# Patient Record
Sex: MALE | Race: BLACK OR AFRICAN AMERICAN | NOT HISPANIC OR LATINO
[De-identification: names, ages, dates, MRNs, and addresses within clinical notes are randomized per-mention and may not be internally consistent; named-entity substitution may affect disease eponyms.]

---

## 2017-04-12 ENCOUNTER — FORM ENCOUNTER (OUTPATIENT)
Age: 77
End: 2017-04-12

## 2017-04-13 ENCOUNTER — APPOINTMENT (OUTPATIENT)
Dept: CT IMAGING | Facility: CLINIC | Age: 77
End: 2017-04-13

## 2017-04-13 ENCOUNTER — OUTPATIENT (OUTPATIENT)
Dept: OUTPATIENT SERVICES | Facility: HOSPITAL | Age: 77
LOS: 1 days | End: 2017-04-13
Payer: MEDICARE

## 2017-04-13 DIAGNOSIS — Z00.8 ENCOUNTER FOR OTHER GENERAL EXAMINATION: ICD-10-CM

## 2017-04-13 PROCEDURE — 70490 CT SOFT TISSUE NECK W/O DYE: CPT

## 2017-04-27 ENCOUNTER — APPOINTMENT (OUTPATIENT)
Dept: SURGERY | Facility: CLINIC | Age: 77
End: 2017-04-27

## 2017-10-31 ENCOUNTER — APPOINTMENT (OUTPATIENT)
Dept: SURGERY | Facility: CLINIC | Age: 77
End: 2017-10-31
Payer: MEDICARE

## 2017-10-31 PROCEDURE — 99213 OFFICE O/P EST LOW 20 MIN: CPT

## 2017-10-31 RX ORDER — TIZANIDINE 4 MG/1
4 TABLET ORAL
Qty: 60 | Refills: 0 | Status: ACTIVE | COMMUNITY
Start: 2017-07-29

## 2017-10-31 RX ORDER — CYCLOBENZAPRINE HYDROCHLORIDE 5 MG/1
5 TABLET, FILM COATED ORAL
Qty: 30 | Refills: 0 | Status: ACTIVE | COMMUNITY
Start: 2017-07-05

## 2018-03-15 ENCOUNTER — APPOINTMENT (OUTPATIENT)
Dept: GASTROENTEROLOGY | Facility: CLINIC | Age: 78
End: 2018-03-15
Payer: MEDICARE

## 2018-03-15 VITALS
DIASTOLIC BLOOD PRESSURE: 88 MMHG | HEART RATE: 102 BPM | WEIGHT: 224 LBS | HEIGHT: 74 IN | BODY MASS INDEX: 28.75 KG/M2 | SYSTOLIC BLOOD PRESSURE: 146 MMHG

## 2018-03-15 DIAGNOSIS — R19.5 OTHER FECAL ABNORMALITIES: ICD-10-CM

## 2018-03-15 DIAGNOSIS — C67.9 MALIGNANT NEOPLASM OF BLADDER, UNSPECIFIED: ICD-10-CM

## 2018-03-15 DIAGNOSIS — R60.0 LOCALIZED EDEMA: ICD-10-CM

## 2018-03-15 DIAGNOSIS — F41.9 ANXIETY DISORDER, UNSPECIFIED: ICD-10-CM

## 2018-03-15 DIAGNOSIS — K22.70 BARRETT'S ESOPHAGUS W/OUT DYSPLASIA: ICD-10-CM

## 2018-03-15 DIAGNOSIS — Z86.010 PERSONAL HISTORY OF COLONIC POLYPS: ICD-10-CM

## 2018-03-15 DIAGNOSIS — D50.9 IRON DEFICIENCY ANEMIA, UNSPECIFIED: ICD-10-CM

## 2018-03-15 PROCEDURE — 82274 ASSAY TEST FOR BLOOD FECAL: CPT | Mod: QW

## 2018-03-15 PROCEDURE — 99214 OFFICE O/P EST MOD 30 MIN: CPT

## 2018-04-02 ENCOUNTER — FORM ENCOUNTER (OUTPATIENT)
Age: 78
End: 2018-04-02

## 2018-04-03 ENCOUNTER — OUTPATIENT (OUTPATIENT)
Dept: OUTPATIENT SERVICES | Facility: HOSPITAL | Age: 78
LOS: 1 days | End: 2018-04-03
Payer: MEDICARE

## 2018-04-03 ENCOUNTER — APPOINTMENT (OUTPATIENT)
Dept: MRI IMAGING | Facility: CLINIC | Age: 78
End: 2018-04-03
Payer: MEDICARE

## 2018-04-03 ENCOUNTER — APPOINTMENT (OUTPATIENT)
Dept: CT IMAGING | Facility: CLINIC | Age: 78
End: 2018-04-03
Payer: MEDICARE

## 2018-04-03 DIAGNOSIS — Z00.8 ENCOUNTER FOR OTHER GENERAL EXAMINATION: ICD-10-CM

## 2018-04-03 PROCEDURE — 70490 CT SOFT TISSUE NECK W/O DYE: CPT

## 2018-04-03 PROCEDURE — 70490 CT SOFT TISSUE NECK W/O DYE: CPT | Mod: 26

## 2018-04-19 ENCOUNTER — APPOINTMENT (OUTPATIENT)
Dept: SURGERY | Facility: CLINIC | Age: 78
End: 2018-04-19
Payer: MEDICARE

## 2018-04-19 PROCEDURE — 99213 OFFICE O/P EST LOW 20 MIN: CPT | Mod: 25

## 2018-04-19 PROCEDURE — 36415 COLL VENOUS BLD VENIPUNCTURE: CPT

## 2018-04-19 PROCEDURE — 31575 DIAGNOSTIC LARYNGOSCOPY: CPT

## 2018-04-19 RX ORDER — OFLOXACIN 3 MG/ML
0.3 SOLUTION/ DROPS OPHTHALMIC
Qty: 5 | Refills: 0 | Status: ACTIVE | COMMUNITY
Start: 2017-11-28

## 2018-04-19 RX ORDER — PREDNISOLONE ACETATE 10 MG/ML
1 SUSPENSION/ DROPS OPHTHALMIC
Qty: 15 | Refills: 0 | Status: ACTIVE | COMMUNITY
Start: 2017-11-28

## 2018-04-19 RX ORDER — BROMFENAC SODIUM 0.7 MG/ML
0.07 SOLUTION/ DROPS OPHTHALMIC
Qty: 3 | Refills: 0 | Status: ACTIVE | COMMUNITY
Start: 2017-11-28

## 2018-04-19 RX ORDER — TAMSULOSIN HYDROCHLORIDE 0.4 MG/1
0.4 CAPSULE ORAL
Qty: 90 | Refills: 0 | Status: ACTIVE | COMMUNITY
Start: 2018-01-11

## 2018-04-21 LAB
T3 SERPL-MCNC: 106 NG/DL
T4 FREE SERPL-MCNC: 1.1 NG/DL
THYROGLOB AB SERPL-ACNC: <20 IU/ML
THYROPEROXIDASE AB SERPL IA-ACNC: <10 IU/ML
TSH SERPL-ACNC: 0.24 UIU/ML

## 2018-04-26 ENCOUNTER — OTHER (OUTPATIENT)
Age: 78
End: 2018-04-26

## 2018-12-06 ENCOUNTER — APPOINTMENT (OUTPATIENT)
Dept: SURGERY | Facility: CLINIC | Age: 78
End: 2018-12-06
Payer: MEDICARE

## 2018-12-06 PROCEDURE — 99213 OFFICE O/P EST LOW 20 MIN: CPT | Mod: 25

## 2018-12-06 PROCEDURE — 31575 DIAGNOSTIC LARYNGOSCOPY: CPT

## 2019-06-10 ENCOUNTER — OTHER (OUTPATIENT)
Age: 79
End: 2019-06-10

## 2019-06-13 ENCOUNTER — OUTPATIENT (OUTPATIENT)
Dept: OUTPATIENT SERVICES | Facility: HOSPITAL | Age: 79
LOS: 1 days | End: 2019-06-13
Payer: MEDICARE

## 2019-06-13 ENCOUNTER — APPOINTMENT (OUTPATIENT)
Dept: CT IMAGING | Facility: CLINIC | Age: 79
End: 2019-06-13
Payer: MEDICARE

## 2019-06-13 DIAGNOSIS — E04.1 NONTOXIC SINGLE THYROID NODULE: ICD-10-CM

## 2019-06-13 PROCEDURE — 70490 CT SOFT TISSUE NECK W/O DYE: CPT

## 2019-06-13 PROCEDURE — 70490 CT SOFT TISSUE NECK W/O DYE: CPT | Mod: 26

## 2019-06-20 ENCOUNTER — APPOINTMENT (OUTPATIENT)
Dept: SURGERY | Facility: CLINIC | Age: 79
End: 2019-06-20
Payer: MEDICARE

## 2019-06-20 PROCEDURE — 99213 OFFICE O/P EST LOW 20 MIN: CPT | Mod: 25

## 2019-06-20 PROCEDURE — 31575 DIAGNOSTIC LARYNGOSCOPY: CPT

## 2019-06-20 NOTE — PHYSICAL EXAM
[de-identified] : left thyroid nodule extending substernally [Laryngoscopy Performed] : laryngoscopy was performed, see procedure section for findings [R] : deviated to the right [Normal] : orientation to person, place, and time: normal

## 2019-06-20 NOTE — ASSESSMENT
[FreeTextEntry1] : will observe  because of lack of symptoms and comorbidities and need for sternotomy to perform thyroidectomy.  . wishes to defer.  neck CT to next year. to return earlier if any change.

## 2019-06-20 NOTE — HISTORY OF PRESENT ILLNESS
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  denies dysphagia, hoarseness. no change in breathing. no changes medically since last visit. recently treated for bladder cancer.  recent CT shows no change in  thyroid.

## 2019-12-10 ENCOUNTER — RESULT REVIEW (OUTPATIENT)
Age: 79
End: 2019-12-10

## 2019-12-10 ENCOUNTER — OTHER (OUTPATIENT)
Age: 79
End: 2019-12-10

## 2019-12-16 ENCOUNTER — APPOINTMENT (OUTPATIENT)
Dept: CT IMAGING | Facility: CLINIC | Age: 79
End: 2019-12-16
Payer: MEDICARE

## 2019-12-16 ENCOUNTER — OUTPATIENT (OUTPATIENT)
Dept: OUTPATIENT SERVICES | Facility: HOSPITAL | Age: 79
LOS: 1 days | End: 2019-12-16
Payer: MEDICARE

## 2019-12-16 DIAGNOSIS — E04.1 NONTOXIC SINGLE THYROID NODULE: ICD-10-CM

## 2019-12-16 PROCEDURE — 70490 CT SOFT TISSUE NECK W/O DYE: CPT

## 2019-12-16 PROCEDURE — 70490 CT SOFT TISSUE NECK W/O DYE: CPT | Mod: 26

## 2019-12-19 ENCOUNTER — OTHER (OUTPATIENT)
Age: 79
End: 2019-12-19

## 2019-12-19 ENCOUNTER — APPOINTMENT (OUTPATIENT)
Dept: SURGERY | Facility: CLINIC | Age: 79
End: 2019-12-19
Payer: MEDICARE

## 2020-03-17 ENCOUNTER — APPOINTMENT (OUTPATIENT)
Dept: SURGERY | Facility: CLINIC | Age: 80
End: 2020-03-17
Payer: MEDICARE

## 2020-03-17 PROCEDURE — 99213 OFFICE O/P EST LOW 20 MIN: CPT

## 2020-03-17 NOTE — PHYSICAL EXAM
[de-identified] : left thyroid nodule extending substernally [Laryngoscopy Performed] : laryngoscopy was performed, see procedure section for findings [R] : deviated to the right [Normal] : orientation to person, place, and time: normal

## 2020-03-17 NOTE — ASSESSMENT
[FreeTextEntry1] : will observe  because of lack of symptoms and comorbidities and need for sternotomy to perform thyroidectomy.  . wishes to defer.  neck CT  prior to next visit. to return earlier if any change.

## 2020-03-17 NOTE — HISTORY OF PRESENT ILLNESS
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  denies dysphagia, hoarseness. no change in breathing. no changes medically since last visit.   recent CT shows no change in  thyroid.  recent TFT's reportedly  normal

## 2021-04-05 ENCOUNTER — APPOINTMENT (OUTPATIENT)
Dept: CT IMAGING | Facility: CLINIC | Age: 81
End: 2021-04-05

## 2021-04-15 ENCOUNTER — OUTPATIENT (OUTPATIENT)
Dept: OUTPATIENT SERVICES | Facility: HOSPITAL | Age: 81
LOS: 1 days | End: 2021-04-15
Payer: COMMERCIAL

## 2021-04-15 ENCOUNTER — APPOINTMENT (OUTPATIENT)
Dept: CT IMAGING | Facility: CLINIC | Age: 81
End: 2021-04-15
Payer: MEDICARE

## 2021-04-15 DIAGNOSIS — E04.1 NONTOXIC SINGLE THYROID NODULE: ICD-10-CM

## 2021-04-15 PROCEDURE — 70491 CT SOFT TISSUE NECK W/DYE: CPT

## 2021-04-15 PROCEDURE — 70491 CT SOFT TISSUE NECK W/DYE: CPT | Mod: 26

## 2021-04-15 PROCEDURE — 82565 ASSAY OF CREATININE: CPT

## 2021-05-06 ENCOUNTER — APPOINTMENT (OUTPATIENT)
Dept: SURGERY | Facility: CLINIC | Age: 81
End: 2021-05-06
Payer: MEDICARE

## 2021-05-06 PROCEDURE — 99214 OFFICE O/P EST MOD 30 MIN: CPT | Mod: 25

## 2021-05-06 PROCEDURE — 36415 COLL VENOUS BLD VENIPUNCTURE: CPT

## 2021-05-06 PROCEDURE — 99072 ADDL SUPL MATRL&STAF TM PHE: CPT

## 2021-05-06 NOTE — HISTORY OF PRESENT ILLNESS
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  denies dysphagia, hoarseness. no change in breathing. had TURBT since last visit.   recent CT shows no change in  thyroid. I have reviewed all old and new data and available images.\par

## 2021-05-06 NOTE — PHYSICAL EXAM
[de-identified] : left thyroid nodule extending substernally [Laryngoscopy Performed] : laryngoscopy was performed, see procedure section for findings [R] : deviated to the right [Normal] : orientation to person, place, and time: normal

## 2021-05-06 NOTE — ASSESSMENT
[FreeTextEntry1] : will observe  because of lack of symptoms and comorbidities and need for sternotomy to perform thyroidectomy.  . wishes to defer.  neck CT  prior to next visit. to return earlier if any change.  bloods drawn. to call next week for results. patient has been given the opportunity to ask questions, and all of the patient's questions have been answered to their satisfaction\par

## 2021-05-07 LAB
T3 SERPL-MCNC: 101 NG/DL
T4 FREE SERPL-MCNC: 1 NG/DL
TSH SERPL-ACNC: 0.28 UIU/ML

## 2021-05-10 LAB
THYROGLOB AB SERPL-ACNC: <20 IU/ML
THYROPEROXIDASE AB SERPL IA-ACNC: 18.5 IU/ML

## 2021-05-11 ENCOUNTER — NON-APPOINTMENT (OUTPATIENT)
Age: 81
End: 2021-05-11

## 2022-04-27 ENCOUNTER — APPOINTMENT (OUTPATIENT)
Dept: CT IMAGING | Facility: CLINIC | Age: 82
End: 2022-04-27
Payer: MEDICARE

## 2022-04-27 ENCOUNTER — OUTPATIENT (OUTPATIENT)
Dept: OUTPATIENT SERVICES | Facility: HOSPITAL | Age: 82
LOS: 1 days | End: 2022-04-27
Payer: COMMERCIAL

## 2022-04-27 DIAGNOSIS — E04.1 NONTOXIC SINGLE THYROID NODULE: ICD-10-CM

## 2022-04-27 PROCEDURE — 70490 CT SOFT TISSUE NECK W/O DYE: CPT

## 2022-04-27 PROCEDURE — 70490 CT SOFT TISSUE NECK W/O DYE: CPT | Mod: 26

## 2022-05-05 ENCOUNTER — APPOINTMENT (OUTPATIENT)
Dept: SURGERY | Facility: CLINIC | Age: 82
End: 2022-05-05
Payer: SELF-PAY

## 2022-05-05 DIAGNOSIS — Z00.00 ENCOUNTER FOR GENERAL ADULT MEDICAL EXAMINATION W/OUT ABNORMAL FINDINGS: ICD-10-CM

## 2022-05-05 PROCEDURE — 99214 OFFICE O/P EST MOD 30 MIN: CPT | Mod: 25

## 2022-05-05 NOTE — PHYSICAL EXAM
[de-identified] : left thyroid nodule extending substernally [Laryngoscopy Performed] : laryngoscopy was performed, see procedure section for findings [R] : deviated to the right [Normal] : orientation to person, place, and time: normal

## 2022-05-05 NOTE — HISTORY OF PRESENT ILLNESS
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  denies dysphagia, hoarseness. no change in breathing.  now being treated for bladder cancer. .   recent CT shows no change in  thyroid. I have reviewed all old and new data and available images.\par

## 2022-05-06 LAB
T3 SERPL-MCNC: 109 NG/DL
T4 FREE SERPL-MCNC: 1.2 NG/DL
THYROGLOB AB SERPL-ACNC: <20 IU/ML
THYROPEROXIDASE AB SERPL IA-ACNC: <10 IU/ML
TSH SERPL-ACNC: 0.42 UIU/ML

## 2022-05-09 ENCOUNTER — NON-APPOINTMENT (OUTPATIENT)
Age: 82
End: 2022-05-09

## 2023-01-01 ENCOUNTER — NON-APPOINTMENT (OUTPATIENT)
Age: 83
End: 2023-01-01

## 2023-01-01 ENCOUNTER — APPOINTMENT (OUTPATIENT)
Dept: SURGERY | Facility: CLINIC | Age: 83
End: 2023-01-01
Payer: MEDICARE

## 2023-01-01 ENCOUNTER — APPOINTMENT (OUTPATIENT)
Dept: CT IMAGING | Facility: CLINIC | Age: 83
End: 2023-01-01

## 2023-01-01 ENCOUNTER — OUTPATIENT (OUTPATIENT)
Dept: OUTPATIENT SERVICES | Facility: HOSPITAL | Age: 83
LOS: 1 days | End: 2023-01-01
Payer: COMMERCIAL

## 2023-01-01 DIAGNOSIS — E04.1 NONTOXIC SINGLE THYROID NODULE: ICD-10-CM

## 2023-01-01 DIAGNOSIS — Z00.00 ENCOUNTER FOR GENERAL ADULT MEDICAL EXAMINATION WITHOUT ABNORMAL FINDINGS: ICD-10-CM

## 2023-01-01 LAB
T3 SERPL-MCNC: 91 NG/DL
T4 FREE SERPL-MCNC: 1.1 NG/DL
THYROGLOB AB SERPL-ACNC: <20 IU/ML
THYROPEROXIDASE AB SERPL IA-ACNC: 15.5 IU/ML
TSH SERPL-ACNC: 0.24 UIU/ML

## 2023-01-01 PROCEDURE — 70490 CT SOFT TISSUE NECK W/O DYE: CPT | Mod: 26

## 2023-01-01 PROCEDURE — 70490 CT SOFT TISSUE NECK W/O DYE: CPT

## 2023-01-01 PROCEDURE — 99214 OFFICE O/P EST MOD 30 MIN: CPT | Mod: 25

## 2023-08-22 NOTE — HISTORY OF PRESENT ILLNESS
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  denies dysphagia, hoarseness. no change in breathing.  now being treated for bladder cancer and cervical spine disease.   recent CT shows no change in  thyroid. I have reviewed all old and new data and available images.

## 2023-08-22 NOTE — PHYSICAL EXAM
[de-identified] : left thyroid nodule extending substernally [Laryngoscopy Performed] : laryngoscopy was performed, see procedure section for findings [R] : deviated to the right [Normal] : orientation to person, place, and time: normal

## 2024-01-01 ENCOUNTER — TRANSCRIPTION ENCOUNTER (OUTPATIENT)
Age: 84
End: 2024-01-01

## 2024-01-01 ENCOUNTER — INPATIENT (INPATIENT)
Facility: HOSPITAL | Age: 84
LOS: 8 days | End: 2024-04-07
Attending: INTERNAL MEDICINE | Admitting: INTERNAL MEDICINE
Payer: MEDICARE

## 2024-01-01 ENCOUNTER — NON-APPOINTMENT (OUTPATIENT)
Age: 84
End: 2024-01-01

## 2024-01-01 ENCOUNTER — RESULT REVIEW (OUTPATIENT)
Age: 84
End: 2024-01-01

## 2024-01-01 ENCOUNTER — INPATIENT (INPATIENT)
Facility: HOSPITAL | Age: 84
LOS: 7 days | Discharge: NURSING, REHAB OR LONG-TERM CARE | End: 2024-03-08
Attending: HOSPITALIST | Admitting: HOSPITALIST
Payer: MEDICARE

## 2024-01-01 VITALS
HEART RATE: 101 BPM | SYSTOLIC BLOOD PRESSURE: 108 MMHG | OXYGEN SATURATION: 100 % | DIASTOLIC BLOOD PRESSURE: 60 MMHG | RESPIRATION RATE: 18 BRPM | TEMPERATURE: 99 F

## 2024-01-01 VITALS
HEART RATE: 92 BPM | SYSTOLIC BLOOD PRESSURE: 89 MMHG | DIASTOLIC BLOOD PRESSURE: 69 MMHG | OXYGEN SATURATION: 96 % | RESPIRATION RATE: 22 BRPM | TEMPERATURE: 98 F

## 2024-01-01 VITALS
DIASTOLIC BLOOD PRESSURE: 61 MMHG | OXYGEN SATURATION: 86 % | HEART RATE: 172 BPM | RESPIRATION RATE: 20 BRPM | SYSTOLIC BLOOD PRESSURE: 98 MMHG | TEMPERATURE: 97 F

## 2024-01-01 VITALS — WEIGHT: 131.4 LBS | HEIGHT: 74 IN

## 2024-01-01 DIAGNOSIS — N17.9 ACUTE KIDNEY FAILURE, UNSPECIFIED: ICD-10-CM

## 2024-01-01 DIAGNOSIS — Z98.890 OTHER SPECIFIED POSTPROCEDURAL STATES: Chronic | ICD-10-CM

## 2024-01-01 DIAGNOSIS — I26.99 OTHER PULMONARY EMBOLISM WITHOUT ACUTE COR PULMONALE: ICD-10-CM

## 2024-01-01 DIAGNOSIS — E04.9 NONTOXIC GOITER, UNSPECIFIED: ICD-10-CM

## 2024-01-01 DIAGNOSIS — S31.000A UNSPECIFIED OPEN WOUND OF LOWER BACK AND PELVIS WITHOUT PENETRATION INTO RETROPERITONEUM, INITIAL ENCOUNTER: ICD-10-CM

## 2024-01-01 DIAGNOSIS — I80.10 PHLEBITIS AND THROMBOPHLEBITIS OF UNSPECIFIED FEMORAL VEIN: ICD-10-CM

## 2024-01-01 DIAGNOSIS — A41.9 SEPSIS, UNSPECIFIED ORGANISM: ICD-10-CM

## 2024-01-01 DIAGNOSIS — R65.10 SYSTEMIC INFLAMMATORY RESPONSE SYNDROME (SIRS) OF NON-INFECTIOUS ORIGIN WITHOUT ACUTE ORGAN DYSFUNCTION: ICD-10-CM

## 2024-01-01 DIAGNOSIS — C67.9 MALIGNANT NEOPLASM OF BLADDER, UNSPECIFIED: ICD-10-CM

## 2024-01-01 DIAGNOSIS — L89.154 PRESSURE ULCER OF SACRAL REGION, STAGE 4: ICD-10-CM

## 2024-01-01 DIAGNOSIS — D62 ACUTE POSTHEMORRHAGIC ANEMIA: ICD-10-CM

## 2024-01-01 DIAGNOSIS — R57.1 HYPOVOLEMIC SHOCK: ICD-10-CM

## 2024-01-01 DIAGNOSIS — Z29.9 ENCOUNTER FOR PROPHYLACTIC MEASURES, UNSPECIFIED: ICD-10-CM

## 2024-01-01 DIAGNOSIS — F41.9 ANXIETY DISORDER, UNSPECIFIED: ICD-10-CM

## 2024-01-01 DIAGNOSIS — Z91.89 OTHER SPECIFIED PERSONAL RISK FACTORS, NOT ELSEWHERE CLASSIFIED: ICD-10-CM

## 2024-01-01 DIAGNOSIS — I82.409 ACUTE EMBOLISM AND THROMBOSIS OF UNSPECIFIED DEEP VEINS OF UNSPECIFIED LOWER EXTREMITY: ICD-10-CM

## 2024-01-01 DIAGNOSIS — D64.9 ANEMIA, UNSPECIFIED: ICD-10-CM

## 2024-01-01 DIAGNOSIS — Z71.89 OTHER SPECIFIED COUNSELING: ICD-10-CM

## 2024-01-01 DIAGNOSIS — E11.9 TYPE 2 DIABETES MELLITUS WITHOUT COMPLICATIONS: ICD-10-CM

## 2024-01-01 DIAGNOSIS — Z96.643 PRESENCE OF ARTIFICIAL HIP JOINT, BILATERAL: Chronic | ICD-10-CM

## 2024-01-01 DIAGNOSIS — Z98.61 CORONARY ANGIOPLASTY STATUS: Chronic | ICD-10-CM

## 2024-01-01 DIAGNOSIS — I25.10 ATHEROSCLEROTIC HEART DISEASE OF NATIVE CORONARY ARTERY WITHOUT ANGINA PECTORIS: ICD-10-CM

## 2024-01-01 DIAGNOSIS — R06.00 DYSPNEA, UNSPECIFIED: ICD-10-CM

## 2024-01-01 DIAGNOSIS — I10 ESSENTIAL (PRIMARY) HYPERTENSION: ICD-10-CM

## 2024-01-01 DIAGNOSIS — N40.0 BENIGN PROSTATIC HYPERPLASIA WITHOUT LOWER URINARY TRACT SYMPTOMS: ICD-10-CM

## 2024-01-01 DIAGNOSIS — Z51.5 ENCOUNTER FOR PALLIATIVE CARE: ICD-10-CM

## 2024-01-01 LAB
-  AMPICILLIN/SULBACTAM: SIGNIFICANT CHANGE UP
-  AMPICILLIN: SIGNIFICANT CHANGE UP
-  AZTREONAM: SIGNIFICANT CHANGE UP
-  BACTEROIDES FRAGILIS: SIGNIFICANT CHANGE UP
-  CEFAZOLIN: SIGNIFICANT CHANGE UP
-  CEFEPIME: SIGNIFICANT CHANGE UP
-  CEFOXITIN: SIGNIFICANT CHANGE UP
-  CEFOXITIN: SIGNIFICANT CHANGE UP
-  CEFTRIAXONE: SIGNIFICANT CHANGE UP
-  CIPROFLOXACIN: SIGNIFICANT CHANGE UP
-  CTX-M RESISTANCE MARKER: SIGNIFICANT CHANGE UP
-  DAPTOMYCIN: SIGNIFICANT CHANGE UP
-  DAPTOMYCIN: SIGNIFICANT CHANGE UP
-  ERTAPENEM: SIGNIFICANT CHANGE UP
-  ESBL: SIGNIFICANT CHANGE UP
-  GENTAMICIN SYNERGY: SIGNIFICANT CHANGE UP
-  GENTAMICIN: SIGNIFICANT CHANGE UP
-  IMIPENEM: SIGNIFICANT CHANGE UP
-  IMIPENEM: SIGNIFICANT CHANGE UP
-  K. PNEUMONIAE GROUP: SIGNIFICANT CHANGE UP
-  LEVOFLOXACIN: SIGNIFICANT CHANGE UP
-  LINEZOLID: SIGNIFICANT CHANGE UP
-  LINEZOLID: SIGNIFICANT CHANGE UP
-  MEROPENEM: SIGNIFICANT CHANGE UP
-  PIPERACILLIN/TAZOBACTAM: SIGNIFICANT CHANGE UP
-  STREPTOMYCIN SYNERGY: SIGNIFICANT CHANGE UP
-  TOBRAMYCIN: SIGNIFICANT CHANGE UP
-  TRIMETHOPRIM/SULFAMETHOXAZOLE: SIGNIFICANT CHANGE UP
-  VANCOMYCIN: SIGNIFICANT CHANGE UP
A1C WITH ESTIMATED AVERAGE GLUCOSE RESULT: 5.9 % — HIGH (ref 4–5.6)
ADD ON TEST-SPECIMEN IN LAB: SIGNIFICANT CHANGE UP
ALBUMIN SERPL ELPH-MCNC: 1.2 G/DL — LOW (ref 3.3–5)
ALBUMIN SERPL ELPH-MCNC: 1.3 G/DL — LOW (ref 3.3–5)
ALBUMIN SERPL ELPH-MCNC: 1.3 G/DL — LOW (ref 3.3–5)
ALBUMIN SERPL ELPH-MCNC: 1.6 G/DL — LOW (ref 3.3–5)
ALBUMIN SERPL ELPH-MCNC: 1.9 G/DL — LOW (ref 3.3–5)
ALBUMIN SERPL ELPH-MCNC: 1.9 G/DL — LOW (ref 3.3–5)
ALBUMIN SERPL ELPH-MCNC: 2 G/DL — LOW (ref 3.3–5)
ALBUMIN SERPL ELPH-MCNC: 2.1 G/DL — LOW (ref 3.3–5)
ALBUMIN SERPL ELPH-MCNC: 2.1 G/DL — LOW (ref 3.3–5)
ALP SERPL-CCNC: 120 U/L — SIGNIFICANT CHANGE UP (ref 40–120)
ALP SERPL-CCNC: 126 U/L — HIGH (ref 40–120)
ALP SERPL-CCNC: 144 U/L — HIGH (ref 40–120)
ALP SERPL-CCNC: 168 U/L — HIGH (ref 40–120)
ALP SERPL-CCNC: 187 U/L — HIGH (ref 40–120)
ALP SERPL-CCNC: 372 U/L — HIGH (ref 40–120)
ALP SERPL-CCNC: 400 U/L — HIGH (ref 40–120)
ALP SERPL-CCNC: 481 U/L — HIGH (ref 40–120)
ALP SERPL-CCNC: 495 U/L — HIGH (ref 40–120)
ALP SERPL-CCNC: 540 U/L — HIGH (ref 40–120)
ALP SERPL-CCNC: 573 U/L — HIGH (ref 40–120)
ALT FLD-CCNC: 11 U/L — SIGNIFICANT CHANGE UP (ref 4–41)
ALT FLD-CCNC: 12 U/L — SIGNIFICANT CHANGE UP (ref 4–41)
ALT FLD-CCNC: 14 U/L — SIGNIFICANT CHANGE UP (ref 4–41)
ALT FLD-CCNC: 15 U/L — SIGNIFICANT CHANGE UP (ref 4–41)
ALT FLD-CCNC: 15 U/L — SIGNIFICANT CHANGE UP (ref 4–41)
ALT FLD-CCNC: 16 U/L — SIGNIFICANT CHANGE UP (ref 4–41)
ALT FLD-CCNC: 18 U/L — SIGNIFICANT CHANGE UP (ref 4–41)
ALT FLD-CCNC: 18 U/L — SIGNIFICANT CHANGE UP (ref 4–41)
ALT FLD-CCNC: 19 U/L — SIGNIFICANT CHANGE UP (ref 4–41)
ANION GAP SERPL CALC-SCNC: 10 MMOL/L — SIGNIFICANT CHANGE UP (ref 7–14)
ANION GAP SERPL CALC-SCNC: 10 MMOL/L — SIGNIFICANT CHANGE UP (ref 7–14)
ANION GAP SERPL CALC-SCNC: 11 MMOL/L — SIGNIFICANT CHANGE UP (ref 7–14)
ANION GAP SERPL CALC-SCNC: 12 MMOL/L — SIGNIFICANT CHANGE UP (ref 7–14)
ANION GAP SERPL CALC-SCNC: 13 MMOL/L — SIGNIFICANT CHANGE UP (ref 7–14)
ANION GAP SERPL CALC-SCNC: 14 MMOL/L — SIGNIFICANT CHANGE UP (ref 7–14)
ANION GAP SERPL CALC-SCNC: 15 MMOL/L — HIGH (ref 7–14)
ANION GAP SERPL CALC-SCNC: 16 MMOL/L — HIGH (ref 7–14)
ANION GAP SERPL CALC-SCNC: 9 MMOL/L — SIGNIFICANT CHANGE UP (ref 7–14)
ANISOCYTOSIS BLD QL: SIGNIFICANT CHANGE UP
APPEARANCE UR: ABNORMAL
APTT BLD: 104.3 SEC — HIGH (ref 24.5–35.6)
APTT BLD: 104.9 SEC — HIGH (ref 24.5–35.6)
APTT BLD: 114.7 SEC — HIGH (ref 24.5–35.6)
APTT BLD: 26.3 SEC — SIGNIFICANT CHANGE UP (ref 24.5–35.6)
APTT BLD: 27 SEC — SIGNIFICANT CHANGE UP (ref 24.5–35.6)
APTT BLD: 29.3 SEC — SIGNIFICANT CHANGE UP (ref 24.5–35.6)
APTT BLD: 64.5 SEC — HIGH (ref 24.5–35.6)
APTT BLD: 95.4 SEC — HIGH (ref 24.5–35.6)
APTT BLD: 96.8 SEC — HIGH (ref 24.5–35.6)
AST SERPL-CCNC: 11 U/L — SIGNIFICANT CHANGE UP (ref 4–40)
AST SERPL-CCNC: 13 U/L — SIGNIFICANT CHANGE UP (ref 4–40)
AST SERPL-CCNC: 13 U/L — SIGNIFICANT CHANGE UP (ref 4–40)
AST SERPL-CCNC: 14 U/L — SIGNIFICANT CHANGE UP (ref 4–40)
AST SERPL-CCNC: 16 U/L — SIGNIFICANT CHANGE UP (ref 4–40)
AST SERPL-CCNC: 17 U/L — SIGNIFICANT CHANGE UP (ref 4–40)
AST SERPL-CCNC: 18 U/L — SIGNIFICANT CHANGE UP (ref 4–40)
AST SERPL-CCNC: 20 U/L — SIGNIFICANT CHANGE UP (ref 4–40)
AST SERPL-CCNC: 24 U/L — SIGNIFICANT CHANGE UP (ref 4–40)
AST SERPL-CCNC: 26 U/L — SIGNIFICANT CHANGE UP (ref 4–40)
AST SERPL-CCNC: 31 U/L — SIGNIFICANT CHANGE UP (ref 4–40)
BASE EXCESS BLDA CALC-SCNC: -12.3 MMOL/L — LOW (ref -2–3)
BASE EXCESS BLDV CALC-SCNC: -10.8 MMOL/L — LOW (ref -2–3)
BASE EXCESS BLDV CALC-SCNC: -11.6 MMOL/L — LOW (ref -2–3)
BASE EXCESS BLDV CALC-SCNC: -13.3 MMOL/L — LOW (ref -2–3)
BASE EXCESS BLDV CALC-SCNC: -14.3 MMOL/L — LOW (ref -2–3)
BASE EXCESS BLDV CALC-SCNC: -8.4 MMOL/L — LOW (ref -2–3)
BASOPHILS # BLD AUTO: 0 K/UL — SIGNIFICANT CHANGE UP (ref 0–0.2)
BASOPHILS # BLD AUTO: 0 K/UL — SIGNIFICANT CHANGE UP (ref 0–0.2)
BASOPHILS # BLD AUTO: 0.01 K/UL — SIGNIFICANT CHANGE UP (ref 0–0.2)
BASOPHILS # BLD AUTO: 0.02 K/UL — SIGNIFICANT CHANGE UP (ref 0–0.2)
BASOPHILS # BLD AUTO: 0.05 K/UL — SIGNIFICANT CHANGE UP (ref 0–0.2)
BASOPHILS NFR BLD AUTO: 0 % — SIGNIFICANT CHANGE UP (ref 0–2)
BASOPHILS NFR BLD AUTO: 0 % — SIGNIFICANT CHANGE UP (ref 0–2)
BASOPHILS NFR BLD AUTO: 0.1 % — SIGNIFICANT CHANGE UP (ref 0–2)
BASOPHILS NFR BLD AUTO: 0.1 % — SIGNIFICANT CHANGE UP (ref 0–2)
BASOPHILS NFR BLD AUTO: 0.2 % — SIGNIFICANT CHANGE UP (ref 0–2)
BILIRUB DIRECT SERPL-MCNC: 0.2 MG/DL — SIGNIFICANT CHANGE UP (ref 0–0.3)
BILIRUB SERPL-MCNC: 0.2 MG/DL — SIGNIFICANT CHANGE UP (ref 0.2–1.2)
BILIRUB SERPL-MCNC: 0.4 MG/DL — SIGNIFICANT CHANGE UP (ref 0.2–1.2)
BILIRUB SERPL-MCNC: 0.5 MG/DL — SIGNIFICANT CHANGE UP (ref 0.2–1.2)
BILIRUB SERPL-MCNC: <0.2 MG/DL — SIGNIFICANT CHANGE UP (ref 0.2–1.2)
BILIRUB SERPL-MCNC: <0.2 MG/DL — SIGNIFICANT CHANGE UP (ref 0.2–1.2)
BILIRUB UR-MCNC: NEGATIVE — SIGNIFICANT CHANGE UP
BLD GP AB SCN SERPL QL: POSITIVE — SIGNIFICANT CHANGE UP
BLOOD GAS ARTERIAL - LYTES,HGB,ICA,LACT RESULT: SIGNIFICANT CHANGE UP
BLOOD GAS ARTERIAL COMPREHENSIVE RESULT: SIGNIFICANT CHANGE UP
BLOOD GAS ARTERIAL COMPREHENSIVE RESULT: SIGNIFICANT CHANGE UP
BLOOD GAS VENOUS COMPREHENSIVE RESULT: SIGNIFICANT CHANGE UP
BUN SERPL-MCNC: 24 MG/DL — HIGH (ref 7–23)
BUN SERPL-MCNC: 26 MG/DL — HIGH (ref 7–23)
BUN SERPL-MCNC: 30 MG/DL — HIGH (ref 7–23)
BUN SERPL-MCNC: 34 MG/DL — HIGH (ref 7–23)
BUN SERPL-MCNC: 44 MG/DL — HIGH (ref 7–23)
BUN SERPL-MCNC: 53 MG/DL — HIGH (ref 7–23)
BUN SERPL-MCNC: 55 MG/DL — HIGH (ref 7–23)
BUN SERPL-MCNC: 55 MG/DL — HIGH (ref 7–23)
BUN SERPL-MCNC: 56 MG/DL — HIGH (ref 7–23)
BUN SERPL-MCNC: 56 MG/DL — HIGH (ref 7–23)
BUN SERPL-MCNC: 57 MG/DL — HIGH (ref 7–23)
BUN SERPL-MCNC: 58 MG/DL — HIGH (ref 7–23)
BUN SERPL-MCNC: 62 MG/DL — HIGH (ref 7–23)
BUN SERPL-MCNC: 74 MG/DL — HIGH (ref 7–23)
BURR CELLS BLD QL SMEAR: PRESENT — SIGNIFICANT CHANGE UP
BURR CELLS BLD QL SMEAR: SLIGHT — SIGNIFICANT CHANGE UP
CALCIUM SERPL-MCNC: 6.4 MG/DL — CRITICAL LOW (ref 8.4–10.5)
CALCIUM SERPL-MCNC: 6.5 MG/DL — CRITICAL LOW (ref 8.4–10.5)
CALCIUM SERPL-MCNC: 6.6 MG/DL — LOW (ref 8.4–10.5)
CALCIUM SERPL-MCNC: 6.7 MG/DL — LOW (ref 8.4–10.5)
CALCIUM SERPL-MCNC: 7.2 MG/DL — LOW (ref 8.4–10.5)
CALCIUM SERPL-MCNC: 7.7 MG/DL — LOW (ref 8.4–10.5)
CALCIUM SERPL-MCNC: 7.8 MG/DL — LOW (ref 8.4–10.5)
CALCIUM SERPL-MCNC: 7.9 MG/DL — LOW (ref 8.4–10.5)
CALCIUM SERPL-MCNC: 7.9 MG/DL — LOW (ref 8.4–10.5)
CALCIUM SERPL-MCNC: 8 MG/DL — LOW (ref 8.4–10.5)
CALCIUM SERPL-MCNC: 8.2 MG/DL — LOW (ref 8.4–10.5)
CHLORIDE BLDV-SCNC: 111 MMOL/L — HIGH (ref 96–108)
CHLORIDE BLDV-SCNC: 115 MMOL/L — HIGH (ref 96–108)
CHLORIDE BLDV-SCNC: 115 MMOL/L — HIGH (ref 96–108)
CHLORIDE BLDV-SCNC: 116 MMOL/L — HIGH (ref 96–108)
CHLORIDE BLDV-SCNC: 117 MMOL/L — HIGH (ref 96–108)
CHLORIDE SERPL-SCNC: 111 MMOL/L — HIGH (ref 98–107)
CHLORIDE SERPL-SCNC: 112 MMOL/L — HIGH (ref 98–107)
CHLORIDE SERPL-SCNC: 112 MMOL/L — HIGH (ref 98–107)
CHLORIDE SERPL-SCNC: 113 MMOL/L — HIGH (ref 98–107)
CHLORIDE SERPL-SCNC: 114 MMOL/L — HIGH (ref 98–107)
CHLORIDE SERPL-SCNC: 114 MMOL/L — HIGH (ref 98–107)
CHLORIDE SERPL-SCNC: 115 MMOL/L — HIGH (ref 98–107)
CHLORIDE SERPL-SCNC: 116 MMOL/L — HIGH (ref 98–107)
CHLORIDE SERPL-SCNC: 117 MMOL/L — HIGH (ref 98–107)
CHLORIDE SERPL-SCNC: 120 MMOL/L — HIGH (ref 98–107)
CO2 BLDA-SCNC: 11 MMOL/L — LOW (ref 19–24)
CO2 BLDV-SCNC: 11.5 MMOL/L — LOW (ref 22–26)
CO2 BLDV-SCNC: 13 MMOL/L — LOW (ref 22–26)
CO2 BLDV-SCNC: 15 MMOL/L — LOW (ref 22–26)
CO2 BLDV-SCNC: 16 MMOL/L — LOW (ref 22–26)
CO2 BLDV-SCNC: 18.1 MMOL/L — LOW (ref 22–26)
CO2 SERPL-SCNC: 10 MMOL/L — CRITICAL LOW (ref 22–31)
CO2 SERPL-SCNC: 10 MMOL/L — CRITICAL LOW (ref 22–31)
CO2 SERPL-SCNC: 11 MMOL/L — LOW (ref 22–31)
CO2 SERPL-SCNC: 12 MMOL/L — LOW (ref 22–31)
CO2 SERPL-SCNC: 13 MMOL/L — LOW (ref 22–31)
CO2 SERPL-SCNC: 14 MMOL/L — LOW (ref 22–31)
CO2 SERPL-SCNC: 15 MMOL/L — LOW (ref 22–31)
CO2 SERPL-SCNC: 16 MMOL/L — LOW (ref 22–31)
CO2 SERPL-SCNC: 17 MMOL/L — LOW (ref 22–31)
COLOR SPEC: YELLOW — SIGNIFICANT CHANGE UP
CREAT SERPL-MCNC: 0.8 MG/DL — SIGNIFICANT CHANGE UP (ref 0.5–1.3)
CREAT SERPL-MCNC: 0.99 MG/DL — SIGNIFICANT CHANGE UP (ref 0.5–1.3)
CREAT SERPL-MCNC: 1.02 MG/DL — SIGNIFICANT CHANGE UP (ref 0.5–1.3)
CREAT SERPL-MCNC: 1.02 MG/DL — SIGNIFICANT CHANGE UP (ref 0.5–1.3)
CREAT SERPL-MCNC: 1.04 MG/DL — SIGNIFICANT CHANGE UP (ref 0.5–1.3)
CREAT SERPL-MCNC: 1.09 MG/DL — SIGNIFICANT CHANGE UP (ref 0.5–1.3)
CREAT SERPL-MCNC: 1.11 MG/DL — SIGNIFICANT CHANGE UP (ref 0.5–1.3)
CREAT SERPL-MCNC: 1.12 MG/DL — SIGNIFICANT CHANGE UP (ref 0.5–1.3)
CREAT SERPL-MCNC: 1.21 MG/DL — SIGNIFICANT CHANGE UP (ref 0.5–1.3)
CREAT SERPL-MCNC: 1.27 MG/DL — SIGNIFICANT CHANGE UP (ref 0.5–1.3)
CREAT SERPL-MCNC: 1.29 MG/DL — SIGNIFICANT CHANGE UP (ref 0.5–1.3)
CREAT SERPL-MCNC: 1.32 MG/DL — HIGH (ref 0.5–1.3)
CREAT SERPL-MCNC: 1.43 MG/DL — HIGH (ref 0.5–1.3)
CREAT SERPL-MCNC: 1.67 MG/DL — HIGH (ref 0.5–1.3)
CRP SERPL-MCNC: 72.8 MG/L — HIGH
CULTURE RESULTS: ABNORMAL
CULTURE RESULTS: SIGNIFICANT CHANGE UP
DIFF PNL FLD: ABNORMAL
E COLI DNA BLD POS QL NAA+NON-PROBE: SIGNIFICANT CHANGE UP
E FAECALIS DNA BLD POS QL NAA+NON-PROBE: SIGNIFICANT CHANGE UP
E FAECIUM DNA BLD POS QL NAA+NON-PROBE: SIGNIFICANT CHANGE UP
EGFR: 40 ML/MIN/1.73M2 — LOW
EGFR: 48 ML/MIN/1.73M2 — LOW
EGFR: 53 ML/MIN/1.73M2 — LOW
EGFR: 55 ML/MIN/1.73M2 — LOW
EGFR: 56 ML/MIN/1.73M2 — LOW
EGFR: 59 ML/MIN/1.73M2 — LOW
EGFR: 65 ML/MIN/1.73M2 — SIGNIFICANT CHANGE UP
EGFR: 65 ML/MIN/1.73M2 — SIGNIFICANT CHANGE UP
EGFR: 67 ML/MIN/1.73M2 — SIGNIFICANT CHANGE UP
EGFR: 71 ML/MIN/1.73M2 — SIGNIFICANT CHANGE UP
EGFR: 72 ML/MIN/1.73M2 — SIGNIFICANT CHANGE UP
EGFR: 72 ML/MIN/1.73M2 — SIGNIFICANT CHANGE UP
EGFR: 75 ML/MIN/1.73M2 — SIGNIFICANT CHANGE UP
EGFR: 87 ML/MIN/1.73M2 — SIGNIFICANT CHANGE UP
EOSINOPHIL # BLD AUTO: 0 K/UL — SIGNIFICANT CHANGE UP (ref 0–0.5)
EOSINOPHIL # BLD AUTO: 0.02 K/UL — SIGNIFICANT CHANGE UP (ref 0–0.5)
EOSINOPHIL # BLD AUTO: 0.17 K/UL — SIGNIFICANT CHANGE UP (ref 0–0.5)
EOSINOPHIL NFR BLD AUTO: 0 % — SIGNIFICANT CHANGE UP (ref 0–6)
EOSINOPHIL NFR BLD AUTO: 0.1 % — SIGNIFICANT CHANGE UP (ref 0–6)
EOSINOPHIL NFR BLD AUTO: 1.1 % — SIGNIFICANT CHANGE UP (ref 0–6)
ESTIMATED AVERAGE GLUCOSE: 123 — SIGNIFICANT CHANGE UP
FOLATE SERPL-MCNC: >20 NG/ML — HIGH (ref 3.1–17.5)
GAS PNL BLDV: 138 MMOL/L — SIGNIFICANT CHANGE UP (ref 136–145)
GAS PNL BLDV: 138 MMOL/L — SIGNIFICANT CHANGE UP (ref 136–145)
GAS PNL BLDV: 139 MMOL/L — SIGNIFICANT CHANGE UP (ref 136–145)
GAS PNL BLDV: SIGNIFICANT CHANGE UP
GAS PNL BLDV: SIGNIFICANT CHANGE UP
GIANT PLATELETS BLD QL SMEAR: PRESENT — SIGNIFICANT CHANGE UP
GLUCOSE BLDC GLUCOMTR-MCNC: 100 MG/DL — HIGH (ref 70–99)
GLUCOSE BLDC GLUCOMTR-MCNC: 101 MG/DL — HIGH (ref 70–99)
GLUCOSE BLDC GLUCOMTR-MCNC: 105 MG/DL — HIGH (ref 70–99)
GLUCOSE BLDC GLUCOMTR-MCNC: 122 MG/DL — HIGH (ref 70–99)
GLUCOSE BLDC GLUCOMTR-MCNC: 125 MG/DL — HIGH (ref 70–99)
GLUCOSE BLDC GLUCOMTR-MCNC: 132 MG/DL — HIGH (ref 70–99)
GLUCOSE BLDC GLUCOMTR-MCNC: 134 MG/DL — HIGH (ref 70–99)
GLUCOSE BLDC GLUCOMTR-MCNC: 134 MG/DL — HIGH (ref 70–99)
GLUCOSE BLDC GLUCOMTR-MCNC: 137 MG/DL — HIGH (ref 70–99)
GLUCOSE BLDC GLUCOMTR-MCNC: 139 MG/DL — HIGH (ref 70–99)
GLUCOSE BLDC GLUCOMTR-MCNC: 148 MG/DL — HIGH (ref 70–99)
GLUCOSE BLDC GLUCOMTR-MCNC: 152 MG/DL — HIGH (ref 70–99)
GLUCOSE BLDC GLUCOMTR-MCNC: 153 MG/DL — HIGH (ref 70–99)
GLUCOSE BLDC GLUCOMTR-MCNC: 157 MG/DL — HIGH (ref 70–99)
GLUCOSE BLDC GLUCOMTR-MCNC: 159 MG/DL — HIGH (ref 70–99)
GLUCOSE BLDC GLUCOMTR-MCNC: 164 MG/DL — HIGH (ref 70–99)
GLUCOSE BLDC GLUCOMTR-MCNC: 175 MG/DL — HIGH (ref 70–99)
GLUCOSE BLDC GLUCOMTR-MCNC: 178 MG/DL — HIGH (ref 70–99)
GLUCOSE BLDC GLUCOMTR-MCNC: 180 MG/DL — HIGH (ref 70–99)
GLUCOSE BLDC GLUCOMTR-MCNC: 187 MG/DL — HIGH (ref 70–99)
GLUCOSE BLDC GLUCOMTR-MCNC: 192 MG/DL — HIGH (ref 70–99)
GLUCOSE BLDC GLUCOMTR-MCNC: 199 MG/DL — HIGH (ref 70–99)
GLUCOSE BLDC GLUCOMTR-MCNC: 202 MG/DL — HIGH (ref 70–99)
GLUCOSE BLDC GLUCOMTR-MCNC: 212 MG/DL — HIGH (ref 70–99)
GLUCOSE BLDC GLUCOMTR-MCNC: 88 MG/DL — SIGNIFICANT CHANGE UP (ref 70–99)
GLUCOSE BLDV-MCNC: 111 MG/DL — HIGH (ref 70–99)
GLUCOSE BLDV-MCNC: 138 MG/DL — HIGH (ref 70–99)
GLUCOSE BLDV-MCNC: 142 MG/DL — HIGH (ref 70–99)
GLUCOSE BLDV-MCNC: 148 MG/DL — HIGH (ref 70–99)
GLUCOSE BLDV-MCNC: 179 MG/DL — HIGH (ref 70–99)
GLUCOSE SERPL-MCNC: 120 MG/DL — HIGH (ref 70–99)
GLUCOSE SERPL-MCNC: 150 MG/DL — HIGH (ref 70–99)
GLUCOSE SERPL-MCNC: 156 MG/DL — HIGH (ref 70–99)
GLUCOSE SERPL-MCNC: 157 MG/DL — HIGH (ref 70–99)
GLUCOSE SERPL-MCNC: 158 MG/DL — HIGH (ref 70–99)
GLUCOSE SERPL-MCNC: 161 MG/DL — HIGH (ref 70–99)
GLUCOSE SERPL-MCNC: 185 MG/DL — HIGH (ref 70–99)
GLUCOSE SERPL-MCNC: 191 MG/DL — HIGH (ref 70–99)
GLUCOSE SERPL-MCNC: 214 MG/DL — HIGH (ref 70–99)
GLUCOSE SERPL-MCNC: 57 MG/DL — LOW (ref 70–99)
GLUCOSE SERPL-MCNC: 73 MG/DL — SIGNIFICANT CHANGE UP (ref 70–99)
GLUCOSE SERPL-MCNC: 80 MG/DL — SIGNIFICANT CHANGE UP (ref 70–99)
GLUCOSE SERPL-MCNC: 80 MG/DL — SIGNIFICANT CHANGE UP (ref 70–99)
GLUCOSE SERPL-MCNC: 92 MG/DL — SIGNIFICANT CHANGE UP (ref 70–99)
GLUCOSE UR QL: NEGATIVE MG/DL — SIGNIFICANT CHANGE UP
GRAM STN FLD: ABNORMAL
HAPTOGLOB SERPL-MCNC: 326 MG/DL — HIGH (ref 34–200)
HCO3 BLDA-SCNC: 10 MMOL/L — CRITICAL LOW (ref 21–28)
HCO3 BLDV-SCNC: 11 MMOL/L — LOW (ref 22–29)
HCO3 BLDV-SCNC: 12 MMOL/L — LOW (ref 22–29)
HCO3 BLDV-SCNC: 14 MMOL/L — LOW (ref 22–29)
HCO3 BLDV-SCNC: 15 MMOL/L — LOW (ref 22–29)
HCO3 BLDV-SCNC: 17 MMOL/L — LOW (ref 22–29)
HCT VFR BLD CALC: 15 % — CRITICAL LOW (ref 39–50)
HCT VFR BLD CALC: 20.5 % — CRITICAL LOW (ref 39–50)
HCT VFR BLD CALC: 21.1 % — LOW (ref 39–50)
HCT VFR BLD CALC: 21.8 % — LOW (ref 39–50)
HCT VFR BLD CALC: 21.9 % — LOW (ref 39–50)
HCT VFR BLD CALC: 22.9 % — LOW (ref 39–50)
HCT VFR BLD CALC: 23.8 % — LOW (ref 39–50)
HCT VFR BLD CALC: 23.8 % — LOW (ref 39–50)
HCT VFR BLD CALC: 24.1 % — LOW (ref 39–50)
HCT VFR BLD CALC: 24.5 % — LOW (ref 39–50)
HCT VFR BLD CALC: 25.6 % — LOW (ref 39–50)
HCT VFR BLD CALC: 26 % — LOW (ref 39–50)
HCT VFR BLD CALC: 26.9 % — LOW (ref 39–50)
HCT VFR BLDA CALC: 20 % — CRITICAL LOW (ref 39–51)
HCT VFR BLDA CALC: 20 % — CRITICAL LOW (ref 39–51)
HCT VFR BLDA CALC: 21 % — CRITICAL LOW (ref 39–51)
HCT VFR BLDA CALC: 23 % — LOW (ref 39–51)
HCT VFR BLDA CALC: 23 % — LOW (ref 39–51)
HGB BLD CALC-MCNC: 6.5 G/DL — CRITICAL LOW (ref 12.6–17.4)
HGB BLD CALC-MCNC: 6.5 G/DL — CRITICAL LOW (ref 12.6–17.4)
HGB BLD CALC-MCNC: 7.1 G/DL — LOW (ref 12.6–17.4)
HGB BLD CALC-MCNC: 7.5 G/DL — LOW (ref 12.6–17.4)
HGB BLD CALC-MCNC: 7.8 G/DL — LOW (ref 12.6–17.4)
HGB BLD-MCNC: 4.5 G/DL — CRITICAL LOW (ref 13–17)
HGB BLD-MCNC: 6.5 G/DL — CRITICAL LOW (ref 13–17)
HGB BLD-MCNC: 6.7 G/DL — CRITICAL LOW (ref 13–17)
HGB BLD-MCNC: 7.4 G/DL — LOW (ref 13–17)
HGB BLD-MCNC: 7.5 G/DL — LOW (ref 13–17)
HGB BLD-MCNC: 7.6 G/DL — LOW (ref 13–17)
HGB BLD-MCNC: 7.7 G/DL — LOW (ref 13–17)
HGB BLD-MCNC: 7.8 G/DL — LOW (ref 13–17)
HGB BLD-MCNC: 8.2 G/DL — LOW (ref 13–17)
HGB BLD-MCNC: 8.2 G/DL — LOW (ref 13–17)
HGB BLD-MCNC: 8.8 G/DL — LOW (ref 13–17)
HOROWITZ INDEX BLDA+IHG-RTO: 35 — SIGNIFICANT CHANGE UP
IANC: 12.11 K/UL — HIGH (ref 1.8–7.4)
IANC: 13.87 K/UL — HIGH (ref 1.8–7.4)
IANC: 19.52 K/UL — HIGH (ref 1.8–7.4)
IANC: 22.9 K/UL — HIGH (ref 1.8–7.4)
IANC: 9.74 K/UL — HIGH (ref 1.8–7.4)
IMM GRANULOCYTES NFR BLD AUTO: 1.2 % — HIGH (ref 0–0.9)
IMM GRANULOCYTES NFR BLD AUTO: 1.9 % — HIGH (ref 0–0.9)
IMM GRANULOCYTES NFR BLD AUTO: 2.9 % — HIGH (ref 0–0.9)
INR BLD: 1.39 RATIO — HIGH (ref 0.85–1.18)
INR BLD: 1.42 RATIO — HIGH (ref 0.85–1.18)
INR BLD: 1.57 RATIO — HIGH (ref 0.85–1.18)
INR BLD: 1.63 RATIO — HIGH (ref 0.85–1.18)
INR BLD: 2.47 RATIO — HIGH (ref 0.85–1.18)
KETONES UR-MCNC: NEGATIVE MG/DL — SIGNIFICANT CHANGE UP
LACTATE BLDV-MCNC: 1 MMOL/L — SIGNIFICANT CHANGE UP (ref 0.5–2)
LACTATE BLDV-MCNC: 1.3 MMOL/L — SIGNIFICANT CHANGE UP (ref 0.5–2)
LACTATE BLDV-MCNC: 2.4 MMOL/L — HIGH (ref 0.5–2)
LACTATE BLDV-MCNC: 2.6 MMOL/L — HIGH (ref 0.5–2)
LACTATE BLDV-MCNC: 4.5 MMOL/L — CRITICAL HIGH (ref 0.5–2)
LDH SERPL L TO P-CCNC: 226 U/L — HIGH (ref 135–225)
LEUKOCYTE ESTERASE UR-ACNC: ABNORMAL
LYMPHOCYTES # BLD AUTO: 0.64 K/UL — LOW (ref 1–3.3)
LYMPHOCYTES # BLD AUTO: 0.66 K/UL — LOW (ref 1–3.3)
LYMPHOCYTES # BLD AUTO: 0.66 K/UL — LOW (ref 1–3.3)
LYMPHOCYTES # BLD AUTO: 12.3 % — LOW (ref 13–44)
LYMPHOCYTES # BLD AUTO: 19.9 % — SIGNIFICANT CHANGE UP (ref 13–44)
LYMPHOCYTES # BLD AUTO: 2.21 K/UL — SIGNIFICANT CHANGE UP (ref 1–3.3)
LYMPHOCYTES # BLD AUTO: 2.6 % — LOW (ref 13–44)
LYMPHOCYTES # BLD AUTO: 2.9 % — LOW (ref 13–44)
LYMPHOCYTES # BLD AUTO: 2.96 K/UL — SIGNIFICANT CHANGE UP (ref 1–3.3)
LYMPHOCYTES # BLD AUTO: 4.3 % — LOW (ref 13–44)
MACROCYTES BLD QL: SIGNIFICANT CHANGE UP
MAGNESIUM SERPL-MCNC: 1.2 MG/DL — LOW (ref 1.6–2.6)
MAGNESIUM SERPL-MCNC: 1.7 MG/DL — SIGNIFICANT CHANGE UP (ref 1.6–2.6)
MAGNESIUM SERPL-MCNC: 1.8 MG/DL — SIGNIFICANT CHANGE UP (ref 1.6–2.6)
MAGNESIUM SERPL-MCNC: 1.8 MG/DL — SIGNIFICANT CHANGE UP (ref 1.6–2.6)
MAGNESIUM SERPL-MCNC: 2 MG/DL — SIGNIFICANT CHANGE UP (ref 1.6–2.6)
MAGNESIUM SERPL-MCNC: 2 MG/DL — SIGNIFICANT CHANGE UP (ref 1.6–2.6)
MAGNESIUM SERPL-MCNC: 2.2 MG/DL — SIGNIFICANT CHANGE UP (ref 1.6–2.6)
MAGNESIUM SERPL-MCNC: 2.3 MG/DL — SIGNIFICANT CHANGE UP (ref 1.6–2.6)
MAGNESIUM SERPL-MCNC: 2.3 MG/DL — SIGNIFICANT CHANGE UP (ref 1.6–2.6)
MAGNESIUM SERPL-MCNC: 2.4 MG/DL — SIGNIFICANT CHANGE UP (ref 1.6–2.6)
MAGNESIUM SERPL-MCNC: 2.5 MG/DL — SIGNIFICANT CHANGE UP (ref 1.6–2.6)
MCHC RBC-ENTMCNC: 28.6 PG — SIGNIFICANT CHANGE UP (ref 27–34)
MCHC RBC-ENTMCNC: 29 PG — SIGNIFICANT CHANGE UP (ref 27–34)
MCHC RBC-ENTMCNC: 29 PG — SIGNIFICANT CHANGE UP (ref 27–34)
MCHC RBC-ENTMCNC: 29.5 PG — SIGNIFICANT CHANGE UP (ref 27–34)
MCHC RBC-ENTMCNC: 29.9 PG — SIGNIFICANT CHANGE UP (ref 27–34)
MCHC RBC-ENTMCNC: 30 GM/DL — LOW (ref 32–36)
MCHC RBC-ENTMCNC: 30.8 GM/DL — LOW (ref 32–36)
MCHC RBC-ENTMCNC: 30.8 PG — SIGNIFICANT CHANGE UP (ref 27–34)
MCHC RBC-ENTMCNC: 30.8 PG — SIGNIFICANT CHANGE UP (ref 27–34)
MCHC RBC-ENTMCNC: 31 PG — SIGNIFICANT CHANGE UP (ref 27–34)
MCHC RBC-ENTMCNC: 31.1 GM/DL — LOW (ref 32–36)
MCHC RBC-ENTMCNC: 31.1 PG — SIGNIFICANT CHANGE UP (ref 27–34)
MCHC RBC-ENTMCNC: 31.2 PG — SIGNIFICANT CHANGE UP (ref 27–34)
MCHC RBC-ENTMCNC: 31.2 PG — SIGNIFICANT CHANGE UP (ref 27–34)
MCHC RBC-ENTMCNC: 31.3 PG — SIGNIFICANT CHANGE UP (ref 27–34)
MCHC RBC-ENTMCNC: 31.5 GM/DL — LOW (ref 32–36)
MCHC RBC-ENTMCNC: 31.7 PG — SIGNIFICANT CHANGE UP (ref 27–34)
MCHC RBC-ENTMCNC: 31.8 GM/DL — LOW (ref 32–36)
MCHC RBC-ENTMCNC: 31.9 GM/DL — LOW (ref 32–36)
MCHC RBC-ENTMCNC: 32 GM/DL — SIGNIFICANT CHANGE UP (ref 32–36)
MCHC RBC-ENTMCNC: 32 GM/DL — SIGNIFICANT CHANGE UP (ref 32–36)
MCHC RBC-ENTMCNC: 32.3 GM/DL — SIGNIFICANT CHANGE UP (ref 32–36)
MCHC RBC-ENTMCNC: 32.7 GM/DL — SIGNIFICANT CHANGE UP (ref 32–36)
MCHC RBC-ENTMCNC: 32.7 GM/DL — SIGNIFICANT CHANGE UP (ref 32–36)
MCHC RBC-ENTMCNC: 33.8 GM/DL — SIGNIFICANT CHANGE UP (ref 32–36)
MCHC RBC-ENTMCNC: 34.4 GM/DL — SIGNIFICANT CHANGE UP (ref 32–36)
MCV RBC AUTO: 102.7 FL — HIGH (ref 80–100)
MCV RBC AUTO: 102.9 FL — HIGH (ref 80–100)
MCV RBC AUTO: 85.9 FL — SIGNIFICANT CHANGE UP (ref 80–100)
MCV RBC AUTO: 86.9 FL — SIGNIFICANT CHANGE UP (ref 80–100)
MCV RBC AUTO: 88.7 FL — SIGNIFICANT CHANGE UP (ref 80–100)
MCV RBC AUTO: 91.2 FL — SIGNIFICANT CHANGE UP (ref 80–100)
MCV RBC AUTO: 91.9 FL — SIGNIFICANT CHANGE UP (ref 80–100)
MCV RBC AUTO: 95.4 FL — SIGNIFICANT CHANGE UP (ref 80–100)
MCV RBC AUTO: 97.2 FL — SIGNIFICANT CHANGE UP (ref 80–100)
MCV RBC AUTO: 97.5 FL — SIGNIFICANT CHANGE UP (ref 80–100)
MCV RBC AUTO: 97.7 FL — SIGNIFICANT CHANGE UP (ref 80–100)
MCV RBC AUTO: 97.7 FL — SIGNIFICANT CHANGE UP (ref 80–100)
MCV RBC AUTO: 98 FL — SIGNIFICANT CHANGE UP (ref 80–100)
METAMYELOCYTES # FLD: 0.9 % — SIGNIFICANT CHANGE UP (ref 0–1)
METHOD TYPE: SIGNIFICANT CHANGE UP
MONOCYTES # BLD AUTO: 0.43 K/UL — SIGNIFICANT CHANGE UP (ref 0–0.9)
MONOCYTES # BLD AUTO: 1.45 K/UL — HIGH (ref 0–0.9)
MONOCYTES # BLD AUTO: 1.69 K/UL — HIGH (ref 0–0.9)
MONOCYTES # BLD AUTO: 1.73 K/UL — HIGH (ref 0–0.9)
MONOCYTES # BLD AUTO: 1.73 K/UL — HIGH (ref 0–0.9)
MONOCYTES NFR BLD AUTO: 1.7 % — LOW (ref 2–14)
MONOCYTES NFR BLD AUTO: 11.3 % — SIGNIFICANT CHANGE UP (ref 2–14)
MONOCYTES NFR BLD AUTO: 11.6 % — SIGNIFICANT CHANGE UP (ref 2–14)
MONOCYTES NFR BLD AUTO: 6.5 % — SIGNIFICANT CHANGE UP (ref 2–14)
MONOCYTES NFR BLD AUTO: 9.4 % — SIGNIFICANT CHANGE UP (ref 2–14)
MRSA PCR RESULT.: SIGNIFICANT CHANGE UP
NEUTROPHILS # BLD AUTO: 12.95 K/UL — HIGH (ref 1.8–7.4)
NEUTROPHILS # BLD AUTO: 13.87 K/UL — HIGH (ref 1.8–7.4)
NEUTROPHILS # BLD AUTO: 19.52 K/UL — HIGH (ref 1.8–7.4)
NEUTROPHILS # BLD AUTO: 24.18 K/UL — HIGH (ref 1.8–7.4)
NEUTROPHILS # BLD AUTO: 9.74 K/UL — HIGH (ref 1.8–7.4)
NEUTROPHILS NFR BLD AUTO: 65.4 % — SIGNIFICANT CHANGE UP (ref 43–77)
NEUTROPHILS NFR BLD AUTO: 76.9 % — SIGNIFICANT CHANGE UP (ref 43–77)
NEUTROPHILS NFR BLD AUTO: 84.4 % — HIGH (ref 43–77)
NEUTROPHILS NFR BLD AUTO: 87.5 % — HIGH (ref 43–77)
NEUTROPHILS NFR BLD AUTO: 92.2 % — HIGH (ref 43–77)
NEUTS BAND # BLD: 6.1 % — HIGH (ref 0–6)
NITRITE UR-MCNC: NEGATIVE — SIGNIFICANT CHANGE UP
NRBC # BLD: 0 /100 WBCS — SIGNIFICANT CHANGE UP (ref 0–0)
NRBC # BLD: 1 /100 WBCS — HIGH (ref 0–0)
NRBC # BLD: 1 /100 WBCS — HIGH (ref 0–0)
NRBC # BLD: 2 /100 WBCS — HIGH (ref 0–0)
NRBC # FLD: 0 K/UL — SIGNIFICANT CHANGE UP (ref 0–0)
NRBC # FLD: 0 K/UL — SIGNIFICANT CHANGE UP (ref 0–0)
NRBC # FLD: 0.02 K/UL — HIGH (ref 0–0)
NRBC # FLD: 0.03 K/UL — HIGH (ref 0–0)
NRBC # FLD: 0.09 K/UL — HIGH (ref 0–0)
NRBC # FLD: 0.14 K/UL — HIGH (ref 0–0)
NRBC # FLD: 0.15 K/UL — HIGH (ref 0–0)
NRBC # FLD: 0.23 K/UL — HIGH (ref 0–0)
NRBC # FLD: 0.24 K/UL — HIGH (ref 0–0)
NRBC # FLD: 0.31 K/UL — HIGH (ref 0–0)
NRBC # FLD: 0.43 K/UL — HIGH (ref 0–0)
NT-PROBNP SERPL-SCNC: 1361 PG/ML — HIGH
OB PNL STL: NEGATIVE — SIGNIFICANT CHANGE UP
OB PNL STL: POSITIVE
ORGANISM # SPEC MICROSCOPIC CNT: ABNORMAL
PCO2 BLDA: 17 MMHG — LOW (ref 35–48)
PCO2 BLDV: 22 MMHG — LOW (ref 42–55)
PCO2 BLDV: 26 MMHG — LOW (ref 42–55)
PCO2 BLDV: 30 MMHG — LOW (ref 42–55)
PCO2 BLDV: 32 MMHG — LOW (ref 42–55)
PCO2 BLDV: 34 MMHG — LOW (ref 42–55)
PH BLDA: 7.38 — SIGNIFICANT CHANGE UP (ref 7.35–7.45)
PH BLDV: 7.28 — LOW (ref 7.32–7.43)
PH BLDV: 7.3 — LOW (ref 7.32–7.43)
PH BLDV: 7.31 — LOW (ref 7.32–7.43)
PH UR: 6 — SIGNIFICANT CHANGE UP (ref 5–8)
PHOSPHATE SERPL-MCNC: 2.2 MG/DL — LOW (ref 2.5–4.5)
PHOSPHATE SERPL-MCNC: 2.8 MG/DL — SIGNIFICANT CHANGE UP (ref 2.5–4.5)
PHOSPHATE SERPL-MCNC: 2.9 MG/DL — SIGNIFICANT CHANGE UP (ref 2.5–4.5)
PHOSPHATE SERPL-MCNC: 3 MG/DL — SIGNIFICANT CHANGE UP (ref 2.5–4.5)
PHOSPHATE SERPL-MCNC: 3.1 MG/DL — SIGNIFICANT CHANGE UP (ref 2.5–4.5)
PHOSPHATE SERPL-MCNC: 3.2 MG/DL — SIGNIFICANT CHANGE UP (ref 2.5–4.5)
PHOSPHATE SERPL-MCNC: 3.4 MG/DL — SIGNIFICANT CHANGE UP (ref 2.5–4.5)
PHOSPHATE SERPL-MCNC: 3.5 MG/DL — SIGNIFICANT CHANGE UP (ref 2.5–4.5)
PHOSPHATE SERPL-MCNC: 3.7 MG/DL — SIGNIFICANT CHANGE UP (ref 2.5–4.5)
PHOSPHATE SERPL-MCNC: 3.7 MG/DL — SIGNIFICANT CHANGE UP (ref 2.5–4.5)
PHOSPHATE SERPL-MCNC: 3.9 MG/DL — SIGNIFICANT CHANGE UP (ref 2.5–4.5)
PHOSPHATE SERPL-MCNC: 4 MG/DL — SIGNIFICANT CHANGE UP (ref 2.5–4.5)
PHOSPHATE SERPL-MCNC: 4.2 MG/DL — SIGNIFICANT CHANGE UP (ref 2.5–4.5)
PLAT MORPH BLD: NORMAL — SIGNIFICANT CHANGE UP
PLATELET # BLD AUTO: 112 K/UL — LOW (ref 150–400)
PLATELET # BLD AUTO: 120 K/UL — LOW (ref 150–400)
PLATELET # BLD AUTO: 120 K/UL — LOW (ref 150–400)
PLATELET # BLD AUTO: 125 K/UL — LOW (ref 150–400)
PLATELET # BLD AUTO: 126 K/UL — LOW (ref 150–400)
PLATELET # BLD AUTO: 143 K/UL — LOW (ref 150–400)
PLATELET # BLD AUTO: 148 K/UL — LOW (ref 150–400)
PLATELET # BLD AUTO: 202 K/UL — SIGNIFICANT CHANGE UP (ref 150–400)
PLATELET # BLD AUTO: 263 K/UL — SIGNIFICANT CHANGE UP (ref 150–400)
PLATELET # BLD AUTO: 69 K/UL — LOW (ref 150–400)
PLATELET # BLD AUTO: 87 K/UL — LOW (ref 150–400)
PLATELET COUNT - ESTIMATE: ABNORMAL
PO2 BLDA: 150 MMHG — HIGH (ref 83–108)
PO2 BLDV: 23 MMHG — LOW (ref 25–45)
PO2 BLDV: 41 MMHG — SIGNIFICANT CHANGE UP (ref 25–45)
PO2 BLDV: 42 MMHG — SIGNIFICANT CHANGE UP (ref 25–45)
PO2 BLDV: 46 MMHG — HIGH (ref 25–45)
PO2 BLDV: 47 MMHG — HIGH (ref 25–45)
POIKILOCYTOSIS BLD QL AUTO: SIGNIFICANT CHANGE UP
POLYCHROMASIA BLD QL SMEAR: SLIGHT — SIGNIFICANT CHANGE UP
POTASSIUM BLDV-SCNC: 4 MMOL/L — SIGNIFICANT CHANGE UP (ref 3.5–5.1)
POTASSIUM BLDV-SCNC: 4.2 MMOL/L — SIGNIFICANT CHANGE UP (ref 3.5–5.1)
POTASSIUM BLDV-SCNC: 4.2 MMOL/L — SIGNIFICANT CHANGE UP (ref 3.5–5.1)
POTASSIUM BLDV-SCNC: 4.4 MMOL/L — SIGNIFICANT CHANGE UP (ref 3.5–5.1)
POTASSIUM BLDV-SCNC: 4.5 MMOL/L — SIGNIFICANT CHANGE UP (ref 3.5–5.1)
POTASSIUM SERPL-MCNC: 3.4 MMOL/L — LOW (ref 3.5–5.3)
POTASSIUM SERPL-MCNC: 3.5 MMOL/L — SIGNIFICANT CHANGE UP (ref 3.5–5.3)
POTASSIUM SERPL-MCNC: 3.8 MMOL/L — SIGNIFICANT CHANGE UP (ref 3.5–5.3)
POTASSIUM SERPL-MCNC: 3.8 MMOL/L — SIGNIFICANT CHANGE UP (ref 3.5–5.3)
POTASSIUM SERPL-MCNC: 3.9 MMOL/L — SIGNIFICANT CHANGE UP (ref 3.5–5.3)
POTASSIUM SERPL-MCNC: 3.9 MMOL/L — SIGNIFICANT CHANGE UP (ref 3.5–5.3)
POTASSIUM SERPL-MCNC: 4 MMOL/L — SIGNIFICANT CHANGE UP (ref 3.5–5.3)
POTASSIUM SERPL-MCNC: 4 MMOL/L — SIGNIFICANT CHANGE UP (ref 3.5–5.3)
POTASSIUM SERPL-MCNC: 4.2 MMOL/L — SIGNIFICANT CHANGE UP (ref 3.5–5.3)
POTASSIUM SERPL-MCNC: 4.3 MMOL/L — SIGNIFICANT CHANGE UP (ref 3.5–5.3)
POTASSIUM SERPL-MCNC: 4.4 MMOL/L — SIGNIFICANT CHANGE UP (ref 3.5–5.3)
POTASSIUM SERPL-MCNC: 4.4 MMOL/L — SIGNIFICANT CHANGE UP (ref 3.5–5.3)
POTASSIUM SERPL-MCNC: 4.7 MMOL/L — SIGNIFICANT CHANGE UP (ref 3.5–5.3)
POTASSIUM SERPL-MCNC: 5.1 MMOL/L — SIGNIFICANT CHANGE UP (ref 3.5–5.3)
POTASSIUM SERPL-SCNC: 3.4 MMOL/L — LOW (ref 3.5–5.3)
POTASSIUM SERPL-SCNC: 3.5 MMOL/L — SIGNIFICANT CHANGE UP (ref 3.5–5.3)
POTASSIUM SERPL-SCNC: 3.8 MMOL/L — SIGNIFICANT CHANGE UP (ref 3.5–5.3)
POTASSIUM SERPL-SCNC: 3.8 MMOL/L — SIGNIFICANT CHANGE UP (ref 3.5–5.3)
POTASSIUM SERPL-SCNC: 3.9 MMOL/L — SIGNIFICANT CHANGE UP (ref 3.5–5.3)
POTASSIUM SERPL-SCNC: 3.9 MMOL/L — SIGNIFICANT CHANGE UP (ref 3.5–5.3)
POTASSIUM SERPL-SCNC: 4 MMOL/L — SIGNIFICANT CHANGE UP (ref 3.5–5.3)
POTASSIUM SERPL-SCNC: 4 MMOL/L — SIGNIFICANT CHANGE UP (ref 3.5–5.3)
POTASSIUM SERPL-SCNC: 4.2 MMOL/L — SIGNIFICANT CHANGE UP (ref 3.5–5.3)
POTASSIUM SERPL-SCNC: 4.3 MMOL/L — SIGNIFICANT CHANGE UP (ref 3.5–5.3)
POTASSIUM SERPL-SCNC: 4.4 MMOL/L — SIGNIFICANT CHANGE UP (ref 3.5–5.3)
POTASSIUM SERPL-SCNC: 4.4 MMOL/L — SIGNIFICANT CHANGE UP (ref 3.5–5.3)
POTASSIUM SERPL-SCNC: 4.7 MMOL/L — SIGNIFICANT CHANGE UP (ref 3.5–5.3)
POTASSIUM SERPL-SCNC: 5.1 MMOL/L — SIGNIFICANT CHANGE UP (ref 3.5–5.3)
PROT SERPL-MCNC: 4.2 G/DL — LOW (ref 6–8.3)
PROT SERPL-MCNC: 4.3 G/DL — LOW (ref 6–8.3)
PROT SERPL-MCNC: 4.4 G/DL — LOW (ref 6–8.3)
PROT SERPL-MCNC: 4.4 G/DL — LOW (ref 6–8.3)
PROT SERPL-MCNC: 4.9 G/DL — LOW (ref 6–8.3)
PROT SERPL-MCNC: 5.1 G/DL — LOW (ref 6–8.3)
PROT SERPL-MCNC: 5.2 G/DL — LOW (ref 6–8.3)
PROT SERPL-MCNC: 5.3 G/DL — LOW (ref 6–8.3)
PROT SERPL-MCNC: 5.3 G/DL — LOW (ref 6–8.3)
PROT SERPL-MCNC: 5.6 G/DL — LOW (ref 6–8.3)
PROT SERPL-MCNC: 5.6 G/DL — LOW (ref 6–8.3)
PROT UR-MCNC: 30 MG/DL
PROTEUS SP DNA BLD POS QL NAA+NON-PROBE: SIGNIFICANT CHANGE UP
PROTHROM AB SERPL-ACNC: 15.6 SEC — HIGH (ref 9.5–13)
PROTHROM AB SERPL-ACNC: 15.7 SEC — HIGH (ref 9.5–13)
PROTHROM AB SERPL-ACNC: 17.5 SEC — HIGH (ref 9.5–13)
PROTHROM AB SERPL-ACNC: 18.2 SEC — HIGH (ref 9.5–13)
PROTHROM AB SERPL-ACNC: 27.2 SEC — HIGH (ref 9.5–13)
RBC # BLD: 1.46 M/UL — LOW (ref 4.2–5.8)
RBC # BLD: 2.05 M/UL — LOW (ref 4.2–5.8)
RBC # BLD: 2.31 M/UL — LOW (ref 4.2–5.8)
RBC # BLD: 2.44 M/UL — LOW (ref 4.2–5.8)
RBC # BLD: 2.48 M/UL — LOW (ref 4.2–5.8)
RBC # BLD: 2.5 M/UL — LOW (ref 4.2–5.8)
RBC # BLD: 2.51 M/UL — LOW (ref 4.2–5.8)
RBC # BLD: 2.51 M/UL — LOW (ref 4.2–5.8)
RBC # BLD: 2.55 M/UL — LOW (ref 4.2–5.8)
RBC # BLD: 2.59 M/UL — LOW (ref 4.2–5.8)
RBC # BLD: 2.62 M/UL — LOW (ref 4.2–5.8)
RBC # BLD: 2.66 M/UL — LOW (ref 4.2–5.8)
RBC # BLD: 2.82 M/UL — LOW (ref 4.2–5.8)
RBC # BLD: 4.72 M/UL — SIGNIFICANT CHANGE UP (ref 4.2–5.8)
RBC # FLD: 17.9 % — HIGH (ref 10.3–14.5)
RBC # FLD: 18 % — HIGH (ref 10.3–14.5)
RBC # FLD: 18.5 % — HIGH (ref 10.3–14.5)
RBC # FLD: 18.6 % — HIGH (ref 10.3–14.5)
RBC # FLD: 18.9 % — HIGH (ref 10.3–14.5)
RBC # FLD: 21.2 % — HIGH (ref 10.3–14.5)
RBC # FLD: 22 % — HIGH (ref 10.3–14.5)
RBC # FLD: 22 % — HIGH (ref 10.3–14.5)
RBC # FLD: 22.3 % — HIGH (ref 10.3–14.5)
RBC # FLD: 22.9 % — HIGH (ref 10.3–14.5)
RBC # FLD: 23 % — HIGH (ref 10.3–14.5)
RBC # FLD: 23.4 % — HIGH (ref 10.3–14.5)
RBC # FLD: 23.9 % — HIGH (ref 10.3–14.5)
RBC BLD AUTO: NORMAL — SIGNIFICANT CHANGE UP
RETICS #: 275.6 K/UL — HIGH (ref 25–125)
RETICS/RBC NFR: 5.8 % — HIGH (ref 0.5–2.5)
RH IG SCN BLD-IMP: POSITIVE — SIGNIFICANT CHANGE UP
S AUREUS DNA NOSE QL NAA+PROBE: SIGNIFICANT CHANGE UP
SAO2 % BLDA: 97.8 % — SIGNIFICANT CHANGE UP (ref 94–98)
SAO2 % BLDV: 22 % — LOW (ref 67–88)
SAO2 % BLDV: 63.5 % — LOW (ref 67–88)
SAO2 % BLDV: 70.2 % — SIGNIFICANT CHANGE UP (ref 67–88)
SAO2 % BLDV: 71.2 % — SIGNIFICANT CHANGE UP (ref 67–88)
SAO2 % BLDV: 74.3 % — SIGNIFICANT CHANGE UP (ref 67–88)
SCHISTOCYTES BLD QL AUTO: SLIGHT — SIGNIFICANT CHANGE UP
SODIUM SERPL-SCNC: 138 MMOL/L — SIGNIFICANT CHANGE UP (ref 135–145)
SODIUM SERPL-SCNC: 139 MMOL/L — SIGNIFICANT CHANGE UP (ref 135–145)
SODIUM SERPL-SCNC: 140 MMOL/L — SIGNIFICANT CHANGE UP (ref 135–145)
SODIUM SERPL-SCNC: 141 MMOL/L — SIGNIFICANT CHANGE UP (ref 135–145)
SODIUM SERPL-SCNC: 142 MMOL/L — SIGNIFICANT CHANGE UP (ref 135–145)
SODIUM SERPL-SCNC: 145 MMOL/L — SIGNIFICANT CHANGE UP (ref 135–145)
SP GR SPEC: 1.06 — HIGH (ref 1–1.03)
SPECIMEN SOURCE: SIGNIFICANT CHANGE UP
SURGICAL PATHOLOGY STUDY: SIGNIFICANT CHANGE UP
T4 FREE SERPL-MCNC: 1.4 NG/DL — SIGNIFICANT CHANGE UP (ref 0.9–1.8)
TROPONIN T, HIGH SENSITIVITY RESULT: 120 NG/L — CRITICAL HIGH
TROPONIN T, HIGH SENSITIVITY RESULT: 92 NG/L — CRITICAL HIGH
TSH SERPL-MCNC: 0.64 UIU/ML — SIGNIFICANT CHANGE UP (ref 0.27–4.2)
UROBILINOGEN FLD QL: 0.2 MG/DL — SIGNIFICANT CHANGE UP (ref 0.2–1)
VARIANT LYMPHS # BLD: 1.7 % — SIGNIFICANT CHANGE UP (ref 0–6)
VIT B12 SERPL-MCNC: 1502 PG/ML — HIGH (ref 200–900)
WBC # BLD: 13.91 K/UL — HIGH (ref 3.8–10.5)
WBC # BLD: 14.29 K/UL — HIGH (ref 3.8–10.5)
WBC # BLD: 14.73 K/UL — HIGH (ref 3.8–10.5)
WBC # BLD: 14.9 K/UL — HIGH (ref 3.8–10.5)
WBC # BLD: 15.34 K/UL — HIGH (ref 3.8–10.5)
WBC # BLD: 16.7 K/UL — HIGH (ref 3.8–10.5)
WBC # BLD: 18.01 K/UL — HIGH (ref 3.8–10.5)
WBC # BLD: 21.05 K/UL — HIGH (ref 3.8–10.5)
WBC # BLD: 21.1 K/UL — HIGH (ref 3.8–10.5)
WBC # BLD: 21.55 K/UL — HIGH (ref 3.8–10.5)
WBC # BLD: 22.05 K/UL — HIGH (ref 3.8–10.5)
WBC # BLD: 22.3 K/UL — HIGH (ref 3.8–10.5)
WBC # BLD: 25.27 K/UL — HIGH (ref 3.8–10.5)
WBC # FLD AUTO: 13.91 K/UL — HIGH (ref 3.8–10.5)
WBC # FLD AUTO: 14.29 K/UL — HIGH (ref 3.8–10.5)
WBC # FLD AUTO: 14.73 K/UL — HIGH (ref 3.8–10.5)
WBC # FLD AUTO: 14.9 K/UL — HIGH (ref 3.8–10.5)
WBC # FLD AUTO: 15.34 K/UL — HIGH (ref 3.8–10.5)
WBC # FLD AUTO: 16.7 K/UL — HIGH (ref 3.8–10.5)
WBC # FLD AUTO: 18.01 K/UL — HIGH (ref 3.8–10.5)
WBC # FLD AUTO: 21.05 K/UL — HIGH (ref 3.8–10.5)
WBC # FLD AUTO: 21.1 K/UL — HIGH (ref 3.8–10.5)
WBC # FLD AUTO: 21.55 K/UL — HIGH (ref 3.8–10.5)
WBC # FLD AUTO: 22.05 K/UL — HIGH (ref 3.8–10.5)
WBC # FLD AUTO: 22.3 K/UL — HIGH (ref 3.8–10.5)
WBC # FLD AUTO: 25.27 K/UL — HIGH (ref 3.8–10.5)

## 2024-01-01 PROCEDURE — 88305 TISSUE EXAM BY PATHOLOGIST: CPT | Mod: 26

## 2024-01-01 PROCEDURE — 99223 1ST HOSP IP/OBS HIGH 75: CPT

## 2024-01-01 PROCEDURE — 99285 EMERGENCY DEPT VISIT HI MDM: CPT

## 2024-01-01 PROCEDURE — 99291 CRITICAL CARE FIRST HOUR: CPT | Mod: GC

## 2024-01-01 PROCEDURE — 99232 SBSQ HOSP IP/OBS MODERATE 35: CPT | Mod: GC

## 2024-01-01 PROCEDURE — 74178 CT ABD&PLV WO CNTR FLWD CNTR: CPT | Mod: 26,MC

## 2024-01-01 PROCEDURE — 93971 EXTREMITY STUDY: CPT | Mod: 26,LT

## 2024-01-01 PROCEDURE — 99232 SBSQ HOSP IP/OBS MODERATE 35: CPT

## 2024-01-01 PROCEDURE — 36620 INSERTION CATHETER ARTERY: CPT

## 2024-01-01 PROCEDURE — 99291 CRITICAL CARE FIRST HOUR: CPT

## 2024-01-01 PROCEDURE — 71045 X-RAY EXAM CHEST 1 VIEW: CPT | Mod: 26

## 2024-01-01 PROCEDURE — 71275 CT ANGIOGRAPHY CHEST: CPT | Mod: 26,MC

## 2024-01-01 PROCEDURE — 99497 ADVNCD CARE PLAN 30 MIN: CPT | Mod: 25

## 2024-01-01 PROCEDURE — 70450 CT HEAD/BRAIN W/O DYE: CPT | Mod: 26,MC,XU

## 2024-01-01 PROCEDURE — 86077 PHYS BLOOD BANK SERV XMATCH: CPT

## 2024-01-01 PROCEDURE — 99223 1ST HOSP IP/OBS HIGH 75: CPT | Mod: GC

## 2024-01-01 PROCEDURE — 70498 CT ANGIOGRAPHY NECK: CPT | Mod: 26,MC

## 2024-01-01 PROCEDURE — 99497 ADVNCD CARE PLAN 30 MIN: CPT | Mod: GC,25

## 2024-01-01 PROCEDURE — 99233 SBSQ HOSP IP/OBS HIGH 50: CPT | Mod: GC

## 2024-01-01 PROCEDURE — 36600 WITHDRAWAL OF ARTERIAL BLOOD: CPT

## 2024-01-01 PROCEDURE — 72127 CT NECK SPINE W/O & W/DYE: CPT | Mod: 26

## 2024-01-01 PROCEDURE — 0042T: CPT | Mod: MC

## 2024-01-01 PROCEDURE — 43239 EGD BIOPSY SINGLE/MULTIPLE: CPT | Mod: GC

## 2024-01-01 PROCEDURE — 99233 SBSQ HOSP IP/OBS HIGH 50: CPT

## 2024-01-01 PROCEDURE — 71045 X-RAY EXAM CHEST 1 VIEW: CPT | Mod: 26,77

## 2024-01-01 PROCEDURE — 99239 HOSP IP/OBS DSCHRG MGMT >30: CPT | Mod: GC

## 2024-01-01 PROCEDURE — 70496 CT ANGIOGRAPHY HEAD: CPT | Mod: 26,MC

## 2024-01-01 PROCEDURE — 74177 CT ABD & PELVIS W/CONTRAST: CPT | Mod: 26,MC

## 2024-01-01 PROCEDURE — 99221 1ST HOSP IP/OBS SF/LOW 40: CPT | Mod: GC

## 2024-01-01 RX ORDER — DEXTROSE 50 % IN WATER 50 %
15 SYRINGE (ML) INTRAVENOUS ONCE
Refills: 0 | Status: DISCONTINUED | OUTPATIENT
Start: 2024-01-01 | End: 2024-01-01

## 2024-01-01 RX ORDER — HYDROMORPHONE HYDROCHLORIDE 2 MG/ML
0.5 INJECTION INTRAMUSCULAR; INTRAVENOUS; SUBCUTANEOUS ONCE
Refills: 0 | Status: DISCONTINUED | OUTPATIENT
Start: 2024-01-01 | End: 2024-01-01

## 2024-01-01 RX ORDER — GLUCAGON INJECTION, SOLUTION 0.5 MG/.1ML
1 INJECTION, SOLUTION SUBCUTANEOUS ONCE
Refills: 0 | Status: DISCONTINUED | OUTPATIENT
Start: 2024-01-01 | End: 2024-01-01

## 2024-01-01 RX ORDER — DEXTROSE 50 % IN WATER 50 %
12.5 SYRINGE (ML) INTRAVENOUS ONCE
Refills: 0 | Status: DISCONTINUED | OUTPATIENT
Start: 2024-01-01 | End: 2024-01-01

## 2024-01-01 RX ORDER — LANOLIN ALCOHOL/MO/W.PET/CERES
3 CREAM (GRAM) TOPICAL AT BEDTIME
Refills: 0 | Status: DISCONTINUED | OUTPATIENT
Start: 2024-01-01 | End: 2024-01-01

## 2024-01-01 RX ORDER — SENNA PLUS 8.6 MG/1
2 TABLET ORAL AT BEDTIME
Refills: 0 | Status: DISCONTINUED | OUTPATIENT
Start: 2024-01-01 | End: 2024-01-01

## 2024-01-01 RX ORDER — SODIUM CHLORIDE 9 MG/ML
1000 INJECTION, SOLUTION INTRAVENOUS
Refills: 0 | Status: DISCONTINUED | OUTPATIENT
Start: 2024-01-01 | End: 2024-01-01

## 2024-01-01 RX ORDER — IMIPENEM AND CILASTATIN 250; 250 MG/100ML; MG/100ML
500 INJECTION, POWDER, FOR SOLUTION INTRAVENOUS EVERY 6 HOURS
Refills: 0 | Status: DISCONTINUED | OUTPATIENT
Start: 2024-01-01 | End: 2024-01-01

## 2024-01-01 RX ORDER — METOPROLOL TARTRATE 50 MG
1 TABLET ORAL
Refills: 0 | DISCHARGE

## 2024-01-01 RX ORDER — VASOPRESSIN 20 [USP'U]/ML
0.02 INJECTION INTRAVENOUS
Qty: 40 | Refills: 0 | Status: DISCONTINUED | OUTPATIENT
Start: 2024-01-01 | End: 2024-01-01

## 2024-01-01 RX ORDER — TAMSULOSIN HYDROCHLORIDE 0.4 MG/1
0.4 CAPSULE ORAL AT BEDTIME
Refills: 0 | Status: DISCONTINUED | OUTPATIENT
Start: 2024-01-01 | End: 2024-01-01

## 2024-01-01 RX ORDER — SODIUM CHLORIDE 9 MG/ML
1000 INJECTION INTRAMUSCULAR; INTRAVENOUS; SUBCUTANEOUS ONCE
Refills: 0 | Status: COMPLETED | OUTPATIENT
Start: 2024-01-01 | End: 2024-01-01

## 2024-01-01 RX ORDER — HYDROMORPHONE HYDROCHLORIDE 2 MG/ML
0.5 INJECTION INTRAMUSCULAR; INTRAVENOUS; SUBCUTANEOUS
Refills: 0 | Status: DISCONTINUED | OUTPATIENT
Start: 2024-01-01 | End: 2024-01-01

## 2024-01-01 RX ORDER — PANTOPRAZOLE SODIUM 20 MG/1
40 TABLET, DELAYED RELEASE ORAL
Refills: 0 | Status: DISCONTINUED | OUTPATIENT
Start: 2024-01-01 | End: 2024-01-01

## 2024-01-01 RX ORDER — FAMOTIDINE 10 MG/ML
1 INJECTION INTRAVENOUS
Refills: 0 | DISCHARGE

## 2024-01-01 RX ORDER — HYDROMORPHONE HYDROCHLORIDE 2 MG/ML
0.2 INJECTION INTRAMUSCULAR; INTRAVENOUS; SUBCUTANEOUS
Refills: 0 | Status: DISCONTINUED | OUTPATIENT
Start: 2024-01-01 | End: 2024-01-01

## 2024-01-01 RX ORDER — HYDROMORPHONE HYDROCHLORIDE 2 MG/ML
0.5 INJECTION INTRAMUSCULAR; INTRAVENOUS; SUBCUTANEOUS EVERY 6 HOURS
Refills: 0 | Status: DISCONTINUED | OUTPATIENT
Start: 2024-01-01 | End: 2024-01-01

## 2024-01-01 RX ORDER — PANTOPRAZOLE SODIUM 20 MG/1
40 TABLET, DELAYED RELEASE ORAL
Refills: 0 | Status: COMPLETED | OUTPATIENT
Start: 2024-01-01 | End: 2024-01-01

## 2024-01-01 RX ORDER — ACETAMINOPHEN 500 MG
650 TABLET ORAL EVERY 6 HOURS
Refills: 0 | Status: DISCONTINUED | OUTPATIENT
Start: 2024-01-01 | End: 2024-01-01

## 2024-01-01 RX ORDER — FENTANYL CITRATE 50 UG/ML
25 INJECTION INTRAVENOUS ONCE
Refills: 0 | Status: DISCONTINUED | OUTPATIENT
Start: 2024-01-01 | End: 2024-01-01

## 2024-01-01 RX ORDER — ONDANSETRON 8 MG/1
4 TABLET, FILM COATED ORAL EVERY 8 HOURS
Refills: 0 | Status: DISCONTINUED | OUTPATIENT
Start: 2024-01-01 | End: 2024-01-01

## 2024-01-01 RX ORDER — VANCOMYCIN HCL 1 G
1000 VIAL (EA) INTRAVENOUS ONCE
Refills: 0 | Status: COMPLETED | OUTPATIENT
Start: 2024-01-01 | End: 2024-01-01

## 2024-01-01 RX ORDER — INSULIN LISPRO 100/ML
VIAL (ML) SUBCUTANEOUS AT BEDTIME
Refills: 0 | Status: DISCONTINUED | OUTPATIENT
Start: 2024-01-01 | End: 2024-01-01

## 2024-01-01 RX ORDER — SODIUM HYPOCHLORITE 0.125 %
1 SOLUTION, NON-ORAL MISCELLANEOUS
Refills: 0 | Status: DISCONTINUED | OUTPATIENT
Start: 2024-01-01 | End: 2024-01-01

## 2024-01-01 RX ORDER — FINASTERIDE 5 MG/1
5 TABLET, FILM COATED ORAL DAILY
Refills: 0 | Status: DISCONTINUED | OUTPATIENT
Start: 2024-01-01 | End: 2024-01-01

## 2024-01-01 RX ORDER — MIDAZOLAM HYDROCHLORIDE 1 MG/ML
1 INJECTION, SOLUTION INTRAMUSCULAR; INTRAVENOUS ONCE
Refills: 0 | Status: DISCONTINUED | OUTPATIENT
Start: 2024-01-01 | End: 2024-01-01

## 2024-01-01 RX ORDER — HYDROMORPHONE HYDROCHLORIDE 2 MG/ML
0.5 INJECTION INTRAMUSCULAR; INTRAVENOUS; SUBCUTANEOUS
Qty: 0 | Refills: 0 | DISCHARGE
Start: 2024-01-01

## 2024-01-01 RX ORDER — MEROPENEM 1 G/30ML
1000 INJECTION INTRAVENOUS EVERY 8 HOURS
Refills: 0 | Status: DISCONTINUED | OUTPATIENT
Start: 2024-01-01 | End: 2024-01-01

## 2024-01-01 RX ORDER — NOREPINEPHRINE BITARTRATE/D5W 8 MG/250ML
0.05 PLASTIC BAG, INJECTION (ML) INTRAVENOUS
Qty: 8 | Refills: 0 | Status: DISCONTINUED | OUTPATIENT
Start: 2024-01-01 | End: 2024-01-01

## 2024-01-01 RX ORDER — ACETAMINOPHEN 500 MG
2 TABLET ORAL
Refills: 0 | DISCHARGE

## 2024-01-01 RX ORDER — POTASSIUM CHLORIDE 20 MEQ
40 PACKET (EA) ORAL EVERY 4 HOURS
Refills: 0 | Status: COMPLETED | OUTPATIENT
Start: 2024-01-01 | End: 2024-01-01

## 2024-01-01 RX ORDER — CALCIUM GLUCONATE 100 MG/ML
2 VIAL (ML) INTRAVENOUS ONCE
Refills: 0 | Status: COMPLETED | OUTPATIENT
Start: 2024-01-01 | End: 2024-01-01

## 2024-01-01 RX ORDER — APIXABAN 2.5 MG/1
2 TABLET, FILM COATED ORAL
Qty: 0 | Refills: 0 | DISCHARGE
Start: 2024-01-01

## 2024-01-01 RX ORDER — POTASSIUM PHOSPHATE, MONOBASIC POTASSIUM PHOSPHATE, DIBASIC 236; 224 MG/ML; MG/ML
15 INJECTION, SOLUTION INTRAVENOUS ONCE
Refills: 0 | Status: COMPLETED | OUTPATIENT
Start: 2024-01-01 | End: 2024-01-01

## 2024-01-01 RX ORDER — SODIUM CHLORIDE 9 MG/ML
500 INJECTION INTRAMUSCULAR; INTRAVENOUS; SUBCUTANEOUS
Refills: 0 | Status: DISCONTINUED | OUTPATIENT
Start: 2024-01-01 | End: 2024-01-01

## 2024-01-01 RX ORDER — SODIUM CHLORIDE 9 MG/ML
500 INJECTION INTRAMUSCULAR; INTRAVENOUS; SUBCUTANEOUS ONCE
Refills: 0 | Status: COMPLETED | OUTPATIENT
Start: 2024-01-01 | End: 2024-01-01

## 2024-01-01 RX ORDER — PIPERACILLIN AND TAZOBACTAM 4; .5 G/20ML; G/20ML
3.38 INJECTION, POWDER, LYOPHILIZED, FOR SOLUTION INTRAVENOUS ONCE
Refills: 0 | Status: COMPLETED | OUTPATIENT
Start: 2024-01-01 | End: 2024-01-01

## 2024-01-01 RX ORDER — PIPERACILLIN AND TAZOBACTAM 4; .5 G/20ML; G/20ML
3.38 INJECTION, POWDER, LYOPHILIZED, FOR SOLUTION INTRAVENOUS EVERY 8 HOURS
Refills: 0 | Status: DISCONTINUED | OUTPATIENT
Start: 2024-01-01 | End: 2024-01-01

## 2024-01-01 RX ORDER — FERROUS SULFATE 325(65) MG
5 TABLET ORAL
Refills: 0 | DISCHARGE

## 2024-01-01 RX ORDER — METRONIDAZOLE 500 MG
500 TABLET ORAL THREE TIMES A DAY
Refills: 0 | Status: COMPLETED | OUTPATIENT
Start: 2024-01-01 | End: 2024-01-01

## 2024-01-01 RX ORDER — HEPARIN SODIUM 5000 [USP'U]/ML
4500 INJECTION INTRAVENOUS; SUBCUTANEOUS EVERY 6 HOURS
Refills: 0 | Status: DISCONTINUED | OUTPATIENT
Start: 2024-01-01 | End: 2024-01-01

## 2024-01-01 RX ORDER — LINAGLIPTIN 5 MG/1
1 TABLET, FILM COATED ORAL
Refills: 0 | DISCHARGE

## 2024-01-01 RX ORDER — ACETAMINOPHEN 500 MG
1000 TABLET ORAL ONCE
Refills: 0 | Status: COMPLETED | OUTPATIENT
Start: 2024-01-01 | End: 2024-01-01

## 2024-01-01 RX ORDER — HYDROMORPHONE HYDROCHLORIDE 2 MG/ML
1 INJECTION INTRAMUSCULAR; INTRAVENOUS; SUBCUTANEOUS
Refills: 0 | Status: DISCONTINUED | OUTPATIENT
Start: 2024-01-01 | End: 2024-01-01

## 2024-01-01 RX ORDER — LOSARTAN POTASSIUM 100 MG/1
1 TABLET, FILM COATED ORAL
Refills: 0 | DISCHARGE

## 2024-01-01 RX ORDER — FOLIC ACID 0.8 MG
1 TABLET ORAL
Refills: 0 | DISCHARGE

## 2024-01-01 RX ORDER — DEXTROSE 50 % IN WATER 50 %
25 SYRINGE (ML) INTRAVENOUS ONCE
Refills: 0 | Status: DISCONTINUED | OUTPATIENT
Start: 2024-01-01 | End: 2024-01-01

## 2024-01-01 RX ORDER — OXYCODONE AND ACETAMINOPHEN 5; 325 MG/1; MG/1
1 TABLET ORAL EVERY 6 HOURS
Refills: 0 | Status: DISCONTINUED | OUTPATIENT
Start: 2024-01-01 | End: 2024-01-01

## 2024-01-01 RX ORDER — INSULIN LISPRO 100/ML
VIAL (ML) SUBCUTANEOUS
Refills: 0 | Status: DISCONTINUED | OUTPATIENT
Start: 2024-01-01 | End: 2024-01-01

## 2024-01-01 RX ORDER — SODIUM CHLORIDE 9 MG/ML
1000 INJECTION INTRAMUSCULAR; INTRAVENOUS; SUBCUTANEOUS
Refills: 0 | Status: DISCONTINUED | OUTPATIENT
Start: 2024-01-01 | End: 2024-01-01

## 2024-01-01 RX ORDER — MIRTAZAPINE 45 MG/1
1 TABLET, ORALLY DISINTEGRATING ORAL
Refills: 0 | DISCHARGE

## 2024-01-01 RX ORDER — HYDROMORPHONE HYDROCHLORIDE 2 MG/ML
0.2 INJECTION INTRAMUSCULAR; INTRAVENOUS; SUBCUTANEOUS ONCE
Refills: 0 | Status: DISCONTINUED | OUTPATIENT
Start: 2024-01-01 | End: 2024-01-01

## 2024-01-01 RX ORDER — SENNA PLUS 8.6 MG/1
2 TABLET ORAL
Refills: 0 | DISCHARGE

## 2024-01-01 RX ORDER — VANCOMYCIN HCL 1 G
1000 VIAL (EA) INTRAVENOUS EVERY 12 HOURS
Refills: 0 | Status: DISCONTINUED | OUTPATIENT
Start: 2024-01-01 | End: 2024-01-01

## 2024-01-01 RX ORDER — SIMVASTATIN 20 MG/1
1 TABLET, FILM COATED ORAL
Refills: 0 | DISCHARGE

## 2024-01-01 RX ORDER — INSULIN LISPRO 100/ML
VIAL (ML) SUBCUTANEOUS EVERY 6 HOURS
Refills: 0 | Status: DISCONTINUED | OUTPATIENT
Start: 2024-01-01 | End: 2024-01-01

## 2024-01-01 RX ORDER — VASOPRESSIN 20 [USP'U]/ML
0.04 INJECTION INTRAVENOUS
Qty: 40 | Refills: 0 | Status: DISCONTINUED | OUTPATIENT
Start: 2024-01-01 | End: 2024-01-01

## 2024-01-01 RX ORDER — MAGNESIUM SULFATE 500 MG/ML
1 VIAL (ML) INJECTION ONCE
Refills: 0 | Status: COMPLETED | OUTPATIENT
Start: 2024-01-01 | End: 2024-01-01

## 2024-01-01 RX ORDER — HYDROMORPHONE HYDROCHLORIDE 2 MG/ML
0.5 INJECTION INTRAMUSCULAR; INTRAVENOUS; SUBCUTANEOUS EVERY 4 HOURS
Refills: 0 | Status: DISCONTINUED | OUTPATIENT
Start: 2024-01-01 | End: 2024-01-01

## 2024-01-01 RX ORDER — PANTOPRAZOLE SODIUM 20 MG/1
1 TABLET, DELAYED RELEASE ORAL
Qty: 0 | Refills: 0 | DISCHARGE
Start: 2024-01-01

## 2024-01-01 RX ORDER — SODIUM BICARBONATE 1 MEQ/ML
100 SYRINGE (ML) INTRAVENOUS ONCE
Refills: 0 | Status: COMPLETED | OUTPATIENT
Start: 2024-01-01 | End: 2024-01-01

## 2024-01-01 RX ORDER — FLUTICASONE PROPIONATE 50 MCG
1 SPRAY, SUSPENSION NASAL
Refills: 0 | DISCHARGE

## 2024-01-01 RX ORDER — DEXTROSE 50 % IN WATER 50 %
50 SYRINGE (ML) INTRAVENOUS ONCE
Refills: 0 | Status: DISCONTINUED | OUTPATIENT
Start: 2024-01-01 | End: 2024-01-01

## 2024-01-01 RX ORDER — NYSTATIN CREAM 100000 [USP'U]/G
1 CREAM TOPICAL
Refills: 0 | DISCHARGE

## 2024-01-01 RX ORDER — ROBINUL 0.2 MG/ML
0.4 INJECTION INTRAMUSCULAR; INTRAVENOUS EVERY 6 HOURS
Refills: 0 | Status: DISCONTINUED | OUTPATIENT
Start: 2024-01-01 | End: 2024-01-01

## 2024-01-01 RX ORDER — NYSTATIN CREAM 100000 [USP'U]/G
1 CREAM TOPICAL
Refills: 0 | Status: DISCONTINUED | OUTPATIENT
Start: 2024-01-01 | End: 2024-01-01

## 2024-01-01 RX ORDER — OXYCODONE AND ACETAMINOPHEN 5; 325 MG/1; MG/1
1 TABLET ORAL
Qty: 0 | Refills: 0 | DISCHARGE
Start: 2024-01-01

## 2024-01-01 RX ORDER — MIRTAZAPINE 45 MG/1
7.5 TABLET, ORALLY DISINTEGRATING ORAL AT BEDTIME
Refills: 0 | Status: DISCONTINUED | OUTPATIENT
Start: 2024-01-01 | End: 2024-01-01

## 2024-01-01 RX ORDER — ATORVASTATIN CALCIUM 80 MG/1
40 TABLET, FILM COATED ORAL AT BEDTIME
Refills: 0 | Status: DISCONTINUED | OUTPATIENT
Start: 2024-01-01 | End: 2024-01-01

## 2024-01-01 RX ORDER — SODIUM CHLORIDE 9 MG/ML
10 INJECTION INTRAMUSCULAR; INTRAVENOUS; SUBCUTANEOUS
Refills: 0 | Status: DISCONTINUED | OUTPATIENT
Start: 2024-01-01 | End: 2024-01-01

## 2024-01-01 RX ORDER — MAGNESIUM SULFATE 500 MG/ML
2 VIAL (ML) INJECTION
Refills: 0 | Status: COMPLETED | OUTPATIENT
Start: 2024-01-01 | End: 2024-01-01

## 2024-01-01 RX ORDER — METFORMIN HYDROCHLORIDE 850 MG/1
1 TABLET ORAL
Refills: 0 | DISCHARGE

## 2024-01-01 RX ORDER — HEPARIN SODIUM 5000 [USP'U]/ML
1000 INJECTION INTRAVENOUS; SUBCUTANEOUS
Qty: 25000 | Refills: 0 | Status: DISCONTINUED | OUTPATIENT
Start: 2024-01-01 | End: 2024-01-01

## 2024-01-01 RX ORDER — MAGNESIUM SULFATE 500 MG/ML
2 VIAL (ML) INJECTION ONCE
Refills: 0 | Status: COMPLETED | OUTPATIENT
Start: 2024-01-01 | End: 2024-01-01

## 2024-01-01 RX ORDER — SODIUM HYPOCHLORITE 0.125 %
1 SOLUTION, NON-ORAL MISCELLANEOUS DAILY
Refills: 0 | Status: DISCONTINUED | OUTPATIENT
Start: 2024-01-01 | End: 2024-01-01

## 2024-01-01 RX ORDER — CHLORHEXIDINE GLUCONATE 213 G/1000ML
1 SOLUTION TOPICAL
Refills: 0 | Status: DISCONTINUED | OUTPATIENT
Start: 2024-01-01 | End: 2024-01-01

## 2024-01-01 RX ORDER — OXYCODONE HYDROCHLORIDE 5 MG/1
0.5 TABLET ORAL
Refills: 0 | DISCHARGE

## 2024-01-01 RX ORDER — APIXABAN 2.5 MG/1
10 TABLET, FILM COATED ORAL EVERY 12 HOURS
Refills: 0 | Status: DISCONTINUED | OUTPATIENT
Start: 2024-01-01 | End: 2024-01-01

## 2024-01-01 RX ORDER — HEPARIN SODIUM 5000 [USP'U]/ML
4500 INJECTION INTRAVENOUS; SUBCUTANEOUS ONCE
Refills: 0 | Status: COMPLETED | OUTPATIENT
Start: 2024-01-01 | End: 2024-01-01

## 2024-01-01 RX ORDER — MEROPENEM 1 G/30ML
1000 INJECTION INTRAVENOUS EVERY 12 HOURS
Refills: 0 | Status: DISCONTINUED | OUTPATIENT
Start: 2024-01-01 | End: 2024-01-01

## 2024-01-01 RX ORDER — ACETAMINOPHEN 500 MG
1000 TABLET ORAL ONCE
Refills: 0 | Status: DISCONTINUED | OUTPATIENT
Start: 2024-01-01 | End: 2024-01-01

## 2024-01-01 RX ORDER — TAMSULOSIN HYDROCHLORIDE 0.4 MG/1
1 CAPSULE ORAL
Refills: 0 | DISCHARGE

## 2024-01-01 RX ORDER — FINASTERIDE 5 MG/1
1 TABLET, FILM COATED ORAL
Refills: 0 | DISCHARGE

## 2024-01-01 RX ORDER — ACETAMINOPHEN 500 MG
975 TABLET ORAL ONCE
Refills: 0 | Status: COMPLETED | OUTPATIENT
Start: 2024-01-01 | End: 2024-01-01

## 2024-01-01 RX ORDER — FUROSEMIDE 40 MG
20 TABLET ORAL ONCE
Refills: 0 | Status: COMPLETED | OUTPATIENT
Start: 2024-01-01 | End: 2024-01-01

## 2024-01-01 RX ORDER — FENTANYL CITRATE 50 UG/ML
50 INJECTION INTRAVENOUS ONCE
Refills: 0 | Status: DISCONTINUED | OUTPATIENT
Start: 2024-01-01 | End: 2024-01-01

## 2024-01-01 RX ORDER — FOLIC ACID 0.8 MG
1 TABLET ORAL DAILY
Refills: 0 | Status: DISCONTINUED | OUTPATIENT
Start: 2024-01-01 | End: 2024-01-01

## 2024-01-01 RX ORDER — VANCOMYCIN HCL 1 G
VIAL (EA) INTRAVENOUS
Refills: 0 | Status: DISCONTINUED | OUTPATIENT
Start: 2024-01-01 | End: 2024-01-01

## 2024-01-01 RX ORDER — HEPARIN SODIUM 5000 [USP'U]/ML
2000 INJECTION INTRAVENOUS; SUBCUTANEOUS EVERY 6 HOURS
Refills: 0 | Status: DISCONTINUED | OUTPATIENT
Start: 2024-01-01 | End: 2024-01-01

## 2024-01-01 RX ORDER — METRONIDAZOLE 500 MG
1 TABLET ORAL
Refills: 0 | DISCHARGE

## 2024-01-01 RX ORDER — HEPARIN SODIUM 5000 [USP'U]/ML
INJECTION INTRAVENOUS; SUBCUTANEOUS
Qty: 25000 | Refills: 0 | Status: DISCONTINUED | OUTPATIENT
Start: 2024-01-01 | End: 2024-01-01

## 2024-01-01 RX ORDER — CHLORHEXIDINE GLUCONATE 213 G/1000ML
1 SOLUTION TOPICAL DAILY
Refills: 0 | Status: DISCONTINUED | OUTPATIENT
Start: 2024-01-01 | End: 2024-01-01

## 2024-01-01 RX ORDER — PANTOPRAZOLE SODIUM 20 MG/1
40 TABLET, DELAYED RELEASE ORAL ONCE
Refills: 0 | Status: COMPLETED | OUTPATIENT
Start: 2024-01-01 | End: 2024-01-01

## 2024-01-01 RX ORDER — HEPARIN SODIUM 5000 [USP'U]/ML
800 INJECTION INTRAVENOUS; SUBCUTANEOUS
Qty: 25000 | Refills: 0 | Status: DISCONTINUED | OUTPATIENT
Start: 2024-01-01 | End: 2024-01-01

## 2024-01-01 RX ADMIN — Medication 1 TABLET(S): at 17:11

## 2024-01-01 RX ADMIN — HYDROMORPHONE HYDROCHLORIDE 0.5 MILLIGRAM(S): 2 INJECTION INTRAMUSCULAR; INTRAVENOUS; SUBCUTANEOUS at 11:59

## 2024-01-01 RX ADMIN — ATORVASTATIN CALCIUM 40 MILLIGRAM(S): 80 TABLET, FILM COATED ORAL at 22:36

## 2024-01-01 RX ADMIN — HYDROMORPHONE HYDROCHLORIDE 0.5 MILLIGRAM(S): 2 INJECTION INTRAMUSCULAR; INTRAVENOUS; SUBCUTANEOUS at 09:41

## 2024-01-01 RX ADMIN — PIPERACILLIN AND TAZOBACTAM 200 GRAM(S): 4; .5 INJECTION, POWDER, LYOPHILIZED, FOR SOLUTION INTRAVENOUS at 22:51

## 2024-01-01 RX ADMIN — Medication 1 APPLICATION(S): at 18:47

## 2024-01-01 RX ADMIN — HYDROMORPHONE HYDROCHLORIDE 0.5 MILLIGRAM(S): 2 INJECTION INTRAMUSCULAR; INTRAVENOUS; SUBCUTANEOUS at 05:29

## 2024-01-01 RX ADMIN — MEROPENEM 100 MILLIGRAM(S): 1 INJECTION INTRAVENOUS at 21:37

## 2024-01-01 RX ADMIN — FINASTERIDE 5 MILLIGRAM(S): 5 TABLET, FILM COATED ORAL at 13:45

## 2024-01-01 RX ADMIN — PANTOPRAZOLE SODIUM 40 MILLIGRAM(S): 20 TABLET, DELAYED RELEASE ORAL at 17:21

## 2024-01-01 RX ADMIN — Medication 100 MILLIGRAM(S): at 10:27

## 2024-01-01 RX ADMIN — Medication 1 TABLET(S): at 05:44

## 2024-01-01 RX ADMIN — HYDROMORPHONE HYDROCHLORIDE 0.5 MILLIGRAM(S): 2 INJECTION INTRAMUSCULAR; INTRAVENOUS; SUBCUTANEOUS at 15:43

## 2024-01-01 RX ADMIN — FENTANYL CITRATE 50 MICROGRAM(S): 50 INJECTION INTRAVENOUS at 10:15

## 2024-01-01 RX ADMIN — SENNA PLUS 2 TABLET(S): 8.6 TABLET ORAL at 21:29

## 2024-01-01 RX ADMIN — CHLORHEXIDINE GLUCONATE 1 APPLICATION(S): 213 SOLUTION TOPICAL at 05:49

## 2024-01-01 RX ADMIN — Medication 25 GRAM(S): at 17:22

## 2024-01-01 RX ADMIN — HYDROMORPHONE HYDROCHLORIDE 0.2 MILLIGRAM(S): 2 INJECTION INTRAMUSCULAR; INTRAVENOUS; SUBCUTANEOUS at 15:00

## 2024-01-01 RX ADMIN — Medication 1 TABLET(S): at 11:35

## 2024-01-01 RX ADMIN — Medication 1: at 09:14

## 2024-01-01 RX ADMIN — Medication 1 MILLIGRAM(S): at 12:00

## 2024-01-01 RX ADMIN — APIXABAN 10 MILLIGRAM(S): 2.5 TABLET, FILM COATED ORAL at 18:43

## 2024-01-01 RX ADMIN — Medication 10 MILLIGRAM(S): at 15:41

## 2024-01-01 RX ADMIN — HYDROMORPHONE HYDROCHLORIDE 0.5 MILLIGRAM(S): 2 INJECTION INTRAMUSCULAR; INTRAVENOUS; SUBCUTANEOUS at 22:18

## 2024-01-01 RX ADMIN — HYDROMORPHONE HYDROCHLORIDE 0.5 MILLIGRAM(S): 2 INJECTION INTRAMUSCULAR; INTRAVENOUS; SUBCUTANEOUS at 00:00

## 2024-01-01 RX ADMIN — FINASTERIDE 5 MILLIGRAM(S): 5 TABLET, FILM COATED ORAL at 11:07

## 2024-01-01 RX ADMIN — PANTOPRAZOLE SODIUM 40 MILLIGRAM(S): 20 TABLET, DELAYED RELEASE ORAL at 01:23

## 2024-01-01 RX ADMIN — HYDROMORPHONE HYDROCHLORIDE 0.5 MILLIGRAM(S): 2 INJECTION INTRAMUSCULAR; INTRAVENOUS; SUBCUTANEOUS at 05:01

## 2024-01-01 RX ADMIN — PANTOPRAZOLE SODIUM 40 MILLIGRAM(S): 20 TABLET, DELAYED RELEASE ORAL at 05:02

## 2024-01-01 RX ADMIN — Medication 400 MILLIGRAM(S): at 06:00

## 2024-01-01 RX ADMIN — Medication 1 MILLIGRAM(S): at 11:36

## 2024-01-01 RX ADMIN — MIRTAZAPINE 7.5 MILLIGRAM(S): 45 TABLET, ORALLY DISINTEGRATING ORAL at 23:37

## 2024-01-01 RX ADMIN — APIXABAN 10 MILLIGRAM(S): 2.5 TABLET, FILM COATED ORAL at 06:56

## 2024-01-01 RX ADMIN — PANTOPRAZOLE SODIUM 40 MILLIGRAM(S): 20 TABLET, DELAYED RELEASE ORAL at 17:11

## 2024-01-01 RX ADMIN — Medication 1: at 18:36

## 2024-01-01 RX ADMIN — Medication 20 MILLIGRAM(S): at 12:10

## 2024-01-01 RX ADMIN — ROBINUL 0.4 MILLIGRAM(S): 0.2 INJECTION INTRAMUSCULAR; INTRAVENOUS at 11:59

## 2024-01-01 RX ADMIN — MIRTAZAPINE 7.5 MILLIGRAM(S): 45 TABLET, ORALLY DISINTEGRATING ORAL at 21:28

## 2024-01-01 RX ADMIN — HYDROMORPHONE HYDROCHLORIDE 0.5 MILLIGRAM(S): 2 INJECTION INTRAMUSCULAR; INTRAVENOUS; SUBCUTANEOUS at 00:51

## 2024-01-01 RX ADMIN — HYDROMORPHONE HYDROCHLORIDE 0.5 MILLIGRAM(S): 2 INJECTION INTRAMUSCULAR; INTRAVENOUS; SUBCUTANEOUS at 00:49

## 2024-01-01 RX ADMIN — Medication 400 MILLIGRAM(S): at 00:33

## 2024-01-01 RX ADMIN — HYDROMORPHONE HYDROCHLORIDE 0.5 MILLIGRAM(S): 2 INJECTION INTRAMUSCULAR; INTRAVENOUS; SUBCUTANEOUS at 18:53

## 2024-01-01 RX ADMIN — HYDROMORPHONE HYDROCHLORIDE 0.5 MILLIGRAM(S): 2 INJECTION INTRAMUSCULAR; INTRAVENOUS; SUBCUTANEOUS at 12:59

## 2024-01-01 RX ADMIN — PANTOPRAZOLE SODIUM 40 MILLIGRAM(S): 20 TABLET, DELAYED RELEASE ORAL at 06:19

## 2024-01-01 RX ADMIN — HEPARIN SODIUM 8 UNIT(S)/HR: 5000 INJECTION INTRAVENOUS; SUBCUTANEOUS at 00:00

## 2024-01-01 RX ADMIN — NYSTATIN CREAM 1 APPLICATION(S): 100000 CREAM TOPICAL at 18:52

## 2024-01-01 RX ADMIN — HYDROMORPHONE HYDROCHLORIDE 1 MILLIGRAM(S): 2 INJECTION INTRAMUSCULAR; INTRAVENOUS; SUBCUTANEOUS at 14:38

## 2024-01-01 RX ADMIN — APIXABAN 10 MILLIGRAM(S): 2.5 TABLET, FILM COATED ORAL at 06:18

## 2024-01-01 RX ADMIN — SODIUM CHLORIDE 100 MILLILITER(S): 9 INJECTION INTRAMUSCULAR; INTRAVENOUS; SUBCUTANEOUS at 21:38

## 2024-01-01 RX ADMIN — Medication 1: at 12:49

## 2024-01-01 RX ADMIN — Medication 400 MILLIGRAM(S): at 01:01

## 2024-01-01 RX ADMIN — MIRTAZAPINE 7.5 MILLIGRAM(S): 45 TABLET, ORALLY DISINTEGRATING ORAL at 22:03

## 2024-01-01 RX ADMIN — HYDROMORPHONE HYDROCHLORIDE 0.5 MILLIGRAM(S): 2 INJECTION INTRAMUSCULAR; INTRAVENOUS; SUBCUTANEOUS at 17:56

## 2024-01-01 RX ADMIN — ATORVASTATIN CALCIUM 40 MILLIGRAM(S): 80 TABLET, FILM COATED ORAL at 23:39

## 2024-01-01 RX ADMIN — APIXABAN 10 MILLIGRAM(S): 2.5 TABLET, FILM COATED ORAL at 18:53

## 2024-01-01 RX ADMIN — PANTOPRAZOLE SODIUM 40 MILLIGRAM(S): 20 TABLET, DELAYED RELEASE ORAL at 06:03

## 2024-01-01 RX ADMIN — HYDROMORPHONE HYDROCHLORIDE 0.2 MILLIGRAM(S): 2 INJECTION INTRAMUSCULAR; INTRAVENOUS; SUBCUTANEOUS at 14:00

## 2024-01-01 RX ADMIN — MIRTAZAPINE 7.5 MILLIGRAM(S): 45 TABLET, ORALLY DISINTEGRATING ORAL at 21:24

## 2024-01-01 RX ADMIN — SENNA PLUS 2 TABLET(S): 8.6 TABLET ORAL at 22:50

## 2024-01-01 RX ADMIN — SODIUM CHLORIDE 151.52 MILLILITER(S): 9 INJECTION INTRAMUSCULAR; INTRAVENOUS; SUBCUTANEOUS at 12:56

## 2024-01-01 RX ADMIN — Medication 500 MILLIGRAM(S): at 14:37

## 2024-01-01 RX ADMIN — CHLORHEXIDINE GLUCONATE 1 APPLICATION(S): 213 SOLUTION TOPICAL at 12:01

## 2024-01-01 RX ADMIN — HYDROMORPHONE HYDROCHLORIDE 0.5 MILLIGRAM(S): 2 INJECTION INTRAMUSCULAR; INTRAVENOUS; SUBCUTANEOUS at 11:41

## 2024-01-01 RX ADMIN — HYDROMORPHONE HYDROCHLORIDE 0.5 MILLIGRAM(S): 2 INJECTION INTRAMUSCULAR; INTRAVENOUS; SUBCUTANEOUS at 13:23

## 2024-01-01 RX ADMIN — TAMSULOSIN HYDROCHLORIDE 0.4 MILLIGRAM(S): 0.4 CAPSULE ORAL at 22:37

## 2024-01-01 RX ADMIN — Medication 100 MILLIGRAM(S): at 12:31

## 2024-01-01 RX ADMIN — HYDROMORPHONE HYDROCHLORIDE 0.5 MILLIGRAM(S): 2 INJECTION INTRAMUSCULAR; INTRAVENOUS; SUBCUTANEOUS at 04:30

## 2024-01-01 RX ADMIN — PANTOPRAZOLE SODIUM 40 MILLIGRAM(S): 20 TABLET, DELAYED RELEASE ORAL at 07:19

## 2024-01-01 RX ADMIN — HYDROMORPHONE HYDROCHLORIDE 0.5 MILLIGRAM(S): 2 INJECTION INTRAMUSCULAR; INTRAVENOUS; SUBCUTANEOUS at 11:07

## 2024-01-01 RX ADMIN — Medication 1 MILLIGRAM(S): at 11:07

## 2024-01-01 RX ADMIN — Medication 1 MILLIGRAM(S): at 13:45

## 2024-01-01 RX ADMIN — HYDROMORPHONE HYDROCHLORIDE 0.5 MILLIGRAM(S): 2 INJECTION INTRAMUSCULAR; INTRAVENOUS; SUBCUTANEOUS at 15:52

## 2024-01-01 RX ADMIN — FENTANYL CITRATE 25 MICROGRAM(S): 50 INJECTION INTRAVENOUS at 11:56

## 2024-01-01 RX ADMIN — SODIUM CHLORIDE 1000 MILLILITER(S): 9 INJECTION INTRAMUSCULAR; INTRAVENOUS; SUBCUTANEOUS at 22:47

## 2024-01-01 RX ADMIN — HYDROMORPHONE HYDROCHLORIDE 0.5 MILLIGRAM(S): 2 INJECTION INTRAMUSCULAR; INTRAVENOUS; SUBCUTANEOUS at 15:46

## 2024-01-01 RX ADMIN — Medication 5.59 MICROGRAM(S)/KG/MIN: at 21:37

## 2024-01-01 RX ADMIN — IMIPENEM AND CILASTATIN 100 MILLIGRAM(S): 250; 250 INJECTION, POWDER, FOR SOLUTION INTRAVENOUS at 17:26

## 2024-01-01 RX ADMIN — Medication 5.59 MICROGRAM(S)/KG/MIN: at 04:08

## 2024-01-01 RX ADMIN — MIRTAZAPINE 7.5 MILLIGRAM(S): 45 TABLET, ORALLY DISINTEGRATING ORAL at 22:37

## 2024-01-01 RX ADMIN — SODIUM CHLORIDE 500 MILLILITER(S): 9 INJECTION INTRAMUSCULAR; INTRAVENOUS; SUBCUTANEOUS at 01:23

## 2024-01-01 RX ADMIN — HYDROMORPHONE HYDROCHLORIDE 0.5 MILLIGRAM(S): 2 INJECTION INTRAMUSCULAR; INTRAVENOUS; SUBCUTANEOUS at 05:30

## 2024-01-01 RX ADMIN — Medication 1 TABLET(S): at 11:28

## 2024-01-01 RX ADMIN — Medication 10 MILLIGRAM(S): at 13:25

## 2024-01-01 RX ADMIN — Medication 100 MILLIGRAM(S): at 01:14

## 2024-01-01 RX ADMIN — Medication 25 GRAM(S): at 11:41

## 2024-01-01 RX ADMIN — Medication 1 APPLICATION(S): at 13:48

## 2024-01-01 RX ADMIN — PANTOPRAZOLE SODIUM 40 MILLIGRAM(S): 20 TABLET, DELAYED RELEASE ORAL at 05:28

## 2024-01-01 RX ADMIN — HYDROMORPHONE HYDROCHLORIDE 0.5 MILLIGRAM(S): 2 INJECTION INTRAMUSCULAR; INTRAVENOUS; SUBCUTANEOUS at 11:29

## 2024-01-01 RX ADMIN — POTASSIUM PHOSPHATE, MONOBASIC POTASSIUM PHOSPHATE, DIBASIC 62.5 MILLIMOLE(S): 236; 224 INJECTION, SOLUTION INTRAVENOUS at 10:46

## 2024-01-01 RX ADMIN — HYDROMORPHONE HYDROCHLORIDE 0.5 MILLIGRAM(S): 2 INJECTION INTRAMUSCULAR; INTRAVENOUS; SUBCUTANEOUS at 12:29

## 2024-01-01 RX ADMIN — PANTOPRAZOLE SODIUM 40 MILLIGRAM(S): 20 TABLET, DELAYED RELEASE ORAL at 06:58

## 2024-01-01 RX ADMIN — HYDROMORPHONE HYDROCHLORIDE 0.5 MILLIGRAM(S): 2 INJECTION INTRAMUSCULAR; INTRAVENOUS; SUBCUTANEOUS at 23:31

## 2024-01-01 RX ADMIN — PANTOPRAZOLE SODIUM 40 MILLIGRAM(S): 20 TABLET, DELAYED RELEASE ORAL at 06:56

## 2024-01-01 RX ADMIN — HYDROMORPHONE HYDROCHLORIDE 0.5 MILLIGRAM(S): 2 INJECTION INTRAMUSCULAR; INTRAVENOUS; SUBCUTANEOUS at 18:07

## 2024-01-01 RX ADMIN — HYDROMORPHONE HYDROCHLORIDE 0.5 MILLIGRAM(S): 2 INJECTION INTRAMUSCULAR; INTRAVENOUS; SUBCUTANEOUS at 06:18

## 2024-01-01 RX ADMIN — HYDROMORPHONE HYDROCHLORIDE 1 MILLIGRAM(S): 2 INJECTION INTRAMUSCULAR; INTRAVENOUS; SUBCUTANEOUS at 03:30

## 2024-01-01 RX ADMIN — HYDROMORPHONE HYDROCHLORIDE 0.5 MILLIGRAM(S): 2 INJECTION INTRAMUSCULAR; INTRAVENOUS; SUBCUTANEOUS at 10:16

## 2024-01-01 RX ADMIN — CHLORHEXIDINE GLUCONATE 1 APPLICATION(S): 213 SOLUTION TOPICAL at 13:45

## 2024-01-01 RX ADMIN — Medication 1 APPLICATION(S): at 17:18

## 2024-01-01 RX ADMIN — HEPARIN SODIUM 4500 UNIT(S): 5000 INJECTION INTRAVENOUS; SUBCUTANEOUS at 22:50

## 2024-01-01 RX ADMIN — TAMSULOSIN HYDROCHLORIDE 0.4 MILLIGRAM(S): 0.4 CAPSULE ORAL at 21:28

## 2024-01-01 RX ADMIN — HYDROMORPHONE HYDROCHLORIDE 0.5 MILLIGRAM(S): 2 INJECTION INTRAMUSCULAR; INTRAVENOUS; SUBCUTANEOUS at 06:03

## 2024-01-01 RX ADMIN — Medication 0.5 MILLIGRAM(S): at 03:10

## 2024-01-01 RX ADMIN — NYSTATIN CREAM 1 APPLICATION(S): 100000 CREAM TOPICAL at 18:05

## 2024-01-01 RX ADMIN — NYSTATIN CREAM 1 APPLICATION(S): 100000 CREAM TOPICAL at 17:58

## 2024-01-01 RX ADMIN — Medication 40 MILLIEQUIVALENT(S): at 10:52

## 2024-01-01 RX ADMIN — FENTANYL CITRATE 50 MICROGRAM(S): 50 INJECTION INTRAVENOUS at 10:04

## 2024-01-01 RX ADMIN — HYDROMORPHONE HYDROCHLORIDE 0.5 MILLIGRAM(S): 2 INJECTION INTRAMUSCULAR; INTRAVENOUS; SUBCUTANEOUS at 17:12

## 2024-01-01 RX ADMIN — HYDROMORPHONE HYDROCHLORIDE 0.5 MILLIGRAM(S): 2 INJECTION INTRAMUSCULAR; INTRAVENOUS; SUBCUTANEOUS at 08:41

## 2024-01-01 RX ADMIN — Medication 1 TABLET(S): at 18:21

## 2024-01-01 RX ADMIN — Medication 500 MILLIGRAM(S): at 13:14

## 2024-01-01 RX ADMIN — HYDROMORPHONE HYDROCHLORIDE 1 MILLIGRAM(S): 2 INJECTION INTRAMUSCULAR; INTRAVENOUS; SUBCUTANEOUS at 10:43

## 2024-01-01 RX ADMIN — ROBINUL 0.4 MILLIGRAM(S): 0.2 INJECTION INTRAMUSCULAR; INTRAVENOUS at 22:18

## 2024-01-01 RX ADMIN — Medication 1 MILLIGRAM(S): at 11:44

## 2024-01-01 RX ADMIN — Medication 1: at 22:37

## 2024-01-01 RX ADMIN — PANTOPRAZOLE SODIUM 40 MILLIGRAM(S): 20 TABLET, DELAYED RELEASE ORAL at 05:44

## 2024-01-01 RX ADMIN — HYDROMORPHONE HYDROCHLORIDE 1 MILLIGRAM(S): 2 INJECTION INTRAMUSCULAR; INTRAVENOUS; SUBCUTANEOUS at 03:01

## 2024-01-01 RX ADMIN — HYDROMORPHONE HYDROCHLORIDE 0.5 MILLIGRAM(S): 2 INJECTION INTRAMUSCULAR; INTRAVENOUS; SUBCUTANEOUS at 07:10

## 2024-01-01 RX ADMIN — PANTOPRAZOLE SODIUM 40 MILLIGRAM(S): 20 TABLET, DELAYED RELEASE ORAL at 17:24

## 2024-01-01 RX ADMIN — HYDROMORPHONE HYDROCHLORIDE 0.5 MILLIGRAM(S): 2 INJECTION INTRAMUSCULAR; INTRAVENOUS; SUBCUTANEOUS at 17:19

## 2024-01-01 RX ADMIN — HYDROMORPHONE HYDROCHLORIDE 0.5 MILLIGRAM(S): 2 INJECTION INTRAMUSCULAR; INTRAVENOUS; SUBCUTANEOUS at 12:34

## 2024-01-01 RX ADMIN — HYDROMORPHONE HYDROCHLORIDE 0.5 MILLIGRAM(S): 2 INJECTION INTRAMUSCULAR; INTRAVENOUS; SUBCUTANEOUS at 02:14

## 2024-01-01 RX ADMIN — Medication 1: at 06:01

## 2024-01-01 RX ADMIN — Medication 10 MILLIGRAM(S): at 06:19

## 2024-01-01 RX ADMIN — PANTOPRAZOLE SODIUM 40 MILLIGRAM(S): 20 TABLET, DELAYED RELEASE ORAL at 17:26

## 2024-01-01 RX ADMIN — MEROPENEM 100 MILLIGRAM(S): 1 INJECTION INTRAVENOUS at 05:48

## 2024-01-01 RX ADMIN — Medication 100 MILLIEQUIVALENT(S): at 05:58

## 2024-01-01 RX ADMIN — Medication 1 TABLET(S): at 12:00

## 2024-01-01 RX ADMIN — ATORVASTATIN CALCIUM 40 MILLIGRAM(S): 80 TABLET, FILM COATED ORAL at 22:02

## 2024-01-01 RX ADMIN — Medication 1000 MILLIGRAM(S): at 01:16

## 2024-01-01 RX ADMIN — PANTOPRAZOLE SODIUM 40 MILLIGRAM(S): 20 TABLET, DELAYED RELEASE ORAL at 05:25

## 2024-01-01 RX ADMIN — Medication 10 MILLIGRAM(S): at 13:44

## 2024-01-01 RX ADMIN — Medication 10 MILLIGRAM(S): at 23:37

## 2024-01-01 RX ADMIN — Medication 10 MILLIGRAM(S): at 05:37

## 2024-01-01 RX ADMIN — Medication 2: at 00:00

## 2024-01-01 RX ADMIN — Medication 1: at 18:20

## 2024-01-01 RX ADMIN — FINASTERIDE 5 MILLIGRAM(S): 5 TABLET, FILM COATED ORAL at 11:13

## 2024-01-01 RX ADMIN — Medication 500 MILLIGRAM(S): at 21:29

## 2024-01-01 RX ADMIN — ATORVASTATIN CALCIUM 40 MILLIGRAM(S): 80 TABLET, FILM COATED ORAL at 22:48

## 2024-01-01 RX ADMIN — CHLORHEXIDINE GLUCONATE 1 APPLICATION(S): 213 SOLUTION TOPICAL at 11:08

## 2024-01-01 RX ADMIN — Medication 100 GRAM(S): at 09:17

## 2024-01-01 RX ADMIN — ATORVASTATIN CALCIUM 40 MILLIGRAM(S): 80 TABLET, FILM COATED ORAL at 21:28

## 2024-01-01 RX ADMIN — Medication 1: at 18:27

## 2024-01-01 RX ADMIN — PANTOPRAZOLE SODIUM 40 MILLIGRAM(S): 20 TABLET, DELAYED RELEASE ORAL at 17:57

## 2024-01-01 RX ADMIN — SODIUM CHLORIDE 2000 MILLILITER(S): 9 INJECTION INTRAMUSCULAR; INTRAVENOUS; SUBCUTANEOUS at 12:00

## 2024-01-01 RX ADMIN — Medication 10 MILLIGRAM(S): at 22:03

## 2024-01-01 RX ADMIN — PANTOPRAZOLE SODIUM 40 MILLIGRAM(S): 20 TABLET, DELAYED RELEASE ORAL at 18:21

## 2024-01-01 RX ADMIN — HEPARIN SODIUM 1100 UNIT(S)/HR: 5000 INJECTION INTRAVENOUS; SUBCUTANEOUS at 22:49

## 2024-01-01 RX ADMIN — TAMSULOSIN HYDROCHLORIDE 0.4 MILLIGRAM(S): 0.4 CAPSULE ORAL at 21:05

## 2024-01-01 RX ADMIN — Medication 1 TABLET(S): at 12:09

## 2024-01-01 RX ADMIN — HYDROMORPHONE HYDROCHLORIDE 0.5 MILLIGRAM(S): 2 INJECTION INTRAMUSCULAR; INTRAVENOUS; SUBCUTANEOUS at 11:36

## 2024-01-01 RX ADMIN — PANTOPRAZOLE SODIUM 40 MILLIGRAM(S): 20 TABLET, DELAYED RELEASE ORAL at 18:05

## 2024-01-01 RX ADMIN — Medication 1 TABLET(S): at 11:08

## 2024-01-01 RX ADMIN — Medication 1: at 13:12

## 2024-01-01 RX ADMIN — Medication 10 MILLIGRAM(S): at 21:24

## 2024-01-01 RX ADMIN — HYDROMORPHONE HYDROCHLORIDE 0.5 MILLIGRAM(S): 2 INJECTION INTRAMUSCULAR; INTRAVENOUS; SUBCUTANEOUS at 16:22

## 2024-01-01 RX ADMIN — CHLORHEXIDINE GLUCONATE 1 APPLICATION(S): 213 SOLUTION TOPICAL at 05:28

## 2024-01-01 RX ADMIN — IMIPENEM AND CILASTATIN 100 MILLIGRAM(S): 250; 250 INJECTION, POWDER, FOR SOLUTION INTRAVENOUS at 23:52

## 2024-01-01 RX ADMIN — HYDROMORPHONE HYDROCHLORIDE 0.5 MILLIGRAM(S): 2 INJECTION INTRAMUSCULAR; INTRAVENOUS; SUBCUTANEOUS at 00:45

## 2024-01-01 RX ADMIN — HYDROMORPHONE HYDROCHLORIDE 0.5 MILLIGRAM(S): 2 INJECTION INTRAMUSCULAR; INTRAVENOUS; SUBCUTANEOUS at 00:30

## 2024-01-01 RX ADMIN — Medication 500 MILLIGRAM(S): at 22:03

## 2024-01-01 RX ADMIN — HYDROMORPHONE HYDROCHLORIDE 0.5 MILLIGRAM(S): 2 INJECTION INTRAMUSCULAR; INTRAVENOUS; SUBCUTANEOUS at 17:49

## 2024-01-01 RX ADMIN — Medication 1 APPLICATION(S): at 06:00

## 2024-01-01 RX ADMIN — Medication 2: at 13:13

## 2024-01-01 RX ADMIN — PANTOPRAZOLE SODIUM 40 MILLIGRAM(S): 20 TABLET, DELAYED RELEASE ORAL at 06:02

## 2024-01-01 RX ADMIN — HYDROMORPHONE HYDROCHLORIDE 0.5 MILLIGRAM(S): 2 INJECTION INTRAMUSCULAR; INTRAVENOUS; SUBCUTANEOUS at 19:49

## 2024-01-01 RX ADMIN — Medication 100 GRAM(S): at 11:56

## 2024-01-01 RX ADMIN — SENNA PLUS 2 TABLET(S): 8.6 TABLET ORAL at 21:24

## 2024-01-01 RX ADMIN — Medication 10 MILLIGRAM(S): at 06:56

## 2024-01-01 RX ADMIN — Medication 10 MILLIGRAM(S): at 21:30

## 2024-01-01 RX ADMIN — MIRTAZAPINE 7.5 MILLIGRAM(S): 45 TABLET, ORALLY DISINTEGRATING ORAL at 22:49

## 2024-01-01 RX ADMIN — Medication 1000 MILLIGRAM(S): at 06:15

## 2024-01-01 RX ADMIN — HYDROMORPHONE HYDROCHLORIDE 1 MILLIGRAM(S): 2 INJECTION INTRAMUSCULAR; INTRAVENOUS; SUBCUTANEOUS at 16:12

## 2024-01-01 RX ADMIN — NYSTATIN CREAM 1 APPLICATION(S): 100000 CREAM TOPICAL at 07:07

## 2024-01-01 RX ADMIN — HYDROMORPHONE HYDROCHLORIDE 0.5 MILLIGRAM(S): 2 INJECTION INTRAMUSCULAR; INTRAVENOUS; SUBCUTANEOUS at 17:45

## 2024-01-01 RX ADMIN — Medication 975 MILLIGRAM(S): at 11:42

## 2024-01-01 RX ADMIN — Medication 10 MILLIGRAM(S): at 22:56

## 2024-01-01 RX ADMIN — APIXABAN 10 MILLIGRAM(S): 2.5 TABLET, FILM COATED ORAL at 05:37

## 2024-01-01 RX ADMIN — SODIUM CHLORIDE 500 MILLILITER(S): 9 INJECTION INTRAMUSCULAR; INTRAVENOUS; SUBCUTANEOUS at 08:50

## 2024-01-01 RX ADMIN — HYDROMORPHONE HYDROCHLORIDE 0.5 MILLIGRAM(S): 2 INJECTION INTRAMUSCULAR; INTRAVENOUS; SUBCUTANEOUS at 06:00

## 2024-01-01 RX ADMIN — NYSTATIN CREAM 1 APPLICATION(S): 100000 CREAM TOPICAL at 12:13

## 2024-01-01 RX ADMIN — NYSTATIN CREAM 1 APPLICATION(S): 100000 CREAM TOPICAL at 06:58

## 2024-01-01 RX ADMIN — Medication 1 TABLET(S): at 17:24

## 2024-01-01 RX ADMIN — Medication 1 TABLET(S): at 13:45

## 2024-01-01 RX ADMIN — IMIPENEM AND CILASTATIN 100 MILLIGRAM(S): 250; 250 INJECTION, POWDER, FOR SOLUTION INTRAVENOUS at 12:34

## 2024-01-01 RX ADMIN — HYDROMORPHONE HYDROCHLORIDE 0.5 MILLIGRAM(S): 2 INJECTION INTRAMUSCULAR; INTRAVENOUS; SUBCUTANEOUS at 01:49

## 2024-01-01 RX ADMIN — HYDROMORPHONE HYDROCHLORIDE 0.5 MILLIGRAM(S): 2 INJECTION INTRAMUSCULAR; INTRAVENOUS; SUBCUTANEOUS at 23:35

## 2024-01-01 RX ADMIN — CHLORHEXIDINE GLUCONATE 1 APPLICATION(S): 213 SOLUTION TOPICAL at 11:36

## 2024-01-01 RX ADMIN — Medication 500 MILLIGRAM(S): at 13:03

## 2024-01-01 RX ADMIN — Medication 250 MILLIGRAM(S): at 17:22

## 2024-01-01 RX ADMIN — Medication 3: at 18:49

## 2024-01-01 RX ADMIN — HYDROMORPHONE HYDROCHLORIDE 0.5 MILLIGRAM(S): 2 INJECTION INTRAMUSCULAR; INTRAVENOUS; SUBCUTANEOUS at 09:16

## 2024-01-01 RX ADMIN — HYDROMORPHONE HYDROCHLORIDE 0.5 MILLIGRAM(S): 2 INJECTION INTRAMUSCULAR; INTRAVENOUS; SUBCUTANEOUS at 00:36

## 2024-01-01 RX ADMIN — Medication 10 MILLIGRAM(S): at 12:09

## 2024-01-01 RX ADMIN — Medication 25 GRAM(S): at 21:37

## 2024-01-01 RX ADMIN — SODIUM CHLORIDE 500 MILLILITER(S): 9 INJECTION INTRAMUSCULAR; INTRAVENOUS; SUBCUTANEOUS at 10:00

## 2024-01-01 RX ADMIN — HYDROMORPHONE HYDROCHLORIDE 0.5 MILLIGRAM(S): 2 INJECTION INTRAMUSCULAR; INTRAVENOUS; SUBCUTANEOUS at 00:59

## 2024-01-01 RX ADMIN — HYDROMORPHONE HYDROCHLORIDE 1 MILLIGRAM(S): 2 INJECTION INTRAMUSCULAR; INTRAVENOUS; SUBCUTANEOUS at 12:39

## 2024-01-01 RX ADMIN — Medication 500 MILLIGRAM(S): at 05:43

## 2024-01-01 RX ADMIN — Medication 1 APPLICATION(S): at 05:28

## 2024-01-01 RX ADMIN — Medication 975 MILLIGRAM(S): at 12:42

## 2024-01-01 RX ADMIN — Medication 10 MILLIGRAM(S): at 06:57

## 2024-01-01 RX ADMIN — Medication 1: at 05:08

## 2024-01-01 RX ADMIN — TAMSULOSIN HYDROCHLORIDE 0.4 MILLIGRAM(S): 0.4 CAPSULE ORAL at 23:38

## 2024-01-01 RX ADMIN — HYDROMORPHONE HYDROCHLORIDE 0.5 MILLIGRAM(S): 2 INJECTION INTRAMUSCULAR; INTRAVENOUS; SUBCUTANEOUS at 08:45

## 2024-01-01 RX ADMIN — ROBINUL 0.4 MILLIGRAM(S): 0.2 INJECTION INTRAMUSCULAR; INTRAVENOUS at 04:30

## 2024-01-01 RX ADMIN — HYDROMORPHONE HYDROCHLORIDE 0.5 MILLIGRAM(S): 2 INJECTION INTRAMUSCULAR; INTRAVENOUS; SUBCUTANEOUS at 02:30

## 2024-01-01 RX ADMIN — CHLORHEXIDINE GLUCONATE 1 APPLICATION(S): 213 SOLUTION TOPICAL at 06:00

## 2024-01-01 RX ADMIN — SODIUM CHLORIDE 500 MILLILITER(S): 9 INJECTION INTRAMUSCULAR; INTRAVENOUS; SUBCUTANEOUS at 14:00

## 2024-01-01 RX ADMIN — Medication 1: at 17:23

## 2024-01-01 RX ADMIN — Medication 5.59 MICROGRAM(S)/KG/MIN: at 07:00

## 2024-01-01 RX ADMIN — Medication 1: at 17:27

## 2024-01-01 RX ADMIN — PANTOPRAZOLE SODIUM 40 MILLIGRAM(S): 20 TABLET, DELAYED RELEASE ORAL at 17:12

## 2024-01-01 RX ADMIN — TAMSULOSIN HYDROCHLORIDE 0.4 MILLIGRAM(S): 0.4 CAPSULE ORAL at 22:56

## 2024-01-01 RX ADMIN — FENTANYL CITRATE 25 MICROGRAM(S): 50 INJECTION INTRAVENOUS at 13:52

## 2024-01-01 RX ADMIN — PANTOPRAZOLE SODIUM 40 MILLIGRAM(S): 20 TABLET, DELAYED RELEASE ORAL at 17:18

## 2024-01-01 RX ADMIN — VASOPRESSIN 6 UNIT(S)/MIN: 20 INJECTION INTRAVENOUS at 09:56

## 2024-01-01 RX ADMIN — HYDROMORPHONE HYDROCHLORIDE 0.5 MILLIGRAM(S): 2 INJECTION INTRAMUSCULAR; INTRAVENOUS; SUBCUTANEOUS at 05:00

## 2024-01-01 RX ADMIN — CHLORHEXIDINE GLUCONATE 1 APPLICATION(S): 213 SOLUTION TOPICAL at 18:00

## 2024-01-01 RX ADMIN — Medication 10 MILLIGRAM(S): at 22:55

## 2024-01-01 RX ADMIN — Medication 1 APPLICATION(S): at 10:45

## 2024-01-01 RX ADMIN — FINASTERIDE 5 MILLIGRAM(S): 5 TABLET, FILM COATED ORAL at 11:35

## 2024-01-01 RX ADMIN — CHLORHEXIDINE GLUCONATE 1 APPLICATION(S): 213 SOLUTION TOPICAL at 06:02

## 2024-01-01 RX ADMIN — HYDROMORPHONE HYDROCHLORIDE 1 MILLIGRAM(S): 2 INJECTION INTRAMUSCULAR; INTRAVENOUS; SUBCUTANEOUS at 18:03

## 2024-01-01 RX ADMIN — PANTOPRAZOLE SODIUM 40 MILLIGRAM(S): 20 TABLET, DELAYED RELEASE ORAL at 17:56

## 2024-01-01 RX ADMIN — IMIPENEM AND CILASTATIN 100 MILLIGRAM(S): 250; 250 INJECTION, POWDER, FOR SOLUTION INTRAVENOUS at 05:08

## 2024-01-01 RX ADMIN — FINASTERIDE 5 MILLIGRAM(S): 5 TABLET, FILM COATED ORAL at 11:44

## 2024-01-01 RX ADMIN — HYDROMORPHONE HYDROCHLORIDE 1 MILLIGRAM(S): 2 INJECTION INTRAMUSCULAR; INTRAVENOUS; SUBCUTANEOUS at 15:08

## 2024-01-01 RX ADMIN — POTASSIUM PHOSPHATE, MONOBASIC POTASSIUM PHOSPHATE, DIBASIC 62.5 MILLIMOLE(S): 236; 224 INJECTION, SOLUTION INTRAVENOUS at 11:40

## 2024-01-01 RX ADMIN — Medication 250 MILLIGRAM(S): at 10:52

## 2024-01-01 RX ADMIN — Medication 10 MILLIGRAM(S): at 22:36

## 2024-01-01 RX ADMIN — HYDROMORPHONE HYDROCHLORIDE 0.5 MILLIGRAM(S): 2 INJECTION INTRAMUSCULAR; INTRAVENOUS; SUBCUTANEOUS at 08:15

## 2024-01-01 RX ADMIN — NYSTATIN CREAM 1 APPLICATION(S): 100000 CREAM TOPICAL at 17:24

## 2024-01-01 RX ADMIN — Medication 1 TABLET(S): at 11:44

## 2024-01-01 RX ADMIN — PANTOPRAZOLE SODIUM 40 MILLIGRAM(S): 20 TABLET, DELAYED RELEASE ORAL at 19:00

## 2024-01-01 RX ADMIN — Medication 40 MILLIEQUIVALENT(S): at 05:22

## 2024-01-01 RX ADMIN — NYSTATIN CREAM 1 APPLICATION(S): 100000 CREAM TOPICAL at 05:38

## 2024-01-01 RX ADMIN — Medication 0.5 MILLIGRAM(S): at 06:51

## 2024-01-01 RX ADMIN — HYDROMORPHONE HYDROCHLORIDE 0.5 MILLIGRAM(S): 2 INJECTION INTRAMUSCULAR; INTRAVENOUS; SUBCUTANEOUS at 17:24

## 2024-01-01 RX ADMIN — HEPARIN SODIUM 9 UNIT(S)/HR: 5000 INJECTION INTRAVENOUS; SUBCUTANEOUS at 21:38

## 2024-01-01 RX ADMIN — SODIUM CHLORIDE 50 MILLILITER(S): 9 INJECTION INTRAMUSCULAR; INTRAVENOUS; SUBCUTANEOUS at 03:30

## 2024-01-01 RX ADMIN — NYSTATIN CREAM 1 APPLICATION(S): 100000 CREAM TOPICAL at 17:11

## 2024-01-01 RX ADMIN — CHLORHEXIDINE GLUCONATE 1 APPLICATION(S): 213 SOLUTION TOPICAL at 05:29

## 2024-01-01 RX ADMIN — PANTOPRAZOLE SODIUM 40 MILLIGRAM(S): 20 TABLET, DELAYED RELEASE ORAL at 05:29

## 2024-01-01 RX ADMIN — Medication 10 MILLIGRAM(S): at 05:25

## 2024-01-01 RX ADMIN — PANTOPRAZOLE SODIUM 40 MILLIGRAM(S): 20 TABLET, DELAYED RELEASE ORAL at 05:49

## 2024-01-01 RX ADMIN — HYDROMORPHONE HYDROCHLORIDE 0.5 MILLIGRAM(S): 2 INJECTION INTRAMUSCULAR; INTRAVENOUS; SUBCUTANEOUS at 18:47

## 2024-01-01 RX ADMIN — IMIPENEM AND CILASTATIN 100 MILLIGRAM(S): 250; 250 INJECTION, POWDER, FOR SOLUTION INTRAVENOUS at 11:56

## 2024-01-01 RX ADMIN — Medication 1 MILLIGRAM(S): at 11:13

## 2024-01-01 RX ADMIN — Medication 500 MILLIGRAM(S): at 21:24

## 2024-01-01 RX ADMIN — HYDROMORPHONE HYDROCHLORIDE 0.5 MILLIGRAM(S): 2 INJECTION INTRAMUSCULAR; INTRAVENOUS; SUBCUTANEOUS at 14:37

## 2024-01-01 RX ADMIN — NYSTATIN CREAM 1 APPLICATION(S): 100000 CREAM TOPICAL at 18:37

## 2024-01-01 RX ADMIN — Medication 500 MILLIGRAM(S): at 06:56

## 2024-01-01 RX ADMIN — Medication 1 APPLICATION(S): at 17:13

## 2024-01-01 RX ADMIN — Medication 250 MILLIGRAM(S): at 06:28

## 2024-01-01 RX ADMIN — FINASTERIDE 5 MILLIGRAM(S): 5 TABLET, FILM COATED ORAL at 11:28

## 2024-01-01 RX ADMIN — SODIUM CHLORIDE 100 MILLILITER(S): 9 INJECTION INTRAMUSCULAR; INTRAVENOUS; SUBCUTANEOUS at 17:00

## 2024-01-01 RX ADMIN — HYDROMORPHONE HYDROCHLORIDE 0.5 MILLIGRAM(S): 2 INJECTION INTRAMUSCULAR; INTRAVENOUS; SUBCUTANEOUS at 06:02

## 2024-01-01 RX ADMIN — HYDROMORPHONE HYDROCHLORIDE 0.5 MILLIGRAM(S): 2 INJECTION INTRAMUSCULAR; INTRAVENOUS; SUBCUTANEOUS at 05:49

## 2024-01-01 RX ADMIN — VASOPRESSIN 6 UNIT(S)/MIN: 20 INJECTION INTRAVENOUS at 21:37

## 2024-01-01 RX ADMIN — FINASTERIDE 5 MILLIGRAM(S): 5 TABLET, FILM COATED ORAL at 12:10

## 2024-01-01 RX ADMIN — PANTOPRAZOLE SODIUM 40 MILLIGRAM(S): 20 TABLET, DELAYED RELEASE ORAL at 18:43

## 2024-01-01 RX ADMIN — PIPERACILLIN AND TAZOBACTAM 200 GRAM(S): 4; .5 INJECTION, POWDER, LYOPHILIZED, FOR SOLUTION INTRAVENOUS at 04:30

## 2024-01-01 RX ADMIN — TAMSULOSIN HYDROCHLORIDE 0.4 MILLIGRAM(S): 0.4 CAPSULE ORAL at 22:02

## 2024-01-01 RX ADMIN — PANTOPRAZOLE SODIUM 40 MILLIGRAM(S): 20 TABLET, DELAYED RELEASE ORAL at 05:36

## 2024-01-01 RX ADMIN — Medication 1 TABLET(S): at 06:55

## 2024-01-01 RX ADMIN — Medication 1: at 13:16

## 2024-01-01 RX ADMIN — Medication 10 MILLIGRAM(S): at 18:29

## 2024-01-01 RX ADMIN — ATORVASTATIN CALCIUM 40 MILLIGRAM(S): 80 TABLET, FILM COATED ORAL at 22:56

## 2024-01-01 RX ADMIN — CHLORHEXIDINE GLUCONATE 1 APPLICATION(S): 213 SOLUTION TOPICAL at 05:14

## 2024-01-01 RX ADMIN — NYSTATIN CREAM 1 APPLICATION(S): 100000 CREAM TOPICAL at 22:03

## 2024-01-01 RX ADMIN — Medication 1 APPLICATION(S): at 05:49

## 2024-01-01 RX ADMIN — PANTOPRAZOLE SODIUM 40 MILLIGRAM(S): 20 TABLET, DELAYED RELEASE ORAL at 18:53

## 2024-01-01 RX ADMIN — Medication 250 MILLIGRAM(S): at 00:51

## 2024-01-01 RX ADMIN — HYDROMORPHONE HYDROCHLORIDE 0.5 MILLIGRAM(S): 2 INJECTION INTRAMUSCULAR; INTRAVENOUS; SUBCUTANEOUS at 18:24

## 2024-01-01 RX ADMIN — HYDROMORPHONE HYDROCHLORIDE 0.5 MILLIGRAM(S): 2 INJECTION INTRAMUSCULAR; INTRAVENOUS; SUBCUTANEOUS at 12:53

## 2024-01-01 RX ADMIN — PANTOPRAZOLE SODIUM 40 MILLIGRAM(S): 20 TABLET, DELAYED RELEASE ORAL at 18:07

## 2024-01-01 RX ADMIN — HYDROMORPHONE HYDROCHLORIDE 0.5 MILLIGRAM(S): 2 INJECTION INTRAMUSCULAR; INTRAVENOUS; SUBCUTANEOUS at 20:00

## 2024-01-01 RX ADMIN — SENNA PLUS 2 TABLET(S): 8.6 TABLET ORAL at 22:03

## 2024-01-01 RX ADMIN — PANTOPRAZOLE SODIUM 40 MILLIGRAM(S): 20 TABLET, DELAYED RELEASE ORAL at 05:08

## 2024-01-01 RX ADMIN — SODIUM CHLORIDE 500 MILLILITER(S): 9 INJECTION INTRAMUSCULAR; INTRAVENOUS; SUBCUTANEOUS at 16:00

## 2024-01-01 RX ADMIN — APIXABAN 10 MILLIGRAM(S): 2.5 TABLET, FILM COATED ORAL at 17:57

## 2024-01-01 RX ADMIN — CHLORHEXIDINE GLUCONATE 1 APPLICATION(S): 213 SOLUTION TOPICAL at 11:17

## 2024-01-01 RX ADMIN — Medication 1 APPLICATION(S): at 11:29

## 2024-01-01 RX ADMIN — CHLORHEXIDINE GLUCONATE 1 APPLICATION(S): 213 SOLUTION TOPICAL at 05:00

## 2024-01-01 RX ADMIN — HYDROMORPHONE HYDROCHLORIDE 0.5 MILLIGRAM(S): 2 INJECTION INTRAMUSCULAR; INTRAVENOUS; SUBCUTANEOUS at 07:40

## 2024-01-01 RX ADMIN — HYDROMORPHONE HYDROCHLORIDE 0.5 MILLIGRAM(S): 2 INJECTION INTRAMUSCULAR; INTRAVENOUS; SUBCUTANEOUS at 22:30

## 2024-01-01 RX ADMIN — Medication 1 TABLET(S): at 11:13

## 2024-01-01 RX ADMIN — VASOPRESSIN 6 UNIT(S)/MIN: 20 INJECTION INTRAVENOUS at 11:41

## 2024-01-01 RX ADMIN — HYDROMORPHONE HYDROCHLORIDE 1 MILLIGRAM(S): 2 INJECTION INTRAMUSCULAR; INTRAVENOUS; SUBCUTANEOUS at 18:33

## 2024-01-01 RX ADMIN — HYDROMORPHONE HYDROCHLORIDE 0.5 MILLIGRAM(S): 2 INJECTION INTRAMUSCULAR; INTRAVENOUS; SUBCUTANEOUS at 17:17

## 2024-01-01 RX ADMIN — Medication 100 GRAM(S): at 11:40

## 2024-01-01 RX ADMIN — MIDAZOLAM HYDROCHLORIDE 1 MILLIGRAM(S): 1 INJECTION, SOLUTION INTRAMUSCULAR; INTRAVENOUS at 10:07

## 2024-01-01 RX ADMIN — HYDROMORPHONE HYDROCHLORIDE 0.5 MILLIGRAM(S): 2 INJECTION INTRAMUSCULAR; INTRAVENOUS; SUBCUTANEOUS at 11:08

## 2024-01-01 RX ADMIN — CHLORHEXIDINE GLUCONATE 1 APPLICATION(S): 213 SOLUTION TOPICAL at 05:02

## 2024-01-01 RX ADMIN — MIRTAZAPINE 7.5 MILLIGRAM(S): 45 TABLET, ORALLY DISINTEGRATING ORAL at 22:55

## 2024-01-01 RX ADMIN — HYDROMORPHONE HYDROCHLORIDE 0.5 MILLIGRAM(S): 2 INJECTION INTRAMUSCULAR; INTRAVENOUS; SUBCUTANEOUS at 16:36

## 2024-01-01 RX ADMIN — PIPERACILLIN AND TAZOBACTAM 25 GRAM(S): 4; .5 INJECTION, POWDER, LYOPHILIZED, FOR SOLUTION INTRAVENOUS at 14:34

## 2024-01-01 RX ADMIN — PANTOPRAZOLE SODIUM 40 MILLIGRAM(S): 20 TABLET, DELAYED RELEASE ORAL at 05:48

## 2024-01-01 RX ADMIN — NYSTATIN CREAM 1 APPLICATION(S): 100000 CREAM TOPICAL at 05:43

## 2024-01-01 RX ADMIN — HYDROMORPHONE HYDROCHLORIDE 0.5 MILLIGRAM(S): 2 INJECTION INTRAMUSCULAR; INTRAVENOUS; SUBCUTANEOUS at 23:19

## 2024-01-01 RX ADMIN — Medication 10 MILLIGRAM(S): at 05:44

## 2024-01-01 RX ADMIN — NYSTATIN CREAM 1 APPLICATION(S): 100000 CREAM TOPICAL at 07:19

## 2024-01-01 RX ADMIN — FENTANYL CITRATE 25 MICROGRAM(S): 50 INJECTION INTRAVENOUS at 14:30

## 2024-01-01 RX ADMIN — HYDROMORPHONE HYDROCHLORIDE 0.5 MILLIGRAM(S): 2 INJECTION INTRAMUSCULAR; INTRAVENOUS; SUBCUTANEOUS at 20:17

## 2024-01-01 RX ADMIN — Medication 1 MILLIGRAM(S): at 12:10

## 2024-01-01 RX ADMIN — TAMSULOSIN HYDROCHLORIDE 0.4 MILLIGRAM(S): 0.4 CAPSULE ORAL at 21:38

## 2024-01-01 RX ADMIN — Medication 1 MILLIGRAM(S): at 11:28

## 2024-01-01 RX ADMIN — ATORVASTATIN CALCIUM 40 MILLIGRAM(S): 80 TABLET, FILM COATED ORAL at 21:24

## 2024-01-01 RX ADMIN — CHLORHEXIDINE GLUCONATE 1 APPLICATION(S): 213 SOLUTION TOPICAL at 06:21

## 2024-01-01 RX ADMIN — CHLORHEXIDINE GLUCONATE 1 APPLICATION(S): 213 SOLUTION TOPICAL at 11:44

## 2024-01-01 RX ADMIN — HYDROMORPHONE HYDROCHLORIDE 0.5 MILLIGRAM(S): 2 INJECTION INTRAMUSCULAR; INTRAVENOUS; SUBCUTANEOUS at 13:29

## 2024-01-01 RX ADMIN — PANTOPRAZOLE SODIUM 40 MILLIGRAM(S): 20 TABLET, DELAYED RELEASE ORAL at 19:49

## 2024-01-01 RX ADMIN — FINASTERIDE 5 MILLIGRAM(S): 5 TABLET, FILM COATED ORAL at 12:00

## 2024-01-01 RX ADMIN — Medication 10 MILLIGRAM(S): at 13:14

## 2024-01-01 RX ADMIN — HEPARIN SODIUM 1000 UNIT(S)/HR: 5000 INJECTION INTRAVENOUS; SUBCUTANEOUS at 05:42

## 2024-02-29 NOTE — ED ADULT NURSE NOTE - OBJECTIVE STATEMENT
Pt. presents to Trauma A BIBEMS from [facility] for low hemoglobin 4.9 w/ outpatient labs. Pt. denies any SOB CP lightheadedness/dizziness abd pain n/v/d fever/chills. Pt. states he feels fine Pt. noted w/ stage 4 pressure injury in his thoracic back and another one in his sacral area, covered w/ dressing. [id number] placed labs collected and sent. to be given blood

## 2024-02-29 NOTE — ED ADULT NURSE REASSESSMENT NOTE - NS ED NURSE REASSESS COMMENT FT1
Gricelda RN: Pt is A&Ox3, resting in stretcher with no complaints at this time. Respirations even and unlabored, chest rise equal b/l. Low sinus tachycardia noted on cardiac monitor. VS as noted in flow sheets. Pt denies chest pain, SOB, abdominal pain, N/V/D, h/a, dizziness, numbness/tingling or any urinary symptoms at this time. No acute distress noted. Pt to receive emergent blood transfusion as per Dr. Spangler and Dr. Velasquez for low hemoglobin. Consent form signed in chart. Blood verified at bedside with MADHAVI Reeves. Pt tolerating blood transfusion well at this time. Safety maintained throughout.

## 2024-02-29 NOTE — ED ADULT NURSE REASSESSMENT NOTE - NS ED NURSE REASSESS COMMENT FT1
report received from AM RN NOEL. pt remains at baseline mental status A&OX4, RR even unlabored completing full sentences. pt resting in stretcher comfortably at this time, no new complaints offered. pt VS per chart. MD Gray aware pt BP. pt denies any acute complaints. pt repositioned in stretcher for increased comfort. stretcher lowest position siderails up safety measures in place.  pt awaiting further results at this time.

## 2024-02-29 NOTE — ED ADULT NURSE NOTE - CHIEF COMPLAINT QUOTE
Pt reporting to the ED form dyana Murcia for low hbg 4.6. As per dyana, increased confusion X ~ 3 days. PMH HTN, DM, GERD. Pt tachycardic, hypoxic, placed on NC. Charge notified. placed on NRBM with minimal improvement.

## 2024-02-29 NOTE — ED PROVIDER NOTE - CLINICAL SUMMARY MEDICAL DECISION MAKING FREE TEXT BOX
A/P 83-year-old male with extensive medical history transferred from [facility] for low hemoglobin and mild confusion.  Exam noted for tachycardia, extensive stage IV sacral decub on the back and sacrum, light brown stool on rectal exam.  Plan: Screening EKG, emergency blood transfusion, labs,  guaiac, cardiac monitoring, admit

## 2024-02-29 NOTE — ED PROVIDER NOTE - PHYSICAL EXAMINATION
Attending/Aníbal: NAD; PERRL/EOMI, non-icterus, supple, no SEBLE, no JVD, RRR, CTAB; Abd-soft, NT/ND, no HSM; no LE edema, A&Ox2 (name/place), BUE 5-/5; BLE 2/5; Skin-+extsneive stage 4 back/sacral-no d/c

## 2024-02-29 NOTE — ED PROVIDER NOTE - OBJECTIVE STATEMENT
___ sent in from [facility] for low hemoglobin, BIBEMS, on arrival pt hypotensive, satting 98% on RA, mentating well, mildly confused. Limited Hx. ***incomplete note*** ___ sent in from [facility] for low hemoglobin, BIBEMS, on arrival pt hypotensive, satting 98% on RA, mentating well, mildly confused. Limited Hx. ***incomplete note***    Attending/Aníbal: 84 yo M [facility] HTN, DM2, bladder CA, CAD s/p cath/stents, Iron Def anemia, HTN, BPH, rhabdo, muscle weakness transferred for low hemoglobin, and mild confusion. Patient denies CP, SOB, palp, abd or change in bowel habits. Attending/Aníabl: 84 yo M [facility] HTN, DM2, bladder CA, CAD s/p cath/stents, Iron Def anemia, HTN, BPH, rhabdo, muscle weakness transferred for low hemoglobin, and mild confusion. Patient denies CP, SOB, palp, abd or change in bowel habits. Hx of upper GIB 2/2 steroid use in the past per documentation from [facility].

## 2024-02-29 NOTE — ED ADULT NURSE NOTE - NSFALLHARMRISKINTERV_ED_ALL_ED

## 2024-02-29 NOTE — ED ADULT NURSE REASSESSMENT NOTE - NS ED NURSE REASSESS COMMENT FT1
Pre-Transfusion vital signs as per flowsheet at this time Consent for transfusion in chart at this time. Pt educated on need for blood product at this time and verified understanding with teachback method. Pt educated on signs and symptoms related to transusion reaction. 2 RNs at bedside at this time for confirmation of blood. Pt offering no complaints and remains in no acute distress. blood initiated as ordered over 2hrs.  safety measures maintained throughout

## 2024-02-29 NOTE — ED PROVIDER NOTE - PROGRESS NOTE DETAILS
Aníbal: Emergency blood transfusion, signed consent in the chart. Patient persistently hypotensive after 1u pRBC, mapping 65-70s, mildly confused however AAOx3 on repeat assessment, will order CTA abd/pelvis to eval for GIB, will order additional unit of pRBC as repeat Hgb corrected to 6, elevated white count likely 2/2 hemoconcentration given pt afebrile w/o obvious signs of infection. - Martha Velasquez, PGY-3 pt sleeping, hemoglobin 6 and wbc 21 , antibiotics ordered. plan for ct angio pending. Called to expedite study. Consent signed, obtained phone consent from Sara, emergency contact, updated on the phone. States BP is low for him, will order pressor. Second unit of prbc.   pt full code. Received on sign out from Dr Spangler, completed prbc, BP in SBP 90's plan for ct angio    -Dr Alvarenga  . Patient persistently hypotensive after 1u pRBC, mapping 65-70s, mildly confused however AAOx3 on repeat assessment, will order CTA abd/pelvis to eval for GIB, will order additional unit of pRBC as repeat Hgb corrected to 6, elevated white count likely 2/2 hemoconcentration given pt afebrile w/o obvious signs of infection. - Martha Velasquez, PGY-3 MICU consulted for persistent hypotension, blood cultures ordered. - Martha Velasquez, PGY-3 Received on sign out from Dr Spangler, completed prbc, BP in SBP 90's plan for ct angio    -Dr Alvarenga  . Patient persistently hypotensive after 1u pRBC, mapping 65-70s, mildly confused however AAOx3 on repeat assessment, will order CTA abd/pelvis to eval for GIB, will order additional unit of pRBC as repeat Hgb corrected to 6. - Martha Velasquez, PGY-3 returned from Ct, tolerated well. Called blood bank for second prbc. pending ct read, MICU consulted. MICU consulted for persistent hypotension, blood cultures ordered. HCP Sara updated regarding pt course. - Martha Velasquez, PGY-3 Aníbal: Emergency blood transfusion, signed consent in the chart.    Ron, PGY-3: Spoke w/pt's cousin Sara named as familial care provider for pt, Sara currently out of state, agreeable to blood transfusion at this time, per Sara Murcia told her pt has Hx of GIB, she was unaware. No Hx of blood transfusions or transfusion reactions that she knows of. Emergent blood release consent form signed and placed in chart. Pt full code at this time. /96 HR 90 prbc started. will sign out to night team at end of shift Duarte, PGY-3, EM: received sign out on this patient, rectal exam performed by prior ED team w/o blood, stool brown. admitted to medicine for sepsis. BP improved w/ 2u PRBC. Inpatient team recommending fluid, 500 cc bolus ordered to be given after blood.

## 2024-02-29 NOTE — CONSULT NOTE ADULT - ASSESSMENT
83M with HTN, DM2, bladder CA, CAD s/p stents, Iron Def anemia, BPH who was referred to the ED for low hemoglobin. MICU was consulted for hypotension.     #Hypotension  #Anemia  Initially BP was 90/60, likely secondary to blood loss anemia, however on my assessment the patient's blood pressure was 110/60 after receiving 1U of pRBCs  Suspect anemia is multifactorial (secondary to iron deficiency, anemia of chronic disease, ?upper GI bleed given BUN elevation and reporting black stool)  - work up of anemia per primary team/ED  - transfuse second unit of pRBCs  - serial CBCs  - blood pressure check at least q4h    The patient is currently not a MICU candidate, please re-consult the MICU as needed.    Note is incomplete until signed by attending.

## 2024-02-29 NOTE — ED ADULT TRIAGE NOTE - CHIEF COMPLAINT QUOTE
Pt reporting to the ED form dyana Murcia for low hbg 4.6. As per dyana, increased confusion X ~ 3 days. PMH HTN, DM, GERD. Pt tahcycaridc, hypoxic, placed on NC. Charge notified. Pt reporting to the ED form dyana Murcia for low hbg 4.6. As per dyana, increased confusion X ~ 3 days. PMH HTN, DM, GERD. Pt tachycardic, hypoxic, placed on NC. Charge notified. placed on NRBM with minimal improvement.

## 2024-02-29 NOTE — CONSULT NOTE ADULT - ATTENDING COMMENTS
83M with HTN, DM2, bladder CA, CAD s/p stents, Iron Def anemia, BPH who was referred to the ED for low hemoglobin. MICU was consulted for hypotension. and concern for hemorrhagic shock.  pt with h/o Fe def anemia now with acute on chronic anemia, ?GIB  s/p 1 unit of PRBCs with improvement of SBP (hb 4.5 to 6.7), 2nd unit ordered   SBP in 110s prior to 2nd unit of prbc  NPO, protonix BID, GI eval  serial CBC   montior for signs of bleeding  CTA done in ED, results pending   no need for vasopressors, reconsult as needed

## 2024-02-29 NOTE — ED PROVIDER NOTE - CARE PLAN
1 Principal Discharge DX:	Low hemoglobin   Principal Discharge DX:	Low hemoglobin  Secondary Diagnosis:	Sepsis  Secondary Diagnosis:	DVT, lower extremity

## 2024-03-01 NOTE — H&P ADULT - CONVERSATION DETAILS
Discussed MOLST DNR DNI Code status. Discussed pt at high risk of decompensating again and given overall poor functional status and multiple comorbidities. Pt understands and request to remain full code at this time. Pt determined to have capacity at this time to make medical decisions. Discussed pt at high risk of decompensating again and given overall poor functional status and multiple comorbidities. Pt understands and request to remain full code at this time. Pt determined to have capacity at this time to make medical decisions.      18 minutes spent

## 2024-03-01 NOTE — PROGRESS NOTE ADULT - PROBLEM SELECTOR PLAN 2
Hgb 4.5 on admission appropriately responding to 6.5 after 2u pRBC. c/b positive antibody screen. 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 Given documented history of recent UGIB, mostly likely source PUD from prior corticosteroid use. Pt unsure if he got endoscopy at [facility].     - trend CBC, maintain active T&S, transfuse to goal Hgb > 8 (CAD), Plt > 10, >15 if febrile, >50 if active bleed or procedure   - unable to obtain anemia w/u prior to transfusion  - consent in chart  - monitor stool output for melena -- pt was previously on PO iron, now being held  - GI consult in AM; will likely need cardiac clearance if plan for scope -- pt open to EGD if offered Hgb 4.5 on admission appropriately responding to 6.5 after 2u pRBC. c/b positive antibody screen. 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 Given documented history of recent UGIB, mostly likely source PUD from prior corticosteroid use. Pt unsure if he got endoscopy at [facility].     -gi consult  -am cbc  -ct a+P showing peptic ulcer disease and duodenitis

## 2024-03-01 NOTE — ED ADULT NURSE REASSESSMENT NOTE - NS ED NURSE REASSESS COMMENT FT1
PT is resting in stretcher, easily arousable to verbal stimuli. no apparent distress noted at this time. pt blood running per MD order.  hand off will be given to primary nurse when return to area.

## 2024-03-01 NOTE — H&P ADULT - NSHPSOCIALHISTORY_GEN_ALL_CORE
From [facility], previously from home living alone w/o services. bedbound, full dependant for most ADLs, all IADLs. Unclear dispo plan from [facility] Denied tobacco, EtOH, recreational drug use.

## 2024-03-01 NOTE — H&P ADULT - NSICDXPASTSURGICALHX_GEN_ALL_CORE_FT
PAST SURGICAL HISTORY:  History of percutaneous coronary intervention     S/P aortic aneurysm repair     S/P bilateral hip replacements

## 2024-03-01 NOTE — PROGRESS NOTE ADULT - PROBLEM SELECTOR PLAN 1
Pt presenting hypotensive tachycardic Hgb 4 iso presumed UGIB, stabilized after 2u pRBC, 500cc NS bolus. /60s now; unknown baseline.    - transfuse to goals below  - ctm BP q4h  - likely history of HFpEF per LAURITA documentation noting [facility] TTE w LVEF 43%  - f/u TTE    # Sepsis  Pt technically meets SIRS criteria with tachycardia, tachypnea, leukocytosis, pCO2 32; likely source would be sacral decub vs aspiration iso UGIB. Favor leukocytosis, tachycardia, hypoxia from acute anemia and would expect improvement after adequate resuscitation. Was given zosyn x1 in ED.    - will monitor off abx for now  - f/u routine rvp mrsa cxr  - f/u BCx Pt presenting hypotensive tachycardic Hgb 4 iso presumed UGIB, stabilized after 2u pRBC, 500cc NS bolus. /60s now; unknown baseline. initially mapping 70s in the ED.   - likely history of HFpEF per LAURITA documentation noting [facility] TTE w LVEF 43%  - f/u TTE    # Sepsis  Pt technically meets SIRS criteria with tachycardia, tachypnea, leukocytosis, pCO2 32; likely source would be sacral decub vs aspiration iso UGIB. Favor leukocytosis, tachycardia, hypoxia from acute anemia and would expect improvement after adequate resuscitation. Was given zosyn x1 in ED.    - will monitor off abx for now  - f/u routine rvp mrsa cxr  - f/u BCx

## 2024-03-01 NOTE — CONSULT NOTE ADULT - ATTENDING COMMENTS
Pt seen.  Assessment and plan reviewed and discussed with fellow. Agree with above.    Impression/Recommendations:  Lumbar spine stage 4 pressure injury--  aquacel/gauze/ABD/tegaderm  Sacrum Unsatgeable-- honey/foam Pt seen.  Assessment and plan reviewed and discussed with fellow. Agree with above.    Impression/Recommendations:  Lumbar spine stage 4 pressure injury--  aquacel/gauze/ABD/tegaderm  Sacrum Unstageable-- honey/foam Assessment and plan reviewed and discussed with fellow. Agree with above.    Impression/Recommendations:  Lumbar spine stage 4 pressure injury--  aquacel/gauze/ABD/tegaderm  Sacrum Unstageable-- honey/foam

## 2024-03-01 NOTE — PROGRESS NOTE ADULT - PROBLEM SELECTOR PLAN 4
Acute L prox great saphenous vein extending into common femoral DVT. Nonocculsive on DVT study. Unable to start AC iso acute bleed.    - monitor off AC  - unlikely candidate but can consider IVC filter

## 2024-03-01 NOTE — H&P ADULT - NSHPADDITIONALINFOADULT_GEN_ALL_CORE
# HFmrEF?   Prior TTE at [facility] apparently w LVH and EF 43%?  - f/u repeat TTE, proBNP    # AF?  - repeat ECG  - would not be a candidate for AC anyways    # Substernal Goiter  Substernal goiter cold. Previously seen by o/p surg, not candidate for resection due to need for sternotomy to resect.  - f/u TSH # HFmrEF  Prior TTE at [facility] w LVH and EF 43%?  - f/u repeat TTE, proBNP  -lasix post blood products      # Substernal Goiter  Substernal goiter cold. Previously seen by o/p surg, not candidate for resection due to need for sternotomy to resect.  - f/u TSH

## 2024-03-01 NOTE — H&P ADULT - NSHPREVIEWOFSYSTEMS_GEN_ALL_CORE
REVIEW OF SYSTEMS:    CONSTITUTIONAL: +weakness. No fevers or chills  EYES/ENT: No visual changes;  No vertigo or throat pain   NECK: No pain or stiffness  RESPIRATORY: No cough, wheezing, hemoptysis; No shortness of breath  CARDIOVASCULAR: No chest pain or palpitations  GASTROINTESTINAL: No abdominal or epigastric pain. No nausea, vomiting, or hematemesis; No diarrhea or constipation. No melena or hematochezia.  GENITOURINARY: No dysuria, frequency or hematuria  NEUROLOGICAL: No numbness  SKIN: No itching, rashes  MUSCULOSKELETAL: No joint pain, muscle pain.

## 2024-03-01 NOTE — H&P ADULT - PROBLEM SELECTOR PLAN 9
Home regimen metformin 1g qd, tradjenta 5mg qd    - f/u A1c  - c/w INOCENTE, FSG ACHS or q6h if NPO; goal glucose  - c/w home finasteride, tamsulosin  - monitor urine output; consider bladder scan

## 2024-03-01 NOTE — PROGRESS NOTE ADULT - ATTENDING COMMENTS
83M from [facility] bladder ca, CAD s/p stents, HTN, T2DM, LYUBOV, GERD, BPH, stage 4 decubiti c/b osteomyelitis on Flagyl/Augmentin until 3/4/24, AAA s/p graft/stent, recent UGIB (1/2024) iso corticosteroids, prior reports of EV, Fermin's esophagitis, substernal goiter, s/p b/l hip replacements p/w hypotension iso anemia Hgb 4s and LLE DVT    overnight no events. BP much improved. mentating well. denies any CP, SOB. states he has had intermittent melena but not in the past month to his knowledge. has seen both GI and heme before for his anemia    on exam  mentating well, answering questions  nad  abd: +BS soft NT/ND  ext: no edema b/l 83M from [facility] bladder ca, CAD s/p stents, HTN, T2DM, LYUBOV, GERD, BPH, stage 4 decubiti c/b osteomyelitis on Flagyl/Augmentin until 3/4/24, AAA s/p graft/stent, recent UGIB (1/2024) iso corticosteroids, prior reports of EV, Fermin's esophagitis, substernal goiter, s/p b/l hip replacements p/w hypotension iso anemia Hgb 4s and LLE DVT    overnight no events. BP much improved. mentating well. denies any CP, SOB. states he has had intermittent melena but not in the past month to his knowledge. has seen both GI and heme before for his anemia    on exam  mentating well, answering questions  nad  abd: +BS soft NT/ND  ext: no edema b/l    Plan  #Possible UGI bleed  -ok to advance diet  -PPI BID  -monitor H&H closely  -appreciate GI input, plan for scope on monday    #Sepsis criteria  -elevated WBC count but afebrile  -in spite of sirs criteria don't feel like he is in sepsis  -f/u blood cultures for now    #sacral decubitus ulcer stage iv  -c/w augmentin and flagyl (unclear why flagyl but Pt had assessment at OSH)  -end date 3/4  -check CRP    #DVT  -hold off on AC given bleed  -scds    otherwise as noted above    attempted to call HCP darrel [phone] but no answer

## 2024-03-01 NOTE — PROGRESS NOTE ADULT - PROBLEM SELECTOR PLAN 5
Likely prerenal iso hypovolemia    - trend BUN/SCr, avoid nephrotoxic, renally dose all meds  - strict I/Os   - urine studies if not improving Likely prerenal iso hypovolemia, creatinine 1.67

## 2024-03-01 NOTE — PROGRESS NOTE ADULT - ASSESSMENT
83M from [facility] bladder ca, CAD s/p stents, HTN, T2DM, LYUBOV, GERD, BPH, depression/anxiety, stage 4 decubiti c/b osteomyelitis on Flagyl/Augmentin until 3/4/24, AAA s/p graft/stent, recent UGIB (1/2024) iso corticosteroids, prior reports of EV, Fermin's esophagitis, substernal goiter, s/p b/l hip replacements p/w hypotension iso anemia Hgb 4s, LLE DVT. 83M from [facility] bladder ca, CAD s/p stents, HTN, T2DM, LYUBOV, GERD, BPH, depression/anxiety, stage 4 decubiti c/b osteomyelitis on Flagyl/Augmentin until 3/4/24, AAA s/p graft/stent, recent UGIB (1/2024) iso corticosteroids, prior reports of EV, Fermin's esophagitis, substernal goiter, s/p b/l hip replacements p/w hypotension iso anemia Hgb 4s. course complicated by LLE DVT.

## 2024-03-01 NOTE — H&P ADULT - NSHPLABSRESULTS_GEN_ALL_CORE
LABS:                       6.5    21.10 )-----------( 125      ( 29 Feb 2024 19:39 )             21.1     02-29    138  |  112<H>  |  74<H>  ----------------------------<  157<H>  4.4   |  17<L>  |  1.67<H>    Ca    7.8<L>      29 Feb 2024 15:10  Phos  4.2     02-29  Mg     2.40     02-29    TPro  5.6<L>  /  Alb  2.1<L>  /  TBili  <0.2  /  DBili  x   /  AST  18  /  ALT  12  /  AlkPhos  144<H>  02-29    PT/INR - ( 29 Feb 2024 15:10 )   PT: 15.6 sec;   INR: 1.39 ratio    PTT - ( 29 Feb 2024 15:10 )  PTT:26.3 sec    Urinalysis Basic - ( 29 Feb 2024 15:10 )  Color: x / Appearance: x / SG: x / pH: x  Gluc: 157 mg/dL / Ketone: x  / Bili: x / Urobili: x   Blood: x / Protein: x / Nitrite: x   Leuk Esterase: x / RBC: x / WBC x   Sq Epi: x / Non Sq Epi: x / Bacteria: x    CAPILLARY BLOOD GLUCOSE  POCT Blood Glucose.: 179 mg/dL (02-29 @ 19:44)    RADIOLOGY, EKG & ADDITIONAL TESTS: Reviewed.   ECG: tachycardic, unclear if sinus given grossly noisy leads; electronic read reports AFib but with very regular QRS. Difficult to interpret.    < from: CT Abdomen and Pelvis w/wo IV Cont (02.29.24 @ 23:29) >  IMPRESSION:  No active GI bleeding is evident.    Wall thickening of the duodenal bulband descending duodenum with mucosal   irregularity and outpouchings. Mild edema in the adjacent fat. Most   likely peptic ulcer disease with acute duodenitis. No abscess or   extraluminal air. Inflamed duodenal diverticulum less likely.    Suspect left common femoral vein DVT. Recommend left lower extremity   venous ultrasound.    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 Reconstitution within the common femoral artery.    Large decubitus ulcer superficial to the lumbar spine.    Several other chronic findings as above.  < end of copied text >    < from: US Duplex Venous Lower Ext Ltd, Left (03.01.24 @ 03:13) >  IMPRESSION:  Left-sided acute occlusive thrombus in the proximal great saphenous vein,   extending into the common femoral vein where it is nonocclusive.  < end of copied text >    < from: CT Neck Soft Tissue No Cont (08.02.23 @ 16:31) >  IMPRESSION:  1.  Goiterous enlargement of the left thyroid lobe with substernal   extension, rightward tracheal and esophageal displacement and   compression. This is largely visualized with the inferior most aspect not   well demonstrated in this examination. Findings relatively unchanged when   compared to prior imaging.  2.  Chronic appearing fracture to the anterior posterior arches of C1   which is new when compared to prior imaging. This finding results in   anterior displacement of the C2 vertebra and probable narrowing of the   cervical spinal canal. Recommend neurosurgical consultation and MRI of   the cervical spine curve assessment of the cervical spinal cord. Clinical   correlation is recommended.  < end of copied text >

## 2024-03-01 NOTE — H&P ADULT - PROBLEM SELECTOR PLAN 10
- c/w home finasteride, tamsulosin  - monitor urine output; consider bladder scan - c/w home mirtazapine, buspirone

## 2024-03-01 NOTE — H&P ADULT - PROBLEM SELECTOR PLAN 5
Likely prerenal iso hypovolemia    - trend BUN/SCr, avoid nephrotoxic, renally dose all meds  - strict I/Os   - urine studies if not improving

## 2024-03-01 NOTE — ED ADULT NURSE REASSESSMENT NOTE - NS ED NURSE REASSESS COMMENT FT1
received pt back from US. pt remains at baseline mental status A&OX3, RR even unlabored completing full sentences. O2 saturation 100% RA. pt resting in stretcher comfortably at this time, no new complaints offered. vbg drawn and sent as ordered. stretcher lowest position siderails up safety measures in place. pt admitted awaiting bed placement at this time

## 2024-03-01 NOTE — H&P ADULT - PROBLEM SELECTOR PLAN 6
Unclear staging, diagnosis, treatment plan, outpatient provider.     - try to obtain outpt records  - f/u UA to r/o hematuria s/p PCI, stents. ECG w/o c/f ischemia  - c/w statin  -hold aspirin for now

## 2024-03-01 NOTE — PROGRESS NOTE ADULT - PROBLEM SELECTOR PLAN 9
Home regimen metformin 1g qd, tradjenta 5mg qd    - f/u A1c  - c/w INOCENTE, FSG ACHS or q6h if NPO; goal glucose

## 2024-03-01 NOTE — H&P ADULT - NSHPPHYSICALEXAM_GEN_ALL_CORE
T(C): 36.7 (03-01-24 @ 02:00), Max: 37.1 (02-29-24 @ 15:35)  T(F): 98 (03-01-24 @ 02:00), Max: 98.7 (02-29-24 @ 15:35)  HR: 98 (03-01-24 @ 03:07) (92 - 172)  BP: 109/67 (03-01-24 @ 03:07) (89/66 - 111/68)  BP(mean): 75 (02-29-24 @ 21:45) (71 - 75)  RR: 19 (03-01-24 @ 03:07) (17 - 22)  SpO2: 100% (03-01-24 @ 03:07) (86% - 100%) on 4L NC    PHYSICAL EXAM:  GENERAL: NAD, lying in bed  HEAD:  Atraumatic, Normocephalic  EYES: EOMI, PERRLA, conjunctiva and sclera clear  ENMT: No tonsillar erythema, exudates, or enlargement; Moist mucous membranes  NECK: Supple, No JVD, Normal thyroid  HEART: Regular rate and rhythm; No murmurs, rubs, or gallops  RESPIRATORY: CTA B/L, No W/R/R  ABDOMEN: Soft, Nontender, Nondistended; Bowel sounds present  NEUROLOGY: A&Ox3, nonfocal, moving all extremities  EXTREMITIES:  2+ Peripheral Pulses, No clubbing, cyanosis, or edema  SKIN: warm, dry, normal color, no rash or abnormal lesions  BACK: covered in gauze/diaper. Exam limited by pt positioning. Lumbar/thoracic decubiti clean appearing. Sacral ulcer foul smelling but appeared grossly clean with granulation tissue.  RECTAL: per ED note, brown stool. Did not repeat.

## 2024-03-01 NOTE — CONSULT NOTE ADULT - ASSESSMENT
83M from [facility] with Fermin's esophagus, bladder ca, CAD s/p stents, HTN, T2DM, LYUBOV (on oral iron supplementation), GERD, BPH, depression/anxiety, stage 4 decubiti c/b osteomyelitis on Flagyl/Augmentin until 3/4/24, AAA s/p graft/stent, rectal polyps, substernal goiter, s/p b/l hip replacements, recent admission (1/2023) at [facility] for hypernatremia, rhabdo iso prolonged fall, COVID treated with remdesivir/decadron c/b UGIB with ?endoscopy who is admitted with acute blood loss anemia and leukocytosis.     #Acute blood loss anemia  #Black stools  #H/o iron deficiency anemia, on oral iron supplements  #Fermin's esophagus  #Leukocytosis  #Sacral decubitus ulceration, osteomyelitis   #Acute DVT, not on A/C  Patient with anemia to 4.5 g/dL from baseline ~9 in January with reports of intermittent black stools, initially hypotensive however responded appropriately to IVF resuscitation. He was transfused 2 units pRBC with adequate improvement in hgb to 6.5 g/dL.  BUN/Cr 74/1.67. Iron studies in January suggestive of anemia of chronic inflammation with ferritin > 500 however i/s/o acute infection and possible iron supplementation. CT A/P demonstrates findings suggestive of duodenal ulceration with active duodenitis. Anemia is likely multifactorial with a potential component of upper GI bleeding due to peptic ulcer disease, esophagitis. Less likely colonic bleeding in the absence of hematochezia and negative prior colonoscopies.  Patient on H2RA only prior to admission. At present he remains clinically stable without indication for urgent endoscopy. Given leukocytosis and possibility of infection, recommend complete infectious work-up prior to endoscopic evaluation.     Endoscopic reports found in outpatient record:  EGD (2016): Colten's esophagus, questionable EV, questionable gastric polyp  Colonoscopy (2016): internal hemorrhoids, four rectal polyps  EGD (2010): Fermin's, gastritis, hiatal hernia  Colonoscopy (2010): internal hemorrhoids  Capsule endoscopy (2010): non-bleeding jejunal ulceration   p/w hypotension iso anemia Hgb 4s. Per [facility] documentation, pt was recently admitted (1/2023) at [facility] for COVID, hypernatremia, rhabdo iso prolonged fall, treated with remdesivir/decadron c/b UGIB; unclear if endoscopy was performed. TTE performed there showed mild LVH with EF 43%. At [facility], pt was found confused, hypoxic, tachycardic, with recent labs showing Hgb 4.6; no active bleeding, melena, hematochezia was documented.     Recommendations:  - IV PPI BID  - Closely monitor vital signs and for clinical signs of bleeding  - Track all stool output and color  - Maintain two large bore peripheral IVs  - Trend CBC, maintain active T&S and transfuse to goal hgb > 8 g/dL   - Clear liquid diet   - Medical work-up of anemia (pre-transfusional iron studies, folic acid, B12, LDH, haptoglobin)  - Complete infectious work-up including cultures and assessment of decubitus ulcer  - Tentative plan for EGD Monday if medically optimized, or sooner if clinically indicated  - Please contact GI team if patient develops overt bleeding and/or hemodynamic instability     Singh Rizo MD  Gastroenterology/Hepatology Fellow  Available via [employer] Teams  Pager: [phone]    NON-URGENT CONSULTS:  Please email [email or web address]  AT NIGHT AND ON WEEKENDS:  Contact on-call GI fellow via answering service ([phone]) from 5pm-8am and on weekends/holidays  MONDAY-FRIDAY 8AM-5PM:  Pager# [phone] (GEOVANNY) or [phone] ([facility]) 83M from [facility] with Fermin's esophagus, bladder ca, CAD s/p stents, HTN, T2DM, LYUBOV (on oral iron supplementation), GERD, BPH, depression/anxiety, stage 4 decubiti c/b osteomyelitis on Flagyl/Augmentin until 3/4/24, AAA s/p graft/stent, rectal polyps, substernal goiter, s/p b/l hip replacements, recent admission (1/2023) at [facility] for hypernatremia, rhabdo iso prolonged fall, COVID treated with remdesivir/decadron c/b UGIB with ?endoscopy who is admitted with acute blood loss anemia and leukocytosis.     #Acute blood loss anemia  #Black stools  #H/o iron deficiency anemia, on oral iron supplements  #Fermin's esophagus  #Leukocytosis  #Sacral decubitus ulceration, osteomyelitis   #Acute DVT, not on A/C  Patient with anemia to 4.5 g/dL from baseline ~9 in January with reports of intermittent black stools, initially hypotensive however responded appropriately to IVF resuscitation. He was transfused 2 units pRBC with adequate improvement in hgb to 6.5 g/dL.  BUN/Cr 74/1.67. Iron studies in January suggestive of anemia of chronic inflammation with ferritin > 500 however i/s/o acute infection and possible iron supplementation. CT A/P demonstrates findings suggestive of duodenal ulceration with active duodenitis. Anemia is likely multifactorial with a potential component of upper GI bleeding due to peptic ulcer disease, esophagitis. Less likely colonic bleeding in the absence of hematochezia and negative prior colonoscopies.  Patient on H2RA only prior to admission. At present he remains clinically stable without indication for urgent endoscopy. Given leukocytosis and possibility of infection, recommend complete infectious work-up prior to endoscopic evaluation.     Endoscopic reports found in outpatient record:  EGD (2016): Colten's esophagus, questionable EV, questionable gastric polyp  Colonoscopy (2016): internal hemorrhoids, four rectal polyps  EGD (2010): Fermin's, gastritis, hiatal hernia  Colonoscopy (2010): internal hemorrhoids  Capsule endoscopy (2010): non-bleeding jejunal ulceration   p/w hypotension iso anemia Hgb 4s. Per [facility] documentation, pt was recently admitted (1/2023) at [facility] for COVID, hypernatremia, rhabdo iso prolonged fall, treated with remdesivir/decadron c/b UGIB; unclear if endoscopy was performed. TTE performed there showed mild LVH with EF 43%. At [facility], pt was found confused, hypoxic, tachycardic, with recent labs showing Hgb 4.6; no active bleeding, melena, hematochezia was documented.     Recommendations:  - IV PPI BID  - Closely monitor vital signs and for clinical signs of bleeding  - Track all stool output and color  - Maintain two large bore peripheral IVs  - Trend CBC, maintain active T&S and transfuse to goal hgb > 8 g/dL   - OK to advance diet  - Medical work-up of anemia (pre-transfusional iron studies, folic acid, B12, LDH, haptoglobin)  - Complete infectious work-up including cultures and assessment of decubitus ulcer  - Tentative plan for EGD Monday if medically optimized, or sooner if clinically indicated  - Please contact GI team if patient develops overt bleeding and/or hemodynamic instability     Singh Rizo MD  Gastroenterology/Hepatology Fellow  Available via [employer] Teams  Pager: [phone]    NON-URGENT CONSULTS:  Please email [email or web address]  AT NIGHT AND ON WEEKENDS:  Contact on-call GI fellow via answering service ([phone]) from 5pm-8am and on weekends/holidays  MONDAY-FRIDAY 8AM-5PM:  Pager# [phone] (FLAQUITA) or [phone] ([facility])

## 2024-03-01 NOTE — CONSULT NOTE ADULT - SUBJECTIVE AND OBJECTIVE BOX
HPI:    83M with HTN, DM2, bladder CA, CAD s/p stents, Iron Def anemia, BPH who was referred to the ED for low hemoglobin. Per ED documentation, has a history of GI bleeding from steroid use. On interview, the patient is A&Ox3, though appears mildly confused. States he came to the ED because his doctor told him, though he doesn't know why. Denies gross bleeding, though does report dark stools in recent days.      Allergies    No Known Allergies    Intolerances      REVIEW OF SYSTEMS:    CONSTITUTIONAL: No weakness, fevers or chills  EYES: No visual changes; no sclera icterics, no pain or drainage  ENT:  No vertigo or throat pain   NECK: No pain or stiffness  RESPIRATORY: No cough, wheezing, hemoptysis; No shortness of breath  CARDIOVASCULAR: No chest pain or palpitations  GASTROINTESTINAL: No abdominal or epigastric pain. No nausea, vomiting, or hematemesis; No diarrhea or constipation. No melena or hematochezia.  GENITOURINARY: No dysuria, frequency or hematuria  NEUROLOGICAL: No numbness or weakness  SKIN: No itching, rashes  Psych: No anxiety or depression        OBJECTIVE:  ICU Vital Signs Last 24 Hrs  T(C): 36.8 (29 Feb 2024 23:44), Max: 37.1 (29 Feb 2024 15:35)  T(F): 98.3 (29 Feb 2024 23:44), Max: 98.7 (29 Feb 2024 15:35)  HR: 102 (29 Feb 2024 23:44) (92 - 172)  BP: 111/68 (29 Feb 2024 23:44) (89/66 - 111/68)  BP(mean): 75 (29 Feb 2024 21:45) (71 - 75)  RR: 21 (29 Feb 2024 23:44) (18 - 22)  SpO2: 100% (29 Feb 2024 23:44) (86% - 100%)    O2 Parameters below as of 29 Feb 2024 23:44  Patient On (Oxygen Delivery Method): room air              CAPILLARY BLOOD GLUCOSE      POCT Blood Glucose.: 179 mg/dL (29 Feb 2024 19:44)      PHYSICAL EXAM:  General: pale, chronically ill appearing  HEENT: conjunctival pallor  Neck: supple  Respiratory: CTABL, comfortable on room air  Cardiovascular: tachycardic, normal S1 S1, no m/r/g  Abdomen: soft, mildly distended  Extremities: cool and dry  Skin: dry, no peripheral edema  Neurological: +b/l lower extremity paralysis  Psychiatry: normal mood and affect    HOSPITAL MEDICATIONS:  MEDICATIONS  (STANDING):  piperacillin/tazobactam IVPB... 3.375 Gram(s) IV Intermittent once    MEDICATIONS  (PRN):      LABS:                        6.5    21.10 )-----------( 125      ( 29 Feb 2024 19:39 )             21.1     02-29    138  |  112<H>  |  74<H>  ----------------------------<  157<H>  4.4   |  17<L>  |  1.67<H>    Ca    7.8<L>      29 Feb 2024 15:10  Phos  4.2     02-29  Mg     2.40     02-29    TPro  5.6<L>  /  Alb  2.1<L>  /  TBili  <0.2  /  DBili  x   /  AST  18  /  ALT  12  /  AlkPhos  144<H>  02-29    PT/INR - ( 29 Feb 2024 15:10 )   PT: 15.6 sec;   INR: 1.39 ratio         PTT - ( 29 Feb 2024 15:10 )  PTT:26.3 sec  Urinalysis Basic - ( 29 Feb 2024 15:10 )    Color: x / Appearance: x / SG: x / pH: x  Gluc: 157 mg/dL / Ketone: x  / Bili: x / Urobili: x   Blood: x / Protein: x / Nitrite: x   Leuk Esterase: x / RBC: x / WBC x   Sq Epi: x / Non Sq Epi: x / Bacteria: x
WOUND SURGERY CONSULT NOTE    HPI:  83M from [facility] w bladder ca, CAD s/p stents, HTN, T2DM, LYUBOV, GERD, BPH, depression/anxiety, stage 4 decubiti c/b osteomyelitis on Flagyl/Augmentin until 3/4/24, AAA s/p graft/stent, recent UGIB (1/2024) iso corticosteroids, prior reports of EV, Fermin's esophagitis, substernal goiter, s/p b/l hip replacements p/w hypotension iso anemia Hgb 4s. Per [facility] documentation, pt was recently admitted (1/2023) at [facility] for COVID, hypernatremia, rhabdo iso prolonged fall, treated with remdesivir/decadron c/b UGIB; unclear if endoscopy performed. TTE performed there showed mild LVH with EF 43%. At [facility], pt was found confused, hypoxic, tachycardic, with recent labs showing Hgb 4.6; no active bleeding, melena, hematochezia was documented; patient unsure of bowel movement quality given largely bedbound status. At time of interview, pt A&Ox3 stating he feels well but is overall poor historian with poor insight, repeatedly requesting for a diet vanilla cherry Pepsi.    In ED, pt found afebrile, hypotensive 98/61, tachycardic 172BPM, SpO2 86% on 4LNC. ED performed rectal exam showing light brown stool. Initial labs notable for leukocytosis WBC 18.01, Hgb 4.5, .7, BUN/SCr 74/1.67, mildly elevated , positive Ab screen, mild metabolic acidosis with negative lactate. Pt transfused 2u pRBC, protonix 40mg IVP, zosyn x1, and NS 500cc bolus. Pt BP responded now 110s/70s, admitted to medicine for further care. (01 Mar 2024 03:51)    Wound care consulted for evaluation of chronic back wound and sacral wound. Patient sustained a pressure injury after a fall at home where he was found after 2 days, which evolved into a large open wound. He complains of pain in his back with movement and dressing changes. He has been receiving wound care at his rehab facility. He says he has a decreased appetite and poor nutrition.     Current Diet: Diet, NPO:   Except Medications  With Ice Chips/Sips of Water (03-01-24 @ 03:11)      PAST MEDICAL & SURGICAL HISTORY:  Sickle Cell Anemia  Bladder cancer  CAD (coronary artery disease)  HTN (hypertension)  T2DM (type 2 diabetes mellitus)  LYUBOV (iron deficiency anemia)  GERD (gastroesophageal reflux disease)  BPH (benign prostatic hyperplasia)  Anxiety and depression  Sacral decubitus ulcer  Osteomyelitis  Abdominal aortic aneurysm  Upper GI bleed  Peptic ulcer disease  Goiter  Esophageal varix  Fermin esophagus  S/P hip replacement, bilateral  S/P repair of abdominal aortic aneurysm using straight graft  History of percutaneous coronary intervention  S/P bilateral hip replacements  S/P aortic aneurysm repair      MEDICATIONS  (STANDING):  amoxicillin  875 milliGRAM(s)/clavulanate 1 Tablet(s) Oral two times a day  atorvastatin 40 milliGRAM(s) Oral at bedtime  busPIRone 10 milliGRAM(s) Oral three times a day  dextrose 5%. 1000 milliLiter(s) (50 mL/Hr) IV Continuous <Continuous>  dextrose 5%. 1000 milliLiter(s) (100 mL/Hr) IV Continuous <Continuous>  dextrose 50% Injectable 25 Gram(s) IV Push once  dextrose 50% Injectable 12.5 Gram(s) IV Push once  dextrose 50% Injectable 25 Gram(s) IV Push once  finasteride 5 milliGRAM(s) Oral daily  folic acid 1 milliGRAM(s) Oral daily  furosemide   Injectable 20 milliGRAM(s) IV Push once  glucagon  Injectable 1 milliGRAM(s) IntraMuscular once  insulin lispro (ADMELOG) corrective regimen sliding scale   SubCutaneous three times a day before meals  insulin lispro (ADMELOG) corrective regimen sliding scale   SubCutaneous at bedtime  metroNIDAZOLE    Tablet 500 milliGRAM(s) Oral three times a day  mirtazapine 7.5 milliGRAM(s) Oral at bedtime  multivitamin 1 Tablet(s) Oral daily  nystatin Cream 1 Application(s) Topical two times a day  pantoprazole  Injectable 40 milliGRAM(s) IV Push two times a day  senna 2 Tablet(s) Oral at bedtime  tamsulosin 0.4 milliGRAM(s) Oral at bedtime  vitamin B complex with vitamin C 1 Tablet(s) Oral daily    MEDICATIONS  (PRN):  acetaminophen     Tablet .. 650 milliGRAM(s) Oral every 6 hours PRN Temp greater or equal to 38C (100.4F), Mild Pain (1 - 3)  aluminum hydroxide/magnesium hydroxide/simethicone Suspension 30 milliLiter(s) Oral every 4 hours PRN Dyspepsia  dextrose Oral Gel 15 Gram(s) Oral once PRN Blood Glucose LESS THAN 70 milliGRAM(s)/deciliter  melatonin 3 milliGRAM(s) Oral at bedtime PRN Insomnia  ondansetron Injectable 4 milliGRAM(s) IV Push every 8 hours PRN Nausea and/or Vomiting      Allergies  No Known Allergies      SOCIAL HISTORY:  Previously lived alone, now at [facility]. Son who lives [facility]    FAMILY HISTORY:  Family history of colon cancer in mother (Mother)      REVIEW OF SYSTEMS:   General/ Skin/Vasc/ Neuro/ MSK: see HPI  All other systems negative    PHYSICAL EXAM:  Vital Signs Last 24 Hrs  T(C): 36.8 (01 Mar 2024 05:30), Max: 37.1 (29 Feb 2024 15:35)  T(F): 98.2 (01 Mar 2024 05:30), Max: 98.7 (29 Feb 2024 15:35)  HR: 90 (01 Mar 2024 05:30) (90 - 172)  BP: 106/70 (01 Mar 2024 05:30) (89/66 - 111/68)  BP(mean): 75 (29 Feb 2024 21:45) (71 - 75)  RR: 18 (01 Mar 2024 05:30) (17 - 22)  SpO2: 100% (01 Mar 2024 05:30) (86% - 100%)    Parameters below as of 01 Mar 2024 05:30  Patient On (Oxygen Delivery Method): room air    General: Elderly man supine in bed, NAD  HEENT:  NC/AT, EOMI  Respiratory: nonlabored w/ equal chest rise  Gastrointestinal: soft NT/ND  Neurology:  A&Ox3, no gross deficits  Psych: calm, appropriate  Musculoskeletal: leg length discrepancy right leg shorter than left  Vascular: BLE equally warm, bilateral DP pulses palpable  Skin:  BLE xerosis.  Lumbar spine/back wound 21.5x9x5.5cm, undermining from 1 to 7oclock max 6.7cm at 5oclock. Small amount of eschar at 11oclock position. Wound bed pink granulation tissue with areas of exposed bone and muscle. Tender to palpation. No purulence, no erythema, no odor.  Sacral wound with tan eschar 5x9x0.2cm. Wound does not connect to lumbar spine wound, though there is minimal tissue  the undermining of the lumbar wound from the sacral wound.    LABS/ CULTURES/ RADIOLOGY:                        6.5    21.10 )-----------( 125      ( 29 Feb 2024 19:39 )             21.1       138  |  112  |  74  ----------------------------<  157      [02-29-24 @ 15:10]  4.4   |  17  |  1.67        Ca     7.8     [02-29-24 @ 15:10]      Mg     2.40     [02-29-24 @ 15:10]      Phos  4.2     [02-29-24 @ 15:10]    TPro  5.6  /  Alb  2.1  /  TBili  <0.2  /  DBili  x   /  AST  18  /  ALT  12  /  AlkPhos  144  [02-29-24 @ 15:10]    PT/INR: PT 15.6 , INR 1.39       [02-29-24 @ 15:10]  PTT: 26.3       [02-29-24 @ 15:10]      CT ABDOMEN AND PELVIS  PROCEDURE DATE:  02/29/2024      FINDINGS:  LOWER CHEST: Nothing acute. Mild bronchiectasis and dependent   atelectasis/scarring lung bases.    LIVER: Within normal limits.  BILE DUCTS: Normal caliber.  GALLBLADDER: Within normal limits.  SPLEEN: Within normal limits.  PANCREAS: Within normal limits.  ADRENALS: Within normal limits.  KIDNEYS/URETERS: Within normal limits.    BLADDER: Mostly obscured by streak artifact from hip arthroplasties, no   acute findings.  REPRODUCTIVE ORGANS: Unremarkable as visualized. Prostate obscured by   streak artifact.    BOWEL: No bowel obstruction. Appendix is normal. No active GI bleeding is   evident. Wall thickening of the duodenal bulb and descending duodenum   with mucosal irregularity and outpouchings. Mild edema in the adjacent   fat.  PERITONEUM: No ascites.  VESSELS: Expansion low-density within the left common femoral vein,   partially obscured by streak artifact, suspicious for DVT. Status post   endograft repair of abdominal aortic aneurysm. Aneurysm sac 7.6 cm   diameter. Prominent mural thrombus within the stent which is patent to   its bifurcation. The right iliac component of the stent is patent. The   left does not opacify and is probably chronically occluded. Contrast is   visible in the more distal left superficial and profunda femoral   arteries. The external iliac arteries and common femorals are obscured by   streak artifact. A vascular occlusion device and coils areseen in the   region of the origin of the left internal iliac. 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  RETROPERITONEUM/LYMPH NODES: No lymphadenopathy.  ABDOMINAL WALL: Large decubitus ulcer superficial to the L2-5 spinous   processes.  BONES: No fracture or aggressive osseous lesions. Prominent degenerative   changes lumbar spine. Bilateral total hip arthroplasties.    IMPRESSION:  No active GI bleeding is evident.    Wall thickening of the duodenal bulband descending duodenum with mucosal   irregularity and outpouchings. Mild edema in the adjacent fat. Most   likely peptic ulcer disease with acute duodenitis. No abscess or   extraluminal air. Inflamed duodenal diverticulum less likely.    Suspect left common femoral vein DVT. Recommend left lower extremity   venous ultrasound.    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 Reconstitution within the common femoral artery.    Large decubitus ulcer superficial to the lumbar spine.    Several other chronic findings as above.    A/P:  83M w hx of sickle cell anemia, bladder ca, CAD s/p stents, HTN, T2DM, LYUBOV, GERD, BPH, depression/anxiety, stage 4 decubiti c/b osteomyelitis on Flagyl/Augmentin until 3/4/24, AAA s/p graft/stent, recent UGIB (1/2024) while on corticosteroids, Fermin's esophagitis, substernal goiter, b/l hip replacements admitted with hypotension likely secondary to anemia w Hgb 4.5 on arrival. Also found to have LLE DVT.    Wound Consult requested to assist w/ management of lumbar wound, sacral wound.    Lumbar spine stage 4 pressure injury  - Cleanse with NS, pat dry. Apply barrier film to intact skin surrounding wound. Apply Aqucel to wound base, dry gauze to fill in overlying dead space, do not overpack. Cover with ABD, secure with Tegaderm. Change daily and PRN if soiled    Sacral wound - unstagable  - Cleanse with soap and water, pat dry. Apply barrier film to intact periwound skin. Apply medihoney to wound eschar, cover with adhesive foam. Change daily and PRN if soiled.    Additional recommendations:  Nutrition Consult for optimization in pt w/ decreased appetite        Inadequate PO intake, & Increased nutritional needs            encourage high quality protein, grupo/ prosource, MVI & Vit C to promote wound healing  Moisturize intact skin w/ SWEEN cream BID including lower legs and feet  Continue turning and positioning w/ offloading assistive devices as per protocol  Waffle Cushion to chair when oob to chair  Continue w/ low air loss pressure redistribution bed surface   Pt will need Group 2 mattress on hospital bed and ROHO cushion for wheel chair upon discharge home  Continue w/ attends under pads and Pericare care as per protocol  Consider condom cath as patient states he cannot use urinal    Care as per medicine, will follow along periodically throughout hospitalization    Upon discharge f/u as outpatient at [facility] [address] [place] [address] [phone]    Thank you for this consult,  Renee Bolden MD; Wound Care Fellow (available on Teams)    If after 4PM or before 7:30AM on Mon-Friday or weekend/holiday please contact general surgery for urgent matters.   - Team A: [id number]   - Team B: [id number]  
Chief Complaint:  anemia    HPI:    83M from [facility] with Fermin's esophagus, bladder ca, CAD s/p stents, HTN, T2DM, LYUBOV (on oral iron supplementation), GERD, BPH, depression/anxiety, stage 4 decubiti c/b osteomyelitis on Flagyl/Augmentin until 3/4/24, AAA s/p graft/stent, rectal polyps, substernal goiter, s/p b/l hip replacements p/w hypotension iso anemia Hgb 4s. Per [facility] documentation, pt was recently admitted (1/2023) at [facility] for COVID, hypernatremia, rhabdo iso prolonged fall, treated with remdesivir/decadron c/b UGIB; unclear if endoscopy was performed. TTE performed there showed mild LVH with EF 43%. At [facility], pt was found confused, hypoxic, tachycardic, with recent labs showing Hgb 4.6; no active bleeding, melena, hematochezia was documented.     Patient reports intermittent black and brown stools for years, averaging 3-4 black semi-formed stools weekly, on iron supplements for an unclear duration of time. He reports prior EGD/Colonoscopy 5 years ago and 2 years ago with "normal" findings. He reports recent lightheadedness, fatigue and diaphoresis without abdominal pain, nausea, vomiting, heartburn, dysphagia or hematochezia.     In ED, pt found afebrile, hypotensive 98/61, tachycardic 172BPM, SpO2 86% on 4LNC. ED performed rectal exam showing light brown stool. Initial labs notable for leukocytosis WBC 18.01, Hgb 4.5, .7, BUN/SCr 74/1.67, mildly elevated , mild metabolic acidosis with negative lactate. Pt transfused 2u pRBC, protonix 40mg IVP, zosyn x1, and NS 500cc bolus. Pt BP responded now 110s/70s, admitted to medicine for further care.    Allergies:  No Known Allergies      Hospital Medications:  acetaminophen     Tablet .. 650 milliGRAM(s) Oral every 6 hours PRN  aluminum hydroxide/magnesium hydroxide/simethicone Suspension 30 milliLiter(s) Oral every 4 hours PRN  amoxicillin  875 milliGRAM(s)/clavulanate 1 Tablet(s) Oral two times a day  atorvastatin 40 milliGRAM(s) Oral at bedtime  busPIRone 10 milliGRAM(s) Oral three times a day  dextrose 5%. 1000 milliLiter(s) IV Continuous <Continuous>  dextrose 5%. 1000 milliLiter(s) IV Continuous <Continuous>  dextrose 50% Injectable 25 Gram(s) IV Push once  dextrose 50% Injectable 12.5 Gram(s) IV Push once  dextrose 50% Injectable 25 Gram(s) IV Push once  dextrose Oral Gel 15 Gram(s) Oral once PRN  finasteride 5 milliGRAM(s) Oral daily  folic acid 1 milliGRAM(s) Oral daily  glucagon  Injectable 1 milliGRAM(s) IntraMuscular once  insulin lispro (ADMELOG) corrective regimen sliding scale   SubCutaneous three times a day before meals  insulin lispro (ADMELOG) corrective regimen sliding scale   SubCutaneous at bedtime  melatonin 3 milliGRAM(s) Oral at bedtime PRN  metroNIDAZOLE    Tablet 500 milliGRAM(s) Oral three times a day  mirtazapine 7.5 milliGRAM(s) Oral at bedtime  multivitamin 1 Tablet(s) Oral daily  nystatin Cream 1 Application(s) Topical two times a day  ondansetron Injectable 4 milliGRAM(s) IV Push every 8 hours PRN  pantoprazole  Injectable 40 milliGRAM(s) IV Push two times a day  senna 2 Tablet(s) Oral at bedtime  tamsulosin 0.4 milliGRAM(s) Oral at bedtime  vitamin B complex with vitamin C 1 Tablet(s) Oral daily      PMHX/PSHX:  Bladder cancer    CAD (coronary artery disease)    HTN (hypertension)    T2DM (type 2 diabetes mellitus)    LYUBOV (iron deficiency anemia)    GERD (gastroesophageal reflux disease)    BPH (benign prostatic hyperplasia)    Anxiety and depression    Sacral decubitus ulcer    Osteomyelitis    Abdominal aortic aneurysm    Upper GI bleed    Peptic ulcer disease    Goiter    Esophageal varix    Fermin esophagus    S/P hip replacement, bilateral    S/P repair of abdominal aortic aneurysm using straight graft    History of percutaneous coronary intervention    S/P bilateral hip replacements    S/P aortic aneurysm repair    Family history:  Family history of colon cancer in mother (Mother)    Social History: no smoking    ROS:   See HPI    PHYSICAL EXAM:   GENERAL:  NAD, resting comfortably in bed  HEENT:  Sclera anicteric  CHEST:  Normal effort  HEART:  HDS  ABDOMEN:  Soft, non-tender, non-distended  EXTREMITIES:  No edema  SKIN:  Warm & Dry.   NEURO:  Alert, conversant, no focal deficit    Vital Signs:  Vital Signs Last 24 Hrs  T(C): 36.8 (01 Mar 2024 05:30), Max: 37.1 (29 Feb 2024 15:35)  T(F): 98.2 (01 Mar 2024 05:30), Max: 98.7 (29 Feb 2024 15:35)  HR: 90 (01 Mar 2024 05:30) (90 - 172)  BP: 106/70 (01 Mar 2024 05:30) (89/66 - 111/68)  BP(mean): 75 (29 Feb 2024 21:45) (71 - 75)  RR: 18 (01 Mar 2024 05:30) (17 - 22)  SpO2: 100% (01 Mar 2024 05:30) (86% - 100%)    Parameters below as of 01 Mar 2024 05:30  Patient On (Oxygen Delivery Method): room air      Daily Height in cm: 187.96 (01 Mar 2024 05:30)    Daily     LABS:                        6.5    21.10 )-----------( 125      ( 29 Feb 2024 19:39 )             21.1     Mean Cell Volume: 102.9 fL (02-29-24 @ 19:39)    02-29    138  |  112<H>  |  74<H>  ----------------------------<  157<H>  4.4   |  17<L>  |  1.67<H>    Ca    7.8<L>      29 Feb 2024 15:10  Phos  4.2     02-29  Mg     2.40     02-29    TPro  5.6<L>  /  Alb  2.1<L>  /  TBili  <0.2  /  DBili  x   /  AST  18  /  ALT  12  /  AlkPhos  144<H>  02-29    LIVER FUNCTIONS - ( 29 Feb 2024 15:10 )  Alb: 2.1 g/dL / Pro: 5.6 g/dL / ALK PHOS: 144 U/L / ALT: 12 U/L / AST: 18 U/L / GGT: x           PT/INR - ( 29 Feb 2024 15:10 )   PT: 15.6 sec;   INR: 1.39 ratio         PTT - ( 29 Feb 2024 15:10 )  PTT:26.3 sec  Urinalysis Basic - ( 29 Feb 2024 15:10 )    Color: x / Appearance: x / SG: x / pH: x  Gluc: 157 mg/dL / Ketone: x  / Bili: x / Urobili: x   Blood: x / Protein: x / Nitrite: x   Leuk Esterase: x / RBC: x / WBC x   Sq Epi: x / Non Sq Epi: x / Bacteria: x                              6.5    21.10 )-----------( 125      ( 29 Feb 2024 19:39 )             21.1                         4.5    18.01 )-----------( 126      ( 29 Feb 2024 15:10 )             15.0     Imaging:    < from: CT Abdomen and Pelvis w/wo IV Cont (02.29.24 @ 23:29) >  FINDINGS:  LOWER CHEST: Nothing acute. Mild bronchiectasis and dependent   atelectasis/scarring lung bases.    LIVER: Within normal limits.  BILE DUCTS: Normal caliber.  GALLBLADDER: Within normal limits.  SPLEEN: Within normal limits.  PANCREAS: Within normal limits.  ADRENALS: Within normal limits.  KIDNEYS/URETERS: Within normal limits.    BLADDER: Mostly obscured by streak artifact from hip arthroplasties, no   acute findings.  REPRODUCTIVE ORGANS: Unremarkable as visualized. Prostate obscured by   streak artifact.    BOWEL: No bowel obstruction. Appendix is normal. No active GI bleeding is   evident. Wall thickening of the duodenal bulb and descending duodenum   with mucosal irregularity and outpouchings. Mild edema in the adjacent   fat.  PERITONEUM: No ascites.  VESSELS: Expansion low-density within the left common femoral vein,   partially obscured by streak artifact, suspicious for DVT. Status post   endograft repair of abdominal aortic aneurysm. Aneurysm sac 7.6 cm   diameter. Prominent mural thrombus within the stent which is patent to   its bifurcation. The right iliac component of the stent is patent. The   left does not opacify and is probably chronically occluded. Contrast is   visible in the more distal left superficial and profunda femoral   arteries. The external iliac arteries and common femorals are obscured by   streak artifact. A vascular occlusion device and coils areseen in the   region of the origin of the left internal iliac. 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  RETROPERITONEUM/LYMPH NODES: No lymphadenopathy.  ABDOMINAL WALL: Large decubitus ulcer superficial to the L2-5 spinous   processes.  BONES: No fracture or aggressive osseous lesions. Prominent degenerative   changes lumbar spine. Bilateral total hip arthroplasties.    IMPRESSION:  No active GI bleeding is evident.    Wall thickening of the duodenal bulband descending duodenum with mucosal   irregularity and outpouchings. Mild edema in the adjacent fat. Most   likely peptic ulcer disease with acute duodenitis. No abscess or   extraluminal air. Inflamed duodenal diverticulum less likely.    Suspect left common femoral vein DVT. Recommend left lower extremity   venous ultrasound.    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 Reconstitution within the common femoral artery.    Large decubitus ulcer superficial to the lumbar spine.    < end of copied text >

## 2024-03-01 NOTE — H&P ADULT - PROBLEM SELECTOR PLAN 2
Hgb 4.5 on admission appropriately responding to 6.5 after 2u pRBC. c/b positive antibody screen. 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 Given documented history of recent UGIB, mostly likely source PUD from prior corticosteroid use. Pt unsure if he got endoscopy at [facility].     - trend CBC, maintain active T&S, transfuse to goal Hgb > 8 (CAD), Plt > 10, >15 if febrile, >50 if active bleed or procedure   - unable to obtain anemia w/u prior to transfusion  - consent in chart  - monitor stool output for melena -- pt was previously on PO iron, now being held  - GI consult in AM; will likely need cardiac clearance if plan for scope -- pt open to EGD if offered -likely reactive in setting of anemia  -wound care consult for sacral ulcer, though afebrile and no neutrophil shift can hold off on further antibiotics for now

## 2024-03-01 NOTE — H&P ADULT - ASSESSMENT
83M from [facility] bladder ca, CAD s/p stents, HTN, T2DM, LYUBOV, GERD, BPH, depression/anxiety, stage 4 decubiti c/b osteomyelitis on Flagyl/Augmentin until 3/4/24, AAA s/p graft/stent, recent UGIB (1/2024) iso corticosteroids, prior reports of EV, Fermin's esophagitis, substernal goiter, s/p b/l hip replacements p/w hypotension iso anemia Hgb 4s. 83M from [facility] bladder ca, CAD s/p stents, HTN, T2DM, LYUBOV, GERD, BPH, depression/anxiety, stage 4 decubiti c/b osteomyelitis on Flagyl/Augmentin until 3/4/24, AAA s/p graft/stent, recent UGIB (1/2024) iso corticosteroids, prior reports of EV, Fermin's esophagitis, substernal goiter, s/p b/l hip replacements p/w hypotension iso anemia Hgb 4s, LLE DVT.

## 2024-03-01 NOTE — H&P ADULT - HISTORY OF PRESENT ILLNESS
83M from [facility] w bladder ca, CAD s/p stents, HTN, T2DM, LYUBOV, GERD, BPH, depression/anxiety, stage 4 decubiti c/b osteomyelitis on Flagyl/Augmentin until 3/4/24, AAA s/p graft/stent, recent UGIB (1/2024) iso corticosteroids, prior reports of EV, Fermin's esophagitis, substernal goiter, s/p b/l hip replacements p/w hypotension iso anemia Hgb 4s. Per [facility] documentation, pt was recently admitted (1/2023) at [facility] for COVID, hypernatremia, rhabdo iso prolonged fall, treated with remdesivir/decadron c/b UGIB; unclear if endoscopy performed. TTE performed there showed mild LVH with EF 43%. At [facility], pt was found confused, hypoxic, tachycardic, with recent labs showing Hgb 4.6; no active bleeding, melena, hematochezia was documented; patient unsure of bowel movement quality given largely bedbound status. At time of interview, pt A&Ox3 stating he feels well but is overall poor historian with poor insight, repeatedly requesting for a diet vanilla cherry Pepsi.    In ED, pt found afebrile, hypotensive 98/61, tachycardic 172BPM, SpO2 86% on 4LNC. ED performed rectal exam showing light brown stool. Initial labs notable for leukocytosis WBC 18.01, Hgb 4.5, .7, BUN/SCr 74/1.67, mildly elevated , positive Ab screen, mild metabolic acidosis with negative lactate. Pt transfused 2u pRBC, protonix 40mg IVP, zosyn x1, and NS 500cc bolus. Pt BP responded now 110s/70s, admitted to medicine for further care.

## 2024-03-01 NOTE — CONSULT NOTE ADULT - ATTENDING COMMENTS
# Worsening anemia, history of LYUBOV on iron supplementation: Reported history of dark stools as well as UGIB during recent hospitalization at OSH, but currently without overt bleeding. Hgb 4.5 on admission. Difficult to interpret BUN/Cr in setting of renal dysfunction. Prior endoscopic evaluation listed above, including Fermin's esophagus as well as ?esophageal varix (not noted on prior imaging, no known underlying chronic liver disease though mild thrombocytopenia and elevated INR on admission). Here imaging notable for "wall thickening of the duodenal bulb and descending duodenum with mucosal irregularity and outpouchings" concerning for possible PUD.   # CAD s/p PCI ?not currently on anti-thrombotic agents  # Fermin's esophagus  # Stage 4 decubiti c/b osteomyelitis on Flagyl/Augmentin until 3/4/24  # AAA s/p graft/stent, rectal polyps  # Recent OSH with reported UGIB, but unclear if endoscopic evaluation was performed  # Acute DVT, not on A/C    --High dose PPI  --Diet per primary team  --Tentatively plan for EGD/push enteroscopy on Monday 3/4 for further evaluation of anemia, dark stools and abnormal CTAP findings c/f PUD    Additional recommendations as above. # Worsening anemia, history of LYUBOV on iron supplementation: Reported history of dark stools as well as UGIB during recent hospitalization at OSH, but currently without overt bleeding. Hgb 4.5 on admission. Difficult to interpret BUN/Cr in setting of renal dysfunction. Prior endoscopic evaluation listed above, including Fermin's esophagus as well as ?esophageal varix (not noted on prior imaging, no known underlying chronic liver disease though mild thrombocytopenia and elevated INR on admission). Here imaging notable for "wall thickening of the duodenal bulb and descending duodenum with mucosal irregularity and outpouchings" concerning for possible PUD. Though anemia likely multifactorial, concern at this time for recent upper GI blood losses, possibly from underlying PUD.  # CAD s/p PCI ?not currently on anti-thrombotic agents  # Fermin's esophagus  # Stage 4 decubiti c/b osteomyelitis on Flagyl/Augmentin until 3/4/24  # AAA s/p graft/stent, rectal polyps  # Recent OSH with reported UGIB, but unclear if endoscopic evaluation was performed  # Acute DVT, not on A/C    --High dose PPI  --Diet per primary team  --Tentatively plan for EGD/push enteroscopy on Monday 3/4 for further evaluation of anemia, dark stools and abnormal CTAP findings c/f PUD    Additional recommendations as above.

## 2024-03-01 NOTE — PROGRESS NOTE ADULT - PROBLEM SELECTOR PLAN 6
Unclear staging, diagnosis, treatment plan, outpatient provider.     - try to obtain outpt records  - f/u UA to r/o hematuria

## 2024-03-01 NOTE — PATIENT PROFILE ADULT - FALL HARM RISK - HARM RISK INTERVENTIONS
Assistance with ambulation/Assistance OOB with selected safe patient handling equipment/Communicate Risk of Fall with Harm to all staff/Discuss with provider need for PT consult/Monitor gait and stability/Provide patient with walking aids - walker, cane, crutches/Reinforce activity limits and safety measures with patient and family/Reorient to person, place and time as needed/Review medications for side effects contributing to fall risk/Sit up slowly, dangle for a short time, stand at bedside before walking/Tailored Fall Risk Interventions/Toileting schedule using arm’s reach rule for commode and bathroom/Use of alarms - bed, chair and/or voice tab/Visual Cue: Yellow wristband and red socks/Bed in lowest position, wheels locked, appropriate side rails in place/Call bell, personal items and telephone in reach/Instruct patient to call for assistance before getting out of bed or chair/Non-slip footwear when patient is out of bed/Sims to call system/Physically safe environment - no spills, clutter or unnecessary equipment/Purposeful Proactive Rounding/Room/bathroom lighting operational, light cord in reach

## 2024-03-01 NOTE — PROGRESS NOTE ADULT - PROBLEM SELECTOR PLAN 3
Chronic sacral and back decubiti ulcer c/b osteomyelitis. Unclear if organism identified, but was discharged from [facility] with 6 weeks of Augmentin/Flagyl due to end 3/4/24.    - monitor for systemic infectious signs  - trend WBC, fever curve  - f/u BCx  - will hold off on IV antibiotics at this time; reevaluate need  - obtain records from [facility] regarding any bone cultures or workup for osteomyelitis  - wound care consult

## 2024-03-01 NOTE — H&P ADULT - PROBLEM SELECTOR PLAN 11
- c/w home mirtazapine, buspirone Hospital Bundle    Fluids: PO  Electrolytes: Replete K > 4, Mg > 2, Phos > 3  Nutrition: Diet NPO for possible GI procedure  PPX  ---VTE: hold iso acute bleed. can consider SCD but acute DVT in L leg   ---GI: IV PPI BID  ---Resp: n/a  Access: PIV  Code Status: FULL CODE; would engage in further GoC  Dispo: pending clinical improvement, likely return to [facility] if able

## 2024-03-01 NOTE — H&P ADULT - PROBLEM SELECTOR PLAN 12
Hospital Bundle    Fluids: PO  Electrolytes: Replete K > 4, Mg > 2, Phos > 3  Nutrition: Diet NPO for possible GI procedure  PPX  ---VTE: hold iso acute bleed. can consider SCD but acute DVT in L leg   ---GI: IV PPI BID  ---Resp: n/a  Access: PIV  Code Status: FULL CODE; would engage in further GoC  Dispo: pending clinical improvement, likely return to [facility] if able

## 2024-03-01 NOTE — PROGRESS NOTE ADULT - SUBJECTIVE AND OBJECTIVE BOX
***************************************************************  Justin Alicea) [facility] PGY2  Internal Medicine   ***************************************************************    MERLINE RODRIGUEZ  83y  MRN: [id number]    Patient is a 83y old  Male who presents with a chief complaint of Anemia, Hypotension (01 Mar 2024 03:51)      Subjective: no events ON. Denies fever, CP, SOB, abn pain, N/V, dysuria. Tolerating diet.      MEDICATIONS  (STANDING):  atorvastatin 40 milliGRAM(s) Oral at bedtime  busPIRone 10 milliGRAM(s) Oral three times a day  dextrose 5%. 1000 milliLiter(s) (50 mL/Hr) IV Continuous <Continuous>  dextrose 5%. 1000 milliLiter(s) (100 mL/Hr) IV Continuous <Continuous>  dextrose 50% Injectable 12.5 Gram(s) IV Push once  dextrose 50% Injectable 25 Gram(s) IV Push once  dextrose 50% Injectable 25 Gram(s) IV Push once  finasteride 5 milliGRAM(s) Oral daily  folic acid 1 milliGRAM(s) Oral daily  glucagon  Injectable 1 milliGRAM(s) IntraMuscular once  insulin lispro (ADMELOG) corrective regimen sliding scale   SubCutaneous three times a day before meals  insulin lispro (ADMELOG) corrective regimen sliding scale   SubCutaneous at bedtime  mirtazapine 7.5 milliGRAM(s) Oral at bedtime  multivitamin 1 Tablet(s) Oral daily  nystatin Cream 1 Application(s) Topical two times a day  pantoprazole  Injectable 40 milliGRAM(s) IV Push two times a day  senna 2 Tablet(s) Oral at bedtime  tamsulosin 0.4 milliGRAM(s) Oral at bedtime  vitamin B complex with vitamin C 1 Tablet(s) Oral daily    MEDICATIONS  (PRN):  acetaminophen     Tablet .. 650 milliGRAM(s) Oral every 6 hours PRN Temp greater or equal to 38C (100.4F), Mild Pain (1 - 3)  aluminum hydroxide/magnesium hydroxide/simethicone Suspension 30 milliLiter(s) Oral every 4 hours PRN Dyspepsia  dextrose Oral Gel 15 Gram(s) Oral once PRN Blood Glucose LESS THAN 70 milliGRAM(s)/deciliter  melatonin 3 milliGRAM(s) Oral at bedtime PRN Insomnia  ondansetron Injectable 4 milliGRAM(s) IV Push every 8 hours PRN Nausea and/or Vomiting      Objective:    Vitals: Vital Signs Last 24 Hrs  T(C): 36.8 (03-01-24 @ 05:30), Max: 37.1 (02-29-24 @ 15:35)  T(F): 98.2 (03-01-24 @ 05:30), Max: 98.7 (02-29-24 @ 15:35)  HR: 90 (03-01-24 @ 05:30) (90 - 172)  BP: 106/70 (03-01-24 @ 05:30) (89/66 - 111/68)  BP(mean): 75 (02-29-24 @ 21:45) (71 - 75)  RR: 18 (03-01-24 @ 05:30) (17 - 22)  SpO2: 100% (03-01-24 @ 05:30) (86% - 100%)            I&O's Summary      PHYSICAL EXAM:  GENERAL: NAD  HEAD:  Atraumatic, Normocephalic  EYES: EOMI, conjunctiva and sclera clear  CHEST/LUNG: Clear to auscultation bilaterally; No rales, rhonchi, wheezing, or rubs  HEART: Regular rate and rhythm; No murmurs, rubs, or gallops  ABDOMEN: Soft, Nontender, Nondistended;   SKIN: No rashes or lesions  NERVOUS SYSTEM:  Alert & Oriented X3, no focal deficits    LABS:  02-29    138  |  112<H>  |  74<H>  ----------------------------<  157<H>  4.4   |  17<L>  |  1.67<H>    Ca    7.8<L>      29 Feb 2024 15:10  Phos  4.2     02-29  Mg     2.40     02-29    TPro  5.6<L>  /  Alb  2.1<L>  /  TBili  <0.2  /  DBili  x   /  AST  18  /  ALT  12  /  AlkPhos  144<H>  02-29      PT/INR - ( 29 Feb 2024 15:10 )   PT: 15.6 sec;   INR: 1.39 ratio         PTT - ( 29 Feb 2024 15:10 )  PTT:26.3 sec              Urinalysis Basic - ( 29 Feb 2024 15:10 )    Color: x / Appearance: x / SG: x / pH: x  Gluc: 157 mg/dL / Ketone: x  / Bili: x / Urobili: x   Blood: x / Protein: x / Nitrite: x   Leuk Esterase: x / RBC: x / WBC x   Sq Epi: x / Non Sq Epi: x / Bacteria: x                              6.5    21.10 )-----------( 125      ( 29 Feb 2024 19:39 )             21.1                         4.5    18.01 )-----------( 126      ( 29 Feb 2024 15:10 )             15.0     CAPILLARY BLOOD GLUCOSE      POCT Blood Glucose.: 148 mg/dL (01 Mar 2024 04:34)  POCT Blood Glucose.: 179 mg/dL (29 Feb 2024 19:44)  POCT Blood Glucose.: 181 mg/dL (29 Feb 2024 14:47)      RADIOLOGY & ADDITIONAL TESTS:    Imaging Personally Reviewed:  [x ] YES  [ ] NO    Consultants involved in case:   Consultant(s) Notes Reviewed:  [ x] YES  [ ] NO:   Care Discussed with Consultants/Other Providers [x ] YES  [ ] NO           Internal Medicine   ***************************************************************    MERLINE RODRIGUEZ  83y  MRN: [id number]    Patient is a 83y old  Male who presents with a chief complaint of Anemia, Hypotension (01 Mar 2024 03:51)      Subjective: no events ON.     MEDICATIONS  (STANDING):  atorvastatin 40 milliGRAM(s) Oral at bedtime  busPIRone 10 milliGRAM(s) Oral three times a day  dextrose 5%. 1000 milliLiter(s) (50 mL/Hr) IV Continuous <Continuous>  dextrose 5%. 1000 milliLiter(s) (100 mL/Hr) IV Continuous <Continuous>  dextrose 50% Injectable 12.5 Gram(s) IV Push once  dextrose 50% Injectable 25 Gram(s) IV Push once  dextrose 50% Injectable 25 Gram(s) IV Push once  finasteride 5 milliGRAM(s) Oral daily  folic acid 1 milliGRAM(s) Oral daily  glucagon  Injectable 1 milliGRAM(s) IntraMuscular once  insulin lispro (ADMELOG) corrective regimen sliding scale   SubCutaneous three times a day before meals  insulin lispro (ADMELOG) corrective regimen sliding scale   SubCutaneous at bedtime  mirtazapine 7.5 milliGRAM(s) Oral at bedtime  multivitamin 1 Tablet(s) Oral daily  nystatin Cream 1 Application(s) Topical two times a day  pantoprazole  Injectable 40 milliGRAM(s) IV Push two times a day  senna 2 Tablet(s) Oral at bedtime  tamsulosin 0.4 milliGRAM(s) Oral at bedtime  vitamin B complex with vitamin C 1 Tablet(s) Oral daily    MEDICATIONS  (PRN):  acetaminophen     Tablet .. 650 milliGRAM(s) Oral every 6 hours PRN Temp greater or equal to 38C (100.4F), Mild Pain (1 - 3)  aluminum hydroxide/magnesium hydroxide/simethicone Suspension 30 milliLiter(s) Oral every 4 hours PRN Dyspepsia  dextrose Oral Gel 15 Gram(s) Oral once PRN Blood Glucose LESS THAN 70 milliGRAM(s)/deciliter  melatonin 3 milliGRAM(s) Oral at bedtime PRN Insomnia  ondansetron Injectable 4 milliGRAM(s) IV Push every 8 hours PRN Nausea and/or Vomiting      Objective:    Vitals: Vital Signs Last 24 Hrs  T(C): 36.8 (03-01-24 @ 05:30), Max: 37.1 (02-29-24 @ 15:35)  T(F): 98.2 (03-01-24 @ 05:30), Max: 98.7 (02-29-24 @ 15:35)  HR: 90 (03-01-24 @ 05:30) (90 - 172)  BP: 106/70 (03-01-24 @ 05:30) (89/66 - 111/68)  BP(mean): 75 (02-29-24 @ 21:45) (71 - 75)  RR: 18 (03-01-24 @ 05:30) (17 - 22)  SpO2: 100% (03-01-24 @ 05:30) (86% - 100%)            I&O's Summary      PHYSICAL EXAM:  GENERAL: NAD  HEAD:  Atraumatic, Normocephalic  EYES: EOMI, conjunctiva and sclera clear  CHEST/LUNG: Clear to auscultation bilaterally; No rales, rhonchi, wheezing, or rubs  HEART: Regular rate and rhythm; No murmurs, rubs, or gallops  ABDOMEN: Soft, Nontender, Nondistended;   SKIN: No rashes or lesions  NERVOUS SYSTEM:  Alert & Oriented X3, no focal deficits    LABS:  02-29    138  |  112<H>  |  74<H>  ----------------------------<  157<H>  4.4   |  17<L>  |  1.67<H>    Ca    7.8<L>      29 Feb 2024 15:10  Phos  4.2     02-29  Mg     2.40     02-29    TPro  5.6<L>  /  Alb  2.1<L>  /  TBili  <0.2  /  DBili  x   /  AST  18  /  ALT  12  /  AlkPhos  144<H>  02-29      PT/INR - ( 29 Feb 2024 15:10 )   PT: 15.6 sec;   INR: 1.39 ratio         PTT - ( 29 Feb 2024 15:10 )  PTT:26.3 sec              Urinalysis Basic - ( 29 Feb 2024 15:10 )    Color: x / Appearance: x / SG: x / pH: x  Gluc: 157 mg/dL / Ketone: x  / Bili: x / Urobili: x   Blood: x / Protein: x / Nitrite: x   Leuk Esterase: x / RBC: x / WBC x   Sq Epi: x / Non Sq Epi: x / Bacteria: x                              6.5    21.10 )-----------( 125      ( 29 Feb 2024 19:39 )             21.1                         4.5    18.01 )-----------( 126      ( 29 Feb 2024 15:10 )             15.0     CAPILLARY BLOOD GLUCOSE      POCT Blood Glucose.: 148 mg/dL (01 Mar 2024 04:34)  POCT Blood Glucose.: 179 mg/dL (29 Feb 2024 19:44)  POCT Blood Glucose.: 181 mg/dL (29 Feb 2024 14:47)      RADIOLOGY & ADDITIONAL TESTS:    Imaging Personally Reviewed:  [x ] YES  [ ] NO    Consultants involved in case:   Consultant(s) Notes Reviewed:  [ x] YES  [ ] NO:   Care Discussed with Consultants/Other Providers [x ] YES  [ ] NO

## 2024-03-01 NOTE — H&P ADULT - PROBLEM SELECTOR PLAN 1
Pt presenting hypotensive tachycardic Hgb 4 iso presumed UGIB, stabilized after 2u pRBC, 500cc NS bolus. /60s now; unknown baseline.    - transfuse to goals below  - ctm BP q4h    # Sepsis  Pt technically meets SIRS criteria with tachycardia, tachypnea, leukocytosis, pCO2 32; likely source would be sacral decub vs aspiration iso UGIB. Favor leukocytosis, tachycardia, hypoxia from acute anemia and would expect improvement after adequate resuscitation. Was given zosyn x1 in ED.    - will monitor off abx for now Pt presenting hypotensive tachycardic Hgb 4 iso presumed UGIB, stabilized after 2u pRBC, 500cc NS bolus. /60s now; unknown baseline.    - transfuse to goals below  - ctm BP q4h  - likely history of HFpEF per [facility] documentation noting [facility] TTE w LVEF 43%  - f/u TTE    # Sepsis  Pt technically meets SIRS criteria with tachycardia, tachypnea, leukocytosis, pCO2 32; likely source would be sacral decub vs aspiration iso UGIB. Favor leukocytosis, tachycardia, hypoxia from acute anemia and would expect improvement after adequate resuscitation. Was given zosyn x1 in ED.    - will monitor off abx for now  - f/u routine rvp mrsa cxr  - f/u BCx -anemia has hx prior UGIB w/ ulcers  -BUN/Cr ratio elevated  - transfuse to goals below  - ctm BP q4h  - likely history of HFpEF per [facility] documentation noting [facility] TTE w LVEF 43%, caution after blood can give lasix 20 mg iv times 1  -MCV elevated, is on oral B12, can obtain B12 /FA/hapt/LDH as well  -email sent to GI

## 2024-03-01 NOTE — H&P ADULT - NSICDXPASTMEDICALHX_GEN_ALL_CORE_FT
PAST MEDICAL HISTORY:  Abdominal aortic aneurysm     Anxiety and depression     Fermin esophagus     Bladder cancer     BPH (benign prostatic hyperplasia)     CAD (coronary artery disease)     Esophageal varix     GERD (gastroesophageal reflux disease)     Goiter     HTN (hypertension)     LYUBOV (iron deficiency anemia)     Osteomyelitis     Peptic ulcer disease     S/P hip replacement, bilateral     S/P repair of abdominal aortic aneurysm using straight graft     Sacral decubitus ulcer     T2DM (type 2 diabetes mellitus)     Upper GI bleed

## 2024-03-01 NOTE — H&P ADULT - PROBLEM SELECTOR PLAN 3
Chronic sacral and back decubiti ulcer c/b osteomyelitis. Unclear if organism identified, but was discharged from [facility] with 6 weeks of Augmentin/Flagyl due to end 3/4/24.    - monitor for systemic infectious signs  - trend WBC, fever curve  - f/u BCx  - will hold off on IV antibiotics at this time; reevaluate need  - obtain records from [facility] regarding any bone cultures or workup for osteomyelitis  - wound care consult

## 2024-03-01 NOTE — H&P ADULT - PROBLEM SELECTOR PLAN 4
Acute L prox great saphenous vein extending into common femoral DVT. Nonocculsive on DVT study. Unable to start AC iso acute bleed.    - monitor off AC  - unlikely candidate but can consider IVC filter Acute L prox great saphenous vein extending into common femoral DVT. Nonocculsive on DVT study. Unable to start AC iso acute bleed.    - monitor off AC, defer to GI when it is appropriate from GI standpoint for AC vs IVC filter

## 2024-03-02 NOTE — DIETITIAN NUTRITION RISK NOTIFICATION - ADDITIONAL COMMENTS/DIETITIAN RECOMMENDATIONS
Please see Dietitian Initial Assessment for complete recommendations.  Mandi Garcia, MIGUEL, CDN #[id number]  Also available on [employer] Teams

## 2024-03-02 NOTE — PROGRESS NOTE ADULT - PROBLEM SELECTOR PLAN 3
Chronic sacral and back decubiti ulcer c/b osteomyelitis. Unclear if organism identified, but was discharged from [facility] with 6 weeks of Augmentin/Flagyl due to end 3/4/24.    - monitor for systemic infectious signs  - trend WBC, fever curve  - f/u BCx  - will hold off on IV antibiotics at this time; reevaluate need  - obtain records from [facility] regarding any bone cultures or workup for osteomyelitis  - wound care consult

## 2024-03-02 NOTE — DIETITIAN INITIAL EVALUATION ADULT - PERTINENT LABORATORY DATA
(3/2) Na 142, BUN 53<H>, Cr 1.32<H>, BG 73, K+ 4.0, Phos 3.7, Mg 2.30, Alk Phos 126<H>, ALT/SGPT 18, AST/SGOT 26   POCT: (3/2) 101-202, (3/1) 100-148

## 2024-03-02 NOTE — DIETITIAN INITIAL EVALUATION ADULT - ADD RECOMMEND
1) Recommend consistent carbohydrate diet + Ensure Max 1 PO 2x daily (provides 150 kcal, 30 gm protein per 11oz serving).  2) Recommend continue micronutrient supplementation.

## 2024-03-02 NOTE — CHART NOTE - NSCHARTNOTEFT_GEN_A_CORE
Patient seen and examined at bedside. No acute events. Patient reports feeling generally well without reports of overt bleeding. Vitals and exam are stable. Hgb appropriately increased to 8.8 g/dL following transfusion.    Recommendations:  - IV PPI BID  - Closely monitor vital signs and for clinical signs of bleeding  - Track all stool output and color  - Maintain two large bore peripheral IVs  - Trend CBC, maintain active T&S and transfuse to goal hgb > 8 g/dL   - OK to advance diet  - Medical work-up of anemia (pre-transfusional iron studies, folic acid, B12, LDH, haptoglobin)  - Complete infectious work-up including cultures and assessment of decubitus ulcer  - Tentative plan for EGD Monday if medically optimized, or sooner if clinically indicated  - Please contact GI team if patient develops overt bleeding and/or hemodynamic instability     Singh Rizo MD  Gastroenterology/Hepatology Fellow

## 2024-03-02 NOTE — PHYSICAL THERAPY INITIAL EVALUATION ADULT - PERTINENT HX OF CURRENT PROBLEM, REHAB EVAL
83M from [facility] w bladder ca, CAD s/p stents, HTN, T2DM, LYUBOV, GERD, BPH, depression/anxiety, stage 4 decubiti c/b osteomyelitis on Flagyl/Augmentin until 3/4/24, AAA s/p graft/stent, recent UGIB (1/2024) iso corticosteroids, prior reports of EV, Fermin's esophagitis, substernal goiter, s/p b/l hip replacements p/w hypotension iso anemia Hgb 4s. Per [facility] documentation, pt was recently admitted (1/2023) at [facility] for COVID, hypernatremia, rhabdo iso prolonged fall, treated with remdesivir/decadron c/b UGIB; unclear if endoscopy performed. TTE performed there showed mild LVH with EF 43%. At [facility], pt was found confused, hypoxic, tachycardic, with recent labs showing Hgb 4.6; no active bleeding, melena, hematochezia was documented; patient unsure of bowel movement quality given largely bedbound status. At time of interview, pt A&Ox3 stating he feels well but is overall poor historian with poor insight, repeatedly requesting for a diet vanilla cherry Pepsi.

## 2024-03-02 NOTE — PROGRESS NOTE ADULT - PROBLEM SELECTOR PLAN 2
Hgb 4.5 on admission appropriately responding to 6.5 after 2u pRBC. c/b positive antibody screen. 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 Given documented history of recent UGIB, mostly likely source PUD from prior corticosteroid use. Pt unsure if he got endoscopy at [facility].     -gi consult  -am cbc  -ct a+P showing peptic ulcer disease and duodenitis

## 2024-03-02 NOTE — PROGRESS NOTE ADULT - PROBLEM SELECTOR PLAN 1
Pt presenting hypotensive tachycardic Hgb 4 iso presumed UGIB, stabilized after 2u pRBC, 500cc NS bolus. /60s now; unknown baseline. initially mapping 70s in the ED.   - likely history of HFpEF per [facility] documentation noting [facility] TTE w LVEF 43%  - f/u TTE    # Sepsis  Pt technically meets SIRS criteria with tachycardia, tachypnea, leukocytosis, pCO2 32; likely source would be sacral decub vs aspiration iso UGIB. Favor leukocytosis, tachycardia, hypoxia from acute anemia and would expect improvement after adequate resuscitation. Was given zosyn x1 in ED.    - will monitor off abx for now  - f/u routine rvp mrsa cxr  - f/u BCx

## 2024-03-02 NOTE — DIETITIAN INITIAL EVALUATION ADULT - OTHER CALCULATIONS
Dosing weight (3/1) 167.5 lbs / 76 kg.  Recent chart weight (11/9/23) 290 lbs; unclear accuracy.   Anthropometrics per [facility] transfer records: 167.1 lbs / 72 inches.   Ideal Body Weight: 190 lbs / 86.4 kg +/-10%

## 2024-03-02 NOTE — PROGRESS NOTE ADULT - ATTENDING COMMENTS
83M from [facility] bladder ca, CAD s/p stents, HTN, T2DM, LYUBOV, GERD, BPH, stage 4 decubiti c/b osteomyelitis on Flagyl/Augmentin until 3/4/24, AAA s/p graft/stent, recent UGIB (1/2024) iso corticosteroids, prior reports of EV, Fermin's esophagitis, substernal goiter, s/p b/l hip replacements p/w hypotension iso anemia Hgb 4s and LLE DVT    Plan  #Possible UGI bleed  -ok to advance diet  -PPI IV BID  -monitor H&H closely  -appreciate GI input, plan for scope 3/4    #Sepsis criteria  -elevated WBC count but afebrile  -in spite of sirs criteria don't feel like he is in sepsis  -f/u blood cultures for now    #sacral decubitus ulcer stage iv  -c/w augmentin and flagyl (unclear why flagyl but Pt had assessment at OSH)  -end date 3/4    #DVT  -hold off on AC given bleed  -scds    otherwise as noted above    attempted to call HCP darrel [phone] but no answer

## 2024-03-02 NOTE — DIETITIAN INITIAL EVALUATION ADULT - OTHER INFO
Per chart, pt is 83 year old male PMH bladder cancer, CAD s/p stents, HTN, type 2 DM, LYUBOV, GERD, BPH, depression/anxiety, stage 4 decubitus ulcer complicated by OM, AAA s/p graft/stent, recent UGIB (1/2024) in setting of corticosteroids, Fermin's esophagitis, substernal goiter, s/p bilateral hip replacements presenting from rehab with hypotension in setting of anemia with course complicated by LLE DVT. CT with PUD, duodenitis. GI was consulted, possible plan for EGD. Full code.    Pt confirms NKFA, denies difficulties chewing/swallowing. History of type 2 DM, no HbA1c available at this time. Diet PTA per [place] transfer records: regular solids, thin liquids, no added salt, no concentrated sweets, Boost Glucose Control TID, DPP 30 mL TID and LPS-SF 60 mL BID. Notable medications PTA per [place] transfer records: ferrous sulfate, folic acid, multivitamin with mineral, Metformin 1000 mg qD, Tradjenta 5 mg qD and mirtazapine. Pt reports poor appetite/PO intake x3 months PTA.    Pt reports continued poor PO intake in house, consumed ~50% of breakfast tray this morning. Pt amenable to provision of nutrition supplement however communicates hesitance at consuming as he does not want frequent BMs. Pt requires assistance with tray set up but is able to feed self independently. Last BM today per flowsheets. Ordered for senna 2 tablets qHS. Fingersticks elevated.

## 2024-03-02 NOTE — PROGRESS NOTE ADULT - ASSESSMENT
83M from [facility] bladder ca, CAD s/p stents, HTN, T2DM, LYUBOV, GERD, BPH, depression/anxiety, stage 4 decubiti c/b osteomyelitis on Flagyl/Augmentin until 3/4/24, AAA s/p graft/stent, recent UGIB (1/2024) iso corticosteroids, prior reports of EV, Fermin's esophagitis, substernal goiter, s/p b/l hip replacements p/w hypotension iso anemia Hgb 4s. course complicated by LLE DVT.

## 2024-03-02 NOTE — DIETITIAN INITIAL EVALUATION ADULT - PERTINENT MEDS FT
amoxicillin/clavulanate Tablet  atorvastatin   folic acid   ADMELOG corrective regimen sliding scale   metroNIDAZOLE Tablet   mirtazapine   multivitamin   pantoprazole IV  senna  vitamin B complex with vitamin C Tablet  aluminum hydroxide/magnesium hydroxide/simethicone Suspension PRN  ondansetron IV PRN

## 2024-03-03 NOTE — PROGRESS NOTE ADULT - ASSESSMENT
83M from [facility] with Fermin's esophagus, bladder ca, CAD s/p stents, HTN, T2DM, LYUBOV (on oral iron supplementation), GERD, BPH, depression/anxiety, stage 4 decubiti c/b osteomyelitis on Flagyl/Augmentin until 3/4/24, AAA s/p graft/stent, rectal polyps, substernal goiter, s/p b/l hip replacements, recent admission (1/2023) at [facility] for hypernatremia, rhabdo iso prolonged fall, COVID treated with remdesivir/decadron c/b UGIB with ?endoscopy who is admitted with acute blood loss anemia and leukocytosis.     #Acute blood loss anemia  #Black stools  #H/o iron deficiency anemia, on oral iron supplements  #Fermin's esophagus  #Leukocytosis  #Sacral decubitus ulceration, osteomyelitis   #Acute DVT, not on A/C  Patient with anemia to 4.5 g/dL from baseline ~9 in January with reports of intermittent black stools, initially hypotensive however responded appropriately to IVF resuscitation. He was transfused 2 units pRBC with adequate improvement in hgb to 6.5 g/dL and then >8.  BUN/Cr 74/1.67. Iron studies in January suggestive of anemia of chronic inflammation with ferritin > 500 however i/s/o acute infection and possible iron supplementation. CT A/P demonstrates findings suggestive of duodenal ulceration with active duodenitis. Anemia is likely multifactorial with a potential component of upper GI bleeding due to peptic ulcer disease, esophagitis. Less likely colonic bleeding in the absence of hematochezia and negative prior colonoscopies.  Patient on H2RA only prior to admission. At present he remains clinically stable without indication for urgent endoscopy. Given leukocytosis and possibility of infection, recommend complete infectious work-up prior to endoscopic evaluation. Hemolysis labs negative. B12/folate normal.    Endoscopic reports found in outpatient record:  EGD (2016): Colten's esophagus, questionable EV, questionable gastric polyp  Colonoscopy (2016): internal hemorrhoids, four rectal polyps  EGD (2010): Fermin's, gastritis, hiatal hernia  Colonoscopy (2010): internal hemorrhoids  Capsule endoscopy (2010): non-bleeding jejunal ulceration   p/w hypotension iso anemia Hgb 4s. Per [facility] documentation, pt was recently admitted (1/2023) at [facility] for COVID, hypernatremia, rhabdo iso prolonged fall, treated with remdesivir/decadron c/b UGIB; unclear if endoscopy was performed. TTE performed there showed mild LVH with EF 43%. At [facility], pt was found confused, hypoxic, tachycardic, with recent labs showing Hgb 4.6; no active bleeding, melena, hematochezia was documented.      Recommendations:  - IV PPI BID  - Closely monitor vital signs and for clinical signs of bleeding  - Track all stool output and color  - Trend CBC, maintain active T&S and transfuse to goal hgb > 8 g/dL   - Regular diet  - NPO after midnight for EGD Monday  - 4AM lab collection (CBC, BMP)    Singh Rizo MD  Gastroenterology/Hepatology Fellow  Available via [employer] Teams  Pager: [phone]    NON-URGENT CONSULTS:  Please email [email or web address]  AT NIGHT AND ON WEEKENDS:  Contact on-call GI fellow via answering service ([phone]) from 5pm-8am and on weekends/holidays  MONDAY-FRIDAY 8AM-5PM:  Pager# [phone] ([facility]) or [phone] ([facility])

## 2024-03-03 NOTE — PROGRESS NOTE ADULT - ATTENDING COMMENTS
83M from [facility] bladder ca, CAD s/p stents, HTN, T2DM, LYUBOV, GERD, BPH, stage 4 decubiti c/b osteomyelitis on Flagyl/Augmentin until 3/4/24, AAA s/p graft/stent, recent UGIB (1/2024) iso corticosteroids, prior reports of EV, Fermin's esophagitis, substernal goiter, s/p b/l hip replacements p/w hypotension iso anemia Hgb 4s and LLE DVT    Plan  #Possible UGI bleed  -CLD today and NPO mn  -PPI IV BID  -monitor H&H closely  -appreciate GI input, plan for scope 3/4    #Sepsis criteria  -elevated WBC count but afebrile  -in spite of sirs criteria don't feel like he is in sepsis  -f/u blood cultures for now    #sacral decubitus ulcer stage iv  -c/w augmentin and flagyl (unclear why flagyl but Pt had assessment at OSH)  -end date 3/4    #DVT  -hold off on AC given bleed  -scds    otherwise as noted above    attempted to call HCP darrel [phone] but no answer

## 2024-03-03 NOTE — PROGRESS NOTE ADULT - SUBJECTIVE AND OBJECTIVE BOX
MICHAELMERLINE  83y  Male      Patient is a 83y old  Male who presents with a chief complaint of Anemia     (02 Mar 2024 14:39)      INTERVAL HPI/OVERNIGHT EVENTS:naeo        FAMILY HISTORY:  Family history of colon cancer in mother (Mother)      T(C): 36.5 (03-03-24 @ 01:42), Max: 36.6 (03-02-24 @ 10:15)  HR: 98 (03-03-24 @ 01:42) (95 - 104)  BP: 92/64 (03-03-24 @ 01:42) (91/63 - 107/60)  RR: 18 (03-03-24 @ 01:42) (17 - 18)  SpO2: 100% (03-03-24 @ 01:42) (98% - 100%)  Wt(kg): --Vital Signs Last 24 Hrs  T(C): 36.5 (03 Mar 2024 01:42), Max: 36.6 (02 Mar 2024 10:15)  T(F): 97.7 (03 Mar 2024 01:42), Max: 97.9 (02 Mar 2024 14:52)  HR: 98 (03 Mar 2024 01:42) (95 - 104)  BP: 92/64 (03 Mar 2024 01:42) (91/63 - 107/60)  BP(mean): --  RR: 18 (03 Mar 2024 01:42) (17 - 18)  SpO2: 100% (03 Mar 2024 01:42) (98% - 100%)    Parameters below as of 03 Mar 2024 01:42  Patient On (Oxygen Delivery Method): room air      No Known Allergies      PHYSICAL EXAM:  GENERAL: NAD  HEAD:  Atraumatic, Normocephalic  EYES: EOMI, conjunctiva and sclera clear  CHEST/LUNG: Clear to auscultation bilaterally; No rales, rhonchi, wheezing, or rubs  HEART: Regular rate and rhythm; No murmurs, rubs, or gallops  ABDOMEN: Soft, Nontender, Nondistended;   SKIN: sacral ulcer  NERVOUS SYSTEM:  Alert & Oriented X3, no focal deficits    Consultant(s) Notes Reviewed:  [x ] YES  [ ] NO  Care Discussed with Consultants/Other Providers [ x] YES  [ ] NO    LABS:      RADIOLOGY & ADDITIONAL TESTS:    Imaging Personally Reviewed:  [ ] YES  [ ] NO  acetaminophen     Tablet .. 650 milliGRAM(s) Oral every 6 hours PRN  aluminum hydroxide/magnesium hydroxide/simethicone Suspension 30 milliLiter(s) Oral every 4 hours PRN  amoxicillin  875 milliGRAM(s)/clavulanate 1 Tablet(s) Oral two times a day  atorvastatin 40 milliGRAM(s) Oral at bedtime  busPIRone 10 milliGRAM(s) Oral three times a day  chlorhexidine 2% Cloths 1 Application(s) Topical daily  dextrose 5%. 1000 milliLiter(s) IV Continuous <Continuous>  dextrose 5%. 1000 milliLiter(s) IV Continuous <Continuous>  dextrose 50% Injectable 12.5 Gram(s) IV Push once  dextrose 50% Injectable 25 Gram(s) IV Push once  dextrose 50% Injectable 25 Gram(s) IV Push once  dextrose Oral Gel 15 Gram(s) Oral once PRN  finasteride 5 milliGRAM(s) Oral daily  folic acid 1 milliGRAM(s) Oral daily  glucagon  Injectable 1 milliGRAM(s) IntraMuscular once  insulin lispro (ADMELOG) corrective regimen sliding scale   SubCutaneous three times a day before meals  insulin lispro (ADMELOG) corrective regimen sliding scale   SubCutaneous at bedtime  melatonin 3 milliGRAM(s) Oral at bedtime PRN  metroNIDAZOLE    Tablet 500 milliGRAM(s) Oral three times a day  mirtazapine 7.5 milliGRAM(s) Oral at bedtime  multivitamin 1 Tablet(s) Oral daily  nystatin Cream 1 Application(s) Topical two times a day  ondansetron Injectable 4 milliGRAM(s) IV Push every 8 hours PRN  pantoprazole  Injectable 40 milliGRAM(s) IV Push two times a day  senna 2 Tablet(s) Oral at bedtime  tamsulosin 0.4 milliGRAM(s) Oral at bedtime  vitamin B complex with vitamin C 1 Tablet(s) Oral daily      HEALTH ISSUES - PROBLEM Dx:  Sacral decubitus ulcer, stage IV    CAD (coronary artery disease)    HTN (hypertension)    Type 2 diabetes mellitus    BPH (benign prostatic hyperplasia)    Anxiety and depression    Prophylactic measure    Thrombophlebitis of the femoral vein    DVT, lower extremity    ANTONI (acute kidney injury)    Systemic inflammatory response syndrome (SIRS)    Anemia

## 2024-03-03 NOTE — PROGRESS NOTE ADULT - SUBJECTIVE AND OBJECTIVE BOX
Interval Events:   No acute events  Patient feels well w/o complaint. Brown stool noted by pt and RN.  Vitals, exam and labs stable    Hospital Medications:  acetaminophen     Tablet .. 650 milliGRAM(s) Oral every 6 hours PRN  aluminum hydroxide/magnesium hydroxide/simethicone Suspension 30 milliLiter(s) Oral every 4 hours PRN  amoxicillin  875 milliGRAM(s)/clavulanate 1 Tablet(s) Oral two times a day  atorvastatin 40 milliGRAM(s) Oral at bedtime  busPIRone 10 milliGRAM(s) Oral three times a day  chlorhexidine 2% Cloths 1 Application(s) Topical daily  dextrose 5%. 1000 milliLiter(s) IV Continuous <Continuous>  dextrose 5%. 1000 milliLiter(s) IV Continuous <Continuous>  dextrose 50% Injectable 12.5 Gram(s) IV Push once  dextrose 50% Injectable 25 Gram(s) IV Push once  dextrose 50% Injectable 25 Gram(s) IV Push once  dextrose Oral Gel 15 Gram(s) Oral once PRN  finasteride 5 milliGRAM(s) Oral daily  folic acid 1 milliGRAM(s) Oral daily  glucagon  Injectable 1 milliGRAM(s) IntraMuscular once  insulin lispro (ADMELOG) corrective regimen sliding scale   SubCutaneous three times a day before meals  insulin lispro (ADMELOG) corrective regimen sliding scale   SubCutaneous at bedtime  melatonin 3 milliGRAM(s) Oral at bedtime PRN  metroNIDAZOLE    Tablet 500 milliGRAM(s) Oral three times a day  mirtazapine 7.5 milliGRAM(s) Oral at bedtime  multivitamin 1 Tablet(s) Oral daily  nystatin Cream 1 Application(s) Topical two times a day  ondansetron Injectable 4 milliGRAM(s) IV Push every 8 hours PRN  pantoprazole  Injectable 40 milliGRAM(s) IV Push two times a day  senna 2 Tablet(s) Oral at bedtime  tamsulosin 0.4 milliGRAM(s) Oral at bedtime  vitamin B complex with vitamin C 1 Tablet(s) Oral daily      ROS: See above.    PHYSICAL EXAM:   Vital Signs:  Vital Signs Last 24 Hrs  T(C): 36.7 (03 Mar 2024 05:42), Max: 36.7 (03 Mar 2024 05:42)  T(F): 98.1 (03 Mar 2024 05:42), Max: 98.1 (03 Mar 2024 05:42)  HR: 101 (03 Mar 2024 05:42) (95 - 104)  BP: 92/63 (03 Mar 2024 05:42) (91/63 - 102/63)  BP(mean): --  RR: 18 (03 Mar 2024 05:42) (17 - 18)  SpO2: 100% (03 Mar 2024 05:42) (98% - 100%)    Parameters below as of 03 Mar 2024 05:42  Patient On (Oxygen Delivery Method): room air      Daily     Daily     GENERAL:  NAD, resting comfortably in bed  HEENT:  sclera anicteric  CHEST:  Normal Effort  HEART:  HDS  ABDOMEN:  Soft, non-tender, non-distended  SKIN:  Warm & Dry. No jaundice  NEURO:  Alert, conversant, no focal deficit    LABS:                        8.2    14.90 )-----------( 120      ( 03 Mar 2024 05:55 )             26.0     Mean Cell Volume: 97.7 fL (03-03-24 @ 05:55)    03-03    140  |  111<H>  |  44<H>  ----------------------------<  80  4.4   |  17<L>  |  1.29    Ca    7.9<L>      03 Mar 2024 05:55  Phos  3.0     03-03  Mg     2.20     03-03    TPro  5.3<L>  /  Alb  2.0<L>  /  TBili  0.5  /  DBili  x   /  AST  31  /  ALT  18  /  AlkPhos  168<H>  03-03    LIVER FUNCTIONS - ( 03 Mar 2024 05:55 )  Alb: 2.0 g/dL / Pro: 5.3 g/dL / ALK PHOS: 168 U/L / ALT: 18 U/L / AST: 31 U/L / GGT: x             Urinalysis Basic - ( 03 Mar 2024 05:55 )    Color: x / Appearance: x / SG: x / pH: x  Gluc: 80 mg/dL / Ketone: x  / Bili: x / Urobili: x   Blood: x / Protein: x / Nitrite: x   Leuk Esterase: x / RBC: x / WBC x   Sq Epi: x / Non Sq Epi: x / Bacteria: x                              8.2    14.90 )-----------( 120      ( 03 Mar 2024 05:55 )             26.0                         8.8    15.34 )-----------( 125      ( 02 Mar 2024 05:06 )             26.9                         6.5    21.10 )-----------( 125      ( 29 Feb 2024 19:39 )             21.1                         4.5    18.01 )-----------( 126      ( 29 Feb 2024 15:10 )             15.0

## 2024-03-03 NOTE — DISCHARGE NOTE PROVIDER - HOSPITAL COURSE
83M from [facility] bladder ca, CAD s/p stents, HTN, T2DM, LYUBOV, GERD, BPH, depression/anxiety, stage 4 decubiti c/b osteomyelitis on Flagyl/Augmentin until 3/4/24, AAA s/p graft/stent, recent UGIB (1/2024) iso corticosteroids, prior reports of EV, Fermin's esophagitis, substernal goiter, s/p b/l hip replacements p/w hypotension iso anemia Hgb 4s. course complicated by LLE DVT. Patient was started on IV PPI BID with deferment of AC in setting of bleed. Patient required 3 units of prbcs with appropriate response. Patient underwent EGD/Colonoscopy on 3/4/23.    83M from [facility] bladder ca, CAD s/p stents, HTN, T2DM, LYUBOV, GERD, BPH, depression/anxiety, stage 4 decubitus sacral wound c/b osteomyelitis with ongoing abx treatment, AAA s/p graft/stent, recent UGIB (1/2024) iso corticosteroids, Fermin's esophagitis, s/p b/l hip replacements who presented with hypotension and was admitted for hypovolemic shock iso upper GIB. While admitted he was given 3u pRBC with appropriate response in Hgb and BP. He was also initiated on IV PPI BID given his bleeding. He underwent EGD/colonoscopy which found mild gastropathy, two non-bleeding duodenal ulcers, and non-bleeding duodenal angiodysplastic lesions. His antibiotic course for osteo was completed while inpt and a wound vacuum was applied to his sacral wound. His hospital course was complicated by LLE DVT. After confirmation that bleeding had resolved, he was initiated on AC therapy with Eliquis which he will continue once discharged. He should also continue taking oral PPI BID. He should follow-up with gastroenterology in 4-6 weeks to repeat endoscopy. He should also follow-up with wound care and his primary care doctor.            83M from [facility] bladder ca, CAD s/p stents, HTN, T2DM, LYUBOV, GERD, BPH, depression/anxiety, stage 4 decubitus sacral wound c/b osteomyelitis with ongoing abx treatment, AAA s/p graft/stent, recent UGIB (1/2024) iso corticosteroids, Fermin's esophagitis, s/p b/l hip replacements who presented with hypotension and was admitted for hypovolemic shock iso upper GIB. While admitted he was given 3u pRBC with appropriate response in Hgb and BP. He was also initiated on IV PPI BID given his bleeding. He underwent EGD/colonoscopy which found mild gastropathy, two non-bleeding duodenal ulcers, and non-bleeding duodenal angiodysplastic lesions. His antibiotic course for osteo was completed while inpt and a wound vacuum was applied to his sacral wound. His hospital course was complicated by LLE DVT. After confirmation that bleeding had resolved and discussion with patient on risk/benefits, he was initiated on AC therapy with Eliquis which he will continue once discharged. He should also continue taking oral PPI BID. He should follow-up with gastroenterology in 4-6 weeks to repeat endoscopy. He should also follow-up with wound care and his primary care doctor.

## 2024-03-03 NOTE — PROGRESS NOTE ADULT - TIME BILLING
review of laboratory data, radiology results, consultants' recommendations, documentation in [place], discussion with patient/ACP and interdisciplinary staff (such as , social workers, etc). Interventions were performed as documented above.
review of laboratory data, radiology results, consultants' recommendations, documentation in [place], discussion with patient/ACP and interdisciplinary staff (such as , social workers, etc). Interventions were performed as documented above.
I had a face to face encounter with this patient. I spent 35 total minutes on the bedside interview and examination, coordination of care, counseling, chart review, order placement and documentation for this patient.

## 2024-03-03 NOTE — PROGRESS NOTE ADULT - PROBLEM SELECTOR PLAN 1
Pt presenting hypotensive tachycardic Hgb 4 iso presumed UGIB, stabilized after 2u pRBC, 500cc NS bolus. /60s now; unknown baseline. initially mapping 70s in the ED.   - likely history of HFpEF per [facility] documentation noting [facility] TTE w LVEF 43%  - f/u TTE    # Sepsis  Pt technically meets SIRS criteria with tachycardia, tachypnea, leukocytosis, pCO2 32; likely source would be sacral decub vs aspiration iso UGIB. Favor leukocytosis, tachycardia, hypoxia from acute anemia and would expect improvement after adequate resuscitation. Was given zosyn x1 in ED.    - will monitor off abx for now  - f/u routine rvp mrsa cxr  - f/u BCx

## 2024-03-03 NOTE — PROGRESS NOTE ADULT - PROBLEM SELECTOR PLAN 3
Chronic sacral and back decubiti ulcer c/b osteomyelitis. Unclear if organism identified, but was discharged from [facility] with 6 weeks of Augmentin/Flagyl due to end 3/4/24.    - monitor for systemic infectious signs  - trend WBC, fever curve  - f/u BCx  - will hold off on IV antibiotics at this time; reevaluate need  - obtain records from [facility] regarding any bone cultures or workup for osteomyelitis  - wound care consult

## 2024-03-03 NOTE — DISCHARGE NOTE PROVIDER - NSDCFUADDAPPT_GEN_ALL_CORE_FT
APPTS ARE READY TO BE MADE: [ ] YES    Best Family or Patient Contact (if needed):    Additional Information about above appointments (if needed):    1: Gastroenterology  2: [facility] at [facility] [address] [facility] [phone]  3: PCP    Other comments or requests:

## 2024-03-03 NOTE — DISCHARGE NOTE PROVIDER - NSDCCPCAREPLAN_GEN_ALL_CORE_FT
PRINCIPAL DISCHARGE DIAGNOSIS  Diagnosis: Low hemoglobin  Assessment and Plan of Treatment: You had very low blood levels requiring transfer to [facility]. Here you received 3 units of blood with an appropriate response in your blood levels. We suspected a Gastrointestinal bleed and you underwent an EGD and colonoscopy which found ___      SECONDARY DISCHARGE DIAGNOSES  Diagnosis: DVT, lower extremity  Assessment and Plan of Treatment: You were noted to have a blood clot in your left leg. Initially we opted to not give you medication due to your bleeding. ___     PRINCIPAL DISCHARGE DIAGNOSIS  Diagnosis: Low hemoglobin  Assessment and Plan of Treatment: You had very low blood levels, also known as anemia, and required transfer to [facility]. While here you received 3 units of blood and your blood levels and blood pressure improved to safe levels. We suspected a Gastrointestinal bleed and you underwent an EGD and colonoscopy which found no active bleeding but did find ulcers and small vessels that likely had bled before. You were started on a medication which will hopefully reduce the risk for any future bleeding. Once discharged, you should followup with the gastroenterologist as you will require a repeat endoscopy in 4-6 weeks. You should also followup with your primary care doctor to discuss your hospitalization.      SECONDARY DISCHARGE DIAGNOSES  Diagnosis: DVT, lower extremity  Assessment and Plan of Treatment: You were noted to have a blood clot in your left leg. Initially we opted to not give you medication due to your bleeding. After your endoscopy was performed and no additional bleeding was seen we determined that it would be more beneficial to start taking medication to treat the clot. You will need to be on this medication for several months. While on the medication your risk for bleeding is increased so you should be especially careful to avoid having falls or other injuries.    Diagnosis: Sacral decubitus ulcer, stage IV  Assessment and Plan of Treatment: While in the hospital you finished the course of antibiotics that was started during your prior admission for wound infection and osteomyelitis. A wound vacuum was placed during this admission which you will continue to have on discharge to encourage healing of the wound. If you notice any new or worsening symptoms, such as fever, chills, worsening of the wound, or having drainage from the wound, you should call your primary doctor or return to the emergency department for further evaluation.     PRINCIPAL DISCHARGE DIAGNOSIS  Diagnosis: Low hemoglobin  Assessment and Plan of Treatment: You had very low blood levels, also known as anemia, and required transfer to [facility]. While here you received 3 units of blood and your blood levels and blood pressure improved to safe levels. We suspected a Gastrointestinal bleed and you underwent an EGD and colonoscopy which found no active bleeding but did find ulcers and small vessels that likely had bled before. You were started on a medication which will hopefully reduce the risk for any future bleeding. Once discharged, you should followup with the gastroenterologist as you will require a repeat endoscopy in 4-6 weeks. You should also followup with your primary care doctor to discuss your hospitalization.      SECONDARY DISCHARGE DIAGNOSES  Diagnosis: DVT, lower extremity  Assessment and Plan of Treatment: You were noted to have a blood clot in your left leg. Initially we opted to not give you medication due to your bleeding. After your endoscopy was performed and no additional bleeding was seen we determined that it would be more beneficial to start taking medication to treat the clot. Once discharged, take Eliquis 10mg every 12 hours until 3/11/24. Starting 3/12/24, take 5mg every 12 hours for 6 months. During this time, discuss with your PCP if you should continue to take the medication after 6 months.  While on the medication your risk for bleeding is increased so you should be especially careful to avoid having falls or other injuries.    Diagnosis: Sacral decubitus ulcer, stage IV  Assessment and Plan of Treatment: While in the hospital you finished the course of antibiotics that was started during your prior admission for wound infection and osteomyelitis. A wound vacuum was placed during this admission which you will continue to have on discharge to encourage healing of the wound. If you notice any new or worsening symptoms, such as fever, chills, worsening of the wound, or having drainage from the wound, you should call your primary doctor or return to the emergency department for further evaluation.

## 2024-03-03 NOTE — DISCHARGE NOTE PROVIDER - NSFOLLOWUPCLINICS_GEN_ALL_ED_FT
[facility]  Gastroenterology  [address] [place] [id number]  Phone: [phone]  Fax:   Follow Up Time: 1 month

## 2024-03-03 NOTE — DISCHARGE NOTE PROVIDER - NSDCMRMEDTOKEN_GEN_ALL_CORE_FT
Augmentin 875 mg-125 mg oral tablet: 1 tab(s) orally 2 times a day 6 weeks from 1/22/2024 - 3/4/2024  busPIRone 10 mg oral tablet: 1 tab(s) orally 3 times a day  ferrous sulfate 220 mg/5 mL (44 mg/5 mL elemental iron) oral elixir: 5 milliliter(s) orally 3 times a day  finasteride 5 mg oral tablet: 1 tab(s) orally once a day  Flagyl 500 mg oral tablet: 1 tab(s) orally 3 times a day 6 weeks from 1/22/2024 - 3/4/2024  fluticasone 50 mcg/inh nasal spray: 1 spray(s) in each nostril once a day  folic acid 1 mg oral tablet: 1 tab(s) orally once a day  losartan 50 mg oral tablet: 1 tab(s) orally once a day  metFORMIN 1000 mg oral tablet, extended release: 1 tab(s) orally once a day  metoprolol succinate 25 mg oral capsule, extended release: 1 cap(s) orally once a day  mirtazapine 7.5 mg oral tablet: 1 tab(s) orally once a day (at bedtime)  Multiple Vitamins oral liquid: 5 milliliter(s) orally once a day  nystatin 100,000 units/g topical cream: Apply topically to affected area 2 times a day Bilateral groin rash  oxyCODONE 5 mg oral tablet: 0.5 tab(s) orally 2 times a day as needed for before dressing change  Pepcid 20 mg oral tablet: 1 tab(s) orally once a day (at bedtime)  senna (sennosides) 8.6 mg oral tablet: 2 tab(s) orally once a day (at bedtime)  simvastatin 20 mg oral tablet: 1 tab(s) orally once a day  tamsulosin 0.4 mg oral capsule: 1 cap(s) orally once a day (at bedtime)  Tradjenta 5 mg oral tablet: 1 tab(s) orally once a day  Tylenol 325 mg oral capsule: 2 cap(s) orally 3 times a day as needed for before PT/OT   apixaban 5 mg oral tablet: 2 tab(s) orally every 12 hours Take 10mg every 12 hours until 3/11/24. Starting 3/12/24, take 5mg every 12 hours for 6 months. During this time, discuss with your primary doctor if you should continue the medication after 6 months.  Augmentin 875 mg-125 mg oral tablet: 1 tab(s) orally 2 times a day 6 weeks from 1/22/2024 - 3/4/2024  busPIRone 10 mg oral tablet: 1 tab(s) orally 3 times a day  ferrous sulfate 220 mg/5 mL (44 mg/5 mL elemental iron) oral elixir: 5 milliliter(s) orally 3 times a day  finasteride 5 mg oral tablet: 1 tab(s) orally once a day  fluticasone 50 mcg/inh nasal spray: 1 spray(s) in each nostril once a day  folic acid 1 mg oral tablet: 1 tab(s) orally once a day  losartan 50 mg oral tablet: 1 tab(s) orally once a day  metFORMIN 1000 mg oral tablet, extended release: 1 tab(s) orally once a day  metoprolol succinate 25 mg oral capsule, extended release: 1 cap(s) orally once a day  mirtazapine 7.5 mg oral tablet: 1 tab(s) orally once a day (at bedtime)  Multiple Vitamins oral liquid: 5 milliliter(s) orally once a day  nystatin 100,000 units/g topical cream: Apply topically to affected area 2 times a day Bilateral groin rash  oxyCODONE 5 mg oral tablet: 0.5 tab(s) orally 2 times a day as needed for before dressing change  Pepcid 20 mg oral tablet: 1 tab(s) orally once a day (at bedtime)  senna (sennosides) 8.6 mg oral tablet: 2 tab(s) orally once a day (at bedtime)  simvastatin 20 mg oral tablet: 1 tab(s) orally once a day  tamsulosin 0.4 mg oral capsule: 1 cap(s) orally once a day (at bedtime)  Tradjenta 5 mg oral tablet: 1 tab(s) orally once a day  Tylenol 325 mg oral capsule: 2 cap(s) orally 3 times a day as needed for before PT/OT   apixaban 5 mg oral tablet: 2 tab(s) orally every 12 hours Take 10mg every 12 hours until 3/11/24. Starting 3/12/24, take 5mg every 12 hours for 6 months. During this time, discuss with your primary doctor if you should continue the medication after 6 months.  busPIRone 10 mg oral tablet: 1 tab(s) orally 3 times a day  ferrous sulfate 220 mg/5 mL (44 mg/5 mL elemental iron) oral elixir: 5 milliliter(s) orally 3 times a day  finasteride 5 mg oral tablet: 1 tab(s) orally once a day  fluticasone 50 mcg/inh nasal spray: 1 spray(s) in each nostril once a day  folic acid 1 mg oral tablet: 1 tab(s) orally once a day  HYDROmorphone: 0.5 milligram(s) intravenous every 4 hours as needed for  severe pain  metFORMIN 1000 mg oral tablet, extended release: 1 tab(s) orally once a day  mirtazapine 7.5 mg oral tablet: 1 tab(s) orally once a day (at bedtime)  Multiple Vitamins oral liquid: 5 milliliter(s) orally once a day  nystatin 100,000 units/g topical cream: Apply topically to affected area 2 times a day Bilateral groin rash  oxycodone-acetaminophen 5 mg-325 mg oral tablet: 1 tab(s) orally every 6 hours As needed Moderate Pain (4 - 6)  pantoprazole 40 mg oral delayed release tablet: 1 tab(s) orally 2 times a day  Pepcid 20 mg oral tablet: 1 tab(s) orally once a day (at bedtime)  senna (sennosides) 8.6 mg oral tablet: 2 tab(s) orally once a day (at bedtime)  simvastatin 20 mg oral tablet: 1 tab(s) orally once a day  tamsulosin 0.4 mg oral capsule: 1 cap(s) orally once a day (at bedtime)  Tradjenta 5 mg oral tablet: 1 tab(s) orally once a day  Tylenol 325 mg oral capsule: 2 cap(s) orally 3 times a day as needed for before PT/OT

## 2024-03-03 NOTE — DISCHARGE NOTE PROVIDER - NSDCFUADDINST_GEN_ALL_CORE_FT
1. Please schedule an appointment with the gastroenterologist as you will require repeat endoscopy in 4-6 weeks to make sure there is no additional bleeding. 1. Please schedule an appointment with the gastroenterologist as you will require repeat endoscopy in 4-6 weeks to make sure there is no additional bleeding.  2. Once discharged, take Eliquis 10mg every 12 hours until 3/11/24. Starting 3/12/24, take 5mg every 12 hours for 6 months. During this time, discuss with your PCP if you should continue to take the medication after 6 months.

## 2024-03-03 NOTE — DISCHARGE NOTE PROVIDER - NSDCCPTREATMENT_GEN_ALL_CORE_FT
PRINCIPAL PROCEDURE  Procedure: EGD  Findings and Treatment:       SECONDARY PROCEDURE  Procedure: CT abdomen pelvis  Findings and Treatment:   < end of copied text >  IMPRESSION:  No active GI bleeding is evident.< from: CT Abdomen and Pelvis w/wo IV Cont (02.29.24 @ 23:29) >      Procedure: US venous duplex scan extremity lower left  Findings and Treatment:   < end of copied text >  IMPRESSION:  Left-sided acute occlusive thrombus in the proximal great saphenous vein,   extending into the common femoral vein where it is nonocclusive.< from: US Duplex Venous Lower Ext Ltd, Left (03.01.24 @ 03:13) >      Procedure: CT cervical spine  Findings and Treatment:   < end of copied text >  IMPRESSION:   No vertebral fracture is recognized. 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  Straightening of the normal   cervical lordosis on a degenerative basis and mild dextroscoliosis with   apex at C7-T1.  Severe degenerative discdisease and spondylosis at C3-4   through C6-7 with narrowing of the BILATERAL C3-4 through C6/7 neural   foramina due to uncovertebral spurring and facet osteophytic hypertrophy.   Posterior osteophytic ridge/disc complexes at C3-4 through C6-7 flatten   the ventral thecal sac and the ventral cord.< from: CT Cervical Spine w/wo IV Cont (03.01.24 @ 12:46) >       PRINCIPAL PROCEDURE  Procedure: EGD  Findings and Treatment: Findings:       A small hiatal hernia was present.       The exam of the esophagus was otherwise normal.       Patchy mild gastropathy characterized by erosions and erythema was found     in the stomach. Biopsies were taken with a cold forceps for histology.       Two non-bleeding cratered duodenal ulcers were found in the duodenal        bulb. The largest lesion was 20 mm in largest dimension with pigmented        material and extending into the duodenal sweep.       A few (at least 5) diminutive angiodysplastic lesions without bleeding        were found in the second portion of the duodenum. Given aforementioned        ulcers and non-bleeding nature, these lesions were not treated.       The exam of the duodenum was otherwise normal.                                                                                   Impression:          - Small hiatal hernia.                       - Gastropathy. Biopsied.    - Two non-bleeding duodenal ulcers with pigmented                        material. Likely etiology of recent blood loss and                        corresponding to recent CT findings.                       - A few non-bleeding angiodysplastic lesions in the                        duodenum. Potential contributing factor to anemia,                        though less likely etiology of overt bleeding.  Recommendation:      - Return patient to hospital [facility] for ongoing care.                       -Advance diet as tolerated.                       - PPI 40mg BID.                       - Suggest repeat EGD in 4-6 weeks to assess ulcer                        healing.                       - Iron supplementation as indicated.        SECONDARY PROCEDURE  Procedure: CT abdomen pelvis  Findings and Treatment:   < end of copied text >  IMPRESSION:  No active GI bleeding is evident.< from: CT Abdomen and Pelvis w/wo IV Cont (02.29.24 @ 23:29) >      Procedure: US venous duplex scan extremity lower left  Findings and Treatment:   < end of copied text >  IMPRESSION:  Left-sided acute occlusive thrombus in the proximal great saphenous vein,   extending into the common femoral vein where it is nonocclusive.< from: US Duplex Venous Lower Ext Ltd, Left (03.01.24 @ 03:13) >      Procedure: CT cervical spine  Findings and Treatment:   < end of copied text >  IMPRESSION:   No vertebral fracture is recognized. 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  Straightening of the normal   cervical lordosis on a degenerative basis and mild dextroscoliosis with   apex at C7-T1.  Severe degenerative discdisease and spondylosis at C3-4   through C6-7 with narrowing of the BILATERAL C3-4 through C6/7 neural   foramina due to uncovertebral spurring and facet osteophytic hypertrophy.   Posterior osteophytic ridge/disc complexes at C3-4 through C6-7 flatten   the ventral thecal sac and the ventral cord.< from: CT Cervical Spine w/wo IV Cont (03.01.24 @ 12:46) >

## 2024-03-03 NOTE — PROGRESS NOTE ADULT - PROBLEM SELECTOR PLAN 2
Hgb 4.5 on admission appropriately responding to 6.5 after 2u pRBC. c/b positive antibody screen. 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 Given documented history of recent UGIB, mostly likely source PUD from prior corticosteroid use. Pt unsure if he got endoscopy at [facility].     -gi consult  -am cbc  -ct a+P showing peptic ulcer disease and duodenitis

## 2024-03-03 NOTE — PROGRESS NOTE ADULT - ASSESSMENT
83M from [facility] bladder ca, CAD s/p stents, HTN, T2DM, LYUBOV, GERD, BPH, depression/anxiety, stage 4 decubiti c/b osteomyelitis on Flagyl/Augmentin until 3/4/24, AAA s/p graft/stent, recent UGIB (1/2024) iso corticosteroids, prior reports of EV, Fermin's esophagitis, substernal goiter, s/p b/l hip replacements p/w hypotension iso anemia Hgb 4s. course complicated by LLE DVT.

## 2024-03-03 NOTE — DISCHARGE NOTE PROVIDER - DISCHARGE DIET
Pre-Operative Instructions    Patient name: Charlie Bernabe         We have scheduled you today for a GI procedure that will be performed sometime within the next few months. Because we realize that there may be some changes in the maintenance of your health after today, but prior to your procedure, we ask that you note the followin. If you have any additional medications that are added to the current medications you are taking, please notify our office so that we can properly instruct you in how to take this around the time of your procedure. For example,  if you started on any type of blood thinner, any type of anti inflammatory medication, or any type of iron supplement or vitamin, these medications will need to be stopped, depending on what they are, several days BEFORE the procedure. If you are given pain medications or any new type of heart medication or blood pressure pills or are a newly diagnoses diabetic put on blood sugar medication, we may also need to make adjustments regarding these.  2. If you have any type of device implanted (pacemaker, defibrillator, implanted pain device or stimulator, or have any kind of joint replacement) please let us know, as special arrangements may need to be made in order for the procedure to be performed.  3. If you develop new allergies, such as Latex or Versed, special arrangements may need to be made.  4. We will also need to be made aware of any changes in your insurance as we want to be sure that you receive the full benefit of your plan.      We understand that situations may arise causing you to cancel and/or reschedule your procedure date. We would appreciate at least two weeks notice. Thank you for your cooperation.    The staff of the [facility] offers these suggestions if you are scheduled for surgery.    1. Plan for unforeseen changes in surgery time. Although we try to maintain a set surgery schedule, there are times when a  surgery case may take longer than expected. This may result in your being in the surgery center longer than originally planned.      2. Arrange for an adult to stay with you at the surgery center. It is very important that you have an adult stay with you at the surgery center during your procedure. The medications you receive can make you sleepy and forgetful. For this reason, the doctor may want to discuss your surgery and post-operative care with an adult friend or family member. Additionally, an adult will be needed to drive you home. An adult must stay with you for the first 24 hours after your surgery. IF YOU DO  NOT HAVE AN ADULT TO DRIVE YOU HOME, YOUR SURGERY WILL BE CANCELLED.     3. Remember to follow pre-operative dietary restrictions. An empty stomach is imperative to prevent nausea or vomiting during and after your procedure.      4. Things to bring with you:  · a list of your current medications (including \"over the counter\" and herbal medications)  · important legal documents such as Power of , Guardianship papers  · any assistive device you are currently using (crutches, walker, hearing aid, etc.)    5. Limit visitors while you are at the surgery center. The time spent at the surgery center is very brief and will be limited to preparing you for surgery and assisting you to recover afterwards. Surgery center rooms are very small, which requires us to limit the number of visitors allowed in at one time.      6. Avoid alcoholic beverages. Because you will be asked to \"fast\" before your procedure, your body will be in a naturally dehydrated state. Alcohol will add to this dehydration making you more prone to problems with blood pressure during and after your procedure You should avoid alcoholic beverages for at least 24 hours prior to your surgery.    7. Wear loose comfortable clothes. Clothes that are easy to put on and take off are best. Sweat pants, shirts with buttons, and slip-on shoes are  wise choices. Avoid tight-fitting clothing such as blue jeans and turtlenecks.    8. Patients on Anticoagulation Medications:   IF YOU HAVE NOT RECEIVED INSTRUCTIONS REGARDING YOUR BLOOD THINNING MEDICATIONS WITHIN 7 DAYS OF YOUR SURGERY, PLEASE CALL THE GI DOCTOR'S OFFICE. FAILURE TO DO SO MAY RESULT IN CANCELLATION OF YOUR SURGERY.     9. Ask questions and insist on answers. Surgery can be a stressful time for anyone. If you have any questions or are unsure about any information given to you, be sure to ask for clarification. A patient can never ask too many questions. If there is something on your mind, let us know. We always want you to feel safe and confident in the care you are receiving.     TO ALL [facility] AMBULATORY SURGERY PATIENTS    You can decide today about the care you will receive in the future. [facility] respects your rights and will support your decisions to the fullest extent permitted by law, including your right to refuse or accept medical or surgical treatment. Please be advised that the [facility] prohibits ambulatory surgery centers from upholding Advanced Directives during surgical procedures. For this reason, any Advanced Directive will be null and void during your stay in the [facility].     Although not honored in the [facility], you are still entitled to be informed about your rights surrounding Advanced Directives. Advanced Directives are legal documents that enable you to specify what forms of treatment you want performed or withheld should you become unable to make or communicate these decisions on your own.  Although this is not a common decision made by outpatient surgery patients, we would like to let you know that an information booklet, \"A Personal Decision\" is available upon your request.      How do you know if an Advanced Directive is right  for you?  It is important to realize your rights as an individual, what the nature of consent for treatment implies, what a durable power of  for health care is and what a living will is.      After reviewing the booklet, \"A Personal Decision,\" should you have any further questions, please call us at [phone].      Thank you.     PATIENT’S RIGHTS    I. To be treated with respect, consideration and dignity, free from all forms of abuse, harassment and discrimination.     II. To receive quality care and high professional standards in a safe environment.    II. To be provided with appropriate privacy.    III. To expect that all disclosures and records are treated confidentially and, except when required by law, to be given the opportunity to approve or refuse their release.    IV. To be provided, to the degree known, complete information concerning their diagnosis, treatment and prognosis. When appropriate, the information is provided to a person designated by the patient to be a legally authorized person.    V. To review the records pertaining to his/her medical care and to have the information explained or interpreted as necessary except when restricted law.    VI. To expect that we will communicate with you in a matter that you can understand.     VII. To be given the names of all practitioners and health care personnel participating in his/her care.    VIII. To make decisions about the plan of care prior to and during the course of treatment.  IX. To refuse a recommended treatment or plan of care to the extent permitted by law and to be informed of the medical consequences of this action.     X. To expect that your treatment preferences will be responded to as delineated in your Advanced Directive, within the limits of the ASC bylaws regarding resuscitation    XI. To be informed as to:  A. Rights and Responsibilities.  B. Services available in the organization.  C. Provisions for after-hours and  emergency care.  D.  The charges for services and available payment options.   E. The right to consent or decline participation in proposed research  studies.  F.  The available resources for resolving disputes, grievances, and conflicts.      XII. To expect emergency procedures to be implemented without unnecessary delay.    XIII. To expect a safe hospital transfer with appropriate medical records when necessary.     XIV. To know that all patients rights extend to the patient’s legal representatives.     XV. Complaints regarding Patients Rights or any issue surrounding care received during your stay can be made by contacting any of the following organizations:  i. Medicare patient: Visit the Office of Medicare Beneficiary Ombudsman at www.medicare.gov on the web.  Or call 1-800-Medicare (1-803.323.7631).     TTY users should call 1-383.708.1150.  ii. Non-Medicare patients can contact the [facility] at [phone]; fax [phone], TTY [phone].  iii. Any patient can also contact the [facility] at [phone] (toll free 8:30 am to 5:00 pm, central time, weekdays).        Patient Responsibilities    It is your responsibility as a [facility] patient:    · To provide all personal and family health information needed to provide you with appropriate care.    · To participate to the best of your ability in making decisions about your medical treatment, and to comply with the agreed upon plan of care.    · To ask questions of your physician or other care providers when you do not understand any information or instructions.    · To maintain appointments as scheduled, or to reschedule in a timely fashion.    · To inform your physician and other care providers if you anticipate problems in following prescribed treatment.    · To recognize the impact of your lifestyle on your personal health.    · To inform your physician or other care provider if you desire a  transfer of care to another physician.    · To be considerate of others receiving and providing care.  To observe relevant surgery center policies and procedures.    · To accept financial responsibility for health care services and to work cooperatively with [facility] to resolve financial obligations    Please contact [facility] regarding billing, to verify your coverage and any out-of-pocket responsibility at [phone]             Regular Diet - No restrictions

## 2024-03-03 NOTE — DISCHARGE NOTE PROVIDER - CARE PROVIDER_API CALL
SRIDEVI AL  [address] [place] [id number]  Phone: [phone]  Fax: [phone]  Established Patient  Follow Up Time:

## 2024-03-04 NOTE — PROGRESS NOTE ADULT - PROBLEM SELECTOR PLAN 4
Acute L prox great saphenous vein extending into common femoral DVT. Nonocculsive on DVT study. Unable to start AC iso acute bleed.    - monitor off AC  - unlikely candidate but can consider IVC filter Acute L prox great saphenous vein extending into common femoral DVT. Nonocculsive on DVT study.  - monitor off AC iso bleed  - unlikely candidate but can consider IVC filter

## 2024-03-04 NOTE — PROGRESS NOTE ADULT - PROBLEM SELECTOR PLAN 1
Pt presenting hypotensive tachycardic Hgb 4 iso presumed UGIB, stabilized after 2u pRBC, 500cc NS bolus. /60s now; unknown baseline. initially mapping 70s in the ED.   - likely history of HFpEF per [facility] documentation noting [facility] TTE w LVEF 43%  - f/u TTE    # Sepsis  Pt technically meets SIRS criteria with tachycardia, tachypnea, leukocytosis, pCO2 32; likely source would be sacral decub vs aspiration iso UGIB. Favor leukocytosis, tachycardia, hypoxia from acute anemia and would expect improvement after adequate resuscitation. Was given zosyn x1 in ED.    - will monitor off abx for now  - f/u routine rvp mrsa cxr  - f/u BCx Pt presented hypotensive, tachycardic, Hgb 4 iso presumed UGIB, stabilized after 2u pRBC. Met SIRS criteria in ED, possible source of osteomyelitis given hx however was treated at prior hospitalization  -trend cbc  -monitor vitals, fluids prn   -infectious workup including BCx, MRSA, CXR negative   -BCx negative Pt presented hypotensive, tachycardic, Hgb 4 iso presumed UGIB, stabilized after 2u pRBC. Met SIRS criteria in ED, possible source of osteomyelitis given hx however was treated at prior hospitalization  -trend cbc  -monitor vitals, fluids prn   -infectious workup including BCx, MRSA, CXR negative

## 2024-03-04 NOTE — PROGRESS NOTE ADULT - ATTENDING COMMENTS
83M from [facility] w bladder ca, CAD s/p stents, HTN, T2DM, LYUBOV, GERD, BPH, stage 4 decubiti c/b osteomyelitis on Flagyl/Augmentin until 3/4/24, AAA s/p graft/stent, recent UGIB (1/2024) iso corticosteroids, prior reports of EV, Fermin's esophagitis, substernal goiter, s/p b/l hip replacements a/w hypotension iso severe anemia and also found to have LLE DVT. S/p 2U PRBCs last on 3/1. Pt also met SIRS criteria on admission w/ leukocytosis and tachycardia however w/o any evidence of new localized infection.     -monitor H/H closely   -c/w PPI IV BID  -GI following. Plan is for scope today 3/4  -Holding off on a/c for LE DVT for now given anemia. Next steps to be determined pending endoscopy evaluation. Will need to challenge on a/c vs revisit possible IVC filter based on overall picture and discussion w/ family.   -C/w augmentin and flagyl (unclear why flagyl but Pt had assessment at OSH). To be completed today 3/4. 2/29 blood cx NTD. Monitor WBC especially once abx completed. C/w wound care.       Dispo- pending medical optimization. PT rec [facility]

## 2024-03-04 NOTE — PROGRESS NOTE ADULT - PROBLEM SELECTOR PLAN 9
Home regimen metformin 1g qd, tradjenta 5mg qd    - f/u A1c  - c/w INOCENTE, FSG ACHS or q6h if NPO; goal glucose  Home regimen metformin 1g qd, tradjenta 5mg qd  - monitor FS  -c/w low ISS  -consistent carb diet

## 2024-03-04 NOTE — PROGRESS NOTE ADULT - SUBJECTIVE AND OBJECTIVE BOX
Wing Vallejo MD  PGY 1 Department of Internal Medicine        Patient is a 83y old  Male who presents with a chief complaint of Anemia, Hypotension (03 Mar 2024 11:45)      SUBJECTIVE / OVERNIGHT EVENTS: Pt seen and examined. No acute overnight events. Denies fevers, chills, CP, SOB, Abdominal pain, N/V, Constipation, Diarrhea        MEDICATIONS  (STANDING):  amoxicillin  875 milliGRAM(s)/clavulanate 1 Tablet(s) Oral two times a day  atorvastatin 40 milliGRAM(s) Oral at bedtime  busPIRone 10 milliGRAM(s) Oral three times a day  chlorhexidine 2% Cloths 1 Application(s) Topical daily  dextrose 5%. 1000 milliLiter(s) (50 mL/Hr) IV Continuous <Continuous>  dextrose 5%. 1000 milliLiter(s) (100 mL/Hr) IV Continuous <Continuous>  dextrose 50% Injectable 25 Gram(s) IV Push once  dextrose 50% Injectable 12.5 Gram(s) IV Push once  dextrose 50% Injectable 25 Gram(s) IV Push once  finasteride 5 milliGRAM(s) Oral daily  folic acid 1 milliGRAM(s) Oral daily  glucagon  Injectable 1 milliGRAM(s) IntraMuscular once  insulin lispro (ADMELOG) corrective regimen sliding scale   SubCutaneous three times a day before meals  insulin lispro (ADMELOG) corrective regimen sliding scale   SubCutaneous at bedtime  metroNIDAZOLE    Tablet 500 milliGRAM(s) Oral three times a day  mirtazapine 7.5 milliGRAM(s) Oral at bedtime  multivitamin 1 Tablet(s) Oral daily  nystatin Cream 1 Application(s) Topical two times a day  pantoprazole  Injectable 40 milliGRAM(s) IV Push two times a day  senna 2 Tablet(s) Oral at bedtime  tamsulosin 0.4 milliGRAM(s) Oral at bedtime  vitamin B complex with vitamin C 1 Tablet(s) Oral daily    MEDICATIONS  (PRN):  acetaminophen     Tablet .. 650 milliGRAM(s) Oral every 6 hours PRN Temp greater or equal to 38C (100.4F), Mild Pain (1 - 3)  aluminum hydroxide/magnesium hydroxide/simethicone Suspension 30 milliLiter(s) Oral every 4 hours PRN Dyspepsia  dextrose Oral Gel 15 Gram(s) Oral once PRN Blood Glucose LESS THAN 70 milliGRAM(s)/deciliter  melatonin 3 milliGRAM(s) Oral at bedtime PRN Insomnia  ondansetron Injectable 4 milliGRAM(s) IV Push every 8 hours PRN Nausea and/or Vomiting      I&O's Summary    03 Mar 2024 07:01  -  04 Mar 2024 07:00  --------------------------------------------------------  IN: 236 mL / OUT: 630 mL / NET: -394 mL        Vital Signs Last 24 Hrs  T(C): 36.7 (04 Mar 2024 06:58), Max: 36.7 (03 Mar 2024 10:12)  T(F): 98 (04 Mar 2024 06:58), Max: 98 (03 Mar 2024 10:12)  HR: 104 (04 Mar 2024 06:58) (91 - 104)  BP: 93/64 (04 Mar 2024 06:58) (91/62 - 99/61)  BP(mean): --  RR: 18 (04 Mar 2024 06:58) (17 - 18)  SpO2: 100% (04 Mar 2024 06:58) (98% - 100%)    Parameters below as of 04 Mar 2024 06:58  Patient On (Oxygen Delivery Method): room air        CAPILLARY BLOOD GLUCOSE      POCT Blood Glucose.: 88 mg/dL (04 Mar 2024 06:08)  POCT Blood Glucose.: 134 mg/dL (03 Mar 2024 22:37)  POCT Blood Glucose.: 178 mg/dL (03 Mar 2024 21:33)  POCT Blood Glucose.: 152 mg/dL (03 Mar 2024 18:00)  POCT Blood Glucose.: 187 mg/dL (03 Mar 2024 12:39)  POCT Blood Glucose.: 105 mg/dL (03 Mar 2024 08:35)      PHYSICAL EXAM:  GENERAL: NAD  HEAD:  Atraumatic, Normocephalic  EYES: EOMI, conjunctiva and sclera clear  CHEST/LUNG: Clear to auscultation bilaterally; No rales, rhonchi, wheezing, or rubs  HEART: Regular rate and rhythm; No murmurs, rubs, or gallops  ABDOMEN: Soft, Nontender, Nondistended;   SKIN: sacral ulcer  NERVOUS SYSTEM:  Alert & Oriented X3, no focal deficits       LABS:                        8.2    14.90 )-----------( 120      ( 03 Mar 2024 05:55 )             26.0     Auto Eosinophil # 0.17  / Auto Eosinophil % 1.1   / Auto Neutrophil # 9.74  / Auto Neutrophil % 65.4  / BANDS % x        03-03    140  |  111<H>  |  44<H>  ----------------------------<  80  4.4   |  17<L>  |  1.29    Ca    7.9<L>      03 Mar 2024 05:55  Mg     2.20     03-03  Phos  3.0     03-03  TPro  5.3<L>  /  Alb  2.0<L>  /  TBili  0.5  /  DBili  x   /  AST  31  /  ALT  18  /  AlkPhos  168<H>  03-03          Urinalysis Basic - ( 03 Mar 2024 05:55 )    Color: x / Appearance: x / SG: x / pH: x  Gluc: 80 mg/dL / Ketone: x  / Bili: x / Urobili: x   Blood: x / Protein: x / Nitrite: x   Leuk Esterase: x / RBC: x / WBC x   Sq Epi: x / Non Sq Epi: x / Bacteria: x            RADIOLOGY & ADDITIONAL TESTS:    Imaging Personally Reviewed:    Consultant(s) Notes Reviewed:      Care Discussed with Consultants/Other Providers:   Wing Vallejo MD  PGY 1 Department of Internal Medicine        Patient is a 83y old  Male who presents with a chief complaint of Anemia, Hypotension (03 Mar 2024 11:45)      SUBJECTIVE / OVERNIGHT EVENTS: Pt seen and examined. No acute overnight events. This morning complains of lower back pain at sacral wound site. Otherwise has no complaints. Denies fevers, chills, CP, SOB, Abdominal pain, N/V, Constipation, Diarrhea        MEDICATIONS  (STANDING):  amoxicillin  875 milliGRAM(s)/clavulanate 1 Tablet(s) Oral two times a day  atorvastatin 40 milliGRAM(s) Oral at bedtime  busPIRone 10 milliGRAM(s) Oral three times a day  chlorhexidine 2% Cloths 1 Application(s) Topical daily  dextrose 5%. 1000 milliLiter(s) (50 mL/Hr) IV Continuous <Continuous>  dextrose 5%. 1000 milliLiter(s) (100 mL/Hr) IV Continuous <Continuous>  dextrose 50% Injectable 25 Gram(s) IV Push once  dextrose 50% Injectable 12.5 Gram(s) IV Push once  dextrose 50% Injectable 25 Gram(s) IV Push once  finasteride 5 milliGRAM(s) Oral daily  folic acid 1 milliGRAM(s) Oral daily  glucagon  Injectable 1 milliGRAM(s) IntraMuscular once  insulin lispro (ADMELOG) corrective regimen sliding scale   SubCutaneous three times a day before meals  insulin lispro (ADMELOG) corrective regimen sliding scale   SubCutaneous at bedtime  metroNIDAZOLE    Tablet 500 milliGRAM(s) Oral three times a day  mirtazapine 7.5 milliGRAM(s) Oral at bedtime  multivitamin 1 Tablet(s) Oral daily  nystatin Cream 1 Application(s) Topical two times a day  pantoprazole  Injectable 40 milliGRAM(s) IV Push two times a day  senna 2 Tablet(s) Oral at bedtime  tamsulosin 0.4 milliGRAM(s) Oral at bedtime  vitamin B complex with vitamin C 1 Tablet(s) Oral daily    MEDICATIONS  (PRN):  acetaminophen     Tablet .. 650 milliGRAM(s) Oral every 6 hours PRN Temp greater or equal to 38C (100.4F), Mild Pain (1 - 3)  aluminum hydroxide/magnesium hydroxide/simethicone Suspension 30 milliLiter(s) Oral every 4 hours PRN Dyspepsia  dextrose Oral Gel 15 Gram(s) Oral once PRN Blood Glucose LESS THAN 70 milliGRAM(s)/deciliter  melatonin 3 milliGRAM(s) Oral at bedtime PRN Insomnia  ondansetron Injectable 4 milliGRAM(s) IV Push every 8 hours PRN Nausea and/or Vomiting      I&O's Summary    03 Mar 2024 07:01  -  04 Mar 2024 07:00  --------------------------------------------------------  IN: 236 mL / OUT: 630 mL / NET: -394 mL        Vital Signs Last 24 Hrs  T(C): 36.7 (04 Mar 2024 06:58), Max: 36.7 (03 Mar 2024 10:12)  T(F): 98 (04 Mar 2024 06:58), Max: 98 (03 Mar 2024 10:12)  HR: 104 (04 Mar 2024 06:58) (91 - 104)  BP: 93/64 (04 Mar 2024 06:58) (91/62 - 99/61)  BP(mean): --  RR: 18 (04 Mar 2024 06:58) (17 - 18)  SpO2: 100% (04 Mar 2024 06:58) (98% - 100%)    Parameters below as of 04 Mar 2024 06:58  Patient On (Oxygen Delivery Method): room air        CAPILLARY BLOOD GLUCOSE      POCT Blood Glucose.: 88 mg/dL (04 Mar 2024 06:08)  POCT Blood Glucose.: 134 mg/dL (03 Mar 2024 22:37)  POCT Blood Glucose.: 178 mg/dL (03 Mar 2024 21:33)  POCT Blood Glucose.: 152 mg/dL (03 Mar 2024 18:00)  POCT Blood Glucose.: 187 mg/dL (03 Mar 2024 12:39)  POCT Blood Glucose.: 105 mg/dL (03 Mar 2024 08:35)      PHYSICAL EXAM:  GENERAL: NAD  HEAD:  Atraumatic, Normocephalic  EYES: EOMI, conjunctiva and sclera clear  CHEST/LUNG: Clear to auscultation bilaterally; No rales, rhonchi, wheezing, or rubs  HEART: Regular rate and rhythm; No murmurs, rubs, or gallops  ABDOMEN: Soft, Nontender, Nondistended;   SKIN: sacral ulcer  NERVOUS SYSTEM:  Alert & Oriented X3, no focal deficits       LABS:                        8.2    14.90 )-----------( 120      ( 03 Mar 2024 05:55 )             26.0     Auto Eosinophil # 0.17  / Auto Eosinophil % 1.1   / Auto Neutrophil # 9.74  / Auto Neutrophil % 65.4  / BANDS % x        03-03    140  |  111<H>  |  44<H>  ----------------------------<  80  4.4   |  17<L>  |  1.29    Ca    7.9<L>      03 Mar 2024 05:55  Mg     2.20     03-03  Phos  3.0     03-03  TPro  5.3<L>  /  Alb  2.0<L>  /  TBili  0.5  /  DBili  x   /  AST  31  /  ALT  18  /  AlkPhos  168<H>  03-03          Urinalysis Basic - ( 03 Mar 2024 05:55 )    Color: x / Appearance: x / SG: x / pH: x  Gluc: 80 mg/dL / Ketone: x  / Bili: x / Urobili: x   Blood: x / Protein: x / Nitrite: x   Leuk Esterase: x / RBC: x / WBC x   Sq Epi: x / Non Sq Epi: x / Bacteria: x            RADIOLOGY & ADDITIONAL TESTS:    Imaging Personally Reviewed:    Consultant(s) Notes Reviewed:      Care Discussed with Consultants/Other Providers:

## 2024-03-04 NOTE — PROGRESS NOTE ADULT - PROBLEM SELECTOR PLAN 10
- c/w home finasteride, tamsulosin  - monitor urine output; consider bladder scan - c/w home finasteride, tamsulosin

## 2024-03-04 NOTE — PROGRESS NOTE ADULT - PROBLEM SELECTOR PLAN 2
Hgb 4.5 on admission appropriately responding to 6.5 after 2u pRBC. c/b positive antibody screen. 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 Given documented history of recent UGIB, mostly likely source PUD from prior corticosteroid use. Pt unsure if he got endoscopy at [facility].     -gi consult  -am cbc  -ct a+P showing peptic ulcer disease and duodenitis Hgb 4.5 on admission improved after 2u pRBC. c/b positive antibody screen. 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 Given documented history of recent UGIB, mostly likely source PUD from prior corticosteroid use.  -ct a+P showing peptic ulcer disease and duodenitis  -gi consulted, appreciate recs  -c/w ppi bid  -scope planned for 3/4

## 2024-03-04 NOTE — PROGRESS NOTE ADULT - PROBLEM SELECTOR PLAN 12
Hospital Bundle    Fluids: PO  Electrolytes: Replete K > 4, Mg > 2, Phos > 3  Nutrition: Diet NPO for possible GI procedure    Access: PIV  Code Status: FULL CODE; would engage in further GoC  Dispo: pending clinical improvement, likely return to [facility] if able DVT PPX: SCDs iso bleed  diet: NPO pending endoscopy, can resume CC as tolerated  Electrolytes: Replete K > 4, Mg > 2, Phos > 3  Code Status: FULL CODE  Dispo: pending clinical improvement, likely return to [facility] if able DVT PPX: hold SCDs iso DVT, hold AC iso bleed  diet: NPO pending endoscopy, can resume CC as tolerated  Electrolytes: Replete K > 4, Mg > 2, Phos > 3  Code Status: FULL CODE  Dispo: pending clinical improvement, likely return to [facility] if able

## 2024-03-04 NOTE — PROGRESS NOTE ADULT - ASSESSMENT
83M from [facility] bladder ca, CAD s/p stents, HTN, T2DM, LYUBOV, GERD, BPH, depression/anxiety, stage 4 decubiti c/b osteomyelitis on Flagyl/Augmentin until 3/4/24, AAA s/p graft/stent, recent UGIB (1/2024) iso corticosteroids, prior reports of EV, Fermin's esophagitis, substernal goiter, s/p b/l hip replacements p/w hypotension iso anemia Hgb 4s. course complicated by LLE DVT.

## 2024-03-04 NOTE — ADVANCED PRACTICE NURSE CONSULT - REASON FOR CONSULT
Patient seen on skin care rounds for NPWT/VAC placement to lumbar spine. Wound care team will continue to see patient on M W F schedule.

## 2024-03-04 NOTE — PROGRESS NOTE ADULT - ASSESSMENT
83M w hx of sickle cell anemia, bladder ca, CAD s/p stents, HTN, T2DM, LYUBOV, GERD, BPH, depression/anxiety, stage 4 decubiti c/b osteomyelitis on Flagyl/Augmentin until 3/4/24, AAA s/p graft/stent, recent UGIB (1/2024) while on corticosteroids, Fermin's esophagitis, substernal goiter, b/l hip replacements admitted with hypotension likely secondary to anemia w Hgb 4.5 on arrival. Also found to have LLE DVT.    Wound Consult requested to assist w/ management of lumbar wound, sacral wound.    Lumbar spine stage 4 pressure injury  - Will plan for wound vac placement    Sacral wound - unstagable  - Cleanse with perineal spray until Triad appears white again. Apply Triad in thick layer over wound BID and PRN if soiled    Additional recommendations:  Nutrition Consult for optimization in pt w/ decreased appetite        Inadequate PO intake, & Increased nutritional needs            encourage high quality protein, grupo/ prosource, MVI & Vit C to promote wound healing            Consider fiber supplement if nutrition shakes causing diarrhea  Moisturize intact skin w/ SWEEN cream BID including lower legs and feet  Continue turning and positioning w/ offloading assistive devices as per protocol  Waffle Cushion to chair when oob to chair  Continue w/ low air loss pressure redistribution bed surface   Pt will need Group 2 mattress on hospital bed and ROHO cushion for wheel chair upon discharge home  Continue w/ attends under pads and Pericare care as per protocol  Consider condom cath as patient states he cannot use urinal    Care as per medicine, will follow along periodically throughout hospitalization    Upon discharge f/u as outpatient at [facility] [address] [place] [address] [phone]    Thank you for this consult,  Renee Bolden MD; Wound Care Fellow (available on Teams)    If after 4PM or before 7:30AM on Mon-Friday or weekend/holiday please contact general surgery for urgent matters.   - Team A: [id number]   - Team B: [id number]

## 2024-03-04 NOTE — PROGRESS NOTE ADULT - PROBLEM SELECTOR PLAN 6
Unclear staging, diagnosis, treatment plan, outpatient provider.     - try to obtain outpt records  - f/u UA to r/o hematuria Unclear staging, diagnosis, treatment plan, outpatient provider.   - try to obtain outpt records

## 2024-03-04 NOTE — PROGRESS NOTE ADULT - ATTENDING COMMENTS
Pt seen and examined with Fellow.  Assessment and plan reviewed and discussed.  Agree with above.    Lumbar spine stage 4 pressure injury:  Will plan for VAC  Sacrum Unstageable pressure injury:  TRAID    Status of wounds and treatment recommendations d/w  pt.  All questions answered.   Pt expressed understanding.    I spent 50  minutes face to face w/ this pt of which more than 50% of the time was spent counseling & coordinating care of this pt.

## 2024-03-04 NOTE — PROGRESS NOTE ADULT - PROBLEM SELECTOR PLAN 5
Likely prerenal iso hypovolemia, creatinine 1.67 Likely prerenal iso hypovolemia, creatinine 1.67 on admission now improving. Baseline unclear, ~0.9 in 2009  -trend bmp  -avoid nephrotoxic agents

## 2024-03-04 NOTE — PROGRESS NOTE ADULT - SUBJECTIVE AND OBJECTIVE BOX
[facility] -- WOUND TEAM -- FOLLOW UP NOTE  --------------------------------------------------------------------------------    24 hour events/subjective:          Diet:  Diet, NPO:   Except Medications (03-04-24 @ 11:26)      ROS: General/ SKIN/ MSK/ Neuro/ GI see HPI  all other systems negative  pt unable to offer    ALLERGIES & MEDICATIONS  --------------------------------------------------------------------------------  Allergies    No Known Allergies    Intolerances          STANDING INPATIENT MEDICATIONS  acetaminophen   IVPB .. 1000 milliGRAM(s) IV Intermittent once  atorvastatin 40 milliGRAM(s) Oral at bedtime  busPIRone 10 milliGRAM(s) Oral three times a day  chlorhexidine 2% Cloths 1 Application(s) Topical daily  dextrose 5%. 1000 milliLiter(s) IV Continuous <Continuous>  dextrose 5%. 1000 milliLiter(s) IV Continuous <Continuous>  dextrose 50% Injectable 25 Gram(s) IV Push once  dextrose 50% Injectable 12.5 Gram(s) IV Push once  dextrose 50% Injectable 25 Gram(s) IV Push once  finasteride 5 milliGRAM(s) Oral daily  folic acid 1 milliGRAM(s) Oral daily  glucagon  Injectable 1 milliGRAM(s) IntraMuscular once  HYDROmorphone  Injectable 0.5 milliGRAM(s) IV Push once  insulin lispro (ADMELOG) corrective regimen sliding scale   SubCutaneous three times a day before meals  insulin lispro (ADMELOG) corrective regimen sliding scale   SubCutaneous at bedtime  mirtazapine 7.5 milliGRAM(s) Oral at bedtime  multivitamin 1 Tablet(s) Oral daily  nystatin Cream 1 Application(s) Topical two times a day  pantoprazole  Injectable 40 milliGRAM(s) IV Push two times a day  senna 2 Tablet(s) Oral at bedtime  tamsulosin 0.4 milliGRAM(s) Oral at bedtime  vitamin B complex with vitamin C 1 Tablet(s) Oral daily      PRN INPATIENT MEDICATION  acetaminophen     Tablet .. 650 milliGRAM(s) Oral every 6 hours PRN  aluminum hydroxide/magnesium hydroxide/simethicone Suspension 30 milliLiter(s) Oral every 4 hours PRN  dextrose Oral Gel 15 Gram(s) Oral once PRN  melatonin 3 milliGRAM(s) Oral at bedtime PRN  ondansetron Injectable 4 milliGRAM(s) IV Push every 8 hours PRN        VITALS/PHYSICAL EXAM  --------------------------------------------------------------------------------  T(C): 36.7 (03-04-24 @ 06:00), Max: 36.7 (03-03-24 @ 17:30)  HR: 102 (03-04-24 @ 08:41) (91 - 104)  BP: 107/76 (03-04-24 @ 08:41) (93/56 - 107/76)  RR: 18 (03-04-24 @ 08:41) (18 - 18)  SpO2: 99% (03-04-24 @ 08:41) (98% - 100%)  Wt(kg): --  Height (cm): 188 (03-04-24 @ 10:19)  Weight (kg): 76 (03-04-24 @ 10:19)  BMI (kg/m2): 21.5 (03-04-24 @ 10:19)  BSA (m2): 2.02 (03-04-24 @ 10:19)    General: Elderly man supine in bed, NAD  HEENT:  NC/AT, EOMI  Respiratory: nonlabored w/ equal chest rise  Gastrointestinal: soft NT/ND  Neurology:  A&Ox3, no gross deficits  Psych: calm, appropriate  Musculoskeletal: leg length discrepancy right leg shorter than left  Vascular: BLE equally warm, bilateral DP pulses palpable  Skin:  BLE xerosis.  Lumbar spine/back wound 21.5x9x5.5cm, undermining from 1 to 7oclock max 6.7cm at 5oclock. Small amount of eschar at 11oclock position. Wound bed pink granulation tissue with areas of exposed bone and muscle. Tender to palpation. No purulence, no erythema, no odor.  Sacral wound with tan eschar 5x9x0.2cm. Wound does not connect to lumbar spine wound, though there is minimal tissue  the undermining of the lumbar wound from the sacral wound.        03-03-24 @ 07:01  -  03-04-24 @ 07:00  --------------------------------------------------------  IN: 236 mL / OUT: 630 mL / NET: -394 mL            LABS/ CULTURES/ RADIOLOGY:              7.8    14.29 >-----------<  112      [03-04-24 @ 05:19]              24.5     140  |  113  |  34  ----------------------------<  80      [03-04-24 @ 05:19]  3.8   |  16  |  1.04        Ca     7.8     [03-04-24 @ 05:19]      Mg     2.00     [03-04-24 @ 05:19]      Phos  3.1     [03-04-24 @ 05:19]    TPro  5.1  /  Alb  1.9  /  TBili  0.4  /  DBili  x   /  AST  17  /  ALT  11  /  AlkPhos  187  [03-04-24 @ 05:19]    PT/INR: PT 17.5 , INR 1.57       [03-04-24 @ 05:19]  PTT: 27.0       [03-04-24 @ 05:19]      CAPILLARY BLOOD GLUCOSE      POCT Blood Glucose.: 106 mg/dL (04 Mar 2024 12:01)  POCT Blood Glucose.: 88 mg/dL (04 Mar 2024 06:08)  POCT Blood Glucose.: 134 mg/dL (03 Mar 2024 22:37)  POCT Blood Glucose.: 178 mg/dL (03 Mar 2024 21:33)  POCT Blood Glucose.: 152 mg/dL (03 Mar 2024 18:00)        Culture - Blood (collected 02-29-24 @ 23:11)  Source: .Blood Blood-Peripheral  Preliminary Report (03-04-24 @ 03:01):    No growth at 72 Hours       [facility] -- WOUND TEAM -- FOLLOW UP NOTE  --------------------------------------------------------------------------------    24 hour events/subjective:  No acute events overnight. Patient to undergo EGD later. Continues to have back pain with dressing changes and movement. Patient reports poor appetite, that nutrition shakes have given him diarrhea in the past.      Diet:  Diet, NPO:   Except Medications (03-04-24 @ 11:26)        ALLERGIES & MEDICATIONS  --------------------------------------------------------------------------------  No Known Allergies        STANDING INPATIENT MEDICATIONS  acetaminophen   IVPB .. 1000 milliGRAM(s) IV Intermittent once  atorvastatin 40 milliGRAM(s) Oral at bedtime  busPIRone 10 milliGRAM(s) Oral three times a day  chlorhexidine 2% Cloths 1 Application(s) Topical daily  dextrose 5%. 1000 milliLiter(s) IV Continuous <Continuous>  dextrose 5%. 1000 milliLiter(s) IV Continuous <Continuous>  dextrose 50% Injectable 25 Gram(s) IV Push once  dextrose 50% Injectable 12.5 Gram(s) IV Push once  dextrose 50% Injectable 25 Gram(s) IV Push once  finasteride 5 milliGRAM(s) Oral daily  folic acid 1 milliGRAM(s) Oral daily  glucagon  Injectable 1 milliGRAM(s) IntraMuscular once  HYDROmorphone  Injectable 0.5 milliGRAM(s) IV Push once  insulin lispro (ADMELOG) corrective regimen sliding scale   SubCutaneous three times a day before meals  insulin lispro (ADMELOG) corrective regimen sliding scale   SubCutaneous at bedtime  mirtazapine 7.5 milliGRAM(s) Oral at bedtime  multivitamin 1 Tablet(s) Oral daily  nystatin Cream 1 Application(s) Topical two times a day  pantoprazole  Injectable 40 milliGRAM(s) IV Push two times a day  senna 2 Tablet(s) Oral at bedtime  tamsulosin 0.4 milliGRAM(s) Oral at bedtime  vitamin B complex with vitamin C 1 Tablet(s) Oral daily      PRN INPATIENT MEDICATION  acetaminophen     Tablet .. 650 milliGRAM(s) Oral every 6 hours PRN  aluminum hydroxide/magnesium hydroxide/simethicone Suspension 30 milliLiter(s) Oral every 4 hours PRN  dextrose Oral Gel 15 Gram(s) Oral once PRN  melatonin 3 milliGRAM(s) Oral at bedtime PRN  ondansetron Injectable 4 milliGRAM(s) IV Push every 8 hours PRN        VITALS/PHYSICAL EXAM  --------------------------------------------------------------------------------  T(C): 36.7 (03-04-24 @ 06:00), Max: 36.7 (03-03-24 @ 17:30)  HR: 102 (03-04-24 @ 08:41) (91 - 104)  BP: 107/76 (03-04-24 @ 08:41) (93/56 - 107/76)  RR: 18 (03-04-24 @ 08:41) (18 - 18)  SpO2: 99% (03-04-24 @ 08:41) (98% - 100%)  Wt(kg): --  Height (cm): 188 (03-04-24 @ 10:19)  Weight (kg): 76 (03-04-24 @ 10:19)  BMI (kg/m2): 21.5 (03-04-24 @ 10:19)  BSA (m2): 2.02 (03-04-24 @ 10:19)    General: Elderly man supine in bed, NAD  Respiratory: nonlabored w/ equal chest rise  Gastrointestinal: soft NT/ND  Neurology:  A&Ox3, no gross deficits  Psych: calm, appropriate  Musculoskeletal: leg length discrepancy; right leg shorter than left  Vascular: BLE equally warm, bilateral DP pulses palpable  Skin:  BLE xerosis.  Lumbar spine/back wound 21.5x9x5.5cm, undermining from 1 to 7oclock max 6.7cm at 5oclock. Small amount of eschar at 11oclock position. Wound bed pink granulation tissue with areas of exposed bone and muscle. Tender to palpation. No purulence, no erythema, no odor.  Sacral wound with tan eschar 5x9x0.2cm. Wound does not connect to lumbar spine wound, though there is minimal tissue  the undermining of the lumbar wound from the sacral wound.        03-03-24 @ 07:01  -  03-04-24 @ 07:00  --------------------------------------------------------  IN: 236 mL / OUT: 630 mL / NET: -394 mL            LABS/ CULTURES/ RADIOLOGY:              7.8    14.29 >-----------<  112      [03-04-24 @ 05:19]              24.5     140  |  113  |  34  ----------------------------<  80      [03-04-24 @ 05:19]  3.8   |  16  |  1.04        Ca     7.8     [03-04-24 @ 05:19]      Mg     2.00     [03-04-24 @ 05:19]      Phos  3.1     [03-04-24 @ 05:19]    TPro  5.1  /  Alb  1.9  /  TBili  0.4  /  DBili  x   /  AST  17  /  ALT  11  /  AlkPhos  187  [03-04-24 @ 05:19]    PT/INR: PT 17.5 , INR 1.57       [03-04-24 @ 05:19]  PTT: 27.0       [03-04-24 @ 05:19]      CAPILLARY BLOOD GLUCOSE      POCT Blood Glucose.: 106 mg/dL (04 Mar 2024 12:01)  POCT Blood Glucose.: 88 mg/dL (04 Mar 2024 06:08)  POCT Blood Glucose.: 134 mg/dL (03 Mar 2024 22:37)  POCT Blood Glucose.: 178 mg/dL (03 Mar 2024 21:33)  POCT Blood Glucose.: 152 mg/dL (03 Mar 2024 18:00)        Culture - Blood (collected 02-29-24 @ 23:11)  Source: .Blood Blood-Peripheral  Preliminary Report (03-04-24 @ 03:01):    No growth at 72 Hours

## 2024-03-04 NOTE — PROGRESS NOTE ADULT - PROBLEM SELECTOR PLAN 7
s/p PCI, stents. ECG w/o c/f ischemia    - c/w statin s/p PCI, stents. ECG w/o c/f ischemia  - c/w statin s/p PCI, stents. ECG w/o c/f ischemia  - c/w home statin

## 2024-03-04 NOTE — PROGRESS NOTE ADULT - PROBLEM SELECTOR PLAN 3
Chronic sacral and back decubiti ulcer c/b osteomyelitis. Unclear if organism identified, but was discharged from [facility] with 6 weeks of Augmentin/Flagyl due to end 3/4/24.    - monitor for systemic infectious signs  - trend WBC, fever curve  - f/u BCx  - will hold off on IV antibiotics at this time; reevaluate need  - obtain records from [facility] regarding any bone cultures or workup for osteomyelitis  - wound care consult Chronic sacral and back decubitus ulcer c/b prior hx of osteomyelitis. Unclear if organism identified, but was discharged from [facility] with 6 weeks of Augmentin/Flagyl due to end 3/4/24.  -wound care consulted, appreciate recs  - monitor for systemic infectious signs  - trend WBC, fever curve  - BCx negative Chronic sacral and back decubitus ulcer c/b prior hx of osteomyelitis. Unclear if organism identified, but was discharged from [facility] with 6 weeks of Augmentin/Flagyl due to end 3/4/24.  -wound care consulted, appreciate recs  - monitor for systemic infectious signs  - trend WBC, fever curve  - BCx negative  - wound vac placed 3/4

## 2024-03-04 NOTE — PRE-OP CHECKLIST - ANTIBIOTIC
Encounter Date: 2023       History     Chief Complaint   Patient presents with    Vascular Access Problem     Noticed blood on dressing after dialysis run today; pressure dressing applied; bleeding controlled; +2 pulse noted     Patient is chronic renal failure dialysis patient patient states he was finishing up dialysis and after they took the access out he started to bleed.  Controlled with direct pressure Ace wrap was placed and patient was brought here.  No numbness or tingling no history previous bleeding states area was not red or swollen prior.  Access is been there for years      Review of patient's allergies indicates:   Allergen Reactions    Iodine      Other reaction(s): difficulty breathing, Facial Swelling    Iodine and iodide containing products Swelling    Shellfish containing products      Other reaction(s): Swelling     Past Medical History:   Diagnosis Date    Anemia     BPH (benign prostatic hyperplasia)     Cervical radiculopathy 2022    Disorder of kidney and ureter     ESRD on hemodialysis 2018    Hypertension     Neck injury 1994    Other chronic pain 2018    Personal history of colonic polyps     Renovascular hypertension 2018    Vitamin D deficiency 2022     Past Surgical History:   Procedure Laterality Date    AV FISTULA PLACEMENT      COLONOSCOPY W/ POLYPECTOMY  2019    MASS EXCISION  2005    near the kidney     NECK SURGERY       Family History   Problem Relation Age of Onset    Heart disease Mother     Diabetes Mother     Hypertension Mother     Cancer Father     Prostate cancer Father     Hypertension Father     Cancer Sister     Breast cancer Sister     No Known Problems Sister      Social History     Tobacco Use    Smoking status: Former     Packs/day: 0.25     Years: 20.00     Pack years: 5.00     Types: Cigarettes     Quit date: 2016     Years since quittin.7    Smokeless tobacco: Never   Substance Use Topics    Alcohol use: No    
Drug use: Not Currently     Types: Marijuana     Review of Systems   Constitutional:  Negative for fever.   HENT:  Negative for sore throat.    Cardiovascular:  Negative for chest pain.   Genitourinary:  Negative for dysuria.   Neurological:  Negative for weakness.   Hematological:  Does not bruise/bleed easily.     Physical Exam     Initial Vitals [06/16/23 1043]   BP Pulse Resp Temp SpO2   (!) 175/105 80 18 97.9 °F (36.6 °C) 98 %      MAP       --         Physical Exam    Nursing note and vitals reviewed.  Constitutional: He appears well-developed and well-nourished.   Thin frail gentleman appears older than stated age but also appear not acutely ill.   HENT:   Head: Normocephalic and atraumatic.   Eyes: EOM are normal. Pupils are equal, round, and reactive to light.   Neck:   Normal range of motion.  Cardiovascular:  Normal rate, regular rhythm, normal heart sounds and intact distal pulses.           Pulmonary/Chest: Breath sounds normal.   Abdominal: Abdomen is soft. Bowel sounds are normal.   Musculoskeletal:         General: Normal range of motion.      Cervical back: Normal range of motion.      Comments: Old left upper arm AV graft no thrill no bruit bandage right upper arm AV graft snug Ace wrap with edema posteriorly weak in 1+ radial pulse    1 undressed 2 puncture wounds without active bleeding but with evidence of fresh blood     Neurological: He is alert and oriented to person, place, and time. He has normal strength. GCS score is 15. GCS eye subscore is 4. GCS verbal subscore is 5. GCS motor subscore is 6.   Skin: Skin is warm and dry. Capillary refill takes less than 2 seconds.   Psychiatric: He has a normal mood and affect. Judgment normal.       ED Course   Lac Repair    Date/Time: 6/16/2023 3:38 PM  Performed by: North Moser III, MD  Authorized by: North Moser III, MD     Consent:     Consent obtained:  Verbal    Consent given by:  Patient    Risks, benefits, and alternatives were 
discussed: yes      Risks discussed:  Poor cosmetic result and need for additional repair  Universal protocol:     Patient identity confirmed:  Verbally with patient  Anesthesia:     Anesthesia method:  None  Exploration:     Limited defect created (wound extended): no      Hemostasis achieved with:  Direct pressure  Treatment:     Area cleansed with:  Povidone-iodine    Amount of cleaning:  Standard    Irrigation solution:  Sterile saline    Irrigation method:  Syringe    Visualized foreign bodies/material removed: no      Debridement:  None  Skin repair:     Repair method:  Tissue adhesive  Repair type:     Repair type:  Simple  Post-procedure details:     Dressing:  Open (no dressing)    Procedure completion:  Tolerated well, no immediate complications  Labs Reviewed   BASIC METABOLIC PANEL - Abnormal; Notable for the following components:       Result Value    Creatinine 3.98 (*)     Calcium Level Total 8.5 (*)     All other components within normal limits   CBC WITH DIFFERENTIAL - Abnormal; Notable for the following components:    WBC 2.80 (*)     RBC 3.10 (*)     Hgb 10.1 (*)     Hct 30.4 (*)     MCV 98.1 (*)     MCH 32.6 (*)     Lymph # 0.51 (*)     Neut # 1.86 (*)     All other components within normal limits   PROTIME-INR - Normal   CBC W/ AUTO DIFFERENTIAL    Narrative:     The following orders were created for panel order CBC auto differential.  Procedure                               Abnormality         Status                     ---------                               -----------         ------                     CBC with Differential[[id number]]        Abnormal            Final result                 Please view results for these tests on the individual orders.          Imaging Results    None          Medications   cloNIDine tablet 0.1 mg (0.1 mg Oral Given 6/16/23 1300)     Medical Decision Making:   Initial Assessment:   The Ace bandage was loosened slightly will reassess for bleed  Clinical Tests: 
  Lab Tests: Ordered and Reviewed  ED Management:  Resting comfortably patient bleeding well-controlled will discharge                        Clinical Impression:   Final diagnoses:  [Z78.9] Problem with vascular access (Primary)        ED Disposition Condition    Discharge Stable          ED Prescriptions    None       Follow-up Information       Follow up With Specialties Details Why Contact Info    Elo Flores MD Family Medicine In 3 days  [address]  [address]  [place] [address]  [phone]               North Moser III, MD  06/16/23 9386    
no (get order)
n/a

## 2024-03-04 NOTE — ADVANCED PRACTICE NURSE CONSULT - RECOMMEDATIONS
Continue with Wound MD topical recommendations.    Please contact Wound/Ostomy Care Service Line if we can be of further assistance (ext [phone]).

## 2024-03-04 NOTE — ADVANCED PRACTICE NURSE CONSULT - ASSESSMENT
General: A&Ox4, able to turn with 1x assistance, incontinent of urine and stool. Skin warm, dry with increased moisture in intertriginous folds, scattered areas of hyperpigmentation and hypopigmentation, scattered areas of ecchymosis without hematoma on bilateral upper extremities.     Lumbar spine: See A&I flowsheet for assessment details.     Sacrum: See A&I flowsheet for assessment details.

## 2024-03-05 NOTE — PROGRESS NOTE ADULT - ASSESSMENT
83M from [facility] bladder ca, CAD s/p stents, HTN, T2DM, LYUBOV, GERD, BPH, depression/anxiety, stage 4 decubiti c/b osteomyelitis on Flagyl/Augmentin until 3/4/24, AAA s/p graft/stent, recent UGIB (1/2024) iso corticosteroids, prior reports of EV, Fermin's esophagitis, substernal goiter, s/p b/l hip replacements p/w hypotension iso anemia Hgb 4s. course complicated by LLE DVT.

## 2024-03-05 NOTE — PROGRESS NOTE ADULT - ATTENDING COMMENTS
83M from [facility] w bladder ca, CAD s/p stents, HTN, T2DM, LYUBOV, GERD, BPH, stage 4 decubiti c/b osteomyelitis on Flagyl/Augmentin until 3/4/24, AAA s/p graft/stent, recent UGIB (1/2024) iso corticosteroids, prior reports of EV, Fermin's esophagitis, substernal goiter, s/p b/l hip replacements a/w hypotension iso severe anemia and also found to have LLE DVT. S/p 2U PRBCs last on 3/1. Pt also met SIRS criteria on admission w/ leukocytosis and tachycardia however w/o any evidence of new localized infection. Now s/p EGD by GI on 3/4 that showed gastropathy, two non-bleeding duodenal ulcers with pigmented material (Likely etiology of recent blood loss) and A few non-bleeding angiodysplastic lesions in the duodenum.                                                                                              -monitor H/H closely   -Per GI will switch IV PPI to PO protonix 40mg BID. Pt should have repeat EGD in 4-6 weeks to assess ulcer healing. Per GI no CI to a/c for patients LE DVT.  -For pts LE DVT. Discussed with patient findings of endoscopy. Had risk benefit conversation with patient and his cousin at the bedside who pt gave permission to speak with. Discussed risk/complications if DVT not treated. Also discussed bleeding risk if a/c restarted. Based on risk/benefit and collaborative decision with patient plan is to start a/c and monitor patient. If remains stable pt to de dcd on a/c.   -For wound c/w wound care. Wound Vac placed. Pt s/p course of abx.     Dispo- Dc planning if pt remains stable on a/c . PT rec [facility]

## 2024-03-05 NOTE — PROGRESS NOTE ADULT - PROBLEM SELECTOR PLAN 12
DVT PPX: hold SCDs iso DVT, hold AC iso bleed  diet: cc  Electrolytes: Replete K > 4, Mg > 2, Phos > 3  Code Status: FULL CODE  Dispo: pending clinical improvement, likely return to [facility] if able DVT PPX: eliquis  diet: cc  Electrolytes: Replete K > 4, Mg > 2, Phos > 3  Code Status: FULL CODE  Dispo: pending clinical improvement, likely return to [facility] if able

## 2024-03-05 NOTE — PROGRESS NOTE ADULT - ASSESSMENT
83M from [facility] with Fermin's esophagus, bladder ca, CAD s/p stents, HTN, T2DM, LYUBOV (on oral iron supplementation), GERD, BPH, depression/anxiety, stage 4 decubiti c/b osteomyelitis on Flagyl/Augmentin until 3/4/24, AAA s/p graft/stent, rectal polyps, substernal goiter, s/p b/l hip replacements, recent admission (1/2023) at [facility] for hypernatremia, rhabdo iso prolonged fall, COVID treated with remdesivir/decadron c/b UGIB with ?endoscopy who is admitted with acute blood loss anemia and leukocytosis.     #Acute blood loss anemia  #Black stools  #Duodenal ulcers  #Small bowel angiectasias   #H/o iron deficiency anemia, on oral iron supplements  #Fermin's esophagus  #Leukocytosis, improving   #Sacral decubitus ulceration, osteomyelitis   #Acute DVT, not on A/C  Patient with anemia to 4.5 g/dL from baseline ~9 in January with reports of intermittent black stools, initially hypotensive however responded appropriately to IVF resuscitation. He was transfused 2 units pRBC with adequate improvement in hgb to 6.5 g/dL and then >8.  BUN/Cr 74/1.67. Iron studies in January suggestive of anemia of chronic inflammation with ferritin > 500 however i/s/o acute infection and possible iron supplementation. CT A/P demonstrates findings suggestive of duodenal ulceration with active duodenitis. Anemia is likely multifactorial with a potential component of upper GI bleeding due to peptic ulcer disease. Less likely colonic bleeding in the absence of hematochezia and negative prior colonoscopies.  Patient on H2RA only prior to admission.   EGD (3/4): two non-bleeding duodenal ulcerations up to 20 mm with pigmented material, gastropathy, few non-bleeding angiectasias in the duodenum. Biopsied for H pylori.     Endoscopic reports found in outpatient record:  EGD (2016): Colten's esophagus, questionable EV, questionable gastric polyp  Colonoscopy (2016): internal hemorrhoids, four rectal polyps  EGD (2010): Fermin's, gastritis, hiatal hernia  Colonoscopy (2010): internal hemorrhoids  Capsule endoscopy (2010): non-bleeding jejunal ulceration   p/w hypotension iso anemia Hgb 4s. Per [facility] documentation, pt was recently admitted (1/2023) at [facility] for COVID, hypernatremia, rhabdo iso prolonged fall, treated with remdesivir/decadron c/b UGIB; unclear if endoscopy was performed. TTE performed there showed mild LVH with EF 43%. At [facility], pt was found confused, hypoxic, tachycardic, with recent labs showing Hgb 4.6; no active bleeding, melena, hematochezia was documented.      Recommendations:  - Advance diet as tolerated  - PO PPI BID   - Monitor for recurrent bleeding  - Iron supplementation as indicated   - Trend CBC, maintain active T&S and transfuse to goal hgb > 8 g/dL   - No GI contraindication to anticoagulation if deemed medically necessary for management of DVT   - Suggest repeat EGD in 4-6 weeks to assess ulcer healing.    Follow up in Gastroenterology Clinic: [phone] ([facility] at [address] [place] [address]) or [phone] ([facility] at [address] [place] [address]) or [phone] ([facility] at [address] [place] [address])  or [place] Office: [phone] ([address] [place] [address]. [address] [place], [place], [address])    Singh Rizo MD  Gastroenterology/Hepatology Fellow  Available via Microsoft Teams  Pager: [phone]    NON-URGENT CONSULTS:  Please email [email or web address]  [email or web address]  AT NIGHT AND ON WEEKENDS:  Contact on-call GI fellow via answering service ([phone]) from 5pm-8am and on weekends/holidays  MONDAY-FRIDAY 8AM-5PM:  Pager# [phone] ([facility]) or [phone] ([facility]) 83M from [facility] with Fermin's esophagus, bladder ca, CAD s/p stents, HTN, T2DM, LYUBOV (on oral iron supplementation), GERD, BPH, depression/anxiety, stage 4 decubiti c/b osteomyelitis on Flagyl/Augmentin until 3/4/24, AAA s/p graft/stent, rectal polyps, substernal goiter, s/p b/l hip replacements, recent admission (1/2023) at [facility] for hypernatremia, rhabdo iso prolonged fall, COVID treated with remdesivir/decadron c/b UGIB with ?endoscopy who is admitted with acute blood loss anemia and leukocytosis.     #Acute blood loss anemia  #Black stools  #Duodenal ulcers  #Small bowel angiectasias   #H/o iron deficiency anemia, on oral iron supplements  #Fermin's esophagus  #Leukocytosis, improving   #Sacral decubitus ulceration, osteomyelitis   #Acute DVT, not on A/C  Patient with anemia to 4.5 g/dL from baseline ~9 in January with reports of intermittent black stools, initially hypotensive however responded appropriately to IVF resuscitation. He was transfused 2 units pRBC with adequate improvement in hgb to 6.5 g/dL and then >8.  BUN/Cr 74/1.67. Iron studies in January suggestive of anemia of chronic inflammation with ferritin > 500 however i/s/o acute infection and possible iron supplementation. CT A/P demonstrates findings suggestive of duodenal ulceration with active duodenitis. Anemia is likely multifactorial with a potential component of upper GI bleeding due to peptic ulcer disease. Less likely colonic bleeding in the absence of hematochezia and negative prior colonoscopies.  Patient on H2RA only prior to admission.   EGD (3/4): two non-bleeding duodenal ulcerations up to 20 mm with pigmented material, gastropathy, few non-bleeding angiectasias in the duodenum. Biopsied for H pylori.     Endoscopic reports found in outpatient record:  EGD (2016): Fermin's esophagus, questionable EV, questionable gastric polyp  Colonoscopy (2016): internal hemorrhoids, four rectal polyps  EGD (2010): Fermin's, gastritis, hiatal hernia  Colonoscopy (2010): internal hemorrhoids  Capsule endoscopy (2010): non-bleeding jejunal ulceration   p/w hypotension iso anemia Hgb 4s. Per [facility] documentation, pt was recently admitted (1/2023) at [facility] for COVID, hypernatremia, rhabdo iso prolonged fall, treated with remdesivir/decadron c/b UGIB; unclear if endoscopy was performed. TTE performed there showed mild LVH with EF 43%. At [facility], pt was found confused, hypoxic, tachycardic, with recent labs showing Hgb 4.6; no active bleeding, melena, hematochezia was documented.      Recommendations:  - Advance diet as tolerated  - PO PPI BID   - Monitor for recurrent bleeding  - Iron supplementation as indicated   - Trend CBC, maintain active T&S and transfuse to goal hgb > 8 g/dL   - No GI contraindication to anticoagulation if deemed medically necessary for management of DVT   - Suggest repeat EGD in 4-6 weeks to assess ulcer healing.    Follow up in Gastroenterology Clinic: [phone] ([facility]ulty [facility] at [address] [place] [address]) or [phone] ([facility] at [address] [place] [address]) or [phone] ([facility] at [address] [place] [address])  or [place] Office: [phone] ([address] [place] [address]. [place], [address])    Singh Rizo MD  Gastroenterology/Hepatology Fellow  Available via Microsoft Teams  Pager: [phone]    NON-URGENT CONSULTS:  Please email [email or web address]  [email or web address]  AT NIGHT AND ON WEEKENDS:  Contact on-call GI fellow via answering service ([phone]) from 5pm-8am and on weekends/holidays  MONDAY-FRIDAY 8AM-5PM:  Pager# [phone] ([facility]) or [phone] ([facility])

## 2024-03-05 NOTE — PROGRESS NOTE ADULT - PROBLEM SELECTOR PLAN 4
Acute L prox great saphenous vein extending into common femoral DVT. Nonocclusive on DVT study.  - monitor off AC iso bleed  - unlikely candidate but can consider IVC filter Acute L prox great saphenous vein extending into common femoral DVT. Nonocclusive on DVT study.  - will initiate treatment with eliquis 10mg bid for 1 week, followed by 5mg bid   - unlikely candidate but can consider IVC filter

## 2024-03-05 NOTE — PROGRESS NOTE ADULT - SUBJECTIVE AND OBJECTIVE BOX
Interval Events:   s/p EGD   No acute events overnight. Patient feeling well without abd pain, N/V or overt bleeding. He c/o lower back pain related to sacral ulcer.   Vitals stable. Abd soft, NT.     Hospital Medications:  acetaminophen     Tablet .. 650 milliGRAM(s) Oral every 6 hours PRN  acetaminophen   IVPB .. 1000 milliGRAM(s) IV Intermittent once  aluminum hydroxide/magnesium hydroxide/simethicone Suspension 30 milliLiter(s) Oral every 4 hours PRN  atorvastatin 40 milliGRAM(s) Oral at bedtime  busPIRone 10 milliGRAM(s) Oral three times a day  chlorhexidine 2% Cloths 1 Application(s) Topical daily  dextrose 5%. 1000 milliLiter(s) IV Continuous <Continuous>  dextrose 5%. 1000 milliLiter(s) IV Continuous <Continuous>  dextrose 50% Injectable 25 Gram(s) IV Push once  dextrose 50% Injectable 12.5 Gram(s) IV Push once  dextrose 50% Injectable 25 Gram(s) IV Push once  dextrose Oral Gel 15 Gram(s) Oral once PRN  finasteride 5 milliGRAM(s) Oral daily  folic acid 1 milliGRAM(s) Oral daily  glucagon  Injectable 1 milliGRAM(s) IntraMuscular once  HYDROmorphone  Injectable 0.5 milliGRAM(s) IV Push every 6 hours PRN  insulin lispro (ADMELOG) corrective regimen sliding scale   SubCutaneous three times a day before meals  insulin lispro (ADMELOG) corrective regimen sliding scale   SubCutaneous at bedtime  melatonin 3 milliGRAM(s) Oral at bedtime PRN  mirtazapine 7.5 milliGRAM(s) Oral at bedtime  multivitamin 1 Tablet(s) Oral daily  nystatin Cream 1 Application(s) Topical two times a day  ondansetron Injectable 4 milliGRAM(s) IV Push every 8 hours PRN  oxycodone    5 mG/acetaminophen 325 mG 1 Tablet(s) Oral every 6 hours PRN  pantoprazole    Tablet 40 milliGRAM(s) Oral two times a day  senna 2 Tablet(s) Oral at bedtime  tamsulosin 0.4 milliGRAM(s) Oral at bedtime  vitamin B complex with vitamin C 1 Tablet(s) Oral daily      ROS: See above.    PHYSICAL EXAM:   Vital Signs:  Vital Signs Last 24 Hrs  T(C): 36.5 (05 Mar 2024 04:51), Max: 36.7 (04 Mar 2024 13:55)  T(F): 97.7 (05 Mar 2024 04:51), Max: 98.1 (05 Mar 2024 00:42)  HR: 102 (05 Mar 2024 04:51) (90 - 110)  BP: 97/66 (05 Mar 2024 04:51) (88/56 - 109/72)  BP(mean): --  RR: 17 (05 Mar 2024 04:51) (12 - 20)  SpO2: 100% (05 Mar 2024 04:51) (98% - 100%)    Parameters below as of 05 Mar 2024 04:51  Patient On (Oxygen Delivery Method): room air      Daily Height in cm: 188 (04 Mar 2024 10:19)    Daily     GENERAL:  NAD, resting comfortably in bed  HEENT:  sclera anicteric  CHEST:  Normal Effort  HEART:  HDS  ABDOMEN:  Soft, non-tender, non-distended  SKIN:  Warm & Dry. No jaundice  NEURO:  Alert, conversant, no focal deficit    LABS:                        7.8    14.29 )-----------( 112      ( 04 Mar 2024 05:19 )             24.5       03-05    140  |  114<H>  |  30<H>  ----------------------------<  57<L>  3.9   |  15<L>  |  1.02    Ca    8.0<L>      05 Mar 2024 05:45  Phos  3.5     03-05  Mg     1.80     03-05    TPro  5.1<L>  /  Alb  1.9<L>  /  TBili  0.4  /  DBili  x   /  AST  17  /  ALT  11  /  AlkPhos  187<H>  03-04    LIVER FUNCTIONS - ( 04 Mar 2024 05:19 )  Alb: 1.9 g/dL / Pro: 5.1 g/dL / ALK PHOS: 187 U/L / ALT: 11 U/L / AST: 17 U/L / GGT: x           PT/INR - ( 04 Mar 2024 05:19 )   PT: 17.5 sec;   INR: 1.57 ratio         PTT - ( 04 Mar 2024 05:19 )  PTT:27.0 sec  Urinalysis Basic - ( 05 Mar 2024 05:45 )    Color: x / Appearance: x / SG: x / pH: x  Gluc: 57 mg/dL / Ketone: x  / Bili: x / Urobili: x   Blood: x / Protein: x / Nitrite: x   Leuk Esterase: x / RBC: x / WBC x   Sq Epi: x / Non Sq Epi: x / Bacteria: x                              7.8    14.29 )-----------( 112      ( 04 Mar 2024 05:19 )             24.5                         8.2    14.90 )-----------( 120      ( 03 Mar 2024 05:55 )             26.0       < from: Upper Endoscopy (03.04.24 @ 15:59) >  Impression:          - Small hiatal hernia.                       - Gastropathy. Biopsied.    - Two non-bleeding duodenal ulcers with pigmented                        material. Likely etiology of recent blood loss and                        corresponding to recent CT findings.                       - A few non-bleeding angiodysplastic lesions in the                        duodenum. Potential contributing factor to anemia,                        though less likely etiology of overt bleeding.  Recommendation:      - Return patient to hospital stringer for ongoing care.                       -Advance diet as tolerated.                       - PPI 40mg BID.                       - Suggest repeat EGD in 4-6 weeks to assess ulcer                        healing.                       - Iron supplementation as indicated.    < end of copied text >   Interval Events:   s/p EGD   No acute events overnight. Patient feeling well without abd pain, N/V or overt bleeding. He c/o lower back pain related to sacral ulcer.     Hospital Medications:  acetaminophen     Tablet .. 650 milliGRAM(s) Oral every 6 hours PRN  acetaminophen   IVPB .. 1000 milliGRAM(s) IV Intermittent once  aluminum hydroxide/magnesium hydroxide/simethicone Suspension 30 milliLiter(s) Oral every 4 hours PRN  atorvastatin 40 milliGRAM(s) Oral at bedtime  busPIRone 10 milliGRAM(s) Oral three times a day  chlorhexidine 2% Cloths 1 Application(s) Topical daily  dextrose 5%. 1000 milliLiter(s) IV Continuous <Continuous>  dextrose 5%. 1000 milliLiter(s) IV Continuous <Continuous>  dextrose 50% Injectable 25 Gram(s) IV Push once  dextrose 50% Injectable 12.5 Gram(s) IV Push once  dextrose 50% Injectable 25 Gram(s) IV Push once  dextrose Oral Gel 15 Gram(s) Oral once PRN  finasteride 5 milliGRAM(s) Oral daily  folic acid 1 milliGRAM(s) Oral daily  glucagon  Injectable 1 milliGRAM(s) IntraMuscular once  HYDROmorphone  Injectable 0.5 milliGRAM(s) IV Push every 6 hours PRN  insulin lispro (ADMELOG) corrective regimen sliding scale   SubCutaneous three times a day before meals  insulin lispro (ADMELOG) corrective regimen sliding scale   SubCutaneous at bedtime  melatonin 3 milliGRAM(s) Oral at bedtime PRN  mirtazapine 7.5 milliGRAM(s) Oral at bedtime  multivitamin 1 Tablet(s) Oral daily  nystatin Cream 1 Application(s) Topical two times a day  ondansetron Injectable 4 milliGRAM(s) IV Push every 8 hours PRN  oxycodone    5 mG/acetaminophen 325 mG 1 Tablet(s) Oral every 6 hours PRN  pantoprazole    Tablet 40 milliGRAM(s) Oral two times a day  senna 2 Tablet(s) Oral at bedtime  tamsulosin 0.4 milliGRAM(s) Oral at bedtime  vitamin B complex with vitamin C 1 Tablet(s) Oral daily      ROS: See above.    PHYSICAL EXAM:   Vital Signs:  Vital Signs Last 24 Hrs  T(C): 36.5 (05 Mar 2024 04:51), Max: 36.7 (04 Mar 2024 13:55)  T(F): 97.7 (05 Mar 2024 04:51), Max: 98.1 (05 Mar 2024 00:42)  HR: 102 (05 Mar 2024 04:51) (90 - 110)  BP: 97/66 (05 Mar 2024 04:51) (88/56 - 109/72)  BP(mean): --  RR: 17 (05 Mar 2024 04:51) (12 - 20)  SpO2: 100% (05 Mar 2024 04:51) (98% - 100%)    Parameters below as of 05 Mar 2024 04:51  Patient On (Oxygen Delivery Method): room air      Daily Height in cm: 188 (04 Mar 2024 10:19)    Daily     GENERAL:  NAD, resting comfortably in bed  HEENT:  sclera anicteric  CHEST:  Normal Effort  HEART:  HDS  ABDOMEN:  Soft, non-tender, non-distended  SKIN:  Warm & Dry. No jaundice  NEURO:  Alert, conversant, no focal deficit    LABS:                        7.8    14.29 )-----------( 112      ( 04 Mar 2024 05:19 )             24.5       03-05    140  |  114<H>  |  30<H>  ----------------------------<  57<L>  3.9   |  15<L>  |  1.02    Ca    8.0<L>      05 Mar 2024 05:45  Phos  3.5     03-05  Mg     1.80     03-05    TPro  5.1<L>  /  Alb  1.9<L>  /  TBili  0.4  /  DBili  x   /  AST  17  /  ALT  11  /  AlkPhos  187<H>  03-04    LIVER FUNCTIONS - ( 04 Mar 2024 05:19 )  Alb: 1.9 g/dL / Pro: 5.1 g/dL / ALK PHOS: 187 U/L / ALT: 11 U/L / AST: 17 U/L / GGT: x           PT/INR - ( 04 Mar 2024 05:19 )   PT: 17.5 sec;   INR: 1.57 ratio         PTT - ( 04 Mar 2024 05:19 )  PTT:27.0 sec  Urinalysis Basic - ( 05 Mar 2024 05:45 )    Color: x / Appearance: x / SG: x / pH: x  Gluc: 57 mg/dL / Ketone: x  / Bili: x / Urobili: x   Blood: x / Protein: x / Nitrite: x   Leuk Esterase: x / RBC: x / WBC x   Sq Epi: x / Non Sq Epi: x / Bacteria: x                              7.8    14.29 )-----------( 112      ( 04 Mar 2024 05:19 )             24.5                         8.2    14.90 )-----------( 120      ( 03 Mar 2024 05:55 )             26.0       < from: Upper Endoscopy (03.04.24 @ 15:59) >  Impression:          - Small hiatal hernia.                       - Gastropathy. Biopsied.    - Two non-bleeding duodenal ulcers with pigmented                        material. Likely etiology of recent blood loss and                        corresponding to recent CT findings.                       - A few non-bleeding angiodysplastic lesions in the                        duodenum. Potential contributing factor to anemia,                        though less likely etiology of overt bleeding.  Recommendation:      - Return patient to hospital stringer for ongoing care.                       -Advance diet as tolerated.                       - PPI 40mg BID.                       - Suggest repeat EGD in 4-6 weeks to assess ulcer                        healing.                       - Iron supplementation as indicated.    < end of copied text >

## 2024-03-05 NOTE — PROGRESS NOTE ADULT - PROBLEM SELECTOR PLAN 2
Hgb 4.5 on admission improved after 2u pRBC. c/b positive antibody screen. 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 Given documented history of recent UGIB, mostly likely source PUD from prior corticosteroid use.  -ct a+P showing peptic ulcer disease and duodenitis  -gi consulted, appreciate recs  -endoscopy 3/4 demonstrating mild gastropathy, nonbleeding duodenal ulcers and angiodysplastic lesions.   -c/w ppi bid, will require repeat scope in 4-6 weeks as outpatient Hgb 4.5 on admission improved after 2u pRBC. c/b positive antibody screen. 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 Given documented history of recent UGIB, mostly likely source PUD from prior corticosteroid use.  -ct a+P showing peptic ulcer disease and duodenitis  -gi consulted, appreciate recs  -endoscopy 3/4 demonstrating mild gastropathy, nonbleeding duodenal ulcers and angiodysplastic lesions.   -c/w ppi bid, will require repeat scope in 4-6 weeks as outpatient  -per GI ok to initiate AC

## 2024-03-05 NOTE — PROGRESS NOTE ADULT - ATTENDING COMMENTS
S/p EGD yesterday with evidence of non-bleeding duodenal ulcers as well as non-bleeding duodenal angiodysplasias.     # Worsening anemia, history of LYUBOV on iron supplementation, dark stools, CT c/f PUD s/p EGD 3/5 with duodenal ulcers and non-bleeding duodenal angiodysplasias: Suspect bleeding likely secondary to duodenal ulcers, but may have concomitant blood loss from slow, intermittent blood loss from small bowel angiodysplasias  # CAD s/p PCI ?not currently on anti-thrombotic agents  # Fermin's esophagus  # Stage 4 decubiti c/b osteomyelitis on Flagyl/Augmentin until 3/4/24  # AAA s/p graft/stent, rectal polyps  # Acute DVT, not on A/C    --High dose PPI  --Diet as tolerated  --Follow up path from recent EGD  --Suggest repeat EGD in 4-6 weeks to assess ulcer healing  --Iron supplementation as indicated  --Can trial A/C if clinically indicated  --Monitor for recurrent overt bleeding    Additional recommendations as above.

## 2024-03-05 NOTE — PROGRESS NOTE ADULT - PROBLEM SELECTOR PLAN 3
Chronic sacral and back decubitus ulcer c/b prior hx of osteomyelitis. Unclear if organism identified, but was discharged from [facility] with 6 weeks of Augmentin/Flagyl due to end 3/4/24.  -wound care consulted, appreciate recs  - monitor for systemic infectious signs  - trend WBC, fever curve  - BCx negative  - wound vac placed 3/4

## 2024-03-05 NOTE — PROGRESS NOTE ADULT - SUBJECTIVE AND OBJECTIVE BOX
Wing Vallejo MD  PGY 1 Department of Internal Medicine        Patient is a 83y old  Male who presents with a chief complaint of Anemia, Hypotension (04 Mar 2024 13:38)      SUBJECTIVE / OVERNIGHT EVENTS: Pt seen and examined. No acute overnight events. Denies fevers, chills, CP, SOB, Abdominal pain, N/V, Constipation, Diarrhea        MEDICATIONS  (STANDING):  acetaminophen   IVPB .. 1000 milliGRAM(s) IV Intermittent once  atorvastatin 40 milliGRAM(s) Oral at bedtime  busPIRone 10 milliGRAM(s) Oral three times a day  chlorhexidine 2% Cloths 1 Application(s) Topical daily  dextrose 5%. 1000 milliLiter(s) (50 mL/Hr) IV Continuous <Continuous>  dextrose 5%. 1000 milliLiter(s) (100 mL/Hr) IV Continuous <Continuous>  dextrose 50% Injectable 12.5 Gram(s) IV Push once  dextrose 50% Injectable 25 Gram(s) IV Push once  dextrose 50% Injectable 25 Gram(s) IV Push once  finasteride 5 milliGRAM(s) Oral daily  folic acid 1 milliGRAM(s) Oral daily  glucagon  Injectable 1 milliGRAM(s) IntraMuscular once  insulin lispro (ADMELOG) corrective regimen sliding scale   SubCutaneous three times a day before meals  insulin lispro (ADMELOG) corrective regimen sliding scale   SubCutaneous at bedtime  mirtazapine 7.5 milliGRAM(s) Oral at bedtime  multivitamin 1 Tablet(s) Oral daily  nystatin Cream 1 Application(s) Topical two times a day  pantoprazole    Tablet 40 milliGRAM(s) Oral two times a day  senna 2 Tablet(s) Oral at bedtime  tamsulosin 0.4 milliGRAM(s) Oral at bedtime  vitamin B complex with vitamin C 1 Tablet(s) Oral daily    MEDICATIONS  (PRN):  acetaminophen     Tablet .. 650 milliGRAM(s) Oral every 6 hours PRN Temp greater or equal to 38C (100.4F), Mild Pain (1 - 3)  aluminum hydroxide/magnesium hydroxide/simethicone Suspension 30 milliLiter(s) Oral every 4 hours PRN Dyspepsia  dextrose Oral Gel 15 Gram(s) Oral once PRN Blood Glucose LESS THAN 70 milliGRAM(s)/deciliter  HYDROmorphone  Injectable 0.5 milliGRAM(s) IV Push every 6 hours PRN Severe Pain (7 - 10)  melatonin 3 milliGRAM(s) Oral at bedtime PRN Insomnia  ondansetron Injectable 4 milliGRAM(s) IV Push every 8 hours PRN Nausea and/or Vomiting  oxycodone    5 mG/acetaminophen 325 mG 1 Tablet(s) Oral every 6 hours PRN Moderate Pain (4 - 6)      I&O's Summary    04 Mar 2024 07:01  -  05 Mar 2024 07:00  --------------------------------------------------------  IN: 0 mL / OUT: 625 mL / NET: -625 mL        Vital Signs Last 24 Hrs  T(C): 36.5 (05 Mar 2024 04:51), Max: 36.7 (04 Mar 2024 13:55)  T(F): 97.7 (05 Mar 2024 04:51), Max: 98.1 (05 Mar 2024 00:42)  HR: 102 (05 Mar 2024 04:51) (90 - 110)  BP: 97/66 (05 Mar 2024 04:51) (88/56 - 109/72)  BP(mean): --  RR: 17 (05 Mar 2024 04:51) (12 - 20)  SpO2: 100% (05 Mar 2024 04:51) (98% - 100%)    Parameters below as of 05 Mar 2024 04:51  Patient On (Oxygen Delivery Method): room air        CAPILLARY BLOOD GLUCOSE      POCT Blood Glucose.: 88 mg/dL (04 Mar 2024 22:41)  POCT Blood Glucose.: 118 mg/dL (04 Mar 2024 18:07)  POCT Blood Glucose.: 99 mg/dL (04 Mar 2024 16:07)  POCT Blood Glucose.: 106 mg/dL (04 Mar 2024 12:01)      PHYSICAL EXAM:  HEAD:  Atraumatic, Normocephalic  EYES: EOMI, conjunctiva and sclera clear  CHEST/LUNG: Clear to auscultation bilaterally; No rales, rhonchi, wheezing, or rubs  HEART: Regular rate and rhythm; No murmurs, rubs, or gallops  ABDOMEN: Soft, Nontender, Nondistended;   SKIN: sacral ulcer with wound vac  NERVOUS SYSTEM:  Alert & Oriented X3, no focal deficits       LABS:                        7.8    14.29 )-----------( 112      ( 04 Mar 2024 05:19 )             24.5     Auto Eosinophil # x     / Auto Eosinophil % x     / Auto Neutrophil # x     / Auto Neutrophil % x     / BANDS % x        03-04    140  |  113<H>  |  34<H>  ----------------------------<  80  3.8   |  16<L>  |  1.04    Ca    7.8<L>      04 Mar 2024 05:19  Mg     2.00     03-04  Phos  3.1     03-04  TPro  5.1<L>  /  Alb  1.9<L>  /  TBili  0.4  /  DBili  x   /  AST  17  /  ALT  11  /  AlkPhos  187<H>  03-04    PT/INR - ( 04 Mar 2024 05:19 )   PT: 17.5 sec;   INR: 1.57 ratio         PTT - ( 04 Mar 2024 05:19 )  PTT:27.0 sec      Urinalysis Basic - ( 04 Mar 2024 05:19 )    Color: x / Appearance: x / SG: x / pH: x  Gluc: 80 mg/dL / Ketone: x  / Bili: x / Urobili: x   Blood: x / Protein: x / Nitrite: x   Leuk Esterase: x / RBC: x / WBC x   Sq Epi: x / Non Sq Epi: x / Bacteria: x            RADIOLOGY & ADDITIONAL TESTS:    Imaging Personally Reviewed:    Consultant(s) Notes Reviewed:      Care Discussed with Consultants/Other Providers:   Wing Vallejo MD  PGY 1 Department of Internal Medicine        Patient is a 83y old  Male who presents with a chief complaint of Anemia, Hypotension (04 Mar 2024 13:38)      SUBJECTIVE / OVERNIGHT EVENTS: Pt seen and examined. No acute overnight events. Complains of pain at sacral wound site improved from yesterday. Otherwise has no complaints.       MEDICATIONS  (STANDING):  acetaminophen   IVPB .. 1000 milliGRAM(s) IV Intermittent once  atorvastatin 40 milliGRAM(s) Oral at bedtime  busPIRone 10 milliGRAM(s) Oral three times a day  chlorhexidine 2% Cloths 1 Application(s) Topical daily  dextrose 5%. 1000 milliLiter(s) (50 mL/Hr) IV Continuous <Continuous>  dextrose 5%. 1000 milliLiter(s) (100 mL/Hr) IV Continuous <Continuous>  dextrose 50% Injectable 12.5 Gram(s) IV Push once  dextrose 50% Injectable 25 Gram(s) IV Push once  dextrose 50% Injectable 25 Gram(s) IV Push once  finasteride 5 milliGRAM(s) Oral daily  folic acid 1 milliGRAM(s) Oral daily  glucagon  Injectable 1 milliGRAM(s) IntraMuscular once  insulin lispro (ADMELOG) corrective regimen sliding scale   SubCutaneous three times a day before meals  insulin lispro (ADMELOG) corrective regimen sliding scale   SubCutaneous at bedtime  mirtazapine 7.5 milliGRAM(s) Oral at bedtime  multivitamin 1 Tablet(s) Oral daily  nystatin Cream 1 Application(s) Topical two times a day  pantoprazole    Tablet 40 milliGRAM(s) Oral two times a day  senna 2 Tablet(s) Oral at bedtime  tamsulosin 0.4 milliGRAM(s) Oral at bedtime  vitamin B complex with vitamin C 1 Tablet(s) Oral daily    MEDICATIONS  (PRN):  acetaminophen     Tablet .. 650 milliGRAM(s) Oral every 6 hours PRN Temp greater or equal to 38C (100.4F), Mild Pain (1 - 3)  aluminum hydroxide/magnesium hydroxide/simethicone Suspension 30 milliLiter(s) Oral every 4 hours PRN Dyspepsia  dextrose Oral Gel 15 Gram(s) Oral once PRN Blood Glucose LESS THAN 70 milliGRAM(s)/deciliter  HYDROmorphone  Injectable 0.5 milliGRAM(s) IV Push every 6 hours PRN Severe Pain (7 - 10)  melatonin 3 milliGRAM(s) Oral at bedtime PRN Insomnia  ondansetron Injectable 4 milliGRAM(s) IV Push every 8 hours PRN Nausea and/or Vomiting  oxycodone    5 mG/acetaminophen 325 mG 1 Tablet(s) Oral every 6 hours PRN Moderate Pain (4 - 6)      I&O's Summary    04 Mar 2024 07:01  -  05 Mar 2024 07:00  --------------------------------------------------------  IN: 0 mL / OUT: 625 mL / NET: -625 mL        Vital Signs Last 24 Hrs  T(C): 36.5 (05 Mar 2024 04:51), Max: 36.7 (04 Mar 2024 13:55)  T(F): 97.7 (05 Mar 2024 04:51), Max: 98.1 (05 Mar 2024 00:42)  HR: 102 (05 Mar 2024 04:51) (90 - 110)  BP: 97/66 (05 Mar 2024 04:51) (88/56 - 109/72)  BP(mean): --  RR: 17 (05 Mar 2024 04:51) (12 - 20)  SpO2: 100% (05 Mar 2024 04:51) (98% - 100%)    Parameters below as of 05 Mar 2024 04:51  Patient On (Oxygen Delivery Method): room air        CAPILLARY BLOOD GLUCOSE      POCT Blood Glucose.: 88 mg/dL (04 Mar 2024 22:41)  POCT Blood Glucose.: 118 mg/dL (04 Mar 2024 18:07)  POCT Blood Glucose.: 99 mg/dL (04 Mar 2024 16:07)  POCT Blood Glucose.: 106 mg/dL (04 Mar 2024 12:01)      PHYSICAL EXAM:  HEAD:  Atraumatic, Normocephalic  EYES: EOMI, conjunctiva and sclera clear  CHEST/LUNG: Clear to auscultation bilaterally; No rales, rhonchi, wheezing, or rubs  HEART: Regular rate and rhythm; No murmurs, rubs, or gallops  ABDOMEN: Soft, Nontender, Nondistended;   SKIN: sacral ulcer with wound vac  NERVOUS SYSTEM:  Alert & Oriented X3, no focal deficits       LABS:                        7.8    14.29 )-----------( 112      ( 04 Mar 2024 05:19 )             24.5     Auto Eosinophil # x     / Auto Eosinophil % x     / Auto Neutrophil # x     / Auto Neutrophil % x     / BANDS % x        03-04    140  |  113<H>  |  34<H>  ----------------------------<  80  3.8   |  16<L>  |  1.04    Ca    7.8<L>      04 Mar 2024 05:19  Mg     2.00     03-04  Phos  3.1     03-04  TPro  5.1<L>  /  Alb  1.9<L>  /  TBili  0.4  /  DBili  x   /  AST  17  /  ALT  11  /  AlkPhos  187<H>  03-04    PT/INR - ( 04 Mar 2024 05:19 )   PT: 17.5 sec;   INR: 1.57 ratio         PTT - ( 04 Mar 2024 05:19 )  PTT:27.0 sec      Urinalysis Basic - ( 04 Mar 2024 05:19 )    Color: x / Appearance: x / SG: x / pH: x  Gluc: 80 mg/dL / Ketone: x  / Bili: x / Urobili: x   Blood: x / Protein: x / Nitrite: x   Leuk Esterase: x / RBC: x / WBC x   Sq Epi: x / Non Sq Epi: x / Bacteria: x            RADIOLOGY & ADDITIONAL TESTS:    Imaging Personally Reviewed:    Consultant(s) Notes Reviewed:      Care Discussed with Consultants/Other Providers:

## 2024-03-05 NOTE — PROGRESS NOTE ADULT - PROBLEM SELECTOR PLAN 1
Pt presented hypotensive, tachycardic, Hgb 4 iso presumed UGIB, stabilized after 2u pRBC. Met SIRS criteria in ED, possible source of osteomyelitis given hx however was treated at prior hospitalization  -trend cbc  -monitor vitals, fluids prn   -infectious workup including BCx, MRSA, CXR negative

## 2024-03-06 NOTE — PROGRESS NOTE ADULT - PROBLEM SELECTOR PLAN 2
Hgb 4.5 on admission improved after 2u pRBC. c/b positive antibody screen. 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 Given documented history of recent UGIB, mostly likely source PUD from prior corticosteroid use.  -ct a+P showing peptic ulcer disease and duodenitis  -gi consulted, appreciate recs  -endoscopy 3/4 demonstrating mild gastropathy, nonbleeding duodenal ulcers and angiodysplastic lesions.   -c/w ppi bid, will require repeat scope in 4-6 weeks as outpatient  -per GI ok to initiate AC Acute L prox great saphenous vein extending into common femoral DVT. Nonocclusive on DVT study 3/1/24.  - c/w eliquis 10mg bid for 1 week (3/5-3/11), followed by 5mg bid   - unlikely candidate but can consider IVC filter

## 2024-03-06 NOTE — PROGRESS NOTE ADULT - PROBLEM SELECTOR PLAN 11
- c/w home mirtazapine, buspirone

## 2024-03-06 NOTE — PROGRESS NOTE ADULT - SUBJECTIVE AND OBJECTIVE BOX
Wing Vallejo MD  PGY 1 Department of Internal Medicine        Patient is a 83y old  Male who presents with a chief complaint of Anemia, Hypotension (05 Mar 2024 09:34)      SUBJECTIVE / OVERNIGHT EVENTS: Pt seen and examined. No acute overnight events. Complains of pain at sacral wound site improved from yesterday. Otherwise has no complaints.         MEDICATIONS  (STANDING):  acetaminophen   IVPB .. 1000 milliGRAM(s) IV Intermittent once  apixaban 10 milliGRAM(s) Oral every 12 hours  atorvastatin 40 milliGRAM(s) Oral at bedtime  busPIRone 10 milliGRAM(s) Oral three times a day  chlorhexidine 2% Cloths 1 Application(s) Topical daily  dextrose 5%. 1000 milliLiter(s) (100 mL/Hr) IV Continuous <Continuous>  dextrose 5%. 1000 milliLiter(s) (50 mL/Hr) IV Continuous <Continuous>  dextrose 50% Injectable 25 Gram(s) IV Push once  dextrose 50% Injectable 25 Gram(s) IV Push once  dextrose 50% Injectable 12.5 Gram(s) IV Push once  finasteride 5 milliGRAM(s) Oral daily  folic acid 1 milliGRAM(s) Oral daily  glucagon  Injectable 1 milliGRAM(s) IntraMuscular once  insulin lispro (ADMELOG) corrective regimen sliding scale   SubCutaneous three times a day before meals  insulin lispro (ADMELOG) corrective regimen sliding scale   SubCutaneous at bedtime  mirtazapine 7.5 milliGRAM(s) Oral at bedtime  multivitamin 1 Tablet(s) Oral daily  nystatin Cream 1 Application(s) Topical two times a day  pantoprazole    Tablet 40 milliGRAM(s) Oral two times a day  senna 2 Tablet(s) Oral at bedtime  tamsulosin 0.4 milliGRAM(s) Oral at bedtime  vitamin B complex with vitamin C 1 Tablet(s) Oral daily    MEDICATIONS  (PRN):  acetaminophen     Tablet .. 650 milliGRAM(s) Oral every 6 hours PRN Temp greater or equal to 38C (100.4F), Mild Pain (1 - 3)  aluminum hydroxide/magnesium hydroxide/simethicone Suspension 30 milliLiter(s) Oral every 4 hours PRN Dyspepsia  dextrose Oral Gel 15 Gram(s) Oral once PRN Blood Glucose LESS THAN 70 milliGRAM(s)/deciliter  HYDROmorphone  Injectable 0.5 milliGRAM(s) IV Push every 6 hours PRN Severe Pain (7 - 10)  melatonin 3 milliGRAM(s) Oral at bedtime PRN Insomnia  ondansetron Injectable 4 milliGRAM(s) IV Push every 8 hours PRN Nausea and/or Vomiting  oxycodone    5 mG/acetaminophen 325 mG 1 Tablet(s) Oral every 6 hours PRN Moderate Pain (4 - 6)      I&O's Summary      Vital Signs Last 24 Hrs  T(C): 36.8 (06 Mar 2024 05:08), Max: 37.4 (05 Mar 2024 22:45)  T(F): 98.2 (06 Mar 2024 05:08), Max: 99.3 (05 Mar 2024 22:45)  HR: 99 (06 Mar 2024 05:08) (99 - 104)  BP: 101/69 (06 Mar 2024 05:08) (101/69 - 109/74)  BP(mean): --  RR: 18 (06 Mar 2024 05:08) (17 - 18)  SpO2: 100% (06 Mar 2024 05:08) (99% - 100%)    Parameters below as of 06 Mar 2024 05:08  Patient On (Oxygen Delivery Method): room air        CAPILLARY BLOOD GLUCOSE      POCT Blood Glucose.: 162 mg/dL (05 Mar 2024 22:30)  POCT Blood Glucose.: 107 mg/dL (05 Mar 2024 17:55)  POCT Blood Glucose.: 113 mg/dL (05 Mar 2024 12:12)  POCT Blood Glucose.: 71 mg/dL (05 Mar 2024 08:58)  POCT Blood Glucose.: 66 mg/dL (05 Mar 2024 08:55)      PHYSICAL EXAM:  HEAD:  Atraumatic, Normocephalic  EYES: EOMI, conjunctiva and sclera clear  CHEST/LUNG: Clear to auscultation bilaterally; No rales, rhonchi, wheezing, or rubs  HEART: Regular rate and rhythm; No murmurs, rubs, or gallops  ABDOMEN: Soft, Nontender, Nondistended;   SKIN: sacral ulcer with wound vac  NERVOUS SYSTEM:  Alert & Oriented X3, no focal deficits       LABS:                        8.2    16.70 )-----------( 125      ( 05 Mar 2024 15:48 )             25.6     Auto Eosinophil # x     / Auto Eosinophil % x     / Auto Neutrophil # x     / Auto Neutrophil % x     / BANDS % x        03-05    140  |  114<H>  |  30<H>  ----------------------------<  57<L>  3.9   |  15<L>  |  1.02    Ca    8.0<L>      05 Mar 2024 05:45  Mg     1.80     03-05  Phos  3.5     03-05          Urinalysis Basic - ( 05 Mar 2024 05:45 )    Color: x / Appearance: x / SG: x / pH: x  Gluc: 57 mg/dL / Ketone: x  / Bili: x / Urobili: x   Blood: x / Protein: x / Nitrite: x   Leuk Esterase: x / RBC: x / WBC x   Sq Epi: x / Non Sq Epi: x / Bacteria: x            RADIOLOGY & ADDITIONAL TESTS:    Imaging Personally Reviewed:    Consultant(s) Notes Reviewed:      Care Discussed with Consultants/Other Providers:

## 2024-03-06 NOTE — PROGRESS NOTE ADULT - ATTENDING COMMENTS
83M from [facility] w bladder ca, CAD s/p stents, HTN, T2DM, LYUBOV, GERD, BPH, stage 4 decubiti c/b osteomyelitis on Flagyl/Augmentin until 3/4/24, AAA s/p graft/stent, recent UGIB (1/2024) iso corticosteroids, prior reports of EV, Fermin's esophagitis, substernal goiter, s/p b/l hip replacements a/w hypotension iso severe anemia and also found to have LLE DVT. S/p 2U PRBCs last on 3/1. Pt also met SIRS criteria on admission w/ leukocytosis and tachycardia however w/o any evidence of new localized infection. Now s/p EGD by GI on 3/4 that showed gastropathy, two non-bleeding duodenal ulcers with pigmented material (Likely etiology of recent blood loss) and A few non-bleeding angiodysplastic lesions in the duodenum.    Pt HD stable. Denies any bleeding                                                                             -monitor H/H closely. Overall has been stable  -C/w protonix 40mg BID. Pt should have repeat EGD in 4-6 weeks to assess ulcer healing. Per GI no CI to a/c for patients LE DVT.  -For pts LE DVT. Discussed with patient findings of endoscopy and risk benefit conversation (see note from 3/5). Pt now stable on eliquis.   -For wound c/w wound care. Wound Vac placed. Pt s/p course of abx.     Dispo- Dc planning. Pt optimized for discharge. PT rec LAURITA,. Will f/u SW/CM daily in re to progress

## 2024-03-06 NOTE — PROGRESS NOTE ADULT - PROBLEM SELECTOR PLAN 4
Acute L prox great saphenous vein extending into common femoral DVT. Nonocclusive on DVT study.  - c/w eliquis 10mg bid for 1 week (3/5-3/11), followed by 5mg bid   - unlikely candidate but can consider IVC filter Acute L prox great saphenous vein extending into common femoral DVT. Nonocclusive on DVT study 3/1/24.  - c/w eliquis 10mg bid for 1 week (3/5-3/11), followed by 5mg bid   - unlikely candidate but can consider IVC filter - hold home losartan, toprol iso hypovolemic shock; resume prn

## 2024-03-06 NOTE — CHART NOTE - NSCHARTNOTEFT_GEN_A_CORE
Pt seen for malnutrition follow up.    Medical Course:  - Per chart, pt is 83 year old male PMH bladder cancer, CAD s/p stents, HTN, type 2 DM, LYUBOV, GERD, BPH, depression/anxiety, stage 4 decubitus ulcer complicated by OM, AAA s/p graft/stent, recent UGIB (1/2024) in setting of corticosteroids, Fermin's esophagitis, substernal goiter, s/p bilateral hip replacements presenting from rehab with hypotension in setting of anemia with course complicated by LLE DVT. CT with PUD, duodenitis. S/p EGD (3/4) demonstrating mild gastropathy, nonbleeding duodenal ulcers and angiodysplastic lesions. GI and Wound Care on board.     Nutrition Course:  - Last BM 3/4 per flowsheets, fecal incontinence noted. Ordered for senna 2 tablets qHS.   - Labs notable for hypophosphatemia, repleted, and hypoglycemia.     Diet Prescription:   - Consistent Carbohydrate/No Snacks  - Ensure Max 1 PO 2x daily     Pertinent Medications:   - atorvastatin, folic acid, ADMELOG corrective regimen sliding scale, mirtazapine, multivitamin, pantoprazole Tablet, potassium phosphate IV, senna, vitamin B complex with vitamin C tablet, aluminum hydroxide/magnesium hydroxide/simethicone Suspension PRN, ondansetron IV PRN    Pertinent Labs:   - (3/6) Na 139 mmol/L Glu 92 mg/dL K+ 3.5 mmol/L Cr 0.99 mg/dL BUN 26 mg/dL<H> Phos 2.2 mg/dL<L>  - POCT: (3/6) 101, (3/5)      Food Allergy:  - NKFA    Weight: (3/4 dosing) 167.5 lbs / 76 kg, (3/1 dosing) 167.5 lbs / 76 kg  Height: 74 in / 187.96 cm  IBW: 190 lbs / 86.4 kg +/-10%  BMI: 21.5 kg/m^2    Physical Assessment:  Edema, per flowsheets: 1+ bilateral foot  Pressure Injury, per wound care consult (3/1): lumbar spine stage 4, sacral spine unstageable    Estimated Needs:   [X] No change since previous assessment, based on dosing weight 167.5 lbs / 76 kg  1080-5576 kcal daily @28-32 kcal/kg, 91.2-114.0 gm protein daily @1.2-1.5 gm/kg     Previous Nutrition Diagnosis: [X] Increased nutrient needs, ongoing [X] Severe malnutrition in the context of chronic illness, ongoing   New Nutrition Diagnosis: [X] not applicable     Education:  [ ] Provided  [ ] Provided on previous assessment by RD  [ ] Not applicable secondary to   [ ] Pt refused     Interventions:   1)   2)  3)     Monitor & Evaluate:  PO intake, tolerance to diet/supplement, nutrition related lab values, weight trends, BMs/GI distress, hydration status, skin integrity.  Mandi Garcia, ABDONN, CDN #[id number]  Also available on [employer] Teams Pt seen for malnutrition follow up.    Medical Course:  - Per chart, pt is 83 year old male PMH bladder cancer, CAD s/p stents, HTN, type 2 DM, LYUBOV, GERD, BPH, depression/anxiety, stage 4 decubitus ulcer complicated by OM, AAA s/p graft/stent, recent UGIB (1/2024) in setting of corticosteroids, Fermin's esophagitis, substernal goiter, s/p bilateral hip replacements presenting from rehab with hypotension in setting of anemia with course complicated by LLE DVT. CT with PUD, duodenitis. S/p EGD (3/4) demonstrating mild gastropathy, nonbleeding duodenal ulcers and angiodysplastic lesions. GI and Wound Care on board.     Nutrition Course:  - Pt reports ongoing poor appetite/PO intake, experiencing taste alterations and finds food to taste like "leather". Pt amenable to provision of fresh lemon slices and [employer]ibley seasoning packets. Pt is not consuming Ensure shakes, reports diarrhea. Pt amenable to trial of ice cream and Liquid Protein Supplement. Last BM 3/4 per flowsheets, fecal incontinence noted. Ordered for senna 2 tablets qHS. Labs notable for hypophosphatemia, repleted, and hypoglycemia.     Diet Prescription:   - Consistent Carbohydrate/No Snacks  - Ensure Max 1 PO 2x daily     Pertinent Medications:   - atorvastatin, folic acid, ADMELOG corrective regimen sliding scale, mirtazapine, multivitamin, pantoprazole Tablet, potassium phosphate IV, senna, vitamin B complex with vitamin C tablet, aluminum hydroxide/magnesium hydroxide/simethicone Suspension PRN, ondansetron IV PRN    Pertinent Labs:   - (3/6) Na 139 mmol/L Glu 92 mg/dL K+ 3.5 mmol/L Cr 0.99 mg/dL BUN 26 mg/dL<H> Phos 2.2 mg/dL<L>  - POCT: (3/6) 101, (3/5)      Food Allergy:  - NKFA    Weight: (3/4 dosing) 167.5 lbs / 76 kg, (3/1 dosing) 167.5 lbs / 76 kg  Height: 74 in / 187.96 cm  IBW: 190 lbs / 86.4 kg +/-10%  BMI: 21.5 kg/m^2    Physical Assessment:  Edema, per flowsheets: 1+ bilateral foot  Pressure Injury, per wound care consult (3/1): lumbar spine stage 4, sacral spine unstageable (wound vac placed 3/4)    Estimated Needs:   [X] No change since previous assessment, based on dosing weight 167.5 lbs / 76 kg  5769-5202 kcal daily @28-32 kcal/kg, 91.2-114.0 gm protein daily @1.2-1.5 gm/kg     Previous Nutrition Diagnosis: [X] Increased nutrient needs, ongoing [X] Severe malnutrition in the context of chronic illness, ongoing   New Nutrition Diagnosis: [X] not applicable     Education:  [X] Reviewed alternative nutrition supplements and methods of consumption.      Interventions:   1) Recommend continue consistent carbohydrate diet.   2) Recommend addition of Liquid Protein Supplement 1 PO 2x daily (provides 100 kcal, 15 gm protein per 30 mL serving). May d/c Ensure Max.    3) RDN to provide Magic Cup Reduced Sugar 1 PO 2x daily (provides 280 kcal, 9 gm protein per 4oz serving). Obtained food preferences, will honor as able.   4) Recommend continue micronutrient supplementation.  5) Monitor electrolytes (K+, Mg, P) and replete to within desired limits as clinically indicated.   6) Obtain weekly weights.     Monitor & Evaluate:  PO intake, tolerance to diet/supplement, nutrition related lab values, weight trends, BMs/GI distress, hydration status, skin integrity.  Mandi Garcia RDN, CDN #[id number]  Also available on [employer] Teams

## 2024-03-06 NOTE — PROGRESS NOTE ADULT - PROBLEM SELECTOR PLAN 1
Pt presented hypotensive, tachycardic, Hgb 4 iso presumed UGIB, stabilized after 2u pRBC. Met SIRS criteria in ED, possible source of osteomyelitis given hx however was treated at prior hospitalization  -trend cbc  -monitor vitals, fluids prn   -infectious workup including BCx, MRSA, CXR negative Chronic sacral and back decubitus ulcer c/b prior hx of osteomyelitis. Unclear if organism identified, but was discharged from [facility] with 6 weeks of Augmentin/Flagyl due to end 3/4/24.  -wound care consulted, appreciate recs  - monitor for systemic infectious signs  - trend WBC, fever curve  - BCx negative  - wound vac placed 3/4

## 2024-03-06 NOTE — PROGRESS NOTE ADULT - PROBLEM SELECTOR PLAN 5
Likely prerenal iso hypovolemia, creatinine 1.67 on admission now improving. Baseline unclear, ~0.9 in 2009  -trend bmp  -avoid nephrotoxic agents Home regimen metformin 1g qd, tradjenta 5mg qd  - monitor FS  -c/w low ISS  -consistent carb diet

## 2024-03-06 NOTE — PROGRESS NOTE ADULT - ASSESSMENT
83M from [facility] bladder ca, CAD s/p stents, HTN, T2DM, LYUBOV, GERD, BPH, depression/anxiety, stage 4 decubiti c/b osteomyelitis on Flagyl/Augmentin until 3/4/24, AAA s/p graft/stent, recent UGIB (1/2024) iso corticosteroids, prior reports of EV, Fermin's esophagitis, substernal goiter, s/p b/l hip replacements p/w hypotension iso anemia Hgb 4s. course complicated by LLE DVT.

## 2024-03-06 NOTE — PROGRESS NOTE ADULT - PROBLEM SELECTOR PLAN 3
Chronic sacral and back decubitus ulcer c/b prior hx of osteomyelitis. Unclear if organism identified, but was discharged from [facility] with 6 weeks of Augmentin/Flagyl due to end 3/4/24.  -wound care consulted, appreciate recs  - monitor for systemic infectious signs  - trend WBC, fever curve  - BCx negative  - wound vac placed 3/4 s/p PCI, stents. ECG w/o c/f ischemia  - c/w home statin

## 2024-03-06 NOTE — PROGRESS NOTE ADULT - PROBLEM SELECTOR PLAN 6
Unclear staging, diagnosis, treatment plan, outpatient provider.   - try to obtain outpt records - c/w home finasteride, tamsulosin

## 2024-03-06 NOTE — PROGRESS NOTE ADULT - PROBLEM SELECTOR PLAN 8
- hold home losartan, toprol iso hypovolemic shock; resume prn DVT PPX: eliquis  diet: cc  Electrolytes: Replete K > 4, Mg > 2, Phos > 3  Code Status: FULL CODE  Dispo: pending clinical improvement, likely return to [facility] if able

## 2024-03-06 NOTE — PROGRESS NOTE ADULT - PROBLEM SELECTOR PLAN 12
DVT PPX: eliquis  diet: cc  Electrolytes: Replete K > 4, Mg > 2, Phos > 3  Code Status: FULL CODE  Dispo: pending clinical improvement, likely return to [facility] if able

## 2024-03-07 NOTE — PROGRESS NOTE ADULT - PROBLEM SELECTOR PLAN 6
- c/w home finasteride, tamsulosin Home regimen metformin 1g qd, tradjenta 5mg qd  - monitor FS  -c/w low ISS  -consistent carb diet

## 2024-03-07 NOTE — PROGRESS NOTE ADULT - PROBLEM SELECTOR PLAN 2
Acute L prox great saphenous vein extending into common femoral DVT. Nonocclusive on DVT study 3/1/24.  - c/w eliquis 10mg bid for 1 week (3/5-3/11), followed by 5mg bid   - unlikely candidate but can consider IVC filter Chronic sacral and back decubitus ulcer c/b prior hx of osteomyelitis. Unclear if organism identified, but was discharged from [facility] with 6 weeks of Augmentin/Flagyl due to end 3/4/24.  -wound care consulted, appreciate recs  - monitor for systemic infectious signs  - trend WBC, fever curve  - BCx negative  - wound vac placed 3/4

## 2024-03-07 NOTE — PROGRESS NOTE ADULT - ATTENDING COMMENTS
83M from [facility] w bladder ca, CAD s/p stents, HTN, T2DM, LYUBOV, GERD, BPH, stage 4 decubiti c/b osteomyelitis on Flagyl/Augmentin until 3/4/24, AAA s/p graft/stent, recent UGIB (1/2024) iso corticosteroids, prior reports of EV, Fermin's esophagitis, substernal goiter, s/p b/l hip replacements a/w hypotension iso severe anemia and also found to have LLE DVT. S/p 2U PRBCs last on 3/1. Pt also met SIRS criteria on admission w/ leukocytosis and tachycardia however w/o any evidence of new localized infection. Now s/p EGD by GI on 3/4 that showed gastropathy, two non-bleeding duodenal ulcers with pigmented material (Likely etiology of recent blood loss) and A few non-bleeding angiodysplastic lesions in the duodenum.    Pt remains HD stable. Denies any bleeding                                                                             -monitor H/H closely. Overall has been stable  -C/w protonix 40mg BID. Pt should have repeat EGD in 4-6 weeks to assess ulcer healing. Per GI no CI to a/c for patients LE DVT.  -For pts LE DVT. Discussed with patient findings of endoscopy and risk benefit conversation (see note from 3/5). Pt now stable on eliquis.   -For wound c/w wound care. Wound Vac placed. Pt s/p course of abx.     Dispo- Dc planning. Pt optimized for discharge. PT rec [facility],. Will f/u SW/CM daily in re to progress 83M from [facility] w bladder ca, CAD s/p stents, HTN, T2DM, LYUBOV, GERD, BPH, stage 4 decubiti c/b osteomyelitis on Flagyl/Augmentin until 3/4/24, AAA s/p graft/stent, recent UGIB (1/2024) iso corticosteroids, prior reports of EV, Fermin's esophagitis, substernal goiter, s/p b/l hip replacements a/w hypotension iso severe anemia and also found to have LLE DVT. S/p 2U PRBCs last on 3/1. Pt also met SIRS criteria on admission w/ leukocytosis and tachycardia however w/o any evidence of new localized infection. Now s/p EGD by GI on 3/4 that showed gastropathy, two non-bleeding duodenal ulcers with pigmented material (Likely etiology of recent blood loss) and A few non-bleeding angiodysplastic lesions in the duodenum.    Pt remains HD stable. Denies any bleeding                                                                             -Monitor H/H closely. Overall has been stable.   -Pt also w/ noted thrombocytopenia. Likely was multifactorial in the setting of recent GIB, antibiotics, AOCD etc. Levels are improving. Will continue to monitor   -C/w protonix 40mg BID. Pt should have repeat EGD in 4-6 weeks to assess ulcer healing. Per GI no CI to a/c for patients LE DVT.  -For pts LE DVT. Discussed with patient findings of endoscopy and risk benefit conversation (see note from 3/5). Pt now stable on eliquis.   -For wound c/w wound care. Wound Vac placed. Pt s/p course of abx.     Dispo- Dc planning. Pt optimized for discharge. PT [facility],. Will f/u SW/CM daily in re to progress

## 2024-03-07 NOTE — PROGRESS NOTE ADULT - ASSESSMENT
83M from [facility] bladder ca, CAD s/p stents, HTN, T2DM, LYUBOV, GERD, BPH, depression/anxiety, stage 4 decubiti c/b osteomyelitis on Flagyl/Augmentin until 3/4/24, AAA s/p graft/stent, recent UGIB (1/2024) iso corticosteroids, prior reports of EV, Fermin's esophagitis, substernal goiter, s/p b/l hip replacements p/w hypotension iso anemia Hgb 4s. course complicated by LLE DVT.

## 2024-03-07 NOTE — PROGRESS NOTE ADULT - PROBLEM SELECTOR PLAN 3
s/p PCI, stents. ECG w/o c/f ischemia  - c/w home statin Acute L prox great saphenous vein extending into common femoral DVT. Nonocclusive on DVT study 3/1/24.  - c/w eliquis 10mg bid for 1 week (3/5-3/11), followed by 5mg bid   - unlikely candidate but can consider IVC filter

## 2024-03-07 NOTE — PROGRESS NOTE ADULT - PROBLEM SELECTOR PLAN 1
Chronic sacral and back decubitus ulcer c/b prior hx of osteomyelitis. Unclear if organism identified, but was discharged from [facility] with 6 weeks of Augmentin/Flagyl due to end 3/4/24.  -wound care consulted, appreciate recs  - monitor for systemic infectious signs  - trend WBC, fever curve  - BCx negative  - wound vac placed 3/4 Likely in the setting of recent UGIB and iron losses. Now s/p EGD that showed -------    Plan----      Thrombocytopenia - Pt also w/ thrombocytopenia likely 2/2 ----  Plan---

## 2024-03-07 NOTE — PROGRESS NOTE ADULT - PROBLEM SELECTOR PLAN 5
Home regimen metformin 1g qd, tradjenta 5mg qd  - monitor FS  -c/w low ISS  -consistent carb diet - hold home losartan, toprol iso hypovolemic shock; resume prn

## 2024-03-07 NOTE — PROGRESS NOTE ADULT - PROBLEM SELECTOR PLAN 8
DVT PPX: eliquis  diet: cc  Electrolytes: Replete K > 4, Mg > 2, Phos > 3  Code Status: FULL CODE  Dispo: pending clinical improvement, likely return to [facility] if able DVT PPX: eliquis  diet: cc  Electrolytes: Replete K > 4, Mg > 2, Phos > 3  Code Status: FULL CODE  Dispo: medically cleared, likely return to [facility], pending auth - c/w home mirtazapine, buspirone

## 2024-03-07 NOTE — PROGRESS NOTE ADULT - PROBLEM SELECTOR PLAN 9
[Formula ___ oz/feed] : [unfilled] oz of formula per feed [Fruit] : fruit [Vegetables] : vegetables [Egg] : egg [Fish] : fish [Meat] : meat [Cereal] : cereal DVT PPX: eliquis  diet: cc  Electrolytes: Replete K > 4, Mg > 2, Phos > 3  Code Status: FULL CODE  Dispo: medically cleared, likely return to [facility], pending auth [___ stools per day] : [unfilled]  stools per day [Pacifier use] : Pacifier use [Sippy cup use] : Sippy cup use [No] : Not at  exposure [Water heater temperature set at <120 degrees F] : Water heater temperature set at <120 degrees F [Rear facing car seat in back seat] : Rear facing car seat in back seat [Infant walker] : Infant walker [Carbon Monoxide Detectors] : Carbon monoxide detectors [Smoke Detectors] : Smoke detectors [Up to date] : Up to date [Exposure to electronic nicotine delivery system] : No exposure to electronic nicotine delivery system [At risk for exposure to lead] : Not at risk for exposure to lead  [At risk for exposure to TB] : Not at risk for exposure to Tuberculosis  [de-identified] : 3 meals per day.dairy products.  [FreeTextEntry7] : 3 crises so far with dactylitis of the fingers and this past week the foot and hand was swollen. It is treated by tylenol. [Gun in Home] : No gun in home [FreeTextEntry3] : sleeps in the bed with parents.

## 2024-03-07 NOTE — PROGRESS NOTE ADULT - PROBLEM SELECTOR PLAN 4
- hold home losartan, toprol iso hypovolemic shock; resume prn s/p PCI, stents. ECG w/o c/f ischemia  - c/w home statin

## 2024-03-07 NOTE — PROGRESS NOTE ADULT - SUBJECTIVE AND OBJECTIVE BOX
Wing Vallejo MD  PGY 1 Department of Internal Medicine        Patient is a 83y old  Male who presents with a chief complaint of Anemia, Hypotension (06 Mar 2024 07:29)      SUBJECTIVE / OVERNIGHT EVENTS: Pt seen and examined. No acute overnight events. Complains of pain at sacral wound site further improved from yesterday. Otherwise has no complaints.         MEDICATIONS  (STANDING):  acetaminophen   IVPB .. 1000 milliGRAM(s) IV Intermittent once  apixaban 10 milliGRAM(s) Oral every 12 hours  atorvastatin 40 milliGRAM(s) Oral at bedtime  busPIRone 10 milliGRAM(s) Oral three times a day  chlorhexidine 2% Cloths 1 Application(s) Topical daily  dextrose 5%. 1000 milliLiter(s) (50 mL/Hr) IV Continuous <Continuous>  dextrose 5%. 1000 milliLiter(s) (100 mL/Hr) IV Continuous <Continuous>  dextrose 50% Injectable 25 Gram(s) IV Push once  dextrose 50% Injectable 12.5 Gram(s) IV Push once  dextrose 50% Injectable 25 Gram(s) IV Push once  finasteride 5 milliGRAM(s) Oral daily  folic acid 1 milliGRAM(s) Oral daily  glucagon  Injectable 1 milliGRAM(s) IntraMuscular once  insulin lispro (ADMELOG) corrective regimen sliding scale   SubCutaneous three times a day before meals  insulin lispro (ADMELOG) corrective regimen sliding scale   SubCutaneous at bedtime  mirtazapine 7.5 milliGRAM(s) Oral at bedtime  multivitamin 1 Tablet(s) Oral daily  nystatin Cream 1 Application(s) Topical two times a day  pantoprazole    Tablet 40 milliGRAM(s) Oral two times a day  senna 2 Tablet(s) Oral at bedtime  tamsulosin 0.4 milliGRAM(s) Oral at bedtime  vitamin B complex with vitamin C 1 Tablet(s) Oral daily    MEDICATIONS  (PRN):  acetaminophen     Tablet .. 650 milliGRAM(s) Oral every 6 hours PRN Temp greater or equal to 38C (100.4F), Mild Pain (1 - 3)  aluminum hydroxide/magnesium hydroxide/simethicone Suspension 30 milliLiter(s) Oral every 4 hours PRN Dyspepsia  dextrose Oral Gel 15 Gram(s) Oral once PRN Blood Glucose LESS THAN 70 milliGRAM(s)/deciliter  HYDROmorphone  Injectable 0.5 milliGRAM(s) IV Push every 6 hours PRN Severe Pain (7 - 10)  melatonin 3 milliGRAM(s) Oral at bedtime PRN Insomnia  ondansetron Injectable 4 milliGRAM(s) IV Push every 8 hours PRN Nausea and/or Vomiting  oxycodone    5 mG/acetaminophen 325 mG 1 Tablet(s) Oral every 6 hours PRN Moderate Pain (4 - 6)      I&O's Summary    05 Mar 2024 07:01  -  06 Mar 2024 07:00  --------------------------------------------------------  IN: 0 mL / OUT: 400 mL / NET: -400 mL    06 Mar 2024 07:01  -  07 Mar 2024 06:56  --------------------------------------------------------  IN: 300 mL / OUT: 200 mL / NET: 100 mL        Vital Signs Last 24 Hrs  T(C): 36.7 (07 Mar 2024 05:14), Max: 37 (06 Mar 2024 19:00)  T(F): 98.1 (07 Mar 2024 05:14), Max: 98.6 (06 Mar 2024 19:00)  HR: 99 (07 Mar 2024 06:00) (78 - 109)  BP: 108/76 (07 Mar 2024 06:00) (100/62 - 121/83)  BP(mean): --  RR: 18 (07 Mar 2024 06:00) (17 - 18)  SpO2: 100% (07 Mar 2024 06:00) (97% - 100%)    Parameters below as of 07 Mar 2024 06:00  Patient On (Oxygen Delivery Method): room air        CAPILLARY BLOOD GLUCOSE      POCT Blood Glucose.: 252 mg/dL (06 Mar 2024 22:12)  POCT Blood Glucose.: 279 mg/dL (06 Mar 2024 18:07)  POCT Blood Glucose.: 193 mg/dL (06 Mar 2024 12:19)  POCT Blood Glucose.: 101 mg/dL (06 Mar 2024 09:05)      PHYSICAL EXAM:  HEAD:  Atraumatic, Normocephalic  EYES: EOMI, conjunctiva and sclera clear  CHEST/LUNG: Clear to auscultation bilaterally; No rales, rhonchi, wheezing, or rubs  HEART: Regular rate and rhythm; No murmurs, rubs, or gallops  ABDOMEN: Soft, Nontender, Nondistended;   SKIN: sacral ulcer with wound vac  NERVOUS SYSTEM:  Alert & Oriented X3, no focal deficits       LABS:                        7.7    14.73 )-----------( 143      ( 06 Mar 2024 05:10 )             24.1     Auto Eosinophil # x     / Auto Eosinophil % x     / Auto Neutrophil # x     / Auto Neutrophil % x     / BANDS % x                            8.2    16.70 )-----------( 125      ( 05 Mar 2024 15:48 )             25.6     Auto Eosinophil # x     / Auto Eosinophil % x     / Auto Neutrophil # x     / Auto Neutrophil % x     / BANDS % x        03-06    139  |  111<H>  |  26<H>  ----------------------------<  92  3.5   |  17<L>  |  0.99    Ca    7.8<L>      06 Mar 2024 05:10  Mg     1.70     03-06  Phos  2.2     03-06          Urinalysis Basic - ( 06 Mar 2024 05:10 )    Color: x / Appearance: x / SG: x / pH: x  Gluc: 92 mg/dL / Ketone: x  / Bili: x / Urobili: x   Blood: x / Protein: x / Nitrite: x   Leuk Esterase: x / RBC: x / WBC x   Sq Epi: x / Non Sq Epi: x / Bacteria: x            RADIOLOGY & ADDITIONAL TESTS:    Imaging Personally Reviewed:    Consultant(s) Notes Reviewed:      Care Discussed with Consultants/Other Providers:   Wing Vallejo MD  PGY 1 Department of Internal Medicine        Patient is a 83y old  Male who presents with a chief complaint of Anemia, Hypotension (06 Mar 2024 07:29)      SUBJECTIVE / OVERNIGHT EVENTS: Pt seen and examined. No acute overnight events. Complains of pain at sacral wound site further, improved from yesterday. Otherwise has no complaints.         MEDICATIONS  (STANDING):  acetaminophen   IVPB .. 1000 milliGRAM(s) IV Intermittent once  apixaban 10 milliGRAM(s) Oral every 12 hours  atorvastatin 40 milliGRAM(s) Oral at bedtime  busPIRone 10 milliGRAM(s) Oral three times a day  chlorhexidine 2% Cloths 1 Application(s) Topical daily  dextrose 5%. 1000 milliLiter(s) (50 mL/Hr) IV Continuous <Continuous>  dextrose 5%. 1000 milliLiter(s) (100 mL/Hr) IV Continuous <Continuous>  dextrose 50% Injectable 25 Gram(s) IV Push once  dextrose 50% Injectable 12.5 Gram(s) IV Push once  dextrose 50% Injectable 25 Gram(s) IV Push once  finasteride 5 milliGRAM(s) Oral daily  folic acid 1 milliGRAM(s) Oral daily  glucagon  Injectable 1 milliGRAM(s) IntraMuscular once  insulin lispro (ADMELOG) corrective regimen sliding scale   SubCutaneous three times a day before meals  insulin lispro (ADMELOG) corrective regimen sliding scale   SubCutaneous at bedtime  mirtazapine 7.5 milliGRAM(s) Oral at bedtime  multivitamin 1 Tablet(s) Oral daily  nystatin Cream 1 Application(s) Topical two times a day  pantoprazole    Tablet 40 milliGRAM(s) Oral two times a day  senna 2 Tablet(s) Oral at bedtime  tamsulosin 0.4 milliGRAM(s) Oral at bedtime  vitamin B complex with vitamin C 1 Tablet(s) Oral daily    MEDICATIONS  (PRN):  acetaminophen     Tablet .. 650 milliGRAM(s) Oral every 6 hours PRN Temp greater or equal to 38C (100.4F), Mild Pain (1 - 3)  aluminum hydroxide/magnesium hydroxide/simethicone Suspension 30 milliLiter(s) Oral every 4 hours PRN Dyspepsia  dextrose Oral Gel 15 Gram(s) Oral once PRN Blood Glucose LESS THAN 70 milliGRAM(s)/deciliter  HYDROmorphone  Injectable 0.5 milliGRAM(s) IV Push every 6 hours PRN Severe Pain (7 - 10)  melatonin 3 milliGRAM(s) Oral at bedtime PRN Insomnia  ondansetron Injectable 4 milliGRAM(s) IV Push every 8 hours PRN Nausea and/or Vomiting  oxycodone    5 mG/acetaminophen 325 mG 1 Tablet(s) Oral every 6 hours PRN Moderate Pain (4 - 6)      I&O's Summary    05 Mar 2024 07:01  -  06 Mar 2024 07:00  --------------------------------------------------------  IN: 0 mL / OUT: 400 mL / NET: -400 mL    06 Mar 2024 07:01  -  07 Mar 2024 06:56  --------------------------------------------------------  IN: 300 mL / OUT: 200 mL / NET: 100 mL        Vital Signs Last 24 Hrs  T(C): 36.7 (07 Mar 2024 05:14), Max: 37 (06 Mar 2024 19:00)  T(F): 98.1 (07 Mar 2024 05:14), Max: 98.6 (06 Mar 2024 19:00)  HR: 99 (07 Mar 2024 06:00) (78 - 109)  BP: 108/76 (07 Mar 2024 06:00) (100/62 - 121/83)  BP(mean): --  RR: 18 (07 Mar 2024 06:00) (17 - 18)  SpO2: 100% (07 Mar 2024 06:00) (97% - 100%)    Parameters below as of 07 Mar 2024 06:00  Patient On (Oxygen Delivery Method): room air        CAPILLARY BLOOD GLUCOSE      POCT Blood Glucose.: 252 mg/dL (06 Mar 2024 22:12)  POCT Blood Glucose.: 279 mg/dL (06 Mar 2024 18:07)  POCT Blood Glucose.: 193 mg/dL (06 Mar 2024 12:19)  POCT Blood Glucose.: 101 mg/dL (06 Mar 2024 09:05)      PHYSICAL EXAM:  HEAD:  Atraumatic, Normocephalic  EYES: EOMI, conjunctiva and sclera clear  CHEST/LUNG: Clear to auscultation bilaterally; No rales, rhonchi, wheezing, or rubs  HEART: Regular rate and rhythm; No murmurs, rubs, or gallops  ABDOMEN: Soft, Nontender, Nondistended;   SKIN: sacral ulcer with wound vac  NERVOUS SYSTEM:  Alert & Oriented X3, no focal deficits       LABS:                        7.7    14.73 )-----------( 143      ( 06 Mar 2024 05:10 )             24.1     Auto Eosinophil # x     / Auto Eosinophil % x     / Auto Neutrophil # x     / Auto Neutrophil % x     / BANDS % x                            8.2    16.70 )-----------( 125      ( 05 Mar 2024 15:48 )             25.6     Auto Eosinophil # x     / Auto Eosinophil % x     / Auto Neutrophil # x     / Auto Neutrophil % x     / BANDS % x        03-06    139  |  111<H>  |  26<H>  ----------------------------<  92  3.5   |  17<L>  |  0.99    Ca    7.8<L>      06 Mar 2024 05:10  Mg     1.70     03-06  Phos  2.2     03-06          Urinalysis Basic - ( 06 Mar 2024 05:10 )    Color: x / Appearance: x / SG: x / pH: x  Gluc: 92 mg/dL / Ketone: x  / Bili: x / Urobili: x   Blood: x / Protein: x / Nitrite: x   Leuk Esterase: x / RBC: x / WBC x   Sq Epi: x / Non Sq Epi: x / Bacteria: x            RADIOLOGY & ADDITIONAL TESTS:    Imaging Personally Reviewed:    Consultant(s) Notes Reviewed:      Care Discussed with Consultants/Other Providers:

## 2024-03-08 NOTE — PROGRESS NOTE ADULT - PROBLEM SELECTOR PROBLEM 9
Type 2 diabetes mellitus
Type 2 diabetes mellitus
Prophylactic measure
Type 2 diabetes mellitus
Prophylactic measure

## 2024-03-08 NOTE — DISCHARGE NOTE NURSING/CASE MANAGEMENT/SOCIAL WORK - NSDCCRNAME_GEN_ALL_CORE_FT
[facility]:[phone], [place] [address]

## 2024-03-08 NOTE — PROGRESS NOTE ADULT - ATTENDING COMMENTS
83M from [facility] w bladder ca, CAD s/p stents, HTN, T2DM, LYUBOV, GERD, BPH, stage 4 decubiti c/b osteomyelitis on Flagyl/Augmentin until 3/4/24, AAA s/p graft/stent, recent UGIB (1/2024) iso corticosteroids, prior reports of EV, Fermin's esophagitis, substernal goiter, s/p b/l hip replacements a/w hypotension iso severe anemia and also found to have LLE DVT. S/p 2U PRBCs last on 3/1. Pt also met SIRS criteria on admission w/ leukocytosis and tachycardia however w/o any evidence of new localized infection. Now s/p EGD by GI on 3/4 that showed gastropathy, two non-bleeding duodenal ulcers with pigmented material (Likely etiology of recent blood loss) and A few non-bleeding angiodysplastic lesions in the duodenum.    Pt remains HD stable. Denies any bleeding                                                                             -Monitor H/H closely. Overall has been stable.   -Pt also w/ noted thrombocytopenia. Likely was multifactorial in the setting of recent GIB, antibiotics, AOCD etc. Levels are improving. Will continue to monitor   -C/w protonix 40mg BID. Pt should have repeat EGD in 4-6 weeks to assess ulcer healing. Per GI no CI to a/c for patients LE DVT.  -For pts LE DVT. Discussed with patient findings of endoscopy and risk benefit conversation (see note from 3/5). Pt now stable on eliquis.   -For wound c/w wound care. Wound Vac placed. Pt s/p course of abx.     Dispo- Dc planning for today. Pt optimized for discharge. PT rec [facility]. Further details outlined in discharge documentation. Spent 38 min in discharge planning and coordination

## 2024-03-08 NOTE — PROGRESS NOTE ADULT - SUBJECTIVE AND OBJECTIVE BOX
Wing Vallejo MD  PGY 1 Department of Internal Medicine        Patient is a 83y old  Male who presents with a chief complaint of Anemia, Hypotension (07 Mar 2024 06:55)      SUBJECTIVE / OVERNIGHT EVENTS: Pt seen and examined. No acute overnight events. Complains of mild pain at sacral wound site. Otherwise has no complaints.         MEDICATIONS  (STANDING):  acetaminophen   IVPB .. 1000 milliGRAM(s) IV Intermittent once  apixaban 10 milliGRAM(s) Oral every 12 hours  atorvastatin 40 milliGRAM(s) Oral at bedtime  busPIRone 10 milliGRAM(s) Oral three times a day  chlorhexidine 2% Cloths 1 Application(s) Topical daily  dextrose 5%. 1000 milliLiter(s) (50 mL/Hr) IV Continuous <Continuous>  dextrose 5%. 1000 milliLiter(s) (100 mL/Hr) IV Continuous <Continuous>  dextrose 50% Injectable 25 Gram(s) IV Push once  dextrose 50% Injectable 12.5 Gram(s) IV Push once  dextrose 50% Injectable 25 Gram(s) IV Push once  finasteride 5 milliGRAM(s) Oral daily  folic acid 1 milliGRAM(s) Oral daily  glucagon  Injectable 1 milliGRAM(s) IntraMuscular once  insulin lispro (ADMELOG) corrective regimen sliding scale   SubCutaneous three times a day before meals  insulin lispro (ADMELOG) corrective regimen sliding scale   SubCutaneous at bedtime  mirtazapine 7.5 milliGRAM(s) Oral at bedtime  multivitamin 1 Tablet(s) Oral daily  nystatin Cream 1 Application(s) Topical two times a day  pantoprazole    Tablet 40 milliGRAM(s) Oral two times a day  senna 2 Tablet(s) Oral at bedtime  tamsulosin 0.4 milliGRAM(s) Oral at bedtime  vitamin B complex with vitamin C 1 Tablet(s) Oral daily    MEDICATIONS  (PRN):  acetaminophen     Tablet .. 650 milliGRAM(s) Oral every 6 hours PRN Temp greater or equal to 38C (100.4F), Mild Pain (1 - 3)  aluminum hydroxide/magnesium hydroxide/simethicone Suspension 30 milliLiter(s) Oral every 4 hours PRN Dyspepsia  dextrose Oral Gel 15 Gram(s) Oral once PRN Blood Glucose LESS THAN 70 milliGRAM(s)/deciliter  HYDROmorphone  Injectable 0.5 milliGRAM(s) IV Push every 6 hours PRN Severe Pain (7 - 10)  melatonin 3 milliGRAM(s) Oral at bedtime PRN Insomnia  ondansetron Injectable 4 milliGRAM(s) IV Push every 8 hours PRN Nausea and/or Vomiting  oxycodone    5 mG/acetaminophen 325 mG 1 Tablet(s) Oral every 6 hours PRN Moderate Pain (4 - 6)      I&O's Summary    07 Mar 2024 07:01  -  08 Mar 2024 07:00  --------------------------------------------------------  IN: 0 mL / OUT: 20 mL / NET: -20 mL        Vital Signs Last 24 Hrs  T(C): 36.8 (08 Mar 2024 03:30), Max: 36.8 (08 Mar 2024 03:30)  T(F): 98.3 (08 Mar 2024 03:30), Max: 98.3 (08 Mar 2024 03:30)  HR: 106 (08 Mar 2024 03:30) (95 - 106)  BP: 108/69 (08 Mar 2024 03:30) (96/65 - 110/70)  BP(mean): --  RR: 17 (08 Mar 2024 03:30) (17 - 18)  SpO2: 100% (08 Mar 2024 03:30) (100% - 100%)    Parameters below as of 08 Mar 2024 03:30  Patient On (Oxygen Delivery Method): room air        CAPILLARY BLOOD GLUCOSE      POCT Blood Glucose.: 147 mg/dL (07 Mar 2024 21:46)  POCT Blood Glucose.: 194 mg/dL (07 Mar 2024 18:14)  POCT Blood Glucose.: 197 mg/dL (07 Mar 2024 12:26)  POCT Blood Glucose.: 164 mg/dL (07 Mar 2024 08:40)      PHYSICAL EXAM:  HEAD:  Atraumatic, Normocephalic  EYES: EOMI, conjunctiva and sclera clear  CHEST/LUNG: Clear to auscultation bilaterally; No rales, rhonchi, wheezing, or rubs  HEART: Regular rate and rhythm; No murmurs, rubs, or gallops  ABDOMEN: Soft, Nontender, Nondistended;   SKIN: sacral ulcer with wound vac  NERVOUS SYSTEM:  Alert & Oriented X3, no focal deficits       LABS:                        7.6    13.91 )-----------( 148      ( 07 Mar 2024 06:42 )             23.8     Auto Eosinophil # x     / Auto Eosinophil % x     / Auto Neutrophil # x     / Auto Neutrophil % x     / BANDS % x        03-07    139  |  113<H>  |  24<H>  ----------------------------<  161<H>  4.0   |  16<L>  |  0.80    Ca    7.7<L>      07 Mar 2024 06:42  Mg     1.80     03-07  Phos  2.8     03-07          Urinalysis Basic - ( 07 Mar 2024 06:42 )    Color: x / Appearance: x / SG: x / pH: x  Gluc: 161 mg/dL / Ketone: x  / Bili: x / Urobili: x   Blood: x / Protein: x / Nitrite: x   Leuk Esterase: x / RBC: x / WBC x   Sq Epi: x / Non Sq Epi: x / Bacteria: x            RADIOLOGY & ADDITIONAL TESTS:    Imaging Personally Reviewed:    Consultant(s) Notes Reviewed:      Care Discussed with Consultants/Other Providers:

## 2024-03-08 NOTE — DISCHARGE NOTE NURSING/CASE MANAGEMENT/SOCIAL WORK - NSDCFUADDAPPT_GEN_ALL_CORE_FT
APPTS ARE READY TO BE MADE: [ ] YES    Best Family or Patient Contact (if needed):    Additional Information about above appointments (if needed):    1: Gastroenterology  2: Wound Care at [facility] [address] [facility] [phone]  3: PCP    Other comments or requests:

## 2024-03-08 NOTE — DISCHARGE NOTE NURSING/CASE MANAGEMENT/SOCIAL WORK - NSDCPNINST_GEN_ALL_CORE
Patient A&Ox3, forgetful at times, medicated for c/o pain with relief during this shift. Patient discharged to [facility] this afternoon, awaiting on transportation, no distress noted at this time.

## 2024-03-08 NOTE — PROGRESS NOTE ADULT - PROBLEM SELECTOR PLAN 1
Chronic sacral and back decubitus ulcer c/b prior hx of osteomyelitis. Unclear if organism identified, but was discharged from [facility] with 6 weeks of Augmentin/Flagyl due to end 3/4/24.  -wound care consulted, appreciate recs  - monitor for systemic infectious signs  - trend WBC, fever curve  - BCx negative  - wound vac placed 3/4

## 2024-03-08 NOTE — PROGRESS NOTE ADULT - PROBLEM SELECTOR PLAN 3
Anemia likely iso UGIB. Hgb 4.5 on admission improved after 2u pRBC.  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   -CTAP showing peptic ulcer disease and duodenitis  -gi consulted, appreciate recs  -endoscopy 3/4 demonstrating mild gastropathy, nonbleeding duodenal ulcers and angiodysplastic lesions.   -c/w ppi bid, will require repeat scope in 4-6 weeks as outpatient  -per GI ok to initiate AC  -trend cbc    #Thrombocytopenia  IMPROVING, likely iso prior UGIB  -trend cbc  -ctm Anemia likely iso UGIB. Hgb 4.5 on admission improved after 2u pRBC.  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   -CTAP showing peptic ulcer disease and duodenitis  -gi consulted, appreciate recs  -endoscopy 3/4 demonstrating mild gastropathy, nonbleeding duodenal ulcers and angiodysplastic lesions.   -c/w ppi bid, will require repeat scope in 4-6 weeks as outpatient  -per GI ok to initiate AC  -trend cbc    #Thrombocytopenia  IMPROVING, likely iso prior UGIB and chronic illness  -trend cbc  -ctm

## 2024-03-08 NOTE — PROGRESS NOTE ADULT - PROVIDER SPECIALTY LIST ADULT
Gastroenterology
Wound Care
Gastroenterology
Internal Medicine

## 2024-03-08 NOTE — DISCHARGE NOTE NURSING/CASE MANAGEMENT/SOCIAL WORK - PATIENT PORTAL LINK FT
You can access the FollowMyHealth Patient Portal offered by [facility] by registering at the following website: [email or web address]. By joining [facility]’s FollowMyHealth portal, you will also be able to view your health information using other applications (apps) compatible with our system.

## 2024-03-08 NOTE — PROGRESS NOTE ADULT - PROBLEM SELECTOR PLAN 2
Acute L prox great saphenous vein extending into common femoral DVT. Nonocclusive on DVT study 3/1/24.  - c/w eliquis 10mg bid for 1 week (3/5-3/11), followed by 5mg bid   - unlikely candidate but can consider IVC filter

## 2024-03-08 NOTE — PROGRESS NOTE ADULT - ASSESSMENT
83M from [facility] bladder ca, CAD s/p stents, HTN, T2DM, LYUBOV, GERD, BPH, depression/anxiety, stage 4 decubiti c/b osteomyelitis on Flagyl/Augmentin until 3/4/24, AAA s/p graft/stent, recent UGIB (1/2024) iso corticosteroids, prior reports of EV, Fermin's esophagitis, substernal goiter, s/p b/l hip replacements p/w hypotension iso anemia Hgb 4s. course complicated by LLE DVT.

## 2024-03-08 NOTE — PROGRESS NOTE ADULT - PROBLEM SELECTOR PLAN 9
DVT PPX: eliquis  diet: cc  Electrolytes: Replete K > 4, Mg > 2, Phos > 3  Code Status: FULL CODE  Dispo: medically cleared, likely return to [facility], pending auth

## 2024-03-08 NOTE — PROGRESS NOTE ADULT - REASON FOR ADMISSION
Anemia, Hypotension

## 2024-03-08 NOTE — DISCHARGE NOTE NURSING/CASE MANAGEMENT/SOCIAL WORK - NSDCPEELIQUISDIET_GEN_ALL_CORE
Operative Report    PREOPERATIVE DIAGNOSIS:   Arthritis of left shoulder region [M19.012]  Left shoulder pain, unspecified chronicity [M25.512]      POSTOPERATIVE DIAGNOSIS:  Same    PROCEDURES:   Procedure(s) (LRB):  ARTHROPLASTY SHOULDER TOTAL (Left)      SURGEON:   Ryan Guadarrama MD     ASSISTANT:   Myrna tyson nurse practitioner  The role of the assistant was in limb positioning, prepping and draping, retractor placement, trial and final component insertion, and wound closure.    ANESTHESIA:   Anesthesiologist: Tony Horne MD  Anesthesia Staff: Oskar Darby General w/Single Interscalene Block     ESTIMATED BLOOD LOSS:   200 mL.     DRAINS:   None.     SPECIMENS:   All bone resections discarded due to certainty of diagnosis.     OPERATIVE INDICATION:   Elderly male with psoriasis end-stage arthritis brought back for surgery due to severe pain     OPERATIVE COMPONENTS:   DonJoy primary total shoulder size 18 stem 54 glenoid and 54 humeral head         OPERATIVE TECHNIQUE:   The patient was brought to the operating room and transferred to the operating table. A general endotracheal anesthetic was administered as well as an interscalene catheter right upper extremity by anesthesia. he was then placed in the beach chair position.     Exam under anesthesia, revealed significantly decreased range of motion A sterile prep and drape of the left shoulder was performed in the usual fashion. he received 2 grams of Ancef. he received tranexamic acid 1 gram at the beginning of the case and again at wound closure. All surgical personnel wore rebreather suits. A sterile prep and drape of the right shoulder was performed in the usual fashion. A surgical time-out was taken, the operative consent was read and the correct operative site was confirmed.     A deltopectoral incision was made from the coracoid down along the deltopectoral interval to the deltoid insertion on the humerus. This was carried down to  subcutaneous tissues. The deltopectoral interval was identified and the cephalic vein case was retracted with the pectoralis. Then the deltoid was retracted and subdeltoid adhesions were released. Then the clavipectoral fascia was then dissected. Dissection was carried along the lateral aspect of the conjoined tendon. However, care was taken to protect both the musculocutaneous and axillary nerves throughout the surgery. Shoulder was then externally rotated, the anterior humeral circumflex vessels were identified and then coagulated. I then released the subscapularis 5-mm from its insertion . Then the capsule was released to posterior inferior aspect with progressive external rotation of the humerus back to the 8:00 position. I was able to dislocate the shoulder anteriorly. There were very severe arthritic changes of the humeral head and the glenoid. However, the patient's supraspinatus and the posterior aspect of the rotator cuff appeared intact. Humeral head osteophytes were then excised.    I resected the humeral head anatomically and placed a metal calcar protector. Then the humerus was retracted posterior to the glenoid. Then a central hole for the glenoid component was made in the center of the glenoid. I then reamed the glenoid. I prepared this for a pegged glenoid. The superior drill hole was then made, then the two drill holes made inferiorly and these were then connected with the broach. Then a trial 54 glenoid had an excellent fit with no rocking motion. Then the glenoid was irrigated and dried  The cement was placed into each of the holes, the glenoid component was positioned and then held securely. The humerus was then subluxed and reamed and broached.. The trial component was then removed. The final DonJoy  size 18 humeral stem was inserted in 30 degrees of retroversion and an excellent press fit was achieved. Then the humeral head component was inserted with the maximum offset over the calcar of the  humerus. The shoulder was reduced and again excellent stability was noted. , I then repaired the subscapularis tendon and the rotator interval. This allowed excellent reattachment of the subscapularis      The wound was then irrigated with antibiotic irrigation.  There was good hemostasis and no drains were felt to be necessary. Then the deltopectoral interval was closed with 0 Vicryl and a layered wound closure and a buried subcuticular wound closure secured with Dermabond. Powdered vancomycin was also placed to assistant and a biotics exposure due to his skin The wound was dressed with a Silverlon dressing, gauze and tape. An UltraSling was applied.    The patient was then moved to the hospital bed.  There were no complications. All sponge and needle counts were correct.  The patient was then taken to the recovery room in stable condition.        DICTATING PROVIDER:   Ryan Guadarrama MD  1/9/2018 2:28 PM       Eat healthy foods you enjoy. Apixaban/Eliquis DOES NOT have a special diet. Limit your alcohol intake.

## 2024-03-15 PROBLEM — D50.9 IRON DEFICIENCY ANEMIA, UNSPECIFIED: Chronic | Status: ACTIVE | Noted: 2024-01-01

## 2024-03-15 PROBLEM — E04.9 NONTOXIC GOITER, UNSPECIFIED: Chronic | Status: ACTIVE | Noted: 2024-01-01

## 2024-03-15 PROBLEM — K21.9 GASTRO-ESOPHAGEAL REFLUX DISEASE WITHOUT ESOPHAGITIS: Chronic | Status: ACTIVE | Noted: 2024-01-01

## 2024-03-15 PROBLEM — M86.9 OSTEOMYELITIS, UNSPECIFIED: Chronic | Status: ACTIVE | Noted: 2024-01-01

## 2024-03-15 PROBLEM — N40.0 BENIGN PROSTATIC HYPERPLASIA WITHOUT LOWER URINARY TRACT SYMPTOMS: Chronic | Status: ACTIVE | Noted: 2024-01-01

## 2024-03-15 PROBLEM — K92.2 GASTROINTESTINAL HEMORRHAGE, UNSPECIFIED: Chronic | Status: ACTIVE | Noted: 2024-01-01

## 2024-03-15 PROBLEM — I71.40 ABDOMINAL AORTIC ANEURYSM, WITHOUT RUPTURE, UNSPECIFIED: Chronic | Status: ACTIVE | Noted: 2024-01-01

## 2024-03-15 PROBLEM — Z96.643 PRESENCE OF ARTIFICIAL HIP JOINT, BILATERAL: Chronic | Status: ACTIVE | Noted: 2024-01-01

## 2024-03-15 PROBLEM — C67.9 MALIGNANT NEOPLASM OF BLADDER, UNSPECIFIED: Chronic | Status: ACTIVE | Noted: 2024-01-01

## 2024-03-15 PROBLEM — L89.159 PRESSURE ULCER OF SACRAL REGION, UNSPECIFIED STAGE: Chronic | Status: ACTIVE | Noted: 2024-01-01

## 2024-03-15 PROBLEM — F41.9 ANXIETY DISORDER, UNSPECIFIED: Chronic | Status: ACTIVE | Noted: 2024-01-01

## 2024-03-15 PROBLEM — K22.70 BARRETT'S ESOPHAGUS WITHOUT DYSPLASIA: Chronic | Status: ACTIVE | Noted: 2024-01-01

## 2024-03-15 PROBLEM — E11.9 TYPE 2 DIABETES MELLITUS WITHOUT COMPLICATIONS: Chronic | Status: ACTIVE | Noted: 2024-01-01

## 2024-03-15 PROBLEM — K27.9 PEPTIC ULCER, SITE UNSPECIFIED, UNSPECIFIED AS ACUTE OR CHRONIC, WITHOUT HEMORRHAGE OR PERFORATION: Chronic | Status: ACTIVE | Noted: 2024-01-01

## 2024-03-15 PROBLEM — I10 ESSENTIAL (PRIMARY) HYPERTENSION: Chronic | Status: ACTIVE | Noted: 2024-01-01

## 2024-03-15 PROBLEM — I25.10 ATHEROSCLEROTIC HEART DISEASE OF NATIVE CORONARY ARTERY WITHOUT ANGINA PECTORIS: Chronic | Status: ACTIVE | Noted: 2024-01-01

## 2024-03-15 PROBLEM — I85.00 ESOPHAGEAL VARICES WITHOUT BLEEDING: Chronic | Status: ACTIVE | Noted: 2024-01-01

## 2024-03-15 PROBLEM — Z98.890 OTHER SPECIFIED POSTPROCEDURAL STATES: Chronic | Status: ACTIVE | Noted: 2024-01-01

## 2024-03-29 NOTE — ED ADULT NURSE NOTE - OBJECTIVE STATEMENT
Received pt in room 27. A&Ox1(self), nonambulatory, transport to ED for nonhealing sacral and back wounds, p/w AMS from [facility]. pt bradycardic, hypotensive, tachypneic on EMS transport, put on 6L nasal cannula, altered per family on assessment, displayed contraction of left upper extremity. Code stroke called, unclear of medication history, afebrile rectally, unstageable wounds on sacral area and back, covered with wet/dry. tachycardic, VS as noted. RR even and unlabored. USIV 20g placed in right AC, 20g placed right hand, AC. Labs sent. Medication given. Awaiting further orders from provider, safety maintained, care ongoing.

## 2024-03-29 NOTE — ED ADULT NURSE NOTE - NSFALLRISKINTERV_ED_ALL_ED
Assistance OOB with selected safe patient handling equipment if applicable/Assistance with ambulation/Communicate fall risk and risk factors to all staff, patient, and family/Monitor gait and stability/Monitor for mental status changes and reorient to person, place, and time, as needed/Provide visual cue: yellow wristband, yellow gown, etc/Reinforce activity limits and safety measures with patient and family/Toileting schedule using arm’s reach rule for commode and bathroom/Use of alarms - bed, stretcher, chair and/or video monitoring/Call bell, personal items and telephone in reach/Instruct patient to call for assistance before getting out of bed/chair/stretcher/Non-slip footwear applied when patient is off stretcher/[place] to call system/Physically safe environment - no spills, clutter or unnecessary equipment/Purposeful Proactive Rounding/Room/bathroom lighting operational, light cord in reach

## 2024-03-29 NOTE — ED ADULT TRIAGE NOTE - HEIGHT IN FEET
Interventional Cardiology [facility] Discharge Note    Patient seen and examined at bedside resting comfortably, denies current complaints.      Afebrile, VSS    Ext:    		Right Radial :  soft, NT, no hematoma, no bleeding, dressing; C/D/I      Pulses:    right radial pulse 2+      A/P:  65 yr old M smoker with FHx of CAD, PMHx HTN, HLD, Prostate CA s/p Prostatectomy 7 years ago,  pre-op for routine colonoscopy, with CCS Class IV Anginal Equivalent Symptoms and abnormal NST who presented to [facility] for evaluation. Patient s/p diagnostic cardiac catheterization which revealed normal LM, 40% dLAD lesion, luminal irregularities of LCx/RCA, EF:50-55%, EDP 12mmHg. Patient recommended to continue medical therapy, encouraged weight management and dietary modification.              1.	Stable for discharge as per attending Dr. Landen Godfrey if patient voids,  wrist stable and 30 minutes of ambulation.  2.	Follow-up with PMD/Cardiologist Dr. Mayank Godfrey in 1-2 weeks  3.	Discharged forms signed and copies in chart 6

## 2024-03-29 NOTE — CONSULT NOTE ADULT - ASSESSMENT
HPI: A 83M from [facility] LAURITA w bladder ca, CAD s/p stents, HTN, T2DM, LYUBOV, GERD, BPH, depression/anxiety, stage 4 decubiti c/b osteomyelitis on Flagyl/Augmentin until 3/4/24, AAA s/p graft/stent, recent UGIB (1/2024) iso corticosteroids, prior reports of EV, Fermin's esophagitis, substernal goiter, s/p b/l hip replacements presents from [facility] due to worsening sacral wounds. HR was 108 during transport and O2 wsa 99% and /72 glucose of 180. During transport, HR dropped to 30 with O2 of 76. Code stroke called for pt not talking and LUE being contracted. Currently LUE is some what contracted distally but pt can still open hand and pt is not aphasic but has moderate dysarthria. Per family at bedside pt was not aphasic last week and was using both of his upper extremities during physical therapy. At baseline, pt is bedbound (2/2 fall in December) and is AAOx 3, alert and needs help with ADLs.    NIH 19  preMRS 5    CT shows age indeterminate infarct in R basal ganglia.     Impression: AMS with decreased responsive and change in vitals likely 2/2 to toxic/metablic/infectious processes. Lower concern for acute CVA at this time given isolated deficit of LUE contraction and dysarthria. May rule out seizure like activity with EEG.     Recommendation:  -F/U CT/CTA/CTP   -24 Hour EEG, if pt does not return to baseline.   -Administer  2 mg IV Ativan or 5 mg IV valium PRN STAT for seizure activity or GTC > 3 minutes or significant derangement of vital signs.  -Administer 1500 mg IV Keppra over 15 minutes for seizures refractory to ativan/valium.  -Toxic/metabolic/infectious work up per primary team- CBC, CMP, urine toxicology, UA, urine cx, blood cx, alcohol level, AED levels, CK, CPK, lactate level    Miscellaneous:  [] Q4H neurological checks and vital signs  [] Seizure, fall and aspiration precautions. Avoid sleep deprivation.  -If pt has a convulsion, please document accurately the lenght of episode and specifically what the pt was doing paying attention to eye blinking vs. closure, gaze deviation, shaking of extremities, tongue biting, urinary incontinence, any derangements of vital signs   HPI: A 83M from [facility] LAUR[place] w bladder ca, CAD s/p stents, HTN, T2DM, LYUBOV, GERD, BPH, depression/anxiety, stage 4 decubiti c/b osteomyelitis on Flagyl/Augmentin until 3/4/24, AAA s/p graft/stent, recent UGIB (1/2024) iso corticosteroids, prior reports of EV, Fermin's esophagitis, substernal goiter, s/p b/l hip replacements presents from [facility] due to worsening sacral wounds. HR was 108 during transport and O2 wsa 99% and /72 glucose of 180. During transport, HR dropped to 30 with O2 of 76. Code stroke called for pt not talking and LUE being contracted. Currently LUE is some what contracted distally but pt can still open hand and pt is not aphasic but has moderate dysarthria. Per family at bedside pt was not aphasic last week and was using both of his upper extremities during physical therapy. At baseline, pt is bedbound (2/2 fall in December) and is AAOx 3, alert and needs help with ADLs.    NIH 19  preMRS 5    CT shows age indeterminate infarct in R basal ganglia, no acute findings per radiology    Impression: AMS with decreased responsive and change in vitals likely 2/2 to toxic/metablic/infectious processes. Lower concern for acute CVA at this time given isolated deficit of limited LUE distal contraction (pt able to open hand on request) and dysarthria. May rule out seizure like activity with EEG.     Recommendation:  -F/U CT/CTA/CTP   -24 Hour EEG, if pt does not return to baseline.   -Administer  2 mg IV Ativan or 5 mg IV valium PRN STAT for seizure activity or GTC > 3 minutes or significant derangement of vital signs.  -Administer 1500 mg IV Keppra over 15 minutes for seizures refractory to ativan/valium.  -Toxic/metabolic/infectious work up per primary team- CBC, CMP, urine toxicology, UA, urine cx, blood cx, CK, CPK, lactate level    Miscellaneous:  [] Q4H neurological checks and vital signs  [] Seizure, fall and aspiration precautions. Avoid sleep deprivation.  -If pt has a convulsion, please document accurately the lenght of episode and specifically what the pt was doing paying attention to eye blinking vs. closure, gaze deviation, shaking of extremities, tongue biting, urinary incontinence, any derangements of vital signs   HPI: A 83M from [facility] LAUR[facility] w bladder ca, CAD s/p stents, HTN, T2DM, LYUBOV, GERD, BPH, depression/anxiety, stage 4 decubiti c/b osteomyelitis on Flagyl/Augmentin until 3/4/24, AAA s/p graft/stent, recent UGIB (1/2024) iso corticosteroids, prior reports of EV, Fermin's esophagitis, substernal goiter, s/p b/l hip replacements presents from [facility] due to worsening sacral wounds. HR was 108 during transport and O2 wsa 99% and /72 glucose of 180. During transport, HR dropped to 30 with O2 of 76. Code stroke called for pt not talking and LUE being contracted. Currently LUE is some what contracted distally but pt can still open hand and pt is not aphasic but has moderate dysarthria. Per family at bedside pt was not aphasic last week and was using both of his upper extremities during physical therapy. At baseline, pt is bedbound (2/2 fall in December) and is AAOx 3, alert and needs help with ADLs.    NIH 19  preMRS 5    CT shows age indeterminate infarct in R basal ganglia, no acute findings per radiology    Impression: AMS with decreased responsive and change in vitals likely 2/2 to toxic/metablic/infectious processes. Lower concern for acute CVA at this time given isolated deficit of limited LUE distal contraction (pt able to open hand on request) and dysarthria. May rule out seizure like activity with EEG.     Recommendation:  -F/U CT/CTA/CTP   -24 Hour EEG, if pt's mental status does not return to baseline.   -May start daily 81 mg ASA for secondary stroke prevention.   -Administer  2 mg IV Ativan or 5 mg IV valium PRN STAT for seizure activity or GTC > 3 minutes or significant derangement of vital signs.  -Administer 1500 mg IV Keppra over 15 minutes for seizures refractory to ativan/valium.  -Toxic/metabolic/infectious work up per primary team- CBC, CMP, urine toxicology, UA, urine cx, blood cx, CK, CPK, lactate level    Miscellaneous:  [] Q4H neurological checks and vital signs  [] Seizure, fall and aspiration precautions. Avoid sleep deprivation.  -If pt has a convulsion, please document accurately the lenght of episode and specifically what the pt was doing paying attention to eye blinking vs. closure, gaze deviation, shaking of extremities, tongue biting, urinary incontinence, any derangements of vital signs   HPI: A 83M from [facility] LAUR[facility] w bladder ca, CAD s/p stents, HTN, T2DM, LYUBOV, GERD, BPH, depression/anxiety, stage 4 decubiti c/b osteomyelitis on Flagyl/Augmentin until 3/4/24, AAA s/p graft/stent, recent UGIB (1/2024) iso corticosteroids, prior reports of EV, Fermin's esophagitis, substernal goiter, s/p b/l hip replacements presents from [facility] due to worsening sacral wounds. HR was 108 during transport and O2 wsa 99% and /72 glucose of 180. During transport, HR dropped to 30 with O2 of 76. Code stroke called for pt not talking and LUE being contracted. Currently LUE is some what contracted distally but pt can still open hand and pt is not aphasic but has moderate dysarthria. Per family at bedside pt was not aphasic last week and was using both of his upper extremities during physical therapy. At baseline, pt is bedbound (2/2 fall in December) and is AAOx 3, alert and needs help with ADLs.    NIH 19  preMRS 5    CT shows age indeterminate infarct in R basal ganglia, no acute findings per radiology    Impression: AMS with decreased responsive and change in vitals likely 2/2 to toxic/metablic/infectious processes. Lower concern for acute CVA at this time given isolated deficit of limited LUE distal contraction (pt able to open hand on request) and dysarthria. May rule out seizure like activity with EEG given change in mental status.     Recommendation:  -F/U CT/CTA/CTP   -24 Hour EEG, if pt's mental status does not return to baseline.   -May start daily 81 mg ASA for secondary stroke prevention.   -Telemetry while in hospital   -Dysphagia screen  -Administer  2 mg IV Ativan or 5 mg IV valium PRN STAT for seizure activity or GTC > 3 minutes or significant derangement of vital signs.  -Administer 1500 mg IV Keppra over 15 minutes for seizures refractory to ativan/valium.  -Toxic/metabolic/infectious work up per primary team- CBC, CMP, urine toxicology, UA, urine cx, blood cx, CK, CPK, lactate level    Miscellaneous:  [] Q4H neurological checks and vital signs  [] Seizure, fall and aspiration precautions. Avoid sleep deprivation.  -If pt has a convulsion, please document accurately the lenght of episode and specifically what the pt was doing paying attention to eye blinking vs. closure, gaze deviation, shaking of extremities, tongue biting, urinary incontinence, any derangements of vital signs   HPI: A 83M from [facility] LAURITA w bladder ca, CAD s/p stents, HTN, T2DM, LYUBOV, GERD, BPH, depression/anxiety, stage 4 decubiti c/b osteomyelitis on Flagyl/Augmentin until 3/4/24, AAA s/p graft/stent, recent UGIB (1/2024) iso corticosteroids, prior reports of EV, Fermin's esophagitis, substernal goiter, s/p b/l hip replacements presents from [facility] due to worsening sacral wounds. HR was 108 during transport and O2 was 99% and /72 glucose of 180. During transport, HR dropped to 30 with O2 of 76. Code stroke called for pt not talking and LUE being contracted. Currently LUE is some what contracted distally but pt can still open hand and has full  strength and pt is not aphasic but has moderate dysarthria. Per family at bedside pt was not aphasic last week and was using both of his upper extremities during physical therapy. At baseline, pt is bedbound (2/2 fall in December) and is AAOx 3, alert and needs help with ADLs.    NIH 19  preMRS 5    CT shows age indeterminate infarct in R basal ganglia, no acute findings per radiology    Impression: AMS with decreased responsive and change in vitals likely 2/2 to toxic/metablic/infectious processes. Lower concern for acute CVA at this time given isolated deficit of limited LUE distal contraction (pt able to open hand on request) and dysarthria. May rule out seizure like activity with EEG given change in mental status.     Recommendation:  -F/U CT/CTA/CTP   -24 Hour EEG, if pt's mental status does not return to baseline.   -May start daily 81 mg ASA for secondary stroke prevention.   -Telemetry while in hospital   -Dysphagia screen  -Administer  2 mg IV Ativan or 5 mg IV valium PRN STAT for seizure activity or GTC > 3 minutes or significant derangement of vital signs.  -Administer 1500 mg IV Keppra over 15 minutes for seizures refractory to ativan/valium.  -Toxic/metabolic/infectious work up per primary team- CBC, CMP, urine toxicology, UA, urine cx, blood cx, CK, CPK, lactate level    Miscellaneous:  [] Q4H neurological checks and vital signs  [] Seizure, fall and aspiration precautions. Avoid sleep deprivation.  -If pt has a convulsion, please document accurately the lenght of episode and specifically what the pt was doing paying attention to eye blinking vs. closure, gaze deviation, shaking of extremities, tongue biting, urinary incontinence, any derangements of vital signs   HPI: A 83M from [facility] LAUR[facility] w bladder ca, CAD s/p stents, HTN, T2DM, LYUBOV, GERD, BPH, depression/anxiety, stage 4 decubiti c/b osteomyelitis on Flagyl/Augmentin until 3/4/24, AAA s/p graft/stent, recent UGIB (1/2024) iso corticosteroids, prior reports of EV, Fermin's esophagitis, substernal goiter, s/p b/l hip replacements presents from [facility] due to worsening sacral wounds. HR was 108 during transport and O2 was 99% and /72 glucose of 180. During transport, HR dropped to 30 with O2 of 76. Code stroke called for pt not talking and LUE being contracted. Currently LUE is some what contracted distally but pt can still open hand and has full  strength and pt is not aphasic but has moderate dysarthria. Per family at bedside pt was not aphasic last week and was using both of his upper extremities during physical therapy. At baseline, pt is bedbound (2/2 fall in December) and is AAOx 3, alert and needs help with ADLs.    NIH 19  preMRS 5    CT shows age indeterminate infarct in R basal ganglia, no acute findings per radiology    Impression: AMS with decreased responsive and change in vitals likely 2/2 to toxic/metablic/infectious processes, found to have an acute left lower lobe PE with findings of right heart strain. Lower concern for acute CVA at this time given isolated deficit of limited LUE distal contraction (pt able to open hand on request) and dysarthria. May rule out seizure like activity with EEG given change in mental status.     Recommendation:  -F/U CT/CTA/CTP   -24 Hour EEG, if pt's mental status does not return to baseline.   -May start daily 81 mg ASA for secondary stroke prevention.   -Telemetry while in hospital   -Dysphagia screen  -Administer  2 mg IV Ativan or 5 mg IV valium PRN STAT for seizure activity or GTC > 3 minutes or significant derangement of vital signs.  -Administer 1500 mg IV Keppra over 15 minutes for seizures refractory to ativan/valium.  -Toxic/metabolic/infectious work up per primary team- CBC, CMP, urine toxicology, UA, urine cx, blood cx, CK, CPK, lactate level    Miscellaneous:  [] Q4H neurological checks and vital signs  [] Seizure, fall and aspiration precautions. Avoid sleep deprivation.  -If pt has a convulsion, please document accurately the lenght of episode and specifically what the pt was doing paying attention to eye blinking vs. closure, gaze deviation, shaking of extremities, tongue biting, urinary incontinence, any derangements of vital signs   HPI: A 83M from [facility] LAUR[facility] w bladder ca, CAD s/p stents, HTN, T2DM, LYUBOV, GERD, BPH, depression/anxiety, stage 4 decubiti c/b osteomyelitis on Flagyl/Augmentin until 3/4/24, AAA s/p graft/stent, recent UGIB (1/2024) iso corticosteroids, prior reports of EV, Fermin's esophagitis, substernal goiter, s/p b/l hip replacements presents from [facility] due to worsening sacral wounds. HR was 108 during transport and O2 was 99% and /72 glucose of 180. During transport, HR dropped to 30 with O2 of 76. Code stroke called for pt not talking and LUE being contracted. Currently LUE is some what contracted distally but pt can still open hand and has full  strength and pt is not aphasic but has moderate dysarthria. Per family at bedside pt was not aphasic last week and was using both of his upper extremities during physical therapy. At baseline, pt is bedbound (2/2 fall in December) and is AAOx 3, alert and needs help with ADLs.    NIH 19  preMRS 5    CT shows age indeterminate infarct in R basal ganglia, no acute findings per radiology    Impression: AMS with decreased responsive and change in vitals likely 2/2 to toxic/metablic/infectious processes, found to have an acute left lower lobe PE with findings of right heart strain. Lower concern for acute CVA at this time given isolated deficit of limited LUE distal contraction (pt able to open hand on request) and dysarthria. May rule out seizure like activity with EEG given change in mental status.     Recommendation:  -F/U CT/CTA/CTP   -24 Hour EEG, if pt's mental status does not return to baseline.   -May start daily 81 mg ASA for secondary stroke prevention.   -Telemetry while in hospital   -Dysphagia screen  -Administer  2 mg IV Ativan or 5 mg IV valium PRN STAT for seizure activity or GTC > 3 minutes or significant derangement of vital signs.  -Administer 1500 mg IV Keppra over 15 minutes for seizures refractory to ativan/valium.  -Toxic/metabolic/infectious work up per primary team- CBC, CMP, urine toxicology, UA, urine cx, blood cx, CK, CPK, lactate level    Miscellaneous:  [] Q4H neurological checks and vital signs  [] Seizure, fall and aspiration precautions. Avoid sleep deprivation.  -If pt has a convulsion, please document accurately the lenght of episode and specifically what the pt was doing paying attention to eye blinking vs. closure, gaze deviation, shaking of extremities, tongue biting, urinary incontinence, any derangements of vital signs    Case discussed with telestroke attending.  Recommendations will be finalized once signed by neurology attending.    HPI: A 83M from [facility] LAUR[facility] w bladder ca, CAD s/p stents, HTN, T2DM, LYUBOV, GERD, BPH, depression/anxiety, stage 4 decubiti c/b osteomyelitis on Flagyl/Augmentin until 3/4/24, AAA s/p graft/stent, recent UGIB (1/2024) iso corticosteroids, prior reports of EV, Fermin's esophagitis, substernal goiter, s/p b/l hip replacements presents from [facility] due to worsening sacral wounds. HR was 108 during transport and O2 was 99% and /72 glucose of 180. During transport, HR dropped to 30 with O2 of 76. Code stroke called for pt not talking and LUE being contracted. Currently LUE is some what contracted distally but pt can still open hand and has full  strength and pt is not aphasic but has moderate dysarthria. Per family at bedside pt was not aphasic last week and was using both of his upper extremities during physical therapy. At baseline, pt is bedbound (2/2 fall in December) and is AAOx 3, alert and needs help with ADLs.    NIH 19  preMRS 5    CT shows age indeterminate infarct in R basal ganglia, no acute findings per radiology    Impression: AMS with decreased responsive and change in vitals likely 2/2 to toxic/metablic/infectious processes, found to have an acute left lower lobe PE with findings of right heart strain. Lower concern for acute CVA at this time given isolated deficit of limited LUE distal contraction (pt able to open hand on request) and dysarthria. May rule out seizure like activity with EEG given change in mental status.     Recommendation:  -F/U CT/CTA/CTP   -24 Hour EEG, if pt's mental status does not return to baseline.   -Telemetry while in hospital   -Dysphagia screen  -Administer  2 mg IV Ativan or 5 mg IV valium PRN STAT for seizure activity or GTC > 3 minutes or significant derangement of vital signs.  -Administer 1500 mg IV Keppra over 15 minutes for seizures refractory to ativan/valium.  -Toxic/metabolic/infectious work up per primary team- CBC, CMP, urine toxicology, UA, urine cx, blood cx, CK, CPK, lactate level    Miscellaneous:  [] Q4H neurological checks and vital signs  [] Seizure, fall and aspiration precautions. Avoid sleep deprivation.  -If pt has a convulsion, please document accurately the lenght of episode and specifically what the pt was doing paying attention to eye blinking vs. closure, gaze deviation, shaking of extremities, tongue biting, urinary incontinence, any derangements of vital signs    Case discussed with telestroke attending.  Recommendations will be finalized once signed by neurology attending.

## 2024-03-29 NOTE — CONSULT NOTE ADULT - ATTENDING COMMENTS
Per CCU team - sepsis, hypotension, hypoxia.  Poor baseline neurological function since seizure in 2022.   Per nurse - moving both arms and no movement in legs.     Exam:  MS: Eyes opened.  Says "ow" at times when moved.  No follow commands. No response to supraorbital pressure.  CN:  No BTT.  Eyes in primary gaze, dysconjugate.  No roving eye movements.  OCR - full EOM.  Corneal reflex is intact and symmetric.  Pupils round,  3mm-->2mm, B.  Gag - intact.    Motor/sensory:  Tone:  Grimace to nailbed pressure:   R arm  L arm  R leg  L leg    Reflexes: Per CCU team - sepsis, hypotension, hypoxia.  Poor baseline neurological function since seizure in 2022.   Per nurse - moving both arms and no movement in legs.     Exam:  MS: Eyes opened.  Says "ow" at times when moved.  No follow commands. No response to supraorbital pressure.  CN:  No BTT.  Eyes in primary gaze.  No roving eye movements.  Corneal reflex is intact and symmetric.  Pupils round,  3mm-->2mm, B.  Gag - intact.    Motor/sensory:  Tone:  Increased tone in B arms.  Grimace to nailbed pressure: w/d in arms abut no w/d in legs.     A/P  Mr. Wade is an 82 yo man with severe encephalopathy - toxic metabolic septic encephalopathy, he was hypoxic and hypotensive on pressors.  "Age-indeterminate right basal ganglial lacunar infarct." on CT head.  He is already on A/C for PE, DVT.  No need for aspirin for stroke prevention if on A/C.  HgbA1c, lipid profile  Please call us back when he is more stable and awake for further recommendations.   Thank you    There is a high probability of sudden, clinically significant, or life threatening deterioration in the patient’s condition which requires the highest level of physician preparedness to intervene urgently.  Critical care time:  40 minutes.

## 2024-03-29 NOTE — ED ADULT NURSE NOTE - CHIEF COMPLAINT QUOTE
Pt received as an un notified notification from [facility] for bradycardia. Per ems initially called for non healing wound and "low vitals". with ems pt became aly to 20s and hypotensive. charge called made aware and brought directly to room 27.

## 2024-03-29 NOTE — ED ADULT TRIAGE NOTE - CHIEF COMPLAINT QUOTE
Pt received as an un notified notification from [facility] for bradycardia. Per ems initially called for non healing wound and "low vitals". with ems pt became aly to 20s and hypotensive. charge called made aware and brought directly to room 27.

## 2024-03-29 NOTE — CONSULT NOTE ADULT - SUBJECTIVE AND OBJECTIVE BOX
Neurology - Consult Note    -  Spectra: [phone] ([facility]), [phone] ([facility])  -    HPI: A 83M from [facility] LAUR[place] w bladder ca, CAD s/p stents, HTN, T2DM, LYUBOV, GERD, BPH, depression/anxiety, stage 4 decubiti c/b osteomyelitis on Flagyl/Augmentin until 3/4/24, AAA s/p graft/stent, recent UGIB (1/2024) iso corticosteroids, prior reports of EV, Fermin's esophagitis, substernal goiter, s/p b/l hip replacements presents from [facility] due to worsening sacral wounds. HR was 108 during transport and O2 wsa 99% and /72 glucose of 180. During transport, HR dropped to 30 with O2 of 76. Code stroke called for pt not talking and LUE being contracted. Currently LUE is some what contracted distally but pt can still open hand and pt is not aphasic but has moderate dysarthria. Per family at bedside pt was not aphasic last week and was using both of his upper extremities during physical therapy. At baseline, pt is bedbound (2/2 fall in December) and is AAOx 3, alert and needs help with ADLs.    NIH 19  preMRS 5    Review of Systems:    CONSTITUTIONAL: No fevers or chills  NEUROLOGICAL: +As stated in HPI above  SKIN: No itching, burning, rashes, or lesions   All other review of systems is negative unless indicated above.    Allergies:  No Known Allergies      PMHx/PSHx/Family Hx: As above, otherwise see below   Bladder cancer    CAD (coronary artery disease)    HTN (hypertension)    T2DM (type 2 diabetes mellitus)    LYUBOV (iron deficiency anemia)    GERD (gastroesophageal reflux disease)    BPH (benign prostatic hyperplasia)    Anxiety and depression    Sacral decubitus ulcer    Osteomyelitis    Abdominal aortic aneurysm    Upper GI bleed    Peptic ulcer disease    Goiter    Esophageal varix    Fermin esophagus    S/P hip replacement, bilateral    S/P repair of abdominal aortic aneurysm using straight graft        Social Hx:  No current use of tobacco, alcohol, or illicit drugs    Medications:  MEDICATIONS  (STANDING):  sodium chloride 0.9% Bolus 1000 milliLiter(s) IV Bolus once    MEDICATIONS  (PRN):      Vitals:  T(C): --  HR: --  BP: --  RR: --  SpO2: --    Physical Examination:   General - NAD  Cardiovascular - Peripheral pulses palpable, no edema  Eyes -  Fundoscopy not performed due to safety precautions in the setting of the COVID-19 pandemic    Neurologic Exam:  Mental status - Eyes closed, opens them to repeated verbal stimuli.  Oriented to self only. Speech fluent, repetition and naming intact. Follows simple commands intermittently but not always.     Cranial nerves - surgical pupils, blinks to threat bilaterally, EOMI, face sensation -unable to test, face without asymmetry b/l, hearing intact b/l, palate with symmetric elevation, trapezius strength- unable to test, tongue midline     Motor - Normal bulk and tone throughout. No pronator drift.  Strength/sensory testing  Unable to perform manual motor testing 2/2 mental status  Upper extremities antigravity at the elbow  No movement noted in lower extremities.    Sensation - Withdraws upper extremities to noxious stimuli. Does not withdraw lower extremities to noxious stimuli.     DTR's -             Biceps      Triceps     Brachioradialis      Patellar    Ankle    Toes/plantar response  R             2+             2+                  2+                       2+            2+                 Down  L              2+             2+                 2+                        2+           2+                 Down    Coordination - Unable to test due to mental status    Gait and station - pt is bed bound     Labs:          CAPILLARY BLOOD GLUCOSE      POCT Blood Glucose.: 168 mg/dL (29 Mar 2024 21:18)          CSF:                  Radiology:     Neurology - Consult Note    -  Spectra: [phone] ([facility]), [phone] ([facility]  -    HPI: A 83M from [facility] [place] w bladder ca, CAD s/p stents, HTN, T2DM, LYUBOV, GERD, BPH, depression/anxiety, stage 4 decubiti c/b osteomyelitis on Flagyl/Augmentin until 3/4/24, AAA s/p graft/stent, recent UGIB (1/2024) iso corticosteroids, prior reports of EV, Fermin's esophagitis, substernal goiter, s/p b/l hip replacements presents from [facility] due to worsening sacral wounds. HR was 108 during transport and O2 wsa 99% and /72 glucose of 180. During transport, HR dropped to 30 with O2 of 76. Code stroke called for pt not talking and LUE being contracted. Currently LUE is some what contracted distally but pt can still open hand and pt is not aphasic but has moderate dysarthria. Per family at bedside pt was not aphasic last week and was using both of his upper extremities during physical therapy. At baseline, pt is bedbound (2/2 fall in December) and is AAOx 3, alert and needs help with ADLs.    NIH 19  preMRS 5    Review of Systems:    CONSTITUTIONAL: No fevers or chills  NEUROLOGICAL: +As stated in HPI above  SKIN: No itching, burning, rashes, or lesions   All other review of systems is negative unless indicated above.    Allergies:  No Known Allergies      PMHx/PSHx/Family Hx: As above, otherwise see below   Bladder cancer    CAD (coronary artery disease)    HTN (hypertension)    T2DM (type 2 diabetes mellitus)    LYUBOV (iron deficiency anemia)    GERD (gastroesophageal reflux disease)    BPH (benign prostatic hyperplasia)    Anxiety and depression    Sacral decubitus ulcer    Osteomyelitis    Abdominal aortic aneurysm    Upper GI bleed    Peptic ulcer disease    Goiter    Esophageal varix    Fermin esophagus    S/P hip replacement, bilateral    S/P repair of abdominal aortic aneurysm using straight graft        Social Hx:  No current use of tobacco, alcohol, or illicit drugs    Medications:  MEDICATIONS  (STANDING):  sodium chloride 0.9% Bolus 1000 milliLiter(s) IV Bolus once    MEDICATIONS  (PRN):      Vitals:  T(C): --  HR: --  BP: --  RR: --  SpO2: --    Physical Examination:   General - NAD  Cardiovascular - Peripheral pulses palpable, no edema  Eyes -  Fundoscopy not performed due to safety precautions in the setting of the COVID-19 pandemic    Neurologic Exam:  Mental status - Eyes closed, opens them to repeated verbal stimuli.  Oriented to self only. Speech fluent, repetition and naming intact. Perseverates. Follows simple commands intermittently but not always.     Cranial nerves - surgical pupils, blinks to threat bilaterally, EOMI, face sensation -unable to test, face without asymmetry b/l, hearing intact b/l, palate with symmetric elevation, trapezius strength- unable to test, tongue midline     Motor - Normal bulk and tone throughout. No pronator drift.  Strength/sensory testing  Unable to perform manual motor testing 2/2 mental status  Upper extremities antigravity at the elbow  No movement noted in lower extremities.    Sensation - Withdraws upper extremities to noxious stimuli. Does not withdraw lower extremities to noxious stimuli.     DTR's -             Biceps      Triceps     Brachioradialis      Patellar    Ankle    Toes/plantar response  R             2+             2+                  2+                       2+            2+                 Down  L              2+             2+                 2+                        2+           2+                 Down    Coordination - Unable to test due to mental status    Gait and station - pt is bed bound     Labs:          CAPILLARY BLOOD GLUCOSE      POCT Blood Glucose.: 168 mg/dL (29 Mar 2024 21:18)          CSF:                  Radiology:     Neurology - Consult Note    -  Spectra: [phone] ([facility]), [phone] ([facility])  -    HPI: A 83M from [facility] [place] w bladder ca, CAD s/p stents, HTN, T2DM, LYUBOV, GERD, BPH, depression/anxiety, stage 4 decubiti c/b osteomyelitis on Flagyl/Augmentin until 3/4/24, AAA s/p graft/stent, recent UGIB (1/2024) iso corticosteroids, prior reports of EV, Fermin's esophagitis, substernal goiter, s/p b/l hip replacements presents from [facility] due to worsening sacral wounds. HR was 108 during transport and O2 wsa 99% and /72 glucose of 180. During transport, HR dropped to 30 with O2 of 76. Code stroke called for pt not talking and LUE being contracted. Currently LUE is some what contracted distally but pt can still open hand and pt is not aphasic but has moderate dysarthria. Per family at bedside pt was not aphasic last week and was using both of his upper extremities during physical therapy. At baseline, pt is bedbound (2/2 fall in December) and is AAOx 3, alert and needs help with ADLs.    NIH 19  preMRS 5    Review of Systems:    CONSTITUTIONAL: No fevers or chills  NEUROLOGICAL: +As stated in HPI above  SKIN: No itching, burning, rashes, or lesions   All other review of systems is negative unless indicated above.    Allergies:  No Known Allergies      PMHx/PSHx/Family Hx: As above, otherwise see below   Bladder cancer    CAD (coronary artery disease)    HTN (hypertension)    T2DM (type 2 diabetes mellitus)    LYUBOV (iron deficiency anemia)    GERD (gastroesophageal reflux disease)    BPH (benign prostatic hyperplasia)    Anxiety and depression    Sacral decubitus ulcer    Osteomyelitis    Abdominal aortic aneurysm    Upper GI bleed    Peptic ulcer disease    Goiter    Esophageal varix    Fermin esophagus    S/P hip replacement, bilateral    S/P repair of abdominal aortic aneurysm using straight graft        Social Hx:  No current use of tobacco, alcohol, or illicit drugs    Medications:  MEDICATIONS  (STANDING):  sodium chloride 0.9% Bolus 1000 milliLiter(s) IV Bolus once    MEDICATIONS  (PRN):      Vitals:  T(C): --  HR: --  BP: --  RR: --  SpO2: --    Physical Examination:   General - NAD  Cardiovascular - Peripheral pulses palpable, no edema  Eyes -  Fundoscopy not performed due to safety precautions in the setting of the COVID-19 pandemic    Neurologic Exam:  Mental status - Eyes closed, opens them to repeated verbal stimuli.  Oriented to self only. Speech fluent, repetition and naming intact. Perseverates. Follows simple commands intermittently but not always.     Cranial nerves - surgical pupils, blinks to threat bilaterally, EOMI, face sensation -unable to test, face without asymmetry b/l, hearing intact b/l, palate with symmetric elevation, trapezius strength- unable to test, tongue midline     Motor - Normal bulk and tone throughout. No pronator drift.  Strength/sensory testing  Unable to perform manual motor testing 2/2 mental status  Upper extremities antigravity at the elbow  No movement noted in lower extremities.    Sensation - Withdraws upper extremities to noxious stimuli. Does not withdraw lower extremities to noxious stimuli.     DTR's -             Biceps      Triceps     Brachioradialis      Patellar    Ankle    Toes/plantar response  R             2+             2+                  2+                       2+            2+                 Down  L              2+             2+                 2+                        2+           2+                 Down    Coordination - Unable to test due to mental status    Gait and station - pt is bed bound     Labs:          CAPILLARY BLOOD GLUCOSE      POCT Blood Glucose.: 168 mg/dL (29 Mar 2024 21:18)          CSF:                  Radiology:            ACC: [id number] EXAM: CT BRAIN STROKE PROTOCOL ORDERED BY: ENRIQUETA DEL TORO    PROCEDURE DATE: 03/29/2024        INTERPRETATION: CLINICAL INDICATION: Code stroke. Right arm contraction, altered mental status.    COMPARISON: Cervical spine CT 3/1/2024 was reviewed    TECHNIQUE: Axial noncontrast CT images of the head were obtained. Sagittal and coronal reformatted images were provided. Bone and soft tissue windows were evaluated.    FINDINGS:  Motion limited study.    There is no acute intracranial hemorrhage, edema, or mass effect. Age-indeterminate right basal ganglia lacunar infarct (306-35). Patchy periventricular and subcortical white matter hypoattenuation compatible with chronic microvascular ischemic changes given the patient's age.  No extra-axial collection.  Mild to moderate generalized age-related cerebral volume loss. No hydrocephalus. Basal cisterns are patent.  There is mild mucosal thickening in the left frontal sinus, tiny mucous retention cyst versus polyp in the left maxillary sinus, the remaining shows paranasal sinuses are clear. There is a trace right mastoid air cell effusion, the right mastoid air cells are grossly clear.  Calvarium is intact. 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  Bilateral lens replacement.    IMPRESSION:    No acute intracranial hemorrhage or mass effect.  Age-indeterminate right basal ganglial lacunar infarct.  Chronic small vessel ischemic changes.   Neurology - Consult Note    -  Spectra: [phone] ([facility]), [phone] ([facility])  -    HPI: A 83M from [facility] [place] w bladder ca, CAD s/p stents, HTN, T2DM, LYUBOV, GERD, BPH, depression/anxiety, stage 4 decubiti c/b osteomyelitis on Flagyl/Augmentin until 3/4/24, AAA s/p graft/stent, recent UGIB (1/2024) iso corticosteroids, prior reports of EV, Fermin's esophagitis, substernal goiter, s/p b/l hip replacements presents from [facility] due to worsening sacral wounds. HR was 108 during transport and O2 wsa 99% and /72 glucose of 180. During transport, HR dropped to 30 with O2 of 76. Code stroke called for pt not talking and LUE being contracted. Currently LUE is some what contracted distally but pt can still open hand and pt is not aphasic but has moderate dysarthria. Per family at bedside pt was not aphasic last week and was using both of his upper extremities during physical therapy. At baseline, pt is bedbound (2/2 fall in December) and is AAOx 3, alert and needs help with ADLs.    NIH 19  preMRS 5    Review of Systems:    CONSTITUTIONAL: No fevers or chills  NEUROLOGICAL: +As stated in HPI above  SKIN: No itching, burning, rashes, or lesions   All other review of systems is negative unless indicated above.    Allergies:  No Known Allergies      PMHx/PSHx/Family Hx: As above, otherwise see below   Bladder cancer    CAD (coronary artery disease)    HTN (hypertension)    T2DM (type 2 diabetes mellitus)    LYUBOV (iron deficiency anemia)    GERD (gastroesophageal reflux disease)    BPH (benign prostatic hyperplasia)    Anxiety and depression    Sacral decubitus ulcer    Osteomyelitis    Abdominal aortic aneurysm    Upper GI bleed    Peptic ulcer disease    Goiter    Esophageal varix    Fermin esophagus    S/P hip replacement, bilateral    S/P repair of abdominal aortic aneurysm using straight graft        Social Hx:  No current use of tobacco, alcohol, or illicit drugs    Medications:  MEDICATIONS  (STANDING):  sodium chloride 0.9% Bolus 1000 milliLiter(s) IV Bolus once    MEDICATIONS  (PRN):      Vitals:  T(C): --  HR: --  BP: --  RR: --  SpO2: --    Physical Examination:   General - NAD  Cardiovascular - Peripheral pulses palpable, no edema  Eyes -  Fundoscopy not performed due to safety precautions in the setting of the COVID-19 pandemic    Neurologic Exam:  Mental status - Eyes closed, opens them to repeated verbal stimuli.  Oriented to self only. Speech fluent, repetition and naming intact. Perseverates. Follows simple commands intermittently but not always.     Cranial nerves - surgical pupils, blinks to threat bilaterally, EOMI, face sensation -unable to test, face without asymmetry b/l, hearing intact b/l, palate with symmetric elevation, trapezius strength- unable to test, tongue midline     Motor - Normal bulk  throughout. LUE contracted distally but pt is able to open his left hand on command. No pronator drift.  Strength/sensory testing  Unable to perform manual motor testing 2/2 mental status  Upper extremities antigravity at the elbow  No movement noted in lower extremities.    Sensation - Withdraws upper extremities to noxious stimuli. Does not withdraw lower extremities to noxious stimuli.     DTR's -             Biceps      Triceps     Brachioradialis      Patellar    Ankle    Toes/plantar response  R             2+             2+                  2+                       2+            2+                 Down  L              2+             2+                 2+                        2+           2+                 Down    Coordination - Unable to test due to mental status    Gait and station - pt is bed bound     Labs:          CAPILLARY BLOOD GLUCOSE      POCT Blood Glucose.: 168 mg/dL (29 Mar 2024 21:18)          CSF:                  Radiology:            ACC: [id number] EXAM: CT BRAIN STROKE PROTOCOL ORDERED BY: ENRIQUETA DEL TORO    PROCEDURE DATE: 03/29/2024        INTERPRETATION: CLINICAL INDICATION: Code stroke. Right arm contraction, altered mental status.    COMPARISON: Cervical spine CT 3/1/2024 was reviewed    TECHNIQUE: Axial noncontrast CT images of the head were obtained. Sagittal and coronal reformatted images were provided. Bone and soft tissue windows were evaluated.    FINDINGS:  Motion limited study.    There is no acute intracranial hemorrhage, edema, or mass effect. Age-indeterminate right basal ganglia lacunar infarct (306-35). Patchy periventricular and subcortical white matter hypoattenuation compatible with chronic microvascular ischemic changes given the patient's age.  No extra-axial collection.  Mild to moderate generalized age-related cerebral volume loss. No hydrocephalus. Basal cisterns are patent.  There is mild mucosal thickening in the left frontal sinus, tiny mucous retention cyst versus polyp in the left maxillary sinus, the remaining shows paranasal sinuses are clear. There is a trace right mastoid air cell effusion, the right mastoid air cells are grossly clear.  Calvarium is intact. 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  Bilateral lens replacement.    IMPRESSION:    No acute intracranial hemorrhage or mass effect.  Age-indeterminate right basal ganglial lacunar infarct.  Chronic small vessel ischemic changes.   Neurology - Consult Note    -  Spectra: [phone] ([facility]), [phone] ([facility])  -    HPI: A 83M from [facility] [place] w bladder ca, CAD s/p stents, HTN, T2DM, LYUBOV, GERD, BPH, depression/anxiety, stage 4 decubiti c/b osteomyelitis on Flagyl/Augmentin until 3/4/24, AAA s/p graft/stent, recent UGIB (1/2024) iso corticosteroids, prior reports of EV, Fermin's esophagitis, substernal goiter, s/p b/l hip replacements presents from [facility] due to worsening sacral wounds. HR was 108 during transport and O2 wsa 99% and /72 glucose of 180. During transport, HR dropped to 30 with O2 of 76. Code stroke called for pt not talking and LUE being contracted. Currently LUE is some what contracted distally but pt can still open hand and pt is not aphasic but has moderate dysarthria. Per family at bedside pt was not aphasic last week and was using both of his upper extremities during physical therapy. At baseline, pt is bedbound (2/2 fall in December) and is AAOx 3, alert and needs help with ADLs.    NIH 19  preMRS 5    Review of Systems:    CONSTITUTIONAL: No fevers or chills  NEUROLOGICAL: +As stated in HPI above  SKIN: No itching, burning, rashes, or lesions   All other review of systems is negative unless indicated above.    Allergies:  No Known Allergies      PMHx/PSHx/Family Hx: As above, otherwise see below   Bladder cancer    CAD (coronary artery disease)    HTN (hypertension)    T2DM (type 2 diabetes mellitus)    LYUBOV (iron deficiency anemia)    GERD (gastroesophageal reflux disease)    BPH (benign prostatic hyperplasia)    Anxiety and depression    Sacral decubitus ulcer    Osteomyelitis    Abdominal aortic aneurysm    Upper GI bleed    Peptic ulcer disease    Goiter    Esophageal varix    Fermin esophagus    S/P hip replacement, bilateral    S/P repair of abdominal aortic aneurysm using straight graft        Social Hx:  No current use of tobacco, alcohol, or illicit drugs    Medications:  MEDICATIONS  (STANDING):  sodium chloride 0.9% Bolus 1000 milliLiter(s) IV Bolus once    MEDICATIONS  (PRN):      Vitals:  T(C): --  HR: --  BP: --  RR: --  SpO2: --    Physical Examination:   General - NAD  Cardiovascular - Peripheral pulses palpable, no edema  Eyes -  Fundoscopy not performed due to safety precautions in the setting of the COVID-19 pandemic    Neurologic Exam:  Mental status - Eyes closed, opens them to repeated verbal stimuli.  Oriented to self only. Speech fluent, repetition and naming intact. Perseverates and intermittently responds to questions. Follows simple commands intermittently but not always.     Cranial nerves - surgical pupils, blinks to threat bilaterally, EOMI, face sensation -unable to test, face without asymmetry b/l, hearing intact b/l, palate with symmetric elevation, trapezius strength- unable to test, tongue midline     Motor - Normal bulk  throughout. LUE contracted distally but pt is able to open his left hand on command. No pronator drift.  Strength/sensory testing  Unable to perform manual motor testing 2/2 mental status  Upper extremities antigravity at the elbow  No movement noted in lower extremities.    Sensation - Withdraws upper extremities to noxious stimuli. Does not withdraw lower extremities to noxious stimuli.     DTR's -             Biceps      Triceps     Brachioradialis      Patellar    Ankle    Toes/plantar response  R             2+             2+                  2+                       2+            2+                 Down  L              2+             2+                 2+                        2+           2+                 Down    Coordination - Unable to test due to mental status    Gait and station - pt is bed bound     Labs:          CAPILLARY BLOOD GLUCOSE      POCT Blood Glucose.: 168 mg/dL (29 Mar 2024 21:18)          CSF:                  Radiology:            ACC: [id number] EXAM: CT BRAIN STROKE PROTOCOL ORDERED BY: ENRIQUETA DEL TORO    PROCEDURE DATE: 03/29/2024        INTERPRETATION: CLINICAL INDICATION: Code stroke. Right arm contraction, altered mental status.    COMPARISON: Cervical spine CT 3/1/2024 was reviewed    TECHNIQUE: Axial noncontrast CT images of the head were obtained. Sagittal and coronal reformatted images were provided. Bone and soft tissue windows were evaluated.    FINDINGS:  Motion limited study.    There is no acute intracranial hemorrhage, edema, or mass effect. Age-indeterminate right basal ganglia lacunar infarct (306-35). Patchy periventricular and subcortical white matter hypoattenuation compatible with chronic microvascular ischemic changes given the patient's age.  No extra-axial collection.  Mild to moderate generalized age-related cerebral volume loss. No hydrocephalus. Basal cisterns are patent.  There is mild mucosal thickening in the left frontal sinus, tiny mucous retention cyst versus polyp in the left maxillary sinus, the remaining shows paranasal sinuses are clear. There is a trace right mastoid air cell effusion, the right mastoid air cells are grossly clear.  Calvarium is intact. 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  Bilateral lens replacement.    IMPRESSION:    No acute intracranial hemorrhage or mass effect.  Age-indeterminate right basal ganglial lacunar infarct.  Chronic small vessel ischemic changes.   Neurology - Consult Note    -  Spectra: [phone] ([facility]), [phone] ([facility])  -    HPI: A 83M from [facility] LAURITA w bladder ca, CAD s/p stents, HTN, T2DM, LYUBOV, GERD, BPH, depression/anxiety, stage 4 decubiti c/b osteomyelitis on Flagyl/Augmentin until 3/4/24, AAA s/p graft/stent, recent UGIB (1/2024) iso corticosteroids, prior reports of EV, Fermin's esophagitis, substernal goiter, s/p b/l hip replacements presents from [facility] due to worsening sacral wounds. HR was 108 during transport and O2 wsa 99% and /72 glucose of 180. During transport, HR dropped to 30 with O2 of 76. Code stroke called for pt not talking and LUE being contracted. Currently LUE is some what contracted distally but pt can still open hand and pt is not aphasic but has moderate dysarthria. Per family at bedside pt was not aphasic last week and was using both of his upper extremities during physical therapy. At baseline, pt is bedbound (2/2 fall in December) and is AAOx 3, alert and needs help with ADLs.    NIH 19  preMRS 5    Review of Systems:    CONSTITUTIONAL: No fevers or chills  NEUROLOGICAL: +As stated in HPI above  SKIN: No itching, burning, rashes, or lesions   All other review of systems is negative unless indicated above.    Allergies:  No Known Allergies      PMHx/PSHx/Family Hx: As above, otherwise see below   Bladder cancer    CAD (coronary artery disease)    HTN (hypertension)    T2DM (type 2 diabetes mellitus)    LYUBOV (iron deficiency anemia)    GERD (gastroesophageal reflux disease)    BPH (benign prostatic hyperplasia)    Anxiety and depression    Sacral decubitus ulcer    Osteomyelitis    Abdominal aortic aneurysm    Upper GI bleed    Peptic ulcer disease    Goiter    Esophageal varix    Fermin esophagus    S/P hip replacement, bilateral    S/P repair of abdominal aortic aneurysm using straight graft        Social Hx:  No current use of tobacco, alcohol, or illicit drugs    Medications:  MEDICATIONS  (STANDING):  sodium chloride 0.9% Bolus 1000 milliLiter(s) IV Bolus once    MEDICATIONS  (PRN):      Vitals:  T(C): --  HR: --  BP: --  RR: --  SpO2: --    Physical Examination:   General - NAD  Cardiovascular - Peripheral pulses palpable, no edema  Eyes -  Fundoscopy not performed due to safety precautions in the setting of the COVID-19 pandemic    Neurologic Exam:  Mental status - Eyes closed, opens them to repeated verbal stimuli.  Oriented to self only. Speech fluent, repetition and naming intact. Perseverates and intermittently responds to questions. Follows simple commands intermittently but not always.     Cranial nerves - surgical pupils, blinks to threat bilaterally, EOMI, face sensation -unable to test, face without asymmetry b/l, hearing intact b/l, palate with symmetric elevation, trapezius strength- unable to test, tongue midline     Motor - Normal bulk  throughout. LUE contracted distally but pt is able to open his left hand on command. No pronator drift.  Strength/sensory testing  Unable to perform manual motor testing 2/2 mental status  Upper extremities antigravity at the elbow  No movement noted in lower extremities.    On reexam,  strength is 5/5 in L hand.     Sensation - Withdraws upper extremities to noxious stimuli. Does not withdraw lower extremities to noxious stimuli.     DTR's -             Biceps      Triceps     Brachioradialis      Patellar    Ankle    Toes/plantar response  R             2+             2+                  2+                       2+            2+                 Down  L              2+             2+                 2+                        2+           2+                 Down    Coordination - Unable to test due to mental status    Gait and station - pt is bed bound     Labs:          CAPILLARY BLOOD GLUCOSE      POCT Blood Glucose.: 168 mg/dL (29 Mar 2024 21:18)          CSF:                  Radiology:            ACC: [id number] EXAM: CT BRAIN STROKE PROTOCOL ORDERED BY: ENRIQUETA DEL TORO    PROCEDURE DATE: 03/29/2024        INTERPRETATION: CLINICAL INDICATION: Code stroke. Right arm contraction, altered mental status.    COMPARISON: Cervical spine CT 3/1/2024 was reviewed    TECHNIQUE: Axial noncontrast CT images of the head were obtained. Sagittal and coronal reformatted images were provided. Bone and soft tissue windows were evaluated.    FINDINGS:  Motion limited study.    There is no acute intracranial hemorrhage, edema, or mass effect. Age-indeterminate right basal ganglia lacunar infarct (306-35). Patchy periventricular and subcortical white matter hypoattenuation compatible with chronic microvascular ischemic changes given the patient's age.  No extra-axial collection.  Mild to moderate generalized age-related cerebral volume loss. No hydrocephalus. Basal cisterns are patent.  There is mild mucosal thickening in the left frontal sinus, tiny mucous retention cyst versus polyp in the left maxillary sinus, the remaining shows paranasal sinuses are clear. There is a trace right mastoid air cell effusion, the right mastoid air cells are grossly clear.  Calvarium is intact. 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  Bilateral lens replacement.    IMPRESSION:    No acute intracranial hemorrhage or mass effect.  Age-indeterminate right basal ganglial lacunar infarct.  Chronic small vessel ischemic changes.   Neurology - Consult Note    -  Spectra: [id number] ([facility]), [phone] ([facility])  -    HPI: A 83M from [facility] w bladder ca, CAD s/p stents, HTN, T2DM, LYUBOV, GERD, BPH, depression/anxiety, stage 4 decubiti c/b osteomyelitis on Flagyl/Augmentin until 3/4/24, AAA s/p graft/stent, recent UGIB (1/2024) iso corticosteroids, prior reports of EV, Fermin's esophagitis, substernal goiter, s/p b/l hip replacements presents from [facility] due to worsening sacral wounds. HR was 108 during transport and O2 was 99% and /72 glucose of 180. During transport, HR dropped to 30 with O2 of 76. Code stroke called for pt not talking and LUE being contracted. Currently LUE is some what contracted distally but pt can still open hand and has full  strength and pt is not aphasic but has moderate dysarthria. Per family at bedside pt was not aphasic last week and was using both of his upper extremities during physical therapy. At baseline, pt is bedbound (2/2 fall in December) and is AAOx 3, alert and needs help with ADLs.    NIH 19  preMRS 5    Review of Systems:    CONSTITUTIONAL: No fevers or chills  NEUROLOGICAL: +As stated in HPI above  SKIN: No itching, burning, rashes, or lesions   All other review of systems is negative unless indicated above.    Allergies:  No Known Allergies      PMHx/PSHx/Family Hx: As above, otherwise see below   Bladder cancer    CAD (coronary artery disease)    HTN (hypertension)    T2DM (type 2 diabetes mellitus)    LYUBOV (iron deficiency anemia)    GERD (gastroesophageal reflux disease)    BPH (benign prostatic hyperplasia)    Anxiety and depression    Sacral decubitus ulcer    Osteomyelitis    Abdominal aortic aneurysm    Upper GI bleed    Peptic ulcer disease    Goiter    Esophageal varix    Fermin esophagus    S/P hip replacement, bilateral    S/P repair of abdominal aortic aneurysm using straight graft        Social Hx:  No current use of tobacco, alcohol, or illicit drugs    Medications:  MEDICATIONS  (STANDING):  sodium chloride 0.9% Bolus 1000 milliLiter(s) IV Bolus once    MEDICATIONS  (PRN):      Vitals:  T(C): --  HR: --  BP: --  RR: --  SpO2: --    Physical Examination:   General - NAD  Cardiovascular - Peripheral pulses palpable, no edema  Eyes -  Fundoscopy not performed due to safety precautions in the setting of the COVID-19 pandemic    Neurologic Exam:  Mental status - Eyes closed, opens them to repeated verbal stimuli.  Oriented to self only. Speech fluent, repetition and naming intact. Perseverates and intermittently responds to questions. Follows simple commands intermittently but not always.     Cranial nerves - surgical pupils, blinks to threat bilaterally, EOMI, face sensation -unable to test, face without asymmetry b/l, hearing intact b/l, palate with symmetric elevation, trapezius strength- unable to test, tongue midline     Motor - Normal bulk  throughout. LUE contracted distally but pt is able to open his left hand on command. No pronator drift.  Strength/sensory testing  Unable to perform manual motor testing 2/2 mental status  Upper extremities antigravity at the elbow  No movement noted in lower extremities.    On reexam,  strength is 5/5 in L hand.     Sensation - Withdraws upper extremities to noxious stimuli. Does not withdraw lower extremities to noxious stimuli.     DTR's -             Biceps      Triceps     Brachioradialis      Patellar    Ankle    Toes/plantar response  R             2+             2+                  2+                       2+            2+                 Down  L              2+             2+                 2+                        2+           2+                 Down    Coordination - Unable to test due to mental status    Gait and station - pt is bed bound     Labs:          CAPILLARY BLOOD GLUCOSE      POCT Blood Glucose.: 168 mg/dL (29 Mar 2024 21:18)          CSF:                  Radiology:            ACC: [id number] EXAM: CT BRAIN STROKE PROTOCOL ORDERED BY: ENRIQUETA DEL TORO    PROCEDURE DATE: 03/29/2024        INTERPRETATION: CLINICAL INDICATION: Code stroke. Right arm contraction, altered mental status.    COMPARISON: Cervical spine CT 3/1/2024 was reviewed    TECHNIQUE: Axial noncontrast CT images of the head were obtained. Sagittal and coronal reformatted images were provided. Bone and soft tissue windows were evaluated.    FINDINGS:  Motion limited study.    There is no acute intracranial hemorrhage, edema, or mass effect. Age-indeterminate right basal ganglia lacunar infarct (306-35). Patchy periventricular and subcortical white matter hypoattenuation compatible with chronic microvascular ischemic changes given the patient's age.  No extra-axial collection.  Mild to moderate generalized age-related cerebral volume loss. No hydrocephalus. Basal cisterns are patent.  There is mild mucosal thickening in the left frontal sinus, tiny mucous retention cyst versus polyp in the left maxillary sinus, the remaining shows paranasal sinuses are clear. There is a trace right mastoid air cell effusion, the right mastoid air cells are grossly clear.  Calvarium is intact. 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  Bilateral lens replacement.    IMPRESSION:    No acute intracranial hemorrhage or mass effect.  Age-indeterminate right basal ganglial lacunar infarct.  Chronic small vessel ischemic changes.

## 2024-03-29 NOTE — ED PROVIDER NOTE - ATTENDING CONTRIBUTION TO CARE
83M with h/o abdominal aortic aneurysm, anxiety, depression, Fermin's esophagus, bladder cancer, BPH, CAD, esophageal varices, GERD, hypertension, osteomyelitis, peptic ulcer disease, large sacral decubitus ulcer, diabetes, upper GI bleed referred from [facility] by family due to concern for altered mental status and worsening sacral wound. Pt is unable to provide history, though he can follow simple commands. Per aide, pt is conversant at baseline. On initial ED evaluation, the patient is acutely altered with possible decreased motor function to LUE, so activated stroke alert. EMS also notes that he had episode of bradycardia and hypoxia upon ED arrival. On exam, pt also has large sacral wound without evidence of new/active drainage, bleeding, purulence, or erythema. No abd TTP or distension, rigidity or guarding.    DDX/PLAN: Concern for acute CVA, ACS, PE, dissection, sepsis. Will obtain CT per stroke protocol, CTA Chest, CTAP, labs, urine studies, blood cultures and treat empirically for sepsis of unclear origin. Anticipate admission.

## 2024-03-29 NOTE — ED PROVIDER NOTE - PROGRESS NOTE DETAILS
Collateral obtained from [facility] regarding patient. as per dyana taylor, family member called 911, for unknown reason. eliquis 5mg BID for Afib. patient refused his meds this morning and did not recieved elquis. last took it 8pm on 3/28/24. mental baseline. as per dyana taylor patient would not have a full coversation today, yes/no. usually speaks in full sentences. cannot ambulate at baseline. upper ext usually full ROM w/o contraction. Prisca Bermudez MD (PGY-2 EM): large PE w/ concern for RH strain. consulted cards. ordered heparin. Prisca Bermudez MD (PGY-2 EM): admitting to CCU. discussed w/ CCU PA sacral wound findings w/ gas. CCU will discuss w/ surgery. patient given vanc, zosyn.

## 2024-03-29 NOTE — ED PROVIDER NOTE - CLINICAL SUMMARY MEDICAL DECISION MAKING FREE TEXT BOX
83-year-old male past medical history significant for abdominal aortic aneurysm, anxiety, depression, Fermin's esophagus, bladder cancer, BPH, CAD, esophageal varices, GERD, hypertension, osteomyelitis, peptic ulcer disease, sacral decubitus ulcer, diabetes, upper GI bleed presents emergency department from [facility] due to worsening sacral wound. patient was called code stroke, will get CT imaging to eval neuro pathology. will get CTA of chest to eval PE 2/2 hypoxia. will get CT abd/pelvis to eval depth of sacral ulcer. will give abx, fluids, re-eval.

## 2024-03-29 NOTE — ED PROVIDER NOTE - OBJECTIVE STATEMENT
83-year-old male past medical history significant for abdominal aortic aneurysm, anxiety, depression, Fermin's esophagus, bladder cancer, BPH, CAD, esophageal varices, GERD, hypertension, osteomyelitis, peptic ulcer disease, sacral decubitus ulcer, diabetes, upper GI bleed presents emergency department from [facility] due to worsening sacral wound.  Patient was brought in by EMS, while patient was coming to the emergency department patient bradycardia down to 40s, became hypoxic and was put on 6 L.  Patient started having dysarthria, contraction of left upper extremity.  Code stroke was called at this time.  unclear if patient is on bloodthinners.

## 2024-03-30 NOTE — CONSULT NOTE ADULT - ATTENDING COMMENTS
83 y.o. male with bladder ca, CAD s/p stents, HTN, T2DM, LYUBOV, GERD, BPH, depression/anxiety, stage 4 decubitus sacral wound c/b osteomyelitis with ongoing abx treatment, AAA s/p graft/stent, recent UGIB, Fermin's esophagitis, s/p b/l hip replacements admitted for Intermediate risk PE.     Plan   - c/w Heparin gtt   - Maintain SaO2>90%   - Wean off supplemental O2 as tolerated   - Risk of bleeding with AC and tPA administration given hx of GI bleeding  - Given the patient's advance age, poor functional status would opt for conservative management  - TTE   - Duplex of the LE   - The patient was on eliquis as an outpatient, may be considered DOAC failure; may require Lovenox as an outpatient   - Patient needs GOC; consider Palliative consult     Not a MICU Candidate at this time. Please call back if needed.

## 2024-03-30 NOTE — PROGRESS NOTE ADULT - SUBJECTIVE AND OBJECTIVE BOX
CCU Service  Cardiology   Spect: [id number] ([facility])     PATIENT: MERLINE RODRIGUEZ, MRN: [id number]    CHIEF COMPLAINT: Patient is a 83y old  Male who presents with a chief complaint of     transferred to CCU for    INTERVAL HISTORY/OVERNIGHT EVENTS:     TELEMETRY:     EKG:       ALLERGIES: Allergies    No Known Allergies    Intolerances        MEDICATIONS:  MEDICATIONS  (STANDING):  chlorhexidine 2% Cloths 1 Application(s) Topical <User Schedule>  heparin  Infusion.  Unit(s)/Hr (11 mL/Hr) IV Continuous <Continuous>  norepinephrine Infusion 0.05 MICROgram(s)/kG/Min (5.59 mL/Hr) IV Continuous <Continuous>    MEDICATIONS  (PRN):  heparin   Injectable 2000 Unit(s) IV Push every 6 hours PRN For aPTT between 40 - 57  heparin   Injectable 4500 Unit(s) IV Push every 6 hours PRN For aPTT less than 40        OBJECTIVE:  ICU Vital Signs Last 24 Hrs  T(C): 36.9 (30 Mar 2024 02:45), Max: 37.3 (29 Mar 2024 22:29)  T(F): 98.5 (30 Mar 2024 02:45), Max: 99.2 (29 Mar 2024 22:29)  HR: 107 (30 Mar 2024 04:00) (107 - 119)  BP: 88/64 (30 Mar 2024 04:00) (71/56 - 129/89)  BP(mean): 72 (30 Mar 2024 04:00) (63 - 77)  ABP: --  ABP(mean): --  RR: 19 (30 Mar 2024 04:00) (16 - 34)  SpO2: 94% (30 Mar 2024 04:00) (92% - 100%)    O2 Parameters below as of 30 Mar 2024 04:00  Patient On (Oxygen Delivery Method): nasal cannula  O2 Flow (L/min): 2          I&O's Summary    Daily Height in cm: 187.96 (29 Mar 2024 21:08)    Daily       PHYSICAL EXAMINATION:  General: Comfortable, no acute distress, cooperative with exam.  HEENT: Moist mucous membranes.  Respiratory: CTAB, normal respiratory effort, no coughing, wheezes, crackles, or rales.  CV: RRR, S1S2, no murmurs, rubs or gallops. No JVD. Distal pulses intact.  Abdominal: Soft, nontender, nondistended, no rebound or guarding, normal bowel sounds.  Neurology: AOx3, no focal neuro defects, MORROW x 4.  Extremities: No pitting edema, + Peripheral pulses.          LABS:                          7.4    22.05 )-----------( 202      ( 30 Mar 2024 04:37 )             22.9     03-30    140  |  112<H>  |  56<H>  ----------------------------<  156<H>  4.7   |  14<L>  |  1.21    Ca    7.2<L>      30 Mar 2024 04:37  Phos  3.4     03-30  Mg     1.20     03-30    TPro  4.9<L>  /  Alb  1.3<L>  /  TBili  0.2  /  DBili  x   /  AST  13  /  ALT  15  /  AlkPhos  481<H>  03-30    LIVER FUNCTIONS - ( 30 Mar 2024 04:37 )  Alb: 1.3 g/dL / Pro: 4.9 g/dL / ALK PHOS: 481 U/L / ALT: 15 U/L / AST: 13 U/L / GGT: x           PT/INR - ( 29 Mar 2024 21:20 )   PT: 27.2 sec;   INR: 2.47 ratio         PTT - ( 30 Mar 2024 04:37 )  PTT:104.3 sec  Creatine Kinase, Serum: 35 U/L (03-29 @ 21:20)  CKMB Units: 3.8 ng/mL (03-29 @ 21:20)    CARDIAC MARKERS ( 29 Mar 2024 21:20 )  x     / x     / 35 U/L / x     / 3.8 ng/mL      Urinalysis Basic - ( 30 Mar 2024 04:37 )    Color: x / Appearance: x / SG: x / pH: x  Gluc: 156 mg/dL / Ketone: x  / Bili: x / Urobili: x   Blood: x / Protein: x / Nitrite: x   Leuk Esterase: x / RBC: x / WBC x   Sq Epi: x / Non Sq Epi: x / Bacteria: x    Assessment:  · Assessment	  83M from [facility] CAD s/p stents, HTN, T2DM, AAA s/p graft/stent, recent UGIB, Fermin's esophagitis, GERD, BPH, Bladder Cancer, 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 Patient  presents to the emergency department from [facility] due to worsening sacral wound with imaging of osteomyelitis. During transport, HR dropped to 30 with O2 of 76, was placed on 6L NC. While in the ED, patient started having dysarthria, contraction of left upper extremity. Code stroke was called CT head and brain negative for infarct. CT Angio of chest showed an acute left lower lobe PE with findings of right heart strain with left femoral DVT. In ED, pt. started on heparin gtt. for PE, levophed started  for hypotension.     Plan     Neuro   Alert and oriented x 1, unable to follow commands   -Code Stroke called in ED, CT HEAD negative   #  Anxiety and depression.  - c/w home mirtazapine, buspirone.    Pulmonary  #Pulmonary Embolism  Patient p/w SOB.  tachycardic    CT chest revealed Acute left lower lobe pulmonary emboli extending to the posterobasal   segmental and subsegmental pulmonary branches. Right heart strain is   present. Recommend echocardiography.    Small nonenhancing peripheral opacity in the left lung base, most   suspicious for pulmonary infarct.    -Started on heparin gtt for PE  -Pt. 100% O2 on 2L NC    -WELLS Score  -Would not get CTPA 2/2 ANTONI ceatnine   -V/Q scan   -Trend cardiac enzymes and proBNP to exclude cardiac dysfunction.    -TTE in am, stat if hemodynamically unstable.  -TPA for acute PE, alteplase 100mg over 2 hours, followed by heparin gtt, no bolus, follow full anticoagulation protocol goal PTT 58-99    CARDIAC  #CAD (coronary artery disease).   s/p PCI, stents. ECG w/o c/f ischemia  - c/w statin  -hold aspirin for now.    # HTN (hypertension).  - hold home losartan, toprol iso hypotension.    GI  # hx prior UGIB w/ ulcers  - PPI    # BPH (benign prostatic hyperplasia).  ·  Plan: - c/w home finasteride, tamsulosin  - monitor urine output; consider bladder scan.       ANTONI (acute kidney injury).    Likely prerenal iso hypovolemia  - trend BUN/SCr, avoid nephrotoxic, renally dose all meds  - strict I/Os   - urine studies if not improving.    #  BPH (benign prostatic hyperplasia).  - Plan: - c/w home finasteride, tamsulosin  - monitor urine output; consider bladder scan.    ENDO  # diabetes mellitus.  - Home regimen metformin 1g qd, tradjenta 5mg qd  - f/u A1c  - c/w INOCENTE, FSG ACHS or q6h if NPO; goal glucose .      HEME  ·  Problem: Anemia.  ·  Plan: -anemia has hx prior UGIB w/ ulcers  -BUN/Cr ratio elevated  - transfuse to goals below  - ctm BP q4h  - likely history of HFpEF per [facility] documentation noting [facility] TTE w LVEF 43%, caution after blood can give lasix 20 mg iv times 1  -MCV elevated, is on oral B12, can obtain B12 /FA/hapt/LDH as well  -email sent to GI.    ID  #Systemic inflammatory response syndrome (SIRS).  -likely reactive in setting of anemia  -wound care consult for sacral ulcer, though afebrile and no neutrophil shift can hold off on further antibiotics for now.  - monitor for systemic infectious signs  - trend WBC, fever curve  - f/u BCx  - will hold off on IV antibiotics at this time; reevaluate need  - obtain records from [facility] regarding any bone cultures or workup for osteomyelitis      SKIN  Sacral decubitus ulcer, stage IV.   Chronic sacral and back decubiti ulcer c/b osteomyelitis. Unclear if organism identified, but was discharged from [facility] with 6 weeks of Augmentin/Flagyl due to end 3/4/24.      #DVT   Acute L prox great saphenous vein extending into common femoral DVT. Nonocculsive on DVT study.     Access: PIV     Code Status: FULL CODE; would engage in further GoC  Dispo: pending clinical improvement, likely return to [facility] if able.   CCU Service  Cardiology   Spect: [id number] ([facility])     PATIENT: MERLINE RODRIGUEZ, MRN: [id number]    CHIEF COMPLAINT: 83M from [facility] CAD s/p stents, HTN, T2DM, AAA s/p graft/stent, recent UGIB, Fermin's esophagitis, GERD, BPH, Bladder Cancer, 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 presents with worsening sacral wound with imaging of osteomyelitis. Code stroke was called CT head and brain negative for infarct.    Transferred to CCU after CT Angio of chest showed an acute left lower lobe PE with findings of right heart strain with left femoral DVT. In ED, pt. started on heparin gtt. for PE, levophed started  for hypotension.     INTERVAL HISTORY/OVERNIGHT EVENTS:    TELEMETRY:     EKG:       ALLERGIES: Allergies    No Known Allergies    Intolerances        MEDICATIONS:  MEDICATIONS  (STANDING):  chlorhexidine 2% Cloths 1 Application(s) Topical <User Schedule>  heparin  Infusion.  Unit(s)/Hr (11 mL/Hr) IV Continuous <Continuous>  norepinephrine Infusion 0.05 MICROgram(s)/kG/Min (5.59 mL/Hr) IV Continuous <Continuous>    MEDICATIONS  (PRN):  heparin   Injectable 2000 Unit(s) IV Push every 6 hours PRN For aPTT between 40 - 57  heparin   Injectable 4500 Unit(s) IV Push every 6 hours PRN For aPTT less than 40        OBJECTIVE:  ICU Vital Signs Last 24 Hrs  T(C): 36.9 (30 Mar 2024 02:45), Max: 37.3 (29 Mar 2024 22:29)  T(F): 98.5 (30 Mar 2024 02:45), Max: 99.2 (29 Mar 2024 22:29)  HR: 107 (30 Mar 2024 04:00) (107 - 119)  BP: 88/64 (30 Mar 2024 04:00) (71/56 - 129/89)  BP(mean): 72 (30 Mar 2024 04:00) (63 - 77)  ABP: --  ABP(mean): --  RR: 19 (30 Mar 2024 04:00) (16 - 34)  SpO2: 94% (30 Mar 2024 04:00) (92% - 100%)    O2 Parameters below as of 30 Mar 2024 04:00  Patient On (Oxygen Delivery Method): nasal cannula  O2 Flow (L/min): 2          I&O's Summary    Daily Height in cm: 187.96 (29 Mar 2024 21:08)    Daily       PHYSICAL EXAMINATION:  General: Comfortable, no acute distress, cooperative with exam.  HEENT: Moist mucous membranes.  Respiratory: CTAB, normal respiratory effort, no coughing, wheezes, crackles, or rales.  CV: RRR, S1S2, no murmurs, rubs or gallops. No JVD. Distal pulses intact.  Abdominal: Soft, nontender, nondistended, no rebound or guarding, normal bowel sounds.  Neurology: AOx3, no focal neuro defects, MORROW x 4.  Extremities: No pitting edema, + Peripheral pulses.          LABS:                          7.4    22.05 )-----------( 202      ( 30 Mar 2024 04:37 )             22.9     03-30    140  |  112<H>  |  56<H>  ----------------------------<  156<H>  4.7   |  14<L>  |  1.21    Ca    7.2<L>      30 Mar 2024 04:37  Phos  3.4     03-30  Mg     1.20     03-30    TPro  4.9<L>  /  Alb  1.3<L>  /  TBili  0.2  /  DBili  x   /  AST  13  /  ALT  15  /  AlkPhos  481<H>  03-30    LIVER FUNCTIONS - ( 30 Mar 2024 04:37 )  Alb: 1.3 g/dL / Pro: 4.9 g/dL / ALK PHOS: 481 U/L / ALT: 15 U/L / AST: 13 U/L / GGT: x           PT/INR - ( 29 Mar 2024 21:20 )   PT: 27.2 sec;   INR: 2.47 ratio         PTT - ( 30 Mar 2024 04:37 )  PTT:104.3 sec  Creatine Kinase, Serum: 35 U/L (03-29 @ 21:20)  CKMB Units: 3.8 ng/mL (03-29 @ 21:20)    CARDIAC MARKERS ( 29 Mar 2024 21:20 )  x     / x     / 35 U/L / x     / 3.8 ng/mL      Urinalysis Basic - ( 30 Mar 2024 04:37 )    Color: x / Appearance: x / SG: x / pH: x  Gluc: 156 mg/dL / Ketone: x  / Bili: x / Urobili: x   Blood: x / Protein: x / Nitrite: x   Leuk Esterase: x / RBC: x / WBC x   Sq Epi: x / Non Sq Epi: x / Bacteria: x    Assessment:  · Assessment	  83M from [facility] CAD s/p stents, HTN, T2DM, AAA s/p graft/stent, recent UGIB, Fermin's esophagitis, GERD, BPH, Bladder Cancer, 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 Patient  presents to the emergency department from [facility] due to worsening sacral wound with imaging of osteomyelitis. During transport, HR dropped to 30 with O2 of 76, was placed on 6L NC. While in the ED, patient started having dysarthria, contraction of left upper extremity. Code stroke was called CT head and brain negative for infarct. CT Angio of chest showed an acute left lower lobe PE with findings of right heart strain with left femoral DVT. In ED, pt. started on heparin gtt. for PE, levophed started  for hypotension.     Plan     Neuro   Alert and oriented x 1, unable to follow commands   -Code Stroke called in ED, CT HEAD negative   #  Anxiety and depression.  - c/w home mirtazapine, buspirone.    Pulmonary  #Pulmonary Embolism  Patient p/w SOB.  tachycardic    CT chest revealed Acute left lower lobe pulmonary emboli extending to the posterobasal   segmental and subsegmental pulmonary branches. Right heart strain is   present. Recommend echocardiography.    Small nonenhancing peripheral opacity in the left lung base, most   suspicious for pulmonary infarct.    -Started on heparin gtt for PE  -Pt. 100% O2 on 2L NC    -WELLS Score  -Would not get CTPA 2/2 ANTONI ceatnine   -V/Q scan   -Trend cardiac enzymes and proBNP to exclude cardiac dysfunction.    -TTE in am, stat if hemodynamically unstable.  -TPA for acute PE, alteplase 100mg over 2 hours, followed by heparin gtt, no bolus, follow full anticoagulation protocol goal PTT 58-99    CARDIAC  #CAD (coronary artery disease).   s/p PCI, stents. ECG w/o c/f ischemia  - c/w statin  -hold aspirin for now.    # HTN (hypertension).  - hold home losartan, toprol iso hypotension.    GI  # hx prior UGIB w/ ulcers  - PPI    # BPH (benign prostatic hyperplasia).  ·  Plan: - c/w home finasteride, tamsulosin  - monitor urine output; consider bladder scan.       ANTONI (acute kidney injury).    Likely prerenal iso hypovolemia  - trend BUN/SCr, avoid nephrotoxic, renally dose all meds  - strict I/Os   - urine studies if not improving.    #  BPH (benign prostatic hyperplasia).  - Plan: - c/w home finasteride, tamsulosin  - monitor urine output; consider bladder scan.    ENDO  # diabetes mellitus.  - Home regimen metformin 1g qd, tradjenta 5mg qd  - f/u A1c  - c/w INOCENTE, FSG ACHS or q6h if NPO; goal glucose .      HEME  ·  Problem: Anemia.  ·  Plan: -anemia has hx prior UGIB w/ ulcers  -BUN/Cr ratio elevated  - transfuse to goals below  - ctm BP q4h  - likely history of HFpEF per [facility] documentation noting [facility] TTE w LVEF 43%, caution after blood can give lasix 20 mg iv times 1  -MCV elevated, is on oral B12, can obtain B12 /FA/hapt/LDH as well  -email sent to GI.    ID  #Systemic inflammatory response syndrome (SIRS).  -likely reactive in setting of anemia  -wound care consult for sacral ulcer, though afebrile and no neutrophil shift can hold off on further antibiotics for now.  - monitor for systemic infectious signs  - trend WBC, fever curve  - f/u BCx  - will hold off on IV antibiotics at this time; reevaluate need  - obtain records from [facility] regarding any bone cultures or workup for osteomyelitis      SKIN  Sacral decubitus ulcer, stage IV.   Chronic sacral and back decubiti ulcer c/b osteomyelitis. Unclear if organism identified, but was discharged from [facility] with 6 weeks of Augmentin/Flagyl due to end 3/4/24.      #DVT   Acute L prox great saphenous vein extending into common femoral DVT. Nonocculsive on DVT study.     Access: PIV     Code Status: FULL CODE; would engage in further GoC  Dispo: pending clinical improvement, likely return to [facility] if able.   CCU Service  Cardiology   Spect: [id number] ([facility])     PATIENT: MERLINE RODRIGUEZ, MRN: [id number]    CHIEF COMPLAINT: 83M from [facility] CAD s/p stents, HTN, T2DM, AAA s/p graft/stent, recent UGIB, Fermin's esophagitis, GERD, BPH, Bladder Cancer, 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 presents with worsening sacral wound with imaging of osteomyelitis. Code stroke was called CT head and brain negative for infarct.    Transferred to CCU after CT Angio of chest showed an acute left lower lobe PE with findings of right heart strain with left femoral DVT. In ED, pt. started on heparin gtt. for PE, levophed started  for hypotension.     INTERVAL HISTORY/OVERNIGHT EVENTS:  Levo 0.1mcg/kg/mn    TELEMETRY:     EKG:       ALLERGIES: Allergies    No Known Allergies    Intolerances        MEDICATIONS:  MEDICATIONS  (STANDING):  chlorhexidine 2% Cloths 1 Application(s) Topical <User Schedule>  heparin  Infusion.  Unit(s)/Hr (11 mL/Hr) IV Continuous <Continuous>  norepinephrine Infusion 0.05 MICROgram(s)/kG/Min (5.59 mL/Hr) IV Continuous <Continuous>    MEDICATIONS  (PRN):  heparin   Injectable 2000 Unit(s) IV Push every 6 hours PRN For aPTT between 40 - 57  heparin   Injectable 4500 Unit(s) IV Push every 6 hours PRN For aPTT less than 40        OBJECTIVE:  ICU Vital Signs Last 24 Hrs  T(C): 36.9 (30 Mar 2024 02:45), Max: 37.3 (29 Mar 2024 22:29)  T(F): 98.5 (30 Mar 2024 02:45), Max: 99.2 (29 Mar 2024 22:29)  HR: 107 (30 Mar 2024 04:00) (107 - 119)  BP: 88/64 (30 Mar 2024 04:00) (71/56 - 129/89)  BP(mean): 72 (30 Mar 2024 04:00) (63 - 77)  ABP: --  ABP(mean): --  RR: 19 (30 Mar 2024 04:00) (16 - 34)  SpO2: 94% (30 Mar 2024 04:00) (92% - 100%)    O2 Parameters below as of 30 Mar 2024 04:00  Patient On (Oxygen Delivery Method): nasal cannula  O2 Flow (L/min): 2          I&O's Summary    Daily Height in cm: 187.96 (29 Mar 2024 21:08)    Daily       PHYSICAL EXAMINATION:  General: Comfortable, no acute distress, cooperative with exam.  HEENT: Moist mucous membranes.  Respiratory: CTAB, normal respiratory effort, no coughing, wheezes, crackles, or rales.  CV: RRR, S1S2, no murmurs, rubs or gallops. No JVD. Distal pulses intact.  Abdominal: Soft, nontender, nondistended, no rebound or guarding, normal bowel sounds.  Neurology: AOx3, no focal neuro defects, MORROW x 4.  Extremities: No pitting edema, + Peripheral pulses.          LABS:                          7.4    22.05 )-----------( 202      ( 30 Mar 2024 04:37 )             22.9     03-30    140  |  112<H>  |  56<H>  ----------------------------<  156<H>  4.7   |  14<L>  |  1.21    Ca    7.2<L>      30 Mar 2024 04:37  Phos  3.4     03-30  Mg     1.20     03-30    TPro  4.9<L>  /  Alb  1.3<L>  /  TBili  0.2  /  DBili  x   /  AST  13  /  ALT  15  /  AlkPhos  481<H>  03-30    LIVER FUNCTIONS - ( 30 Mar 2024 04:37 )  Alb: 1.3 g/dL / Pro: 4.9 g/dL / ALK PHOS: 481 U/L / ALT: 15 U/L / AST: 13 U/L / GGT: x           PT/INR - ( 29 Mar 2024 21:20 )   PT: 27.2 sec;   INR: 2.47 ratio         PTT - ( 30 Mar 2024 04:37 )  PTT:104.3 sec  Creatine Kinase, Serum: 35 U/L (03-29 @ 21:20)  CKMB Units: 3.8 ng/mL (03-29 @ 21:20)    CARDIAC MARKERS ( 29 Mar 2024 21:20 )  x     / x     / 35 U/L / x     / 3.8 ng/mL      Urinalysis Basic - ( 30 Mar 2024 04:37 )    Color: x / Appearance: x / SG: x / pH: x  Gluc: 156 mg/dL / Ketone: x  / Bili: x / Urobili: x   Blood: x / Protein: x / Nitrite: x   Leuk Esterase: x / RBC: x / WBC x   Sq Epi: x / Non Sq Epi: x / Bacteria: x    Assessment:  · Assessment	  83M from [facility] CAD s/p stents, HTN, T2DM, AAA s/p graft/stent, recent UGIB, Fermin's esophagitis, GERD, BPH, Bladder Cancer, 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 Patient  presents to the emergency department from [facility] due to worsening sacral wound with imaging of osteomyelitis. During transport, HR dropped to 30 with O2 of 76, was placed on 6L NC. While in the ED, patient started having dysarthria, contraction of left upper extremity. Code stroke was called CT head and brain negative for infarct. CT Angio of chest showed an acute left lower lobe PE with findings of right heart strain with left femoral DVT. In ED, pt. started on heparin gtt. for PE, levophed started  for hypotension.     Plan     Neuro   Alert and oriented x 1, unable to follow commands   -Code Stroke called in ED, CT HEAD negative   #  Anxiety and depression.  - c/w home mirtazapine, buspirone.    Pulmonary  #Pulmonary Embolism  Patient p/w SOB.  tachycardic    CT chest revealed Acute left lower lobe pulmonary emboli extending to the posterobasal   segmental and subsegmental pulmonary branches. Right heart strain is   present. Recommend echocardiography.    Small nonenhancing peripheral opacity in the left lung base, most   suspicious for pulmonary infarct.    -Started on heparin gtt for PE  -Pt. 100% O2 on 2L NC    -WELLS Score  -Trend cardiac enzymes and proBNP to exclude cardiac dysfunction.  -TTE in am, stat if hemodynamically unstable.      CARDIAC  #CAD (coronary artery disease).  - c/w statin  -hold aspirin for now.        GI  # hx prior UGIB w/ ulcers  - PPI IVP bid  - Eliquis 1/2 dose    # BPH (benign prostatic hyperplasia).  ·  Plan: - c/w home finasteride, tamsulosin  - monitor urine output; consider bladder scan.  - timmons      Normal indices  - trend BUN/SCr, avoid nephrotoxic, renally dose all meds  - strict I/Os   - urine studies if not improving.    #  BPH (benign prostatic hyperplasia).  - Plan: - c/w home finasteride, tamsulosin  - monitor urine output; consider bladder scan.    ENDO  # diabetes mellitus.  - Home regimen metformin 1g qd, tradjenta 5mg qd  - f/u A1c  - c/w INOCENTE, FSG ACHS or q6h if NPO; goal glucose .      HEME  ·  Problem: Anemia.  ·  Plan: -anemia has hx prior UGIB w/ ulcers  -BUN/Cr ratio elevated      ID  #Systemic inflammatory response syndrome (SIRS).  -likely reactive in setting of anemia  -wound care consult for sacral ulcer, though afebrile and no neutrophil shift can hold off on further antibiotics for now.  - monitor for systemic infectious signs  - trend WBC, fever curve  - f/u BCx  - obtain records from [facility] regarding any bone cultures or workup for osteomyelitis      SKIN  Sacral decubitus ulcer, stage IV.   Chronic sacral and back decubiti ulcer c/b osteomyelitis. Unclear if organism identified, but was discharged from [facility] with 6 weeks of Augmentin/Flagyl due to end 3/4/24.      #DVT   Acute L prox great saphenous vein extending into common femoral DVT. Non occlusive on DVT study.     Access: PIV     Code Status: FULL CODE; would engage in further GoC  Dispo: pending clinical improvement, likely return to [facility] if able.   CCU Service  Cardiology   Spect: [id number] ([facility])     PATIENT: MERLINE RODRIGUEZ, MRN: [id number]    CHIEF COMPLAINT: 83M from [facility] CAD s/p stents, HTN, T2DM, AAA s/p graft/stent, recent UGIB, Fermin's esophagitis, GERD, BPH, Bladder Cancer, 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 presents with worsening sacral wound with imaging of osteomyelitis. Code stroke was called CT head and brain negative for infarct.    Transferred to CCU after CT Angio of chest showed an acute left lower lobe PE with findings of right heart strain with left femoral DVT. In ED, pt. started on heparin gtt. for PE, levophed started  for hypotension.     INTERVAL HISTORY/OVERNIGHT EVENTS:  Levo 0.1mcg/kg/mn    TELEMETRY:     EKG:       ALLERGIES: Allergies    No Known Allergies    Intolerances        MEDICATIONS:  MEDICATIONS  (STANDING):  chlorhexidine 2% Cloths 1 Application(s) Topical <User Schedule>  heparin  Infusion.  Unit(s)/Hr (11 mL/Hr) IV Continuous <Continuous>  norepinephrine Infusion 0.05 MICROgram(s)/kG/Min (5.59 mL/Hr) IV Continuous <Continuous>    MEDICATIONS  (PRN):  heparin   Injectable 2000 Unit(s) IV Push every 6 hours PRN For aPTT between 40 - 57  heparin   Injectable 4500 Unit(s) IV Push every 6 hours PRN For aPTT less than 40        OBJECTIVE:  ICU Vital Signs Last 24 Hrs  T(C): 36.9 (30 Mar 2024 02:45), Max: 37.3 (29 Mar 2024 22:29)  T(F): 98.5 (30 Mar 2024 02:45), Max: 99.2 (29 Mar 2024 22:29)  HR: 107 (30 Mar 2024 04:00) (107 - 119)  BP: 88/64 (30 Mar 2024 04:00) (71/56 - 129/89)  BP(mean): 72 (30 Mar 2024 04:00) (63 - 77)  ABP: --  ABP(mean): --  RR: 19 (30 Mar 2024 04:00) (16 - 34)  SpO2: 94% (30 Mar 2024 04:00) (92% - 100%)    O2 Parameters below as of 30 Mar 2024 04:00  Patient On (Oxygen Delivery Method): nasal cannula  O2 Flow (L/min): 2          I&O's Summary    Daily Height in cm: 187.96 (29 Mar 2024 21:08)    Daily       PHYSICAL EXAMINATION:  General: Comfortable, no acute distress, cooperative with exam.  HEENT: Moist mucous membranes.  Respiratory: CTAB, normal respiratory effort, no coughing, wheezes, crackles, or rales.  CV: RRR, S1S2, no murmurs, rubs or gallops. No JVD. Distal pulses intact.  Abdominal: Soft, nontender, nondistended, no rebound or guarding, normal bowel sounds.  Neurology: AOx3, no focal neuro defects, MORROW x 4.  Extremities: No pitting edema, + Peripheral pulses.          LABS:                          7.4    22.05 )-----------( 202      ( 30 Mar 2024 04:37 )             22.9     03-30    140  |  112<H>  |  56<H>  ----------------------------<  156<H>  4.7   |  14<L>  |  1.21    Ca    7.2<L>      30 Mar 2024 04:37  Phos  3.4     03-30  Mg     1.20     03-30    TPro  4.9<L>  /  Alb  1.3<L>  /  TBili  0.2  /  DBili  x   /  AST  13  /  ALT  15  /  AlkPhos  481<H>  03-30    LIVER FUNCTIONS - ( 30 Mar 2024 04:37 )  Alb: 1.3 g/dL / Pro: 4.9 g/dL / ALK PHOS: 481 U/L / ALT: 15 U/L / AST: 13 U/L / GGT: x           PT/INR - ( 29 Mar 2024 21:20 )   PT: 27.2 sec;   INR: 2.47 ratio         PTT - ( 30 Mar 2024 04:37 )  PTT:104.3 sec  Creatine Kinase, Serum: 35 U/L (03-29 @ 21:20)  CKMB Units: 3.8 ng/mL (03-29 @ 21:20)    CARDIAC MARKERS ( 29 Mar 2024 21:20 )  x     / x     / 35 U/L / x     / 3.8 ng/mL      Urinalysis Basic - ( 30 Mar 2024 04:37 )    Color: x / Appearance: x / SG: x / pH: x  Gluc: 156 mg/dL / Ketone: x  / Bili: x / Urobili: x   Blood: x / Protein: x / Nitrite: x   Leuk Esterase: x / RBC: x / WBC x   Sq Epi: x / Non Sq Epi: x / Bacteria: x    Assessment:  · Assessment	  83M from [facility] [place] CAD s/p stents, HTN, T2DM, AAA s/p graft/stent, recent UGIB, Fermin's esophagitis, GERD, BPH, Bladder Cancer, 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 Patient  presents to the emergency department from [facility] due to worsening sacral wound with imaging of osteomyelitis. During transport, HR dropped to 30 with O2 of 76, was placed on 6L NC. While in the ED, patient started having dysarthria, contraction of left upper extremity. Code stroke was called CT head and brain negative for infarct. CT Angio of chest showed an acute left lower lobe PE with findings of right heart strain with left femoral DVT. In ED, pt. started on heparin gtt. for PE, levophed started  for hypotension.     Plan     Neuro   Alert and oriented x 1, unable to follow commands   -Code Stroke called in ED, CT HEAD negative   #  Anxiety and depression.  - Hold mirtazapine, buspirone.    Pulmonary  #Pulmonary Embolism  Patient p/w SOB.  tachycardic    CT chest revealed Acute left lower lobe pulmonary emboli extending to the posterobasal   segmental and subsegmental pulmonary branches. Right heart strain is   present. Recommend echocardiography.    Small nonenhancing peripheral opacity in the left lung base, most   suspicious for pulmonary infarct.    -Started on heparin gtt for PE  -Pt. 100% O2 on 2L NC    -WELLS Score  -Trend cardiac enzymes and proBNP to exclude cardiac dysfunction.  -TTE in am, stat if hemodynamically unstable.      CARDIAC  #CAD (coronary artery disease).  - c/w statin  -hold aspirin for now.        GI  # hx prior UGIB w/ ulcers  - PPI IVP bid  - Eliquis 1/2 dose    # BPH (benign prostatic hyperplasia).  ·  Plan: - c/w home finasteride, tamsulosin  - monitor urine output; consider bladder scan.  - timmons      Normal indices  - trend BUN/SCr, avoid nephrotoxic, renally dose all meds  - strict I/Os   - urine studies if not improving.    #  BPH (benign prostatic hyperplasia).  - Plan: - c/w home finasteride, tamsulosin  - monitor urine output; consider bladder scan.    ENDO  # diabetes mellitus.  - Home regimen metformin 1g qd, tradjenta 5mg qd  - f/u A1c  - c/w INOCENTE, FSG ACHS or q6h if NPO; goal glucose .      HEME  ·  Problem: Anemia.  ·  Plan: -anemia has hx prior UGIB w/ ulcers  -BUN/Cr ratio elevated      ID  #Systemic inflammatory response syndrome (SIRS).  -likely reactive in setting of anemia  -wound care consult for sacral ulcer, though afebrile and no neutrophil shift can hold off on further antibiotics for now.  - monitor for systemic infectious signs  - trend WBC, fever curve  - f/u BCx  - obtain records from [facility] regarding any bone cultures or workup for osteomyelitis      SKIN  Sacral decubitus ulcer, stage IV.   Chronic sacral and back decubiti ulcer c/b osteomyelitis. Unclear if organism identified, but was discharged from [facility] with 6 weeks of Augmentin/Flagyl due to end 3/4/24.      #DVT   Acute L prox great saphenous vein extending into common femoral DVT. Non occlusive on DVT study.     Access: PIV     Code Status: FULL CODE; would engage in further GoC  Dispo: pending clinical improvement, likely return to [facility] if able.   CCU Service  Cardiology   Spect: [id number] ([facility])     PATIENT: MERLINE RODRIGUEZ, MRN: [id number]    CHIEF COMPLAINT: 83M from [facility] CAD s/p stents, HTN, T2DM, AAA s/p graft/stent, recent UGIB, Fermin's esophagitis, GERD, BPH, Bladder Cancer, 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 presents with worsening sacral wound with imaging of osteomyelitis. Code stroke was called CT head and brain negative for infarct.    Transferred to CCU after CT Angio of chest showed an acute left lower lobe PE with findings of right heart strain with left femoral DVT. In ED, pt. started on heparin gtt. for PE, levophed started  for hypotension.     INTERVAL HISTORY/OVERNIGHT EVENTS:  Levo 0.1mcg/kg/mn    TELEMETRY:     EKG:       ALLERGIES: Allergies    No Known Allergies    Intolerances        MEDICATIONS:  MEDICATIONS  (STANDING):  chlorhexidine 2% Cloths 1 Application(s) Topical <User Schedule>  heparin  Infusion.  Unit(s)/Hr (11 mL/Hr) IV Continuous <Continuous>  norepinephrine Infusion 0.05 MICROgram(s)/kG/Min (5.59 mL/Hr) IV Continuous <Continuous>    MEDICATIONS  (PRN):  heparin   Injectable 2000 Unit(s) IV Push every 6 hours PRN For aPTT between 40 - 57  heparin   Injectable 4500 Unit(s) IV Push every 6 hours PRN For aPTT less than 40        OBJECTIVE:  ICU Vital Signs Last 24 Hrs  T(C): 36.9 (30 Mar 2024 02:45), Max: 37.3 (29 Mar 2024 22:29)  T(F): 98.5 (30 Mar 2024 02:45), Max: 99.2 (29 Mar 2024 22:29)  HR: 107 (30 Mar 2024 04:00) (107 - 119)  BP: 88/64 (30 Mar 2024 04:00) (71/56 - 129/89)  BP(mean): 72 (30 Mar 2024 04:00) (63 - 77)  ABP: --  ABP(mean): --  RR: 19 (30 Mar 2024 04:00) (16 - 34)  SpO2: 94% (30 Mar 2024 04:00) (92% - 100%)    O2 Parameters below as of 30 Mar 2024 04:00  Patient On (Oxygen Delivery Method): nasal cannula  O2 Flow (L/min): 2          I&O's Summary    Daily Height in cm: 187.96 (29 Mar 2024 21:08)    Daily       PHYSICAL EXAMINATION:  General: Comfortable, no acute distress, cooperative with exam.  HEENT: Moist mucous membranes.  Respiratory: CTAB, normal respiratory effort, no coughing, wheezes, crackles, or rales.  CV: RRR, S1S2, no murmurs, rubs or gallops. No JVD. Distal pulses intact.  Abdominal: Soft, nontender, nondistended, no rebound or guarding, normal bowel sounds.  Neurology: AOx3, no focal neuro defects, MORROW x 4.  Extremities: No pitting edema, + Peripheral pulses.          LABS:                          7.4    22.05 )-----------( 202      ( 30 Mar 2024 04:37 )             22.9     03-30    140  |  112<H>  |  56<H>  ----------------------------<  156<H>  4.7   |  14<L>  |  1.21    Ca    7.2<L>      30 Mar 2024 04:37  Phos  3.4     03-30  Mg     1.20     03-30    TPro  4.9<L>  /  Alb  1.3<L>  /  TBili  0.2  /  DBili  x   /  AST  13  /  ALT  15  /  AlkPhos  481<H>  03-30    LIVER FUNCTIONS - ( 30 Mar 2024 04:37 )  Alb: 1.3 g/dL / Pro: 4.9 g/dL / ALK PHOS: 481 U/L / ALT: 15 U/L / AST: 13 U/L / GGT: x           PT/INR - ( 29 Mar 2024 21:20 )   PT: 27.2 sec;   INR: 2.47 ratio         PTT - ( 30 Mar 2024 04:37 )  PTT:104.3 sec  Creatine Kinase, Serum: 35 U/L (03-29 @ 21:20)  CKMB Units: 3.8 ng/mL (03-29 @ 21:20)    CARDIAC MARKERS ( 29 Mar 2024 21:20 )  x     / x     / 35 U/L / x     / 3.8 ng/mL      Urinalysis Basic - ( 30 Mar 2024 04:37 )    Color: x / Appearance: x / SG: x / pH: x  Gluc: 156 mg/dL / Ketone: x  / Bili: x / Urobili: x   Blood: x / Protein: x / Nitrite: x   Leuk Esterase: x / RBC: x / WBC x   Sq Epi: x / Non Sq Epi: x / Bacteria: x    Assessment:  · Assessment	  83M from [facility] [place] CAD s/p stents, HTN, T2DM, AAA s/p graft/stent, recent UGIB, Fermin's esophagitis, GERD, BPH, Bladder Cancer, 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 Patient  presents to the emergency department from [facility] due to worsening sacral wound with imaging of osteomyelitis. During transport, HR dropped to 30 with O2 of 76, was placed on 6L NC. While in the ED, patient started having dysarthria, contraction of left upper extremity. Code stroke was called CT head and brain negative for infarct. CT Angio of chest showed an acute left lower lobe PE with findings of right heart strain with left femoral DVT. In ED, pt. started on heparin gtt. for PE, levophed started  for hypotension.     Plan     Neuro   Alert and oriented x 1, unable to follow commands   -Code Stroke called in ED, CT HEAD negative   - A+O x 3 and conversational at baseline  #  Anxiety and depression.  - Hold mirtazapine, buspirone.    Pulmonary  #Pulmonary Embolism  Patient p/w SOB.  tachycardic    CT chest revealed Acute left lower lobe pulmonary emboli extending to the posterobasal   segmental and subsegmental pulmonary branches. Right heart strain is   present. Recommend echocardiography.    Small nonenhancing peripheral opacity in the left lung base, most   suspicious for pulmonary infarct.    -Started on heparin gtt for PE  -Pt. 100% O2 on 2L NC    -WELLS Score  -Trend cardiac enzymes and proBNP to exclude cardiac dysfunction.  -TTE in am, stat if hemodynamically unstable.      CARDIAC  #CAD (coronary artery disease).  - c/w statin  -hold aspirin for now.        GI  # hx prior UGIB w/ ulcers  - PPI IVP bid  - Eliquis 1/2 dose    # BPH (benign prostatic hyperplasia).  ·  Plan: - c/w home finasteride, tamsulosin  - monitor urine output; consider bladder scan.  - timmons      Normal indices  - trend BUN/SCr, avoid nephrotoxic, renally dose all meds  - strict I/Os   - urine studies if not improving.    #  BPH (benign prostatic hyperplasia).  - Plan: - c/w home finasteride, tamsulosin  - monitor urine output; consider bladder scan.    ENDO  # diabetes mellitus.  - Home regimen metformin 1g qd, tradjenta 5mg qd  - f/u A1c  - c/w INOCENTE, FSG ACHS or q6h if NPO; goal glucose .      HEME  ·  Problem: Anemia.  ·  Plan: -anemia has hx prior UGIB w/ ulcers  -BUN/Cr ratio elevated      ID  #Systemic inflammatory response syndrome (SIRS).  -likely reactive in setting of anemia  -wound care consult for sacral ulcer, though afebrile and no neutrophil shift can hold off on further antibiotics for now.  - monitor for systemic infectious signs  - trend WBC, fever curve  - f/u BCx  - obtain records from [facility] regarding any bone cultures or workup for osteomyelitis      SKIN  Sacral decubitus ulcer, stage IV.   Chronic sacral and back decubiti ulcer c/b osteomyelitis. Unclear if organism identified, but was discharged from [facility] with 6 weeks of Augmentin/Flagyl due to end 3/4/24.      #DVT   Acute L prox great saphenous vein extending into common femoral DVT. Non occlusive on DVT study.     Access: PIV     Code Status: FULL CODE; would engage in further GoC  Dispo: pending clinical improvement, likely return to [facility] if able.

## 2024-03-30 NOTE — PHARMACOTHERAPY INTERVENTION NOTE - COMMENTS
83 M with polymicrobial bacteremia including ESBL gene detected.    Recommendation(s):  1) Consider changing Pip/tazo to meropenem 1 gram IV q8h to cover ESBL organisms, detected on BCID.    Andres Menendez, PharmD, BCIDP  Clinical Pharmacy Specialist, Infectious Diseases  Spectra extension [phone]  . 83 M with polymicrobial bacteremia including ESBL gene detected.    Recommendation(s):  1) Consider changing Pip/tazo to meropenem 1 gram IV q12h to cover ESBL organisms, detected on BCID.    Andres Menendez, PharmD, BCIDP  Clinical Pharmacy Specialist, Infectious Diseases  Spectra extension [phone]  .

## 2024-03-30 NOTE — PROCEDURE NOTE - NSINFORMCONSENT_GEN_A_CORE
This was an emergent procedure.
Telephone consent/Benefits, risks, and possible complications of procedure explained to patient/caregiver who verbalized understanding and gave verbal consent.

## 2024-03-30 NOTE — PROCEDURE NOTE - NSPOSTCAREGUIDE_GEN_A_CORE
Care for catheter as per unit/ICU protocols
Verbal/written post procedure instructions were given to patient/caregiver/Keep the cast/splint/dressing clean and dry/Care for catheter as per unit/ICU protocols

## 2024-03-30 NOTE — CONSULT NOTE ADULT - ASSESSMENT
83M from [facility] bladder ca, CAD s/p stents, HTN, T2DM, LYUBOV, GERD, BPH, depression/anxiety, stage 4 decubitus sacral wound c/b osteomyelitis with ongoing abx treatment, AAA s/p graft/stent, recent UGIB, Fermin's esophagitis, s/p b/l hip replacements presents from [place] for worsening sacral wound with imaging c/f osteomyelitis. Course c/b code stroke with low concern for CVA and unremarkable imaging. On imaging found to have an acute left lower lobe PE with findings of right heart strain. MICU consulted for acute PE with concerns of RHS and increased oxygen requirements.     # Acute LLL pulmonary embolism/ moderate risk  # Acute hypoxic respiratory failure   - Patient with improving oxygen status (now on 2L 100%) and no signs of respiratory distress  - patient is moderate risk however given recent history of UGIB, age, and overall poor prognosis patient is not a TPA candidate at this time.  - c/w heparin gtt  - goal O2 > 90%, wean as tolerated  - appreciate cardiology/ CCU recommendations  - formal TTE recommended  - US dopplers of LE recommended  - given patient with PE s/p Eliquis ie failed therapy, patient may require Lovenox after discharge  - further GOC     Patient is not a MICU candidate at this time  Note not finalized until attending attestation   83M from [facility] bladder ca, CAD s/p stents, HTN, T2DM, LYUBOV, GERD, BPH, depression/anxiety, stage 4 decubitus sacral wound c/b osteomyelitis with ongoing abx treatment, AAA s/p graft/stent, recent UGIB, Fermin's esophagitis, s/p b/l hip replacements presents from [place] for worsening sacral wound with imaging c/f osteomyelitis. Course c/b code stroke with low concern for CVA and unremarkable imaging. On imaging found to have an acute left lower lobe PE with findings of right heart strain. MICU consulted for acute PE with concerns of RHS and increased oxygen requirements.     # Acute LLL pulmonary embolism/ moderate risk  # Acute hypoxic respiratory failure   - Patient with improving oxygen status (now on 2L 100%) and no signs of respiratory distress  - BNP 6196, Trop 120 --> 92  - patient is moderate risk however given recent history of UGIB, age, and overall poor prognosis patient is not a TPA candidate at this time.  - c/w heparin gtt  - goal O2 > 90%, wean as tolerated  - appreciate cardiology/ CCU recommendations  - formal TTE recommended  - US dopplers of LE recommended  - given patient with PE s/p Eliquis ie failed therapy, patient may require Lovenox after discharge  - further C     Patient is not a MICU candidate at this time  Note not finalized until attending attestation

## 2024-03-30 NOTE — ED ADULT NURSE REASSESSMENT NOTE - NS ED NURSE REASSESS COMMENT FT1
break coverage rn. received report from MADHAVI sena. pt A&Ox0-1 baseline mental status. pt noted to be hypotensive at this time compared to previous BP readings. MAP >65. report given to rn in CCU, but made provider GRACIA Vaughn aware. provider to come see pt prior to transport. safety maintained.

## 2024-03-30 NOTE — H&P ADULT - NSHPPHYSICALEXAM_GEN_ALL_CORE
PHYSICAL EXAM:.  General: NAD  Psychiatry: AAO1 to name; unable to follow commands  HEENT: NC/AT  Neck: Supple  Respiratory: no respiratory distress; comfortable on 2L  Cardiovascular: tachycardic on tele  Abdomen: soft and nontender  Extremities: warm, no LE edema  Neurological: moving upper extremities; minimal movement LE General: No Acute Distress  NEURO: AAO1 to name; unable to follow commands  EYES:  No conjunctival or scleral injection, non-icteric  ENMT: Oral mucosa with moist membranes. Normal dentition; no pharyngeal injection or exudates  NECK: Supple, symmetric and without tracheal deviation   RESP: B/L Lungs sounds clear on auscultation, no respiratory distress; comfortable on 2L  CV: Tachycardic, +S1S2, no JVD; no peripheral edema  GI: Soft, NT, ND, no rebound, no guarding; no palpable masses; no hepatosplenomegaly; no hernia palpated  LYMPH: No cervical LAD or tenderness; no axillary LAD or tenderness; no inguinal LAD or tenderness  MSK: Left upper extremity contracted   SKIN: Stage 4 decubitus sacral wound

## 2024-03-30 NOTE — PROCEDURE NOTE - NSINDICATIONS_GEN_A_CORE
critical illness/hemodynamic monitoring/hypertonic/irritant infusion/venous access
arterial puncture to obtain ABG's/blood sampling/critical patient/monitoring purposes

## 2024-03-30 NOTE — H&P ADULT - CRITICAL CARE ATTENDING COMMENT
Kevin Wade is an 83-year-old man with history of CAD s/p stents, HTN, DM-II, AAA s/p graft/stent, recent UGIB, Fermin's esophagitis, GERD, BPH, Bladder Cancer (Stage 4) decubitus sacral wound c/b osteomyelitis (ongoing abx treatment), LYUBOV depression / anxiety, and s/p b/l hip replacements. He presented to ED from [facility] due to worsening sacral wound osteomyelitis. During transport, HR dropped to 30 bpm and O2 Sat decreased to 76% on RA. He was placed on 6L NC. While in the ED, patient developed dysarthria, contraction of left upper extremity. Code stroke was called with CT head and brain negative for infarct. CT angio of chest showed acute left lower lobe PE with findings of right heart strain. There was left femoral DVT. In ED, pt. started on heparin gtt. for PE. Norepinephrine (Levophed) was started  for hypotension

## 2024-03-30 NOTE — PROGRESS NOTE ADULT - CRITICAL CARE ATTENDING COMMENT
Kevin Wade is an 83 year old man with history of CAD s/p stents, HTN, DM-II, AAA s/p graft/stent, recent UGIB, Fermin's esophagitis, GERD, BPH, Bladder Cancer (Stage 4) decubitus sacral wound c/b osteomyelitis (ongoing abx treatment), LYUBOV  depression / anxiety, and s/p b/l hip replacements. He presented to ED from [facility] due to worsening sacral wound osteomyelitis. During transport, HR dropped to 30 bpm and O2 Sat decreased to 76% on RA. He was placed on 6L NC. While in the ED, patient developed dysarthria, contraction of left upper extremity. Code stroke was called with CT head and brain negative for infarct. CT angio of chest showed acute left lower lobe PE with findings of right heart strain. There was left femoral DVT. In ED, pt. started on heparin gtt. for PE. Norepinephrine (Levophed) was started  for hypotension

## 2024-03-30 NOTE — ED ADULT NURSE REASSESSMENT NOTE - NS ED NURSE REASSESS COMMENT FT1
break coverage rn. pt in room 27. A&Ox1. NP at bedside. bp noted to have fluctuating MAPs. NP aware of situation and will place order for small dose of levophed. pt well appearing otherwise. does not appear symptomatic in relation to BPs. safety maintained break coverage rn. pt in room 27. A&Ox1. NP at bedside. bp noted to have fluctuating MAPs. NP aware of situation and will place order for small dose of levophed. pt well appearing otherwise. does not appear symptomatic in relation to BPs. heparin drip at 11ml/hr. safety maintained

## 2024-03-30 NOTE — H&P ADULT - ASSESSMENT
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 Patient  presents to the emergency department from [facility] due to worsening sacral wound with imaging of osteomyelitis. During transport, HR dropped to 30 with O2 of 76, was placed on 6L NC. While in the ED, patient started having dysarthria, contraction of left upper extremity. Code stroke was called CT head and brain negative for infarct. CT Angio of chest showed an acute left lower lobe PE with findings of right heart strain with left femoral DVT. In ED, pt. started on heparin gtt. for PE, levophed started  for hypotension.      83M from [facility] CAD s/p stents, HTN, T2DM, AAA s/p graft/stent, recent UGIB, Fermin's esophagitis, GERD, BPH, Bladder Cancer, 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 Patient  presents to the emergency department from [facility] due to worsening sacral wound with imaging of osteomyelitis. During transport, HR dropped to 30 with O2 of 76, was placed on 6L NC. While in the ED, patient started having dysarthria, contraction of left upper extremity. Code stroke was called CT head and brain negative for infarct. CT Angio of chest showed an acute left lower lobe PE with findings of right heart strain with left femoral DVT. In ED, pt. started on heparin gtt. for PE, levophed started  for hypotension.     Plan     Neuro   Alert and oriented x 1, unable to follow commands   -Code Stroke called in ED, CT HEAD negative     Pulmonary  #Pulmonary Embolism  Patient p/w SOB.   Found to be tachycardic to   CT chest revealed    #Pulmonary Embolism  -Started on heparin gtt for PE  -Pt. 100% O2 on 2L NC  -Check ABG to evaluate hypoxia    -Patient hemodynamically stable  -Patient with acute SOB, hypotensive, tachypneic, EKG with RBBB, CE elevated.   -WELLS Score  -Would not get CTPA 2/2 ANTONI ceatnine   -V/Q scan   -Trend cardiac enzymes and proBNP to exclude cardiac dysfunction.  -Start heparin gtt and follow full anticoagulation protocoll goal PTT 58-99   -Monitor hemodynamic status, Will need continous tele and oxygen saturation monitoring.    -TTE in am, stat if hemodynamically unstable.  -TPA for acute PE, alteplase 100mg over 2 hours, followed by heparin gtt, no bolus, follow full anticoagulation protocoll goal PTT 58-99  -TPA for sub acute PE, alteplase 50mg over 2 hours, followed by heparin gtt, no bolus, follow full anticoagulation protocoll goal PTT 58-99  -Catheter directed thrombolysis with interventional cardiology               Problem/Plan - 1:  ·  Problem: Anemia.  ·  Plan: -anemia has hx prior UGIB w/ ulcers  -BUN/Cr ratio elevated  - transfuse to goals below  - ctm BP q4h  - likely history of HFpEF per [facility] documentation noting [facility] TTE w LVEF 43%, caution after blood can give lasix 20 mg iv times 1  -MCV elevated, is on oral B12, can obtain B12 /FA/hapt/LDH as well  -email sent to GI.     Problem/Plan - 2:  ·  Problem: Systemic inflammatory response syndrome (SIRS).  ·  Plan: -likely reactive in setting of anemia  -wound care consult for sacral ulcer, though afebrile and no neutrophil shift can hold off on further antibiotics for now.     Problem/Plan - 3:  ·  Problem: Sacral decubitus ulcer, stage IV.   ·  Plan: Chronic sacral and back decubiti ulcer c/b osteomyelitis. Unclear if organism identified, but was discharged from [facility] with 6 weeks of Augmentin/Flagyl due to end 3/4/24.    - monitor for systemic infectious signs  - trend WBC, fever curve  - f/u BCx  - will hold off on IV antibiotics at this time; reevaluate need  - obtain records from [facility] regarding any bone cultures or workup for osteomyelitis  - wound care consult.     Problem/Plan - 4:  ·  Problem: DVT, lower extremity.   ·  Plan: Acute L prox great saphenous vein extending into common femoral DVT. Nonocculsive on DVT study. Unable to start AC iso acute bleed.    - monitor off AC, defer to GI when it is appropriate from GI standpoint for AC vs IVC filter.     Problem/Plan - 5:  ·  Problem: ANTONI (acute kidney injury).   ·  Plan: Likely prerenal iso hypovolemia    - trend BUN/SCr, avoid nephrotoxic, renally dose all meds  - strict I/Os   - urine studies if not improving.     Problem/Plan - 6:  ·  Problem: CAD (coronary artery disease).  ·  Plan: s/p PCI, stents. ECG w/o c/f ischemia  - c/w statin  -hold aspirin for now.     Problem/Plan - 7:  ·  Problem: HTN (hypertension).  ·  Plan: - hold home losartan, toprol iso hypotension.     Problem/Plan - 8:  ·  Problem: Type 2 diabetes mellitus.  ·  Plan: Home regimen metformin 1g qd, tradjenta 5mg qd    - f/u A1c  - c/w INOCENTE, FSG ACHS or q6h if NPO; goal glucose .     Problem/Plan - 9:  ·  Problem: BPH (benign prostatic hyperplasia).  ·  Plan: - c/w home finasteride, tamsulosin  - monitor urine output; consider bladder scan.     Problem/Plan - 10:  ·  Problem: Anxiety and depression.  ·  Plan; - c/w home mirtazapine, buspirone.     Problem/Plan - 11:  ·  Problem: Prophylactic measure.  ·  Plan: Hospital Bundle    Fluids: PO  Electrolytes: Replete K > 4, Mg > 2, Phos > 3  Nutrition: Diet NPO for possible GI procedure  PPX  ---VTE: hold iso acute bleed. can consider SCD but acute DVT in L leg   ---GI: IV PPI BID  ---Resp: n/a  Access: PIV  Code Status: FULL CODE; would engage in further GoC  Dispo: pending clinical improvement, likely return to [facility] [place] if able. 83M from [facility] [place] CAD s/p stents, HTN, T2DM, AAA s/p graft/stent, recent UGIB, Fermin's esophagitis, GERD, BPH, Bladder Cancer, 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 Patient  presents to the emergency department from [facility] due to worsening sacral wound with imaging of osteomyelitis. During transport, HR dropped to 30 with O2 of 76, was placed on 6L NC. While in the ED, patient started having dysarthria, contraction of left upper extremity. Code stroke was called CT head and brain negative for infarct. CT Angio of chest showed an acute left lower lobe PE with findings of right heart strain with left femoral DVT. In ED, pt. started on heparin gtt. for PE, levophed started  for hypotension.     Plan     Neuro   Alert and oriented x 1, unable to follow commands   -Code Stroke called in ED, CT HEAD negative   #  Anxiety and depression.  - c/w home mirtazapine, buspirone.e       Pulmonary  #Pulmonary Embolism  Patient p/w SOB.  tachycardic    CT chest revealed Acute left lower lobe pulmonary emboli extending to the posterobasal   segmental and subsegmental pulmonary branches. Right heart strain is   present. Recommend echocardiography.  Small nonenhancing peripheral opacity in the left lung base, most   suspicious for pulmonary infarct.  -Started on heparin gtt for PE  -Pt. 100% O2 on 2L NC  -Obtain TTE in AM     Cardiac   Hx. HTN (hypertension)  - Pt. currently hypotensive in ED SBP 70s-80s while in ED, Levophed started   - hold home losartan, toprol iso hypotension  -Plan to wean pt. off levo as tolerated    #CAD (coronary artery disease).   s/p PCI, stents. ECG w/o c/f ischemia  - c/w statin and ASA 81 mg    ID  # ? Sepsis  - Pt. with Sacral and back decubitus ulcer, unstagable to bone with hx of osteomyelitis  - Blood Cultures sent  -Started on Vanc and Zosyn for epimeric coverage   -Lactate 4.5  - trend WBC, fever curve  - wound care consult.     83M from [facility] [place] CAD s/p stents, HTN, T2DM, AAA s/p graft/stent, recent UGIB, Fermin's esophagitis, GERD, BPH, Bladder Cancer, 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 Patient  presents to the emergency department from [facility] due to worsening sacral wound with imaging of osteomyelitis. During transport, HR dropped to 30 with O2 of 76, was placed on 6L NC. While in the ED, patient started having dysarthria, contraction of left upper extremity. Code stroke was called CT head and brain negative for infarct. CT Angio of chest showed an acute left lower lobe PE with findings of right heart strain with left femoral DVT. In ED, pt. started on heparin gtt. for PE, levophed started  for hypotension.     Plan   Neuro   Alert and oriented x 1, unable to follow commands   -Code Stroke called in ED, CT HEAD negative   - A+O x 3 and conversational at baseline  #  Anxiety and depression.  - Hold mirtazapine, buspirone.    Pulmonary  #Pulmonary Embolism  Patient p/w SOB.  tachycardic    CT chest revealed Acute left lower lobe pulmonary emboli extending to the posterobasal   segmental and subsegmental pulmonary branches. Right heart strain is   present. Recommend echocardiography.    Small nonenhancing peripheral opacity in the left lung base, most   suspicious for pulmonary infarct.    -Started on heparin gtt for PE  -Pt. 100% O2 on 2L NC    -WELLS Score  -Trend cardiac enzymes and proBNP to exclude cardiac dysfunction.  -TTE in am, stat if hemodynamically unstable.      CARDIAC  #CAD (coronary artery disease).  - c/w statin  -hold aspirin for now.        GI  # hx prior UGIB w/ ulcers  - PPI IVP bid  - Eliquis 1/2 dose    # BPH (benign prostatic hyperplasia).  ·  Plan: - c/w home finasteride, tamsulosin  - monitor urine output; consider bladder scan.  - timmons      Normal indices  - trend BUN/SCr, avoid nephrotoxic, renally dose all meds  - strict I/Os   - urine studies if not improving.    #  BPH (benign prostatic hyperplasia).  - Plan: - c/w home finasteride, tamsulosin  - monitor urine output; consider bladder scan.    ENDO  # diabetes mellitus.  - Home regimen metformin 1g qd, tradjenta 5mg qd  - f/u A1c  - c/w INOCENTE, FSG ACHS or q6h if NPO; goal glucose .      HEME  ·  Problem: Anemia.  ·  Plan: -anemia has hx prior UGIB w/ ulcers  -BUN/Cr ratio elevated      ID  #Systemic inflammatory response syndrome (SIRS).  -likely reactive in setting of anemia  -wound care consult for sacral ulcer, though afebrile and no neutrophil shift can hold off on further antibiotics for now.  - monitor for systemic infectious signs  - trend WBC, fever curve  - f/u BCx  - obtain records from [facility] regarding any bone cultures or workup for osteomyelitis      SKIN  Sacral decubitus ulcer, stage IV.   Chronic sacral and back decubiti ulcer c/b osteomyelitis. Unclear if organism identified, but was discharged from [facility] with 6 weeks of Augmentin/Flagyl due to end 3/4/24.      #DVT   Acute L prox great saphenous vein extending into common femoral DVT. Non occlusive on DVT study.     Access: Right A-Line

## 2024-03-30 NOTE — CHART NOTE - NSCHARTNOTEFT_GEN_A_CORE
Patient became agitated during CVC insertion after access was obtained and was given 50 mcg Fentanyl x 1 and 1 mg Versed x 1 IV push. Arterial line tracing was subsequently lost and patient became unresponsive and pulseless. ACLS was initiated and 1 round of CPR was performed with ROSC. 500 mL NS bolus given x 3. Central line was completed without further complication. Patient currently hemodynamically stable. Will continue to monitor.

## 2024-03-30 NOTE — CHART NOTE - NSCHARTNOTEFT_GEN_A_CORE
Started on heparin gtt. for PE. Levophed and vaso started  for hypotension.   RIJ placed:   CVP 1-2, CV02 56.4, CI 2.5. NS 500ml x 3, lactate up trending 3 Started on heparin gtt. for PE. Levophed and vaso started  for hypotension.   RIJ placed:   CVP 1-2, CV02 56.4, CI 2.5.  lactate up trending up to 5. NS 500ml x 4 given and NS @ 100ML x 10 hours started.   Repeat hemodynamics: CVP 3, CV02 60%, CO 5, CI 3. Lactate down trending, now 3.

## 2024-03-30 NOTE — H&P ADULT - NSHPLABSRESULTS_GEN_ALL_CORE
Vitals:  ============  T(F): 98.5 (30 Mar 2024 02:45), Max: 99.2 (29 Mar 2024 22:29)  HR: 107 (30 Mar 2024 04:00)  BP: 88/64 (30 Mar 2024 04:00)  RR: 19 (30 Mar 2024 04:00)  SpO2: 94% (30 Mar 2024 04:00) (92% - 100%)  temp max in last 48H T(F): , Max: 99.2 (03-29-24 @ 22:29)    =======================================================  Current Antibiotics:    Other medications:  chlorhexidine 2% Cloths 1 Application(s) Topical <User Schedule>  heparin  Infusion.  Unit(s)/Hr IV Continuous <Continuous>  norepinephrine Infusion 0.05 MICROgram(s)/kG/Min IV Continuous <Continuous>      =======================================================  Labs:                        7.4    25.27 )-----------( 263      ( 29 Mar 2024 21:20 )             23.8     03-29    142  |  111<H>  |  56<H>  ----------------------------<  158<H>  5.1   |  17<L>  |  1.27    Ca    7.9<L>      29 Mar 2024 21:20    TPro  5.3<L>  /  Alb  1.6<L>  /  TBili  0.2  /  DBili  x   /  AST  11  /  ALT  12  /  AlkPhos  573<H>  03-29      Culture - Blood (collected 02-29-24 @ 23:11)  Source: .Blood Blood-Peripheral  Final Report (03-06-24 @ 03:01):    No growth at 5 days      Creatinine: 1.27 mg/dL (03-29-24 @ 21:20)            WBC Count: 25.27 K/uL (03-29-24 @ 21:20)        Alkaline Phosphatase: 573 U/L (03-29-24 @ 21:20)  Alanine Aminotransferase (ALT/SGPT): 12 U/L (03-29-24 @ 21:20)  Aspartate Aminotransferase (AST/SGOT): 11 U/L (03-29-24 @ 21:20)  Bilirubin Total: 0.2 mg/dL (03-29-24 @ 21:20)

## 2024-03-30 NOTE — H&P ADULT - HISTORY OF PRESENT ILLNESS
83M from [facility] CAD s/p stents, HTN, T2DM, AAA s/p graft/stent, recent UGIB, Fermin's esophagitis, GERD, BPH, Bladder Cancer, Stage 4 decubitus sacral wound c/b osteomyelitis with ongoing abx treatment, LYUBOV  depression/anxiety, s/p b/l hip replacements presents from [facility] for worsening sacral wound with imaging c/f osteomyelitis. Course c/b code stroke with low concern for CVA and unremarkable imaging. On imaging found to have an acute left lower lobe PE with findings of right heart strain. During transport, HR dropped to 30 with O2 of 76. On previous admission 03/2024 was found to have left LE DVT and was initiated on Eliquis 5BID however last taken 03/28 PM per ED documentation.  83M from [facility] [place] CAD s/p stents, HTN, T2DM, AAA s/p graft/stent, recent UGIB, Fermin's esophagitis, GERD, BPH, Bladder Cancer, 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 Patient  presents to the emergency department from [facility] due to worsening sacral wound with imaging of osteomyelitis. During transport, HR dropped to 30 with O2 of 76, was placed on 6L NC. While in the ED, patient started having dysarthria, contraction of left upper extremity. Code stroke was called  Course c/b code stroke with low concern for CVA and unremarkable imaging. On imaging found to have an acute left lower lobe PE with findings of right heart strain. On previous admission 03/2024 was found to have left LE DVT and was initiated on Eliquis 5BID however last taken 03/28 PM per ED documentation.     83-year-old male past medical history significant for abdominal aortic aneurysm, anxiety, depression, Fermin's esophagus, bladder cancer, BPH, CAD, esophageal varices, GERD, hypertension, osteomyelitis, peptic ulcer disease, sacral decubitus ulcer, diabetes, upper GI bleed  at this time.  unclear if patient is on bloodthinners. 83M from [facility] [place] CAD s/p stents, HTN, T2DM, AAA s/p graft/stent, recent UGIB, Fermin's esophagitis, GERD, BPH, Bladder Cancer, 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 Patient  presents to the emergency department from [facility] due to worsening sacral wound with imaging of osteomyelitis. During transport, HR dropped to 30 with O2 of 76, was placed on 6L NC. While in the ED, patient started having dysarthria, contraction of left upper extremity. Code stroke was called CT head and brain negative for infarct. CT Angio of chest showed an acute left lower lobe PE with findings of right heart strain with left femoral DVT. In ED, pt. started on heparin gtt. for PE, levophed started  for hypotension.

## 2024-03-30 NOTE — CONSULT NOTE ADULT - SUBJECTIVE AND OBJECTIVE BOX
83M from [facility] bladder ca, CAD s/p stents, HTN, T2DM, LYUBOV, GERD, BPH, depression/anxiety, stage 4 decubitus sacral wound c/b osteomyelitis with ongoing abx treatment, AAA s/p graft/stent, recent UGIB, Fermin's esophagitis, s/p b/l hip replacements presents from [place] for worsening sacral wound with imaging c/f osteomyelitis. Course c/b code stroke with low concern for CVA and unremarkable imaging. On imaging found to have an acute left lower lobe PE with findings of right heart strain. During transport, HR dropped to 30 with O2 of 76. On previous admission 03/2024 was found to have left LE DVT and was initiated on Eliquis 5BID however last taken 03/28 PM per ED documentation.     MICU consulted for acute PE with increased oxygen requirements. Patient assessed at bedside. Tachycardic 110s, SBP 110s, SpO2 100% on 2L (decreased from 4L). Patient AAO1, moving upper extremities. Does not appear in significant respiratory distress. Pending cardiology/CCU recs.     CHIEF COMPLAINT:    HPI:    PAST MEDICAL & SURGICAL HISTORY:  Bladder cancer      CAD (coronary artery disease)      HTN (hypertension)      T2DM (type 2 diabetes mellitus)      LYUBOV (iron deficiency anemia)      GERD (gastroesophageal reflux disease)      BPH (benign prostatic hyperplasia)      Anxiety and depression      Sacral decubitus ulcer      Osteomyelitis      Abdominal aortic aneurysm      Upper GI bleed      Peptic ulcer disease      Goiter      Esophageal varix      Fermin esophagus      S/P hip replacement, bilateral      S/P repair of abdominal aortic aneurysm using straight graft      History of percutaneous coronary intervention      S/P bilateral hip replacements      S/P aortic aneurysm repair          FAMILY HISTORY:  Family history of colon cancer in mother (Mother)        SOCIAL HISTORY:  Smoking: __ packs x ___ years  EtOH Use:  Marital Status:  Occupation:  Recent Travel:  Country of Birth:  Advance Directives:    Allergies    No Known Allergies    Intolerances        HOME MEDICATIONS:    REVIEW OF SYSTEMS:  Unable to assess d/t mental status     OBJECTIVE:  ICU Vital Signs Last 24 Hrs  T(C): 37.3 (29 Mar 2024 22:29), Max: 37.3 (29 Mar 2024 22:29)  T(F): 99.2 (29 Mar 2024 22:29), Max: 99.2 (29 Mar 2024 22:29)  HR: 118 (29 Mar 2024 23:51) (112 - 118)  BP: 110/84 (29 Mar 2024 23:51) (110/84 - 129/89)  BP(mean): --  ABP: --  ABP(mean): --  RR: 22 (29 Mar 2024 23:51) (22 - 24)  SpO2: 100% (29 Mar 2024 23:51) (99% - 100%)    O2 Parameters below as of 29 Mar 2024 23:51  Patient On (Oxygen Delivery Method): nasal cannula  O2 Flow (L/min): 4            CAPILLARY BLOOD GLUCOSE      POCT Blood Glucose.: 168 mg/dL (29 Mar 2024 21:18)      PHYSICAL EXAM:  General:   HEENT:   Lymph Nodes:  Neck:   Respiratory:   Cardiovascular:   Abdomen:   Extremities:   Skin:   Neurological:  Psychiatry:    HOSPITAL MEDICATIONS:  MEDICATIONS  (STANDING):  acetaminophen   IVPB .. 1000 milliGRAM(s) IV Intermittent once  heparin  Infusion.  Unit(s)/Hr (11 mL/Hr) IV Continuous <Continuous>  vancomycin  IVPB. 1000 milliGRAM(s) IV Intermittent once    MEDICATIONS  (PRN):  heparin   Injectable 4500 Unit(s) IV Push every 6 hours PRN For aPTT less than 40  heparin   Injectable 2000 Unit(s) IV Push every 6 hours PRN For aPTT between 40 - 57      LABS:                        7.4    25.27 )-----------( 263      ( 29 Mar 2024 21:20 )             23.8     03-29    142  |  111<H>  |  56<H>  ----------------------------<  158<H>  5.1   |  17<L>  |  1.27    Ca    7.9<L>      29 Mar 2024 21:20    TPro  5.3<L>  /  Alb  1.6<L>  /  TBili  0.2  /  DBili  x   /  AST  11  /  ALT  12  /  AlkPhos  573<H>  03-29    PT/INR - ( 29 Mar 2024 21:20 )   PT: 27.2 sec;   INR: 2.47 ratio         PTT - ( 29 Mar 2024 21:20 )  PTT:29.3 sec  Urinalysis Basic - ( 29 Mar 2024 21:20 )    Color: x / Appearance: x / SG: x / pH: x  Gluc: 158 mg/dL / Ketone: x  / Bili: x / Urobili: x   Blood: x / Protein: x / Nitrite: x   Leuk Esterase: x / RBC: x / WBC x   Sq Epi: x / Non Sq Epi: x / Bacteria: x        Venous Blood Gas:  03-29 @ 21:20  7.31/32/29/16/35.5  VBG Lactate: 4.7      MICROBIOLOGY:     RADIOLOGY:  [ ] Reviewed and interpreted by me    EKG: 83M from [facility] bladder ca, CAD s/p stents, HTN, T2DM, LYUBOV, GERD, BPH, depression/anxiety, stage 4 decubitus sacral wound c/b osteomyelitis with ongoing abx treatment, AAA s/p graft/stent, recent UGIB, Fermin's esophagitis, s/p b/l hip replacements presents from [place] for worsening sacral wound with imaging c/f osteomyelitis. Course c/b code stroke with low concern for CVA and unremarkable imaging. On imaging found to have an acute left lower lobe PE with findings of right heart strain. During transport, HR dropped to 30 with O2 of 76. On previous admission 03/2024 was found to have left LE DVT and was initiated on Eliquis 5BID however last taken 03/28 PM per ED documentation.     MICU consulted for acute PE with increased oxygen requirements. Patient assessed at bedside. Tachycardic 110s, SBP 110s, SpO2 100% on 2L (decreased from 4L). Patient AAO1, moving upper extremities. Does not appear in significant respiratory distress. Pending cardiology/CCU recs.     CHIEF COMPLAINT:    HPI:    PAST MEDICAL & SURGICAL HISTORY:  Bladder cancer  CAD (coronary artery disease  HTN (hypertension)  T2DM (type 2 diabetes mellitus)  LYUBOV (iron deficiency anemia)  GERD (gastroesophageal reflux disease)  BPH (benign prostatic hyperplasia)  Anxiety and depression  Sacral decubitus ulcer  Osteomyelitis  Abdominal aortic aneurysm  Upper GI bleed  Peptic ulcer disease  Goiter  Esophageal varix  Fermin esophagus      S/P hip replacement, bilateral  S/P repair of abdominal aortic aneurysm using straight graft  History of percutaneous coronary intervention  S/P bilateral hip replacements  S/P aortic aneurysm repair      FAMILY HISTORY:  Family history of colon cancer in mother (Mother)          Allergies  No Known Allergies  Intolerances      REVIEW OF SYSTEMS:  Unable to assess d/t mental status     OBJECTIVE:  ICU Vital Signs Last 24 Hrs  T(C): 37.3 (29 Mar 2024 22:29), Max: 37.3 (29 Mar 2024 22:29)  T(F): 99.2 (29 Mar 2024 22:29), Max: 99.2 (29 Mar 2024 22:29)  HR: 118 (29 Mar 2024 23:51) (112 - 118)  BP: 110/84 (29 Mar 2024 23:51) (110/84 - 129/89)  BP(mean): --  ABP: --  ABP(mean): --  RR: 22 (29 Mar 2024 23:51) (22 - 24)  SpO2: 100% (29 Mar 2024 23:51) (99% - 100%)    O2 Parameters below as of 29 Mar 2024 23:51  Patient On (Oxygen Delivery Method): nasal cannula  O2 Flow (L/min): 4            CAPILLARY BLOOD GLUCOSE      POCT Blood Glucose.: 168 mg/dL (29 Mar 2024 21:18)      PHYSICAL EXAM:  General: NAD  HEENT: NC/AT  Neck: Supple  Respiratory: no respiratory distress; comfortable on 2L  Cardiovascular: tachycardic on tele  Abdomen: soft and nontender  Extremities: warm, no LE edema  Neurological: moving upper extremities; minimal movement LE  Psychiatry: AAO1 to name; unable to follow commands    HOSPITAL MEDICATIONS:  MEDICATIONS  (STANDING):  acetaminophen   IVPB .. 1000 milliGRAM(s) IV Intermittent once  heparin  Infusion.  Unit(s)/Hr (11 mL/Hr) IV Continuous <Continuous>  vancomycin  IVPB. 1000 milliGRAM(s) IV Intermittent once    MEDICATIONS  (PRN):  heparin   Injectable 4500 Unit(s) IV Push every 6 hours PRN For aPTT less than 40  heparin   Injectable 2000 Unit(s) IV Push every 6 hours PRN For aPTT between 40 - 57      LABS:                        7.4    25.27 )-----------( 263      ( 29 Mar 2024 21:20 )             23.8     03-29    142  |  111<H>  |  56<H>  ----------------------------<  158<H>  5.1   |  17<L>  |  1.27    Ca    7.9<L>      29 Mar 2024 21:20    TPro  5.3<L>  /  Alb  1.6<L>  /  TBili  0.2  /  DBili  x   /  AST  11  /  ALT  12  /  AlkPhos  573<H>  03-29    PT/INR - ( 29 Mar 2024 21:20 )   PT: 27.2 sec;   INR: 2.47 ratio         PTT - ( 29 Mar 2024 21:20 )  PTT:29.3 sec  Urinalysis Basic - ( 29 Mar 2024 21:20 )    Color: x / Appearance: x / SG: x / pH: x  Gluc: 158 mg/dL / Ketone: x  / Bili: x / Urobili: x   Blood: x / Protein: x / Nitrite: x   Leuk Esterase: x / RBC: x / WBC x   Sq Epi: x / Non Sq Epi: x / Bacteria: x        Venous Blood Gas:  03-29 @ 21:20  7.31/32/29/16/35.5  VBG Lactate: 4.7      MICROBIOLOGY:     RADIOLOGY:  [ ] Reviewed and interpreted by me    EKG:

## 2024-03-31 NOTE — CONSULT NOTE ADULT - ATTENDING COMMENTS
83-year-old male with a past medical history significant for CAD status post PCI, hypertension, diabetes, AAA status post graft and stent, recent GI bleed, Fermin's esophagitis, bladder cancer, stage IV decubitus sacral wound complicated by osteomyelitis, bilateral hip replacements who was admitted to the hospital from [facility] due to worsening sacral wound.    Upon arrival in the ED, stroke code called due to dysarthria, contraction of left upper extremity.  CT of the head was obtained which did not show evidence for infarction.  CT a of the thorax showed an acute left lower PE with right heart strain and a femoral DVT.  Patient was started on heparin gtt. following that.  Patient then required CCU admission for further management of hypotension and pressor support.    Other workup obtained on admission included CT abdomen/pelvis showing a large sacral decubitus ulcer extending from L1 to the coccyx, gas tracking was noted in the spinal canal and extending through the gluteal soft tissues.  Left posterior thigh and right thigh fossa abscesses were noted, exposed lumbar spinous processes and erosions were noted concerning for osteomyelitis.  Chest x-ray was obtained showing clear lungs, urinalysis with evidence for infection.  Blood cultures were obtained which are now polymicrobial with detection of E. coli, Klebsiella, Proteus, Enterococcus and Bacteroides.  ID consulted for further management.    #Polymicrobial bacteremia, sacral wound likely origin  #Stage IV sacral wound, infected  #Pelvic abscesses noted in the posterior thigh, right ischial anal fossa    Recommendations  Discontinue vancomycin, piperacillin/tazobactam and clindamycin  Start imipenem  Surgical evaluation for wound debridement, abscess drainage as noted on imaging  Obtain repeat blood cultures  Would pursue GÓMEZ if within goals of care  Follow fever curve and WBC count    Tin Vasquez MD  Division of Infectious Diseases

## 2024-03-31 NOTE — CONSULT NOTE ADULT - SUBJECTIVE AND OBJECTIVE BOX
Surgery Consult Note  Attending: Crystal Kitchen  Service: Surgery Team A  [id number]   p      HPI: 83M from [facility] CAD s/p stents, HTN, T2DM, AAA s/p graft/stent, recent UGIB, Fermin's esophagitis, GERD, BPH, Bladder Cancer, 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 Patient  presents to the emergency department from [facility] due to worsening sacral wound with concern for osteomyelitis that patient has been ongoing treatment for as an outpatient. Recently saw wound care MD 3/4 who placed a wound vac prior to discahrge. During transport, HR dropped to 30 with O2 of 76, was placed on 6L NC. While in the ED, patient started having dysarthria, contraction of left upper extremity. Code stroke was called CT head and brain negative for infarct. CT Angio of chest showed an acute left lower lobe PE with findings of right heart strain with left femoral DVT. In ED, pt. started on heparin gtt. for PE, levophed started  for hypotension.     Patient admitted to CCU and undergoing treatment for PE with heparin gtt and requiring levophed .1. CTAP obtained which revealed similar large sacral decubitis ulcer with gas tracking into spinal canal and extending into gluteal tissue, left posterior right thigh and right posterior ischioanal fossa abscesses, and Exposed lumbar spinous processes and erosions of the sacrococcygeal spinous processes most concerning for osteomyelitis. WBC elevated to 21 however was discahrged previously with wbc elevated to 14 3/7. LRINEC score 3.       PAST MEDICAL HISTORY:  PAST MEDICAL & SURGICAL HISTORY:  Bladder cancer      CAD (coronary artery disease)      HTN (hypertension)      T2DM (type 2 diabetes mellitus)      LYUBOV (iron deficiency anemia)      GERD (gastroesophageal reflux disease)      BPH (benign prostatic hyperplasia)      Anxiety and depression      Sacral decubitus ulcer      Osteomyelitis      Abdominal aortic aneurysm      Upper GI bleed      Peptic ulcer disease      Goiter      Esophageal varix      Fermin esophagus      S/P hip replacement, bilateral      S/P repair of abdominal aortic aneurysm using straight graft      History of percutaneous coronary intervention      S/P bilateral hip replacements      S/P aortic aneurysm repair          ALLERGIES:  Allergies    No Known Allergies    Intolerances        SOCIAL HISTORY: Negative for tobacco, etoh, or drug use    FAMILY HISTORY:  FAMILY HISTORY:  Family history of colon cancer in mother (Mother)        PHYSICAL EXAM:  General: patient moaning, AxOx0  Pulmonary: on NC  Cardiovascular: NSR, no murmurs  Abdominal: soft, ND/NT, no organomegaly, no guarding or rebound tenderness  Wound: Large unstageable wound at lower back measuring [id number](superior portion) and 7cm in width at the inferior portion, upper back area with healthy muscle and exposed spinous process, one area of bridging skin at the center with tunneling connecting to the sacral wound. Sacral wound also with exposed bone and necrotic tissue overlaying the base and skin edges, not easily removal. Tunneling at the sacrum into left posterior thigh. No brigida pus expressed,       VITAL SIGNS:  Vital Signs Last 24 Hrs  T(C): 34.1 (31 Mar 2024 12:00), Max: 37.1 (30 Mar 2024 20:30)  T(F): 93.4 (31 Mar 2024 12:00), Max: 98.8 (30 Mar 2024 20:30)  HR: 98 (31 Mar 2024 14:00) (86 - 125)  BP: --  BP(mean): --  RR: 21 (31 Mar 2024 14:00) (13 - 28)  SpO2: 100% (31 Mar 2024 14:00) (95% - 100%)    Parameters below as of 31 Mar 2024 08:00  Patient On (Oxygen Delivery Method): nasal cannula  O2 Flow (L/min): 2      I&O's Summary    30 Mar 2024 07:01  -  31 Mar 2024 07:00  --------------------------------------------------------  IN: 3881 mL / OUT: 465 mL / NET: 3416 mL    31 Mar 2024 07:01  -  31 Mar 2024 14:16  --------------------------------------------------------  IN: 1111.5 mL / OUT: 25 mL / NET: 1086.5 mL        LABS:                        6.7    21.05 )-----------( 120      ( 31 Mar 2024 08:15 )             20.5     03-31    139  |  113<H>  |  58<H>  ----------------------------<  150<H>  3.9   |  10<LL>  |  1.11    Ca    6.7<L>      31 Mar 2024 08:15  Phos  3.9     03-31  Mg     2.20     03-31    TPro  4.4<L>  /  Alb  1.2<L>  /  TBili  <0.2  /  DBili  x   /  AST  14  /  ALT  15  /  AlkPhos  372<H>  03-31    PT/INR - ( 31 Mar 2024 08:15 )   PT: 18.2 sec;   INR: 1.63 ratio         PTT - ( 31 Mar 2024 08:15 )  PTT:95.4 sec  Urinalysis Basic - ( 31 Mar 2024 08:15 )    Color: x / Appearance: x / SG: x / pH: x  Gluc: 150 mg/dL / Ketone: x  / Bili: x / Urobili: x   Blood: x / Protein: x / Nitrite: x   Leuk Esterase: x / RBC: x / WBC x   Sq Epi: x / Non Sq Epi: x / Bacteria: x      CAPILLARY BLOOD GLUCOSE      POCT Blood Glucose.: 134 mg/dL (31 Mar 2024 11:17)  POCT Blood Glucose.: 175 mg/dL (31 Mar 2024 05:48)  POCT Blood Glucose.: 199 mg/dL (30 Mar 2024 23:24)  POCT Blood Glucose.: 164 mg/dL (30 Mar 2024 16:55)    LIVER FUNCTIONS - ( 31 Mar 2024 08:15 )  Alb: 1.2 g/dL / Pro: 4.4 g/dL / ALK PHOS: 372 U/L / ALT: 15 U/L / AST: 14 U/L / GGT: x             CULTURES:  Culture Results:   Growth in aerobic and anaerobic bottles: Klebsiella pneumoniae  Growth in aerobic and anaerobic bottles: Escherichia coli  See previous culture [id number]  Growth in aerobic and anaerobic bottles: Gram Negative Rods and Gram  Positive Rods (03-30 @ 03:37)  Culture Results:   Growth in aerobic and anaerobic bottles: Escherichia coli  Growth in aerobic and anaerobic bottles: Klebsiella pneumoniae  See previous culture [id number]  Growth in aerobic and anaerobic bottles: Gram Negative Rods and Gram  Positive Rods (03-30 @ 03:37)  Culture Results:   Growth in aerobic and anaerobic bottles: Enterococcus faecalis  Growth in aerobic bottle: Proteus mirabilis  Growth in anaerobic bottle: Klebsiella pneumoniae  Growth in aerobic and anaerobic bottles: Escherichia coli  Growth in anaerobic bottle: Clostridium perfringens "Susceptibilities not  performed"  Direct identification is available within approximately 3-5  hours either by Blood Panel Multiplexed PCR or Direct  MALDI-TOF. Details: [email or web address]  Aguilar/B ResistanceGene Detected  When multiple species' of Enterococcus are present,  association of a resistance gene to a specific organism  cannot be determined.  When multiple gram negative organisms are present,  association of a resistance gene to a specific organism  cannot be determined. (03-29 @ 21:28)  Culture Results:   Growth in aerobic and anaerobic bottles: Escherichia coli  Growth in aerobic and anaerobic bottles: Proteus mirabilis  Growth in anaerobic bottle: Clostridium perfringens  See previous culture [id number] (03-29 @ 21:20)      RADIOLOGY & ADDITIONAL STUDIES:  < from: CT Abdomen and Pelvis w/ IV Cont (03.29.24 @ 21:59) >    ACC: [id number] EXAM:  CT ABDOMEN AND PELVIS IC   ORDERED BY: ENRIQUETA BERMUDEZ     ACC: [id number] EXAM:  CT ANGIO CHEST PULM ART [facility]   ORDERED BY: ENRIQUETA BERMUDEZ     PROCEDURE DATE:  03/29/2024          INTERPRETATION:  CLINICAL INFORMATION: Wells score greater than 4    COMPARISON: CT abdomen and pelvis 2/29/2024, CT neck 8/2/2023    CONTRAST/COMPLICATIONS:  IV Contrast: Omnipaque 350 (accession [id number]), IV contrast documented   in unlinked concurrent exam (accession [id number])  90 cc administered   10   cc discarded  Oral Contrast: NONE  Complications: None reported at time of study completion    PROCEDURE:  CT Angiography of the Chest was performed followed by portal venous phase   imaging of the Abdomen and Pelvis.  Sagittal and coronal reformats were performed as well as 3D (MIP)   reconstructions.    FINDINGS:  CHEST:  LUNGS AND LARGE AIRWAYS: Patent central airways. There is rightward   deviation of the trachea due to substernal thyroid goiter. Emphysema.   Several scattered sub-6 mm pulmonary nodules bilaterally, nonspecific.   Bibasilar atelectasis. Small nonenhancing peripheral consolidation at the   left lung base in the region of the occluded pulmonary arteries, possibly   a small pulmonary infarct.  PLEURA: No pleural effusion.  VESSELS: Breathing motion significantly limits evaluation of subsegmental   pulmonary artery branches in the right middle, right lower, lingula, and   left lower lobes. Left lower lobar pulmonary embolism extending into the   and occluding theposterobasal segmental and subsegmental branches.   Enlargement of the main pulmonary artery measuring 4.1 cm.  HEART: Elevated RV to LV ratio and straightening of the interventricular   septum suggestive of right heart strain. Heart size is normal. No   pericardial effusion  MEDIASTINUM AND JENI: No lymphadenopathy.Redemonstrated large left   heterogeneous thyroid goiter extending into the mediastinum with mass   effect on/compression of the trachea and rightward rightward tracheal   deviation, as well as compression of the esophagus.  CHEST WALL AND LOWER NECK: Within normal limits.    ABDOMEN AND PELVIS:  LIVER: Few too small to characterize hepatic hypodensities.  BILE DUCTS: Normal caliber.  GALLBLADDER: Within normal limits.  SPLEEN: Within normal limits.  PANCREAS: Within normal limits.  ADRENALS: Nonspecific nodular bilateral adrenal thickening.  KIDNEYS/URETERS: No hydronephrosis. Bilateral renal hypoattenuating foci   too small to characterize.    BLADDER: Not visualized due to streak artifact from bilateral hip   prostheses.  REPRODUCTIVE ORGANS: Not visualized due to streak artifact from bilateral   hip prostheses.    BOWEL: Duodenal diverticulum. No bowel obstruction. Appendix is normal.   Copious stool in the rectum.  PERITONEUM: No ascites.  VESSELS: Filling defect in the left common femoral vein compatible with   deep venous thrombosis (303-114). Redemonstrated endovascular repair of   abdominal aortic aneurysm. The aneurysm sac measures 7.1 cm in transverse   dimension, not significantly changed. Similar-appearing mural thrombus   within the stent with moderate narrowing. The stent is patent to the   bifurcation. The left common iliac component of the stent is chronically   occluded. The distal left external iliac artery and superficial femoral   artery is patent. The proximal portion of the left external iliac artery   is obscured by streak artifact. Coils in the region of the left internal   iliac artery. The right common iliac limb of the stent is patent.   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  RETROPERITONEUM/LYMPH NODES: No lymphadenopathy.  ABDOMINAL WALL: Large decubitus ulcer extending from L1 to the coccyx   with multiple exposed vertebral body spinous processes. There is gasseen   tracking to the left second sacral foramen and into the spinal canal   (303-80 and 605-77). Gas and soft tissue tracks into the bilateral   gluteal musculature. Rim-enhancing collection of fluid and gas in the   left posterior upper thigh measuring 5.7 x 5.4 x 2.2 cm. A collection of   fluid and gas in the right ischioanal fossa measuring 3.8 x 3.0 x 2.1 cm.  BONES: Bilateral total hip arthroplasties. Osseous erosions of the   sacrococcygeal spinous processes also concerning for osteomyelitis.   Severe degenerative changes of the spine. Grade 1 anterolisthesis L4 on   L5. Mild retrolisthesis L3 on L4.    IMPRESSION:    Acute left lower lobe pulmonary emboli extending to the posterobasal   segmental and subsegmental pulmonary branches. Right heart strain is   present. Recommend echocardiography.    Small nonenhancing peripheral opacity in the left lung base, most   suspicious for pulmonary infarct.    These findings were discussed with Dr. Bermudez by Dr. Craig at 10:15 PM on   3/29/2024, with repeat back confirmation.    Left common femoral vein DVT.    Large sacral decubitus ulcer extending from L1 to the coccyx. Gas tracks   into the spinal canal and extends to the gluteal soft tissues.    Left posterior thigh and right ischioanalfossa abscesses as above.    Exposed lumbar spinous processes and erosions of the sacrococcygeal   spinous processes most concerning for osteomyelitis.    --- End of Report ---      < end of copied text >

## 2024-03-31 NOTE — PATIENT PROFILE ADULT - PATIENT REPRESENTATIVE: ( YOU CAN CHOOSE ANY PERSON THAT CAN ASSIST YOU WITH YOUR HEALTH CARE PREFERENCES, DOES NOT HAVE TO BE A SPOUSE, IMMEDIATE FAMILY OR SIGNIFICANT OTHER/PARTNER)
Check labs for reasons related to vocal cord paralysis and increased reflexes and walking problems.    See me as needed information could not be obtained same name as above

## 2024-03-31 NOTE — PATIENT PROFILE ADULT - FUNCTIONAL ASSESSMENT - DAILY ACTIVITY 5.
pt doesnot answer questions no family at bedsideto provide answers. pt doesnot answer questions no family at bedsideto provide answers./1 = Total assistance

## 2024-03-31 NOTE — CONSULT NOTE ADULT - ASSESSMENT
83M from [facility] CAD s/p stents, HTN, T2DM, AAA s/p graft/stent, recent UGIB, Fermin's esophagitis, GERD, BPH, Bladder Cancer, Stage 4 decubitus sacral wound c/b osteomyelitis with ongoing abx treatment,  Presented for worsening sacral wound but found to have LLL PE and left femoral DVT with right heart strain admitted to CCU on levophed and heparin gtt. General surgery consulted for CTAP with new abscess in right ischioanal space and left posterior thigh not previously seen and concern for sepsis 2/2 large sacral wound.     Plan:  - no acute surgical intervention, patient with severe comorbidities and not a candidate at this time for bedside or OR debridement with active PE w/ RHS/levophed gtt/hep gtt.  - Recommend palliative care consult for comprehensive GOC discussion as patient with multiple severe illnesses  - recommend wound care MD consult as patient has previously been evaluated by the team and may have further recommendations  - can cleanse wound with soap and water, apply barrier film to periwound skin, apply medihoney to sacral wound eschar and cover with gauze/adhesive foam, change daily and PRN if soiled    Discussed with Dr. Rojas Gamma  Surgery Team A  [id number]  83M from [facility] CAD s/p stents, HTN, T2DM, AAA s/p graft/stent, recent UGIB, Fermin's esophagitis, GERD, BPH, Bladder Cancer, Stage 4 decubitus sacral wound c/b osteomyelitis with ongoing abx treatment,  Presented for worsening sacral wound but found to have LLL PE and left femoral DVT with right heart strain admitted to CCU on levophed and heparin gtt. General surgery consulted for CTAP with new abscess in right ischioanal space and left posterior thigh not previously seen and concern for sepsis 2/2 large sacral wound.     Plan:  - no acute surgical intervention, patient with severe comorbidities and not a candidate at this time for bedside or OR debridement with active PE w/ RHS/levophed gtt/hep gtt.  - Recommend palliative care consult for comprehensive GOC discussion as patient with multiple severe illnesses  - recommend wound care MD consult as patient has previously been evaluated by the team and may have further recommendations  - can cleanse wound with soap and water, apply barrier film to periwound skin, apply medihoney to sacral wound eschar and cover with gauze/adhesive foam, change daily and PRN if soiled  - c/w imipenem appreciate ID recs    Discussed with Dr. Ag  Surgery Team A  [id number]  83M from [facility] CAD s/p stents, HTN, T2DM, AAA s/p graft/stent, recent UGIB, Fermin's esophagitis, GERD, BPH, Bladder Cancer, Stage 4 decubitus sacral wound c/b osteomyelitis with ongoing abx treatment,  Presented for worsening sacral wound but found to have LLL PE and left femoral DVT with right heart strain admitted to CCU on levophed and heparin gtt. General surgery consulted for CTAP with new abscess in right ischioanal space and left posterior thigh not previously seen and concern for sepsis 2/2 large sacral wound.     Plan:  - no acute surgical intervention, patient with severe comorbidities and not a candidate at this time for bedside or OR debridement with active PE w/ RHS/levophed gtt/hep gtt.  - Recommend palliative care consult for comprehensive GOC discussion with family as patient with multiple severe illnesses and no mental status  - recommend wound care MD consult as patient has previously been evaluated by the team and may have further recommendations  - can cleanse wound with soap and water, apply barrier film to periwound skin, apply medihoney to sacral wound eschar and cover with gauze/adhesive foam, change daily and PRN if soiled  - c/w imipenem appreciate ID recs    Discussed with Dr. Ag  Surgery Team A  [id number]

## 2024-03-31 NOTE — DISCHARGE NOTE PROVIDER - NSDCFUSCHEDAPPT_GEN_ALL_CORE_FT
[facility]  [facility] [address] [facility]  Scheduled Appointment: 04/17/2024

## 2024-03-31 NOTE — CONSULT NOTE ADULT - SUBJECTIVE AND OBJECTIVE BOX
Patient is a 83y old  Male who presents with a chief complaint of SEPSIS, PE (31 Mar 2024 06:21)    HPI:  83M from [facility] [place] CAD s/p stents, HTN, T2DM, AAA s/p graft/stent, recent UGIB, Fermin's esophagitis, GERD, BPH, Bladder Cancer, 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 Patient  presents to the emergency department from [facility] due to worsening sacral wound with imaging of osteomyelitis. During transport, HR dropped to 30 with O2 of 76, was placed on 6L NC. While in the ED, patient started having dysarthria, contraction of left upper extremity. Code stroke was called CT head and brain negative for infarct. CT Angio of chest showed an acute left lower lobe PE with findings of right heart strain with left femoral DVT. In ED, pt. started on heparin gtt. for PE, levophed started  for hypotension.  (30 Mar 2024 03:43)       REVIEW OF SYSTEMS  Constitutional: No fevers, chills, weight loss or fatigue   Skin: No rash, no phlebitis	  Eyes: No discharge	  ENMT: No sore throat, oral thrush, ulcers or exudate  Respiratory: No cough, no SOB  Cardiovascular:  No chest pain, palpitations or edema   Gastrointestinal: No pain, nausea, vomiting, diarrhea or constipation	  Genitourinary: No dysuria, discharge or flank pain  MSK: No arthralgias or back pain   Neurological: No HA, no weakness, no seizures, no AMS       prior hospital charts reviewed [V]  primary team notes reviewed [V]  other consultant notes reviewed [V]    PAST MEDICAL & SURGICAL HISTORY:  Bladder cancer      CAD (coronary artery disease)      HTN (hypertension)      T2DM (type 2 diabetes mellitus)      LYUBOV (iron deficiency anemia)      GERD (gastroesophageal reflux disease)      BPH (benign prostatic hyperplasia)      Anxiety and depression      Sacral decubitus ulcer      Osteomyelitis      Abdominal aortic aneurysm      Upper GI bleed      Peptic ulcer disease      Goiter      Esophageal varix      Fermin esophagus      S/P hip replacement, bilateral      S/P repair of abdominal aortic aneurysm using straight graft      History of percutaneous coronary intervention      S/P bilateral hip replacements      S/P aortic aneurysm repair          SOCIAL HISTORY:  - Denied smoking/vaping/alcohol/recreational drug use    FAMILY HISTORY:  Family history of colon cancer in mother (Mother)        Allergies  No Known Allergies        ANTIMICROBIALS:  clindamycin IVPB 900 once  clindamycin IVPB    clindamycin IVPB 900 every 8 hours  meropenem  IVPB 1000 every 12 hours  vancomycin  IVPB 1000 every 12 hours      ANTIMICROBIALS (past 90 days):  MEDICATIONS  (STANDING):    clindamycin IVPB   100 mL/Hr IV Intermittent (03-31-24 @ 01:14)    meropenem  IVPB   100 mL/Hr IV Intermittent (03-31-24 @ 05:48)   100 mL/Hr IV Intermittent (03-30-24 @ 21:37)    piperacillin/tazobactam IVPB..   25 mL/Hr IV Intermittent (03-30-24 @ 14:34)    piperacillin/tazobactam IVPB...   200 mL/Hr IV Intermittent (03-29-24 @ 22:51)    vancomycin  IVPB   250 mL/Hr IV Intermittent (03-31-24 @ 06:28)   250 mL/Hr IV Intermittent (03-30-24 @ 17:22)    vancomycin  IVPB.   250 mL/Hr IV Intermittent (03-30-24 @ 00:51)        OTHER MEDS:   MEDICATIONS  (STANDING):  dextrose 50% Injectable 25 once  dextrose 50% Injectable 12.5 once  dextrose 50% Injectable 25 once  dextrose Oral Gel 15 once PRN  glucagon  Injectable 1 once  heparin  Infusion 800 <Continuous>  insulin lispro (ADMELOG) corrective regimen sliding scale  three times a day before meals  insulin lispro (ADMELOG) corrective regimen sliding scale  at bedtime  norepinephrine Infusion 0.05 <Continuous>  pantoprazole  Injectable 40 two times a day  tamsulosin 0.4 at bedtime      VITALS:  Vital Signs Last 24 Hrs  T(F): 98 (03-31-24 @ 04:00), Max: 99.2 (03-29-24 @ 22:29)    Vital Signs Last 24 Hrs  HR: 95 (03-31-24 @ 06:00) (86 - 127)  BP: --  RR: 16 (03-31-24 @ 06:00)  SpO2: 99% (03-31-24 @ 04:00) (93% - 100%)  Wt(kg): --    EXAM:  General: Patient in no acute distress   HEENT: NCAT, EOMI, PERRL, no oral lesions  CV: S1+S2, no m/r/g appreciated   Lungs: No respiratory distress, CTAB  Abd: Soft, nontender, no guarding, no rebound tenderness, + bowel sounds   Ext: No cyanosis, no edema  Neuro: Alert and oriented, no focal deficits, CN II-XII grossly intact   Skin: No rash   IV: No phlebitis      Labs:                        7.4    22.05 )-----------( 202      ( 30 Mar 2024 04:37 )             22.9     03-30    141  |  115<H>  |  57<H>  ----------------------------<  185<H>  4.2   |  12<L>  |  1.12    Ca    6.6<L>      30 Mar 2024 22:01  Phos  4.0     03-30  Mg     2.50     03-30    TPro  4.2<L>  /  Alb  1.2<L>  /  TBili  0.2  /  DBili  x   /  AST  13  /  ALT  12  /  AlkPhos  400<H>  03-30      WBC Trend:  WBC Count: 22.05 (03-30-24 @ 04:37)  WBC Count: 25.27 (03-29-24 @ 21:20)      Auto Neutrophil #: 24.18 K/uL (03-29-24 @ 21:20)  Band Neutrophils %: 3.5 % (03-29-24 @ 21:20)  Auto Neutrophil #: 9.74 K/uL (03-03-24 @ 05:55)  Auto Neutrophil #: 12.95 K/uL (03-02-24 @ 05:06)  Auto Neutrophil #: 13.87 K/uL (02-29-24 @ 15:10)      Creatine Trend:  Creatinine: 1.12 (03-30)  Creatinine: 1.21 (03-30)  Creatinine: 1.27 (03-29)      Liver Biochemical Testing Trend:  Alanine Aminotransferase (ALT/SGPT): 12 (03-30)  Alanine Aminotransferase (ALT/SGPT): 15 (03-30)  Alanine Aminotransferase (ALT/SGPT): 12 (03-29)  Alanine Aminotransferase (ALT/SGPT): 11 (03-04)  Alanine Aminotransferase (ALT/SGPT): 18 (03-03)  Aspartate Aminotransferase (AST/SGOT): 13 (03-30-24 @ 22:01)  Aspartate Aminotransferase (AST/SGOT): 13 (03-30-24 @ 04:37)  Aspartate Aminotransferase (AST/SGOT): 11 (03-29-24 @ 21:20)  Aspartate Aminotransferase (AST/SGOT): 17 (03-04-24 @ 05:19)  Aspartate Aminotransferase (AST/SGOT): 31 (03-03-24 @ 05:55)  Bilirubin Total: 0.2 (03-30)  Bilirubin Total: 0.2 (03-30)  Bilirubin Total: 0.2 (03-29)  Bilirubin Total: 0.4 (03-04)  Bilirubin Total: 0.5 (03-03)      Trend LDH  03-01-24 @ 13:00  226<H>      Auto Eosinophil %: 0.0 % (03-29-24 @ 21:20)      Urinalysis Basic - ( 30 Mar 2024 22:01 )    Color: x / Appearance: x / SG: x / pH: x  Gluc: 185 mg/dL / Ketone: x  / Bili: x / Urobili: x   Blood: x / Protein: x / Nitrite: x   Leuk Esterase: x / RBC: x / WBC x   Sq Epi: x / Non Sq Epi: x / Bacteria: x      MICROBIOLOGY:    MRSA PCR Result.: NotDetec (03-01-24 @ 15:52)      Culture - Blood (collected 30 Mar 2024 03:37)  Source: .Blood Blood-Peripheral  Preliminary Report:    Growth in aerobic and anaerobic bottles: Gram Negative Rods and Gram    Positive Rods    Culture - Blood (collected 30 Mar 2024 03:37)  Source: .Blood Blood-Peripheral  Preliminary Report:    Growth in aerobic and anaerobic bottles: Gram Negative Rods and Gram    Positive Rods    Culture - Blood (collected 29 Mar 2024 21:28)  Source: .Blood Blood-Peripheral  Preliminary Report:    Growth in anaerobic bottle: Gram Negative Rods and Gram Positive Rods    Growth in aerobic bottle: Gram Negative Rods and Gram positive cocci in    pairs    Direct identification is available within approximately 3-5    hours either by Blood Panel Multiplexed PCR or Direct    MALDI-TOF. Details: [email or web address]    Aguilar/B Resistance Gene Detected    When multiple species' of Enterococcus are present,    association of a resistance gene to a specific organism    cannot be determined.    When multiple gram negative organisms are present,    association of a resistance gene to a specific organism    cannot be determined.  Organism: Blood Culture PCR  Organism: Blood Culture PCR    Sensitivities:      Method Type: PCR      -  Bacteroides fragilis: Detec      -  Escherichia coli: Detec      -  K. pneumoniae group: Detec (K. pneumoniae, K. quasipneumoniae, K. variicola)      -  Proteus species: Detec      -  Enterococcus faecalis: Detec      -  Enterococcus faecium: Detec      -  ESBL: Detec      -  CTX-M Resistance Marker: Detec    Culture - Blood (collected 29 Mar 2024 21:20)  Source: .Blood Blood-Peripheral  Preliminary Report:    Growth in aerobic bottle: Gram Negative Rods    Growth in anaerobic bottle: Gram Negative Rods and Gram Positive Rods    Culture - Blood (collected 29 Feb 2024 23:11)  Source: .Blood Blood-Peripheral  Final Report:    No growth at 5 days    Troponin T, High Sensitivity Result: 92 (03-29)  Troponin T, High Sensitivity Result: 120 (03-29)    Blood Gas Venous - Lactate: 2.6 (03-30 @ 22:01)  Blood Gas Venous - Lactate: 3.1 (03-30 @ 16:15)  Blood Gas Arterial, Lactate: 5.1 (03-30 @ 10:23)  Blood Gas Venous - Lactate: 5.1 (03-30 @ 10:23)        RADIOLOGY:  imaging below personally reviewed    < from: Xray Chest 1 View- PORTABLE-Urgent (Xray Chest 1 View- PORTABLE-Urgent .) (03.30.24 @ 12:47) >  FINDINGS:  The cardiac silhouette is normal in size. The right lung was   not completely imaged on this film. There is a right IJ central venous   catheter with tip in the superior vena cava. No pneumothorax is seen.   There is rightward deviation of the trachea secondary to thyroid goiter.   The lungs are clear as visualized. The hilar and mediastinal structures   appear unremarkable. There is bilateral glenohumeral arthrosis.    IMPRESSION: Clearlungs as visualized.    < end of copied text >  < from: CT Abdomen and Pelvis w/ IV Cont (03.29.24 @ 21:59) >  FINDINGS:  CHEST:  LUNGS AND LARGE AIRWAYS: Patent central airways. There is rightward   deviation of the trachea due to substernal thyroid goiter. Emphysema.   Several scattered sub-6 mm pulmonary nodules bilaterally, nonspecific.   Bibasilar atelectasis. Small nonenhancing peripheral consolidation at the   left lung base in the region of the occluded pulmonary arteries, possibly   a small pulmonary infarct.  PLEURA: No pleural effusion.  VESSELS: Breathing motion significantly limits evaluation of subsegmental   pulmonary artery branches in the right middle, right lower, lingula, and   left lower lobes. Left lower lobar pulmonary embolism extending into the   and occluding theposterobasal segmental and subsegmental branches.   Enlargement of the main pulmonary artery measuring 4.1 cm.  HEART: Elevated RV to LV ratio and straightening of the interventricular   septum suggestive of right heart strain. Heart size is normal. No   pericardial effusion  MEDIASTINUM AND JENI: No lymphadenopathy.Redemonstrated large left   heterogeneous thyroid goiter extending into the mediastinum with mass   effect on/compression of the trachea and rightward rightward tracheal   deviation, as well as compression of the esophagus.  CHEST WALL AND LOWER NECK: Within normal limits.    ABDOMEN AND PELVIS:  LIVER: Few too small to characterize hepatic hypodensities.  BILE DUCTS: Normal caliber.  GALLBLADDER: Within normal limits.  SPLEEN: Within normal limits.  PANCREAS: Within normal limits.  ADRENALS: Nonspecific nodular bilateral adrenal thickening.  KIDNEYS/URETERS: No hydronephrosis. Bilateral renal hypoattenuating foci   too small to characterize.    BLADDER: Not visualized due to streak artifact from bilateral hip   prostheses.  REPRODUCTIVE ORGANS: Not visualized due to streak artifact from bilateral   hip prostheses.    BOWEL: Duodenal diverticulum. No bowel obstruction. Appendix is normal.   Copious stool in the rectum.  PERITONEUM: No ascites.  VESSELS: Filling defect in the left common femoral vein compatible with   deep venous thrombosis (303-114). Redemonstrated endovascular repair of   abdominal aortic aneurysm. The aneurysm sac measures 7.1 cm in transverse   dimension, not significantly changed. Similar-appearing mural thrombus   within the stent with moderate narrowing. The stent is patent to the   bifurcation. The left common iliac component of the stent is chronically   occluded. The distal left external iliac artery and superficial femoral   artery is patent. The proximal portion of the left external iliac artery   is obscured by streak artifact. Coils in the region of the left internal   iliac artery. The right common iliac limb of the stent is patent.   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  RETROPERITONEUM/LYMPH NODES: No lymphadenopathy.  ABDOMINAL WALL: Large decubitus ulcer extending from L1 to the coccyx   with multiple exposed vertebral body spinous processes. There is gasseen   tracking to the left second sacral foramen and into the spinal canal   (303-80 and 605-77). Gas and soft tissue tracks into the bilateral   gluteal musculature. Rim-enhancing collection of fluid and gas in the   left posterior upper thigh measuring 5.7 x 5.4 x 2.2 cm. A collection of   fluid and gas in the right ischioanal fossa measuring 3.8 x 3.0 x 2.1 cm.  BONES: Bilateral total hip arthroplasties. Osseous erosions of the   sacrococcygeal spinous processes also concerning for osteomyelitis.   Severe degenerative changes of the spine. Grade 1 anterolisthesis L4 on   L5. Mild retrolisthesis L3 on L4.    IMPRESSION:    Acute left lower lobe pulmonary emboli extending to the posterobasal   segmental and subsegmental pulmonary branches. Right heart strain is   present. Recommend echocardiography.    Small nonenhancing peripheral opacity in the left lung base, most   suspicious for pulmonary infarct.    These findings were discussed with Dr. Bermudez by Dr. Craig at 10:15 PM on   3/29/2024, with repeat back confirmation.    Left common femoral vein DVT.    Large sacral decubitus ulcer extending from L1 to the coccyx. Gas tracks   into the spinal canal and extends to the gluteal soft tissues.    Left posterior thigh and right ischioanalfossa abscesses as above.    Exposed lumbar spinous processes and erosions of the sacrococcygeal   spinous processes most concerning for osteomyelitis.    < end of copied text >

## 2024-03-31 NOTE — PATIENT PROFILE ADULT - DEAF OR HARD OF HEARING?
pt doesnot answer questions no family at bedsideto provide answers. pt doesnot answer questions no family at bedsideto provide answers./no

## 2024-03-31 NOTE — DISCHARGE NOTE PROVIDER - HOSPITAL COURSE
83M from [facility] CAD s/p stents, HTN, T2DM, AAA s/p graft/stent, recent UGIB, Fermin's esophagitis, GERD, BPH, Bladder Cancer, 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 presents with worsening sacral wound with imaging of osteomyelitis. Code stroke was called CT head and brain negative for infarct.  Transferred to CCU for acute left lower lobe PE with findings of right heart strain with left femoral DVT. Started on heparin gtt. for PE and pressors for hypotension.    Echo: Severely enlarged right ventricular cavity size and reduced systolic function.   Right ventricular systolic function is reduced with apical sparing consistent with acute pulmonary embolism (Martinez's sign).  3/31 RIJ demonstrating sepsis  CVP 0-1, CV02 70%, CO 9..4, CI 5.   Multiple fluid boluses and and maintenance IV @ 150/hr. PRBC x 1 for Hg 6.5    Infectious disease consulted for (+) Blood cultures polymicrobial - ESBL gene detected with E. Coli, Klebsiella, Proteus, Enterococcus, and Bacteroides  Pt started on imipenem/cilastin 500 mg q6h (covers all organisms on blood cultures)    Surgical Consult for evaluation for debridement

## 2024-03-31 NOTE — DISCHARGE NOTE PROVIDER - NSDCMRMEDTOKEN_GEN_ALL_CORE_FT
apixaban 5 mg oral tablet: 2 tab(s) orally every 12 hours Take 10mg every 12 hours until 3/11/24. Starting 3/12/24, take 5mg every 12 hours for 6 months. During this time, discuss with your primary doctor if you should continue the medication after 6 months.  busPIRone 10 mg oral tablet: 1 tab(s) orally 3 times a day  ferrous sulfate 220 mg/5 mL (44 mg/5 mL elemental iron) oral elixir: 5 milliliter(s) orally 3 times a day  finasteride 5 mg oral tablet: 1 tab(s) orally once a day  fluticasone 50 mcg/inh nasal spray: 1 spray(s) in each nostril once a day  folic acid 1 mg oral tablet: 1 tab(s) orally once a day  HYDROmorphone: 0.5 milligram(s) intravenous every 4 hours as needed for  severe pain  metFORMIN 1000 mg oral tablet, extended release: 1 tab(s) orally once a day  mirtazapine 7.5 mg oral tablet: 1 tab(s) orally once a day (at bedtime)  Multiple Vitamins oral liquid: 5 milliliter(s) orally once a day  nystatin 100,000 units/g topical cream: Apply topically to affected area 2 times a day Bilateral groin rash  oxycodone-acetaminophen 5 mg-325 mg oral tablet: 1 tab(s) orally every 6 hours As needed Moderate Pain (4 - 6)  pantoprazole 40 mg oral delayed release tablet: 1 tab(s) orally 2 times a day  Pepcid 20 mg oral tablet: 1 tab(s) orally once a day (at bedtime)  senna (sennosides) 8.6 mg oral tablet: 2 tab(s) orally once a day (at bedtime)  simvastatin 20 mg oral tablet: 1 tab(s) orally once a day  tamsulosin 0.4 mg oral capsule: 1 cap(s) orally once a day (at bedtime)  Tradjenta 5 mg oral tablet: 1 tab(s) orally once a day  Tylenol 325 mg oral capsule: 2 cap(s) orally 3 times a day as needed for before PT/OT

## 2024-03-31 NOTE — PATIENT PROFILE ADULT - VISION (WITH CORRECTIVE LENSES IF THE PATIENT USUALLY WEARS THEM):
pt doesnot answer questions no family at bedsideto provide answers. pt doesnot answer questions no family at bedsideto provide answers./Partially impaired: cannot see medication labels or newsprint, but can see obstacles in path, and the surrounding layout; can count fingers at arm's length

## 2024-03-31 NOTE — PATIENT PROFILE ADULT - FALL HARM RISK - HARM RISK INTERVENTIONS
Assistance with ambulation/Assistance OOB with selected safe patient handling equipment/Communicate Risk of Fall with Harm to all staff/Reinforce activity limits and safety measures with patient and family/Tailored Fall Risk Interventions/Visual Cue: Yellow wristband and red socks/Bed in lowest position, wheels locked, appropriate side rails in place/Call bell, personal items and telephone in reach/Instruct patient to call for assistance before getting out of bed or chair/Non-slip footwear when patient is out of bed/[place] to call system/Physically safe environment - no spills, clutter or unnecessary equipment/Purposeful Proactive Rounding/Room/bathroom lighting operational, light cord in reach Assistance with ambulation/Assistance OOB with selected safe patient handling equipment/Communicate Risk of Fall with Harm to all staff/Discuss with provider need for PT consult/Monitor gait and stability/Reinforce activity limits and safety measures with patient and family/Tailored Fall Risk Interventions/Visual Cue: Yellow wristband and red socks/Bed in lowest position, wheels locked, appropriate side rails in place/Call bell, personal items and telephone in reach/Instruct patient to call for assistance before getting out of bed or chair/Non-slip footwear when patient is out of bed/[place] to call system/Physically safe environment - no spills, clutter or unnecessary equipment/Purposeful Proactive Rounding/Room/bathroom lighting operational, light cord in reach

## 2024-03-31 NOTE — PROGRESS NOTE ADULT - SUBJECTIVE AND OBJECTIVE BOX
CCU Service  Cardiology   Spect: [id number] ([facility])     PATIENT: MERLINE RODRIGUEZ, MRN: [id number]    83M from [facility] CAD s/p stents, HTN, T2DM, AAA s/p graft/stent, recent UGIB, Fermin's esophagitis, GERD, BPH, Bladder Cancer, 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 presents with worsening sacral wound with imaging of osteomyelitis. Code stroke was called CT head and brain negative for infarct.    Transferred to CCU after CT Angio of chest showed an acute left lower lobe PE with findings of right heart strain with left femoral DVT. In ED, pt. started on heparin gtt. for PE, levophed started  for hypotension.     INTERVAL HISTORY/OVERNIGHT EVENTS:  Remains on heparin gtt. for PE.   Levophed and vaso started  for hypotension.   Initial HD readings:  CVP 1-2, CV02 56.4, CI 2.5.  lactate up trending up to 5. NS 500ml x 4 given and NS @ 100ML x 10 hours started.   Repeat hemodynamics: CVP 3, CV02 60%, CO 5, CI 3. Lactate down trending, now 3.    Meropenum initiated to cover ESBL organisms, detected on BCID.    ALLERGIES: Allergies  No Known Allergies      MEDICATIONS:  MEDICATIONS  (STANDING):  chlorhexidine 2% Cloths 1 Application(s) Topical <User Schedule>  chlorhexidine 4% Liquid 1 Application(s) Topical <User Schedule>  clindamycin IVPB 900 milliGRAM(s) IV Intermittent once  clindamycin IVPB      clindamycin IVPB 900 milliGRAM(s) IV Intermittent every 8 hours  dextrose 5%. 1000 milliLiter(s) (100 mL/Hr) IV Continuous <Continuous>  dextrose 5%. 1000 milliLiter(s) (50 mL/Hr) IV Continuous <Continuous>  dextrose 50% Injectable 25 Gram(s) IV Push once  dextrose 50% Injectable 25 Gram(s) IV Push once  dextrose 50% Injectable 12.5 Gram(s) IV Push once  glucagon  Injectable 1 milliGRAM(s) IntraMuscular once  heparin  Infusion 800 Unit(s)/Hr (8 mL/Hr) IV Continuous <Continuous>  insulin lispro (ADMELOG) corrective regimen sliding scale   SubCutaneous three times a day before meals  insulin lispro (ADMELOG) corrective regimen sliding scale   SubCutaneous at bedtime  meropenem  IVPB 1000 milliGRAM(s) IV Intermittent every 12 hours  norepinephrine Infusion 0.05 MICROgram(s)/kG/Min (5.59 mL/Hr) IV Continuous <Continuous>  pantoprazole  Injectable 40 milliGRAM(s) IV Push two times a day  sodium chloride 0.9%. 1000 milliLiter(s) (100 mL/Hr) IV Continuous <Continuous>  sodium chloride 0.9%. 500 milliLiter(s) (50 mL/Hr) IV Continuous <Continuous>  tamsulosin 0.4 milliGRAM(s) Oral at bedtime  vancomycin  IVPB 1000 milliGRAM(s) IV Intermittent every 12 hours    MEDICATIONS  (PRN):  dextrose Oral Gel 15 Gram(s) Oral once PRN Blood Glucose LESS THAN 70 milliGRAM(s)/deciliter  sodium chloride 0.9% lock flush 10 milliLiter(s) IV Push every 1 hour PRN Pre/post blood products, medications, blood draw, and to maintain line patency        OBJECTIVE:  ICU Vital Signs Last 24 Hrs  T(C): 37.1 (31 Mar 2024 00:00), Max: 37.1 (30 Mar 2024 20:30)  T(F): 98.8 (31 Mar 2024 00:00), Max: 98.8 (30 Mar 2024 20:30)  HR: 95 (31 Mar 2024 05:00) (86 - 127)  BP: --  BP(mean): --  ABP: 88/49 (31 Mar 2024 05:00) (79/51 - 122/78)  ABP(mean): 62 (31 Mar 2024 05:00) (60 - 97)  RR: 14 (31 Mar 2024 05:00) (13 - 27)  SpO2: 99% (31 Mar 2024 04:00) (93% - 100%)    O2 Parameters below as of 31 Mar 2024 04:00  Patient On (Oxygen Delivery Method): nasal cannula  O2 Flow (L/min): 2          I&O's Summary    29 Mar 2024 07:01  -  30 Mar 2024 07:00  --------------------------------------------------------  IN: 64.4 mL / OUT: 0 mL / NET: 64.4 mL    30 Mar 2024 07:01  -  31 Mar 2024 06:22  --------------------------------------------------------  IN: 3841 mL / OUT: 340 mL / NET: 3501 mL      Daily     Daily       PHYSICAL EXAMINATION:  General: Comfortable, no acute distress, cooperative with exam.  HEENT: Moist mucous membranes.  Respiratory: CTAB, normal respiratory effort, no coughing, wheezes, crackles, or rales.  CV: RRR, S1S2, no murmurs, rubs or gallops. No JVD. Distal pulses intact.  Abdominal: Soft, nontender, nondistended, no rebound or guarding, normal bowel sounds.  Neurology: AOx3, no focal neuro defects, MORROW x 4.  Extremities: No pitting edema, + Peripheral pulses.          LABS:  ABG - ( 30 Mar 2024 10:23 )  pH, Arterial: 7.34  pH, Blood: x     /  pCO2: 18    /  pO2: 178   / HCO3: 10    / Base Excess: -14.5 /  SaO2: 98.5                                    7.4    22.05 )-----------( 202      ( 30 Mar 2024 04:37 )             22.9     03-30    141  |  115<H>  |  57<H>  ----------------------------<  185<H>  4.2   |  12<L>  |  1.12    Ca    6.6<L>      30 Mar 2024 22:01  Phos  4.0     03-30  Mg     2.50     03-30    TPro  4.2<L>  /  Alb  1.2<L>  /  TBili  0.2  /  DBili  x   /  AST  13  /  ALT  12  /  AlkPhos  400<H>  03-30    LIVER FUNCTIONS - ( 30 Mar 2024 22:01 )  Alb: 1.2 g/dL / Pro: 4.2 g/dL / ALK PHOS: 400 U/L / ALT: 12 U/L / AST: 13 U/L / GGT: x           PT/INR - ( 29 Mar 2024 21:20 )   PT: 27.2 sec;   INR: 2.47 ratio         PTT - ( 30 Mar 2024 22:01 )  PTT:114.7 sec    CARDIAC MARKERS ( 29 Mar 2024 21:20 )  x     / x     / 35 U/L / x     / 3.8 ng/mL      Urinalysis Basic - ( 30 Mar 2024 22:01 )    Color: x / Appearance: x / SG: x / pH: x  Gluc: 185 mg/dL / Ketone: x  / Bili: x / Urobili: x   Blood: x / Protein: x / Nitrite: x   Leuk Esterase: x / RBC: x / WBC x   Sq Epi: x / Non Sq Epi: x / Bacteria: x      Assessment:  · Assessment	  83M from [facility] CAD s/p stents, HTN, T2DM, AAA s/p graft/stent, recent UGIB, Fermin's esophagitis, GERD, BPH, Bladder Cancer, 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 Patient  presents to the emergency department from [facility] due to worsening sacral wound with imaging of osteomyelitis. During transport, HR dropped to 30 with O2 of 76, was placed on 6L NC. While in the ED, patient started having dysarthria, contraction of left upper extremity. Code stroke was called CT head and brain negative for infarct. CT Angio of chest showed an acute left lower lobe PE with findings of right heart strain with left femoral DVT. In ED, pt. started on heparin gtt. for PE, levophed started  for hypotension.     Plan     Neuro   Alert and oriented x 1, unable to follow commands   -Code Stroke called in ED, CT HEAD negative   - A+O x 3 and conversational at baseline  #  Anxiety and depression.  - Hold mirtazapine, buspirone.    Pulmonary  #Pulmonary Embolism  Patient p/w SOB.  tachycardic    CT chest revealed Acute left lower lobe pulmonary emboli extending to the posterobasal   segmental and subsegmental pulmonary branches. Right heart strain is   present. Recommend echocardiography.    Small nonenhancing peripheral opacity in the left lung base, most   suspicious for pulmonary infarct.    -Started on heparin gtt for PE  -Pt. 100% O2 on 2L NC    -WELLS Score  -Trend cardiac enzymes and proBNP to exclude cardiac dysfunction.  -TTE in am, stat if hemodynamically unstable.      CARDIAC  # Right sided heart failure  Left ventricular cavity is normal in size. Left ventricular systolic function is probably nornal.   Severely enlarged right ventricular cavity size and reduced systolic function.   Right ventricular systolic function is reduced with apical sparing consistent with acute pulmonary embolism (Martinez's sign).    Remains on norepinephrine and Vaso for hypotension  RIJ placed: Initial HD readings:  CVP 1-2, CV02 56.4, CI 2.5.  lactate up trending up to 5. NS 500ml x 4 given and NS @ 100ML x 10 hours started.   Repeat hemodynamics after fluid resuscitation CVP 3, CV02 60%, CO 5, CI 3. Lactate down trending, now 3.    #CAD (coronary artery disease).  - c/w statin  -hold aspirin for now.        GI  # hx prior UGIB w/ ulcers  - PPI IVP bid  - Eliquis 1/2 dose    # BPH (benign prostatic hyperplasia).  ·  Plan: - c/w home finasteride, tamsulosin  - monitor urine output; consider bladder scan.  - timmons      Normal indices  - trend BUN/SCr, avoid nephrotoxic, renally dose all meds  - strict I/Os   - urine studies if not improving.    #  BPH (benign prostatic hyperplasia).  - Plan: - c/w home finasteride, tamsulosin  - monitor urine output; consider bladder scan.    ENDO  # diabetes mellitus.  - Home regimen metformin 1g qd, tradjenta 5mg qd  - f/u A1c  - c/w INOCENTE, FSG ACHS or q6h if NPO; goal glucose .      HEME  ·  Problem: Anemia.  ·  Plan: -anemia has hx prior UGIB w/ ulcers  -BUN/Cr ratio elevated      ID  #Systemic inflammatory response syndrome (SIRS).  -likely reactive in setting of anemia  -wound care consult for sacral ulcer, though afebrile and no neutrophil shift can hold off on further antibiotics for now.  - monitor for systemic infectious signs  - Meropenum initiated to cover ESBL organisms, detected on BCID.  - ID consult  - obtain records from [facility] regarding any bone cultures or workup for osteomyelitis      SKIN  Sacral decubitus ulcer, stage IV.   Chronic sacral and back decubiti ulcer c/b osteomyelitis. Unclear if organism identified, but was discharged from [facility] with 6 weeks of Augmentin/Flagyl due to end 3/4/24.  Wound consult      #DVT   Acute L prox great saphenous vein extending into common femoral DVT. Non occlusive on DVT study.     Access:   AMI KO 3/30  Code Status: FULL CODE; would engage in further GoC  Dispo: pending clinical improvement, likely return to [facility] if able.   CCU Service  Cardiology   Spect: [id number] ([facility])     PATIENT: MERLINE RODRIGUEZ, MRN: [id number]    83M from [facility] CAD s/p stents, HTN, T2DM, AAA s/p graft/stent, recent UGIB, Fermin's esophagitis, GERD, BPH, Bladder Cancer, 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 presents with worsening sacral wound with imaging of osteomyelitis. Code stroke was called CT head and brain negative for infarct.    Transferred to CCU after CT Angio of chest showed an acute left lower lobe PE with findings of right heart strain with left femoral DVT. In ED, pt. started on heparin gtt. for PE, levophed started  for hypotension.     INTERVAL HISTORY/OVERNIGHT EVENTS:  Remains on heparin gtt. for PE.   Levophed and vaso started  for hypotension.   Initial HD readings:  CVP 1-2, CV02 56.4, CI 2.5.  lactate up trending up to 5. NS 500ml x 4 given and NS @ 100ML x 10 hours started.   Repeat hemodynamics: CVP 3, CV02 60%, CO 5, CI 3. Lactate down trending, now 3.    Vaso weaned off and Norepi weaned to 0.05mcg/kg/mn  NS bolus 500    Meropenum initiated to cover ESBL organisms, detected on BCID.    ALLERGIES: Allergies  No Known Allergies      MEDICATIONS:  MEDICATIONS  (STANDING):  chlorhexidine 2% Cloths 1 Application(s) Topical <User Schedule>  chlorhexidine 4% Liquid 1 Application(s) Topical <User Schedule>  clindamycin IVPB 900 milliGRAM(s) IV Intermittent once  clindamycin IVPB      clindamycin IVPB 900 milliGRAM(s) IV Intermittent every 8 hours  dextrose 5%. 1000 milliLiter(s) (100 mL/Hr) IV Continuous <Continuous>  dextrose 5%. 1000 milliLiter(s) (50 mL/Hr) IV Continuous <Continuous>  dextrose 50% Injectable 25 Gram(s) IV Push once  dextrose 50% Injectable 25 Gram(s) IV Push once  dextrose 50% Injectable 12.5 Gram(s) IV Push once  glucagon  Injectable 1 milliGRAM(s) IntraMuscular once  heparin  Infusion 800 Unit(s)/Hr (8 mL/Hr) IV Continuous <Continuous>  insulin lispro (ADMELOG) corrective regimen sliding scale   SubCutaneous three times a day before meals  insulin lispro (ADMELOG) corrective regimen sliding scale   SubCutaneous at bedtime  meropenem  IVPB 1000 milliGRAM(s) IV Intermittent every 12 hours  norepinephrine Infusion 0.05 MICROgram(s)/kG/Min (5.59 mL/Hr) IV Continuous <Continuous>  pantoprazole  Injectable 40 milliGRAM(s) IV Push two times a day  sodium chloride 0.9%. 1000 milliLiter(s) (100 mL/Hr) IV Continuous <Continuous>  sodium chloride 0.9%. 500 milliLiter(s) (50 mL/Hr) IV Continuous <Continuous>  tamsulosin 0.4 milliGRAM(s) Oral at bedtime  vancomycin  IVPB 1000 milliGRAM(s) IV Intermittent every 12 hours    MEDICATIONS  (PRN):  dextrose Oral Gel 15 Gram(s) Oral once PRN Blood Glucose LESS THAN 70 milliGRAM(s)/deciliter  sodium chloride 0.9% lock flush 10 milliLiter(s) IV Push every 1 hour PRN Pre/post blood products, medications, blood draw, and to maintain line patency        OBJECTIVE:  ICU Vital Signs Last 24 Hrs  T(C): 37.1 (31 Mar 2024 00:00), Max: 37.1 (30 Mar 2024 20:30)  T(F): 98.8 (31 Mar 2024 00:00), Max: 98.8 (30 Mar 2024 20:30)  HR: 95 (31 Mar 2024 05:00) (86 - 127)  BP: --  BP(mean): --  ABP: 88/49 (31 Mar 2024 05:00) (79/51 - 122/78)  ABP(mean): 62 (31 Mar 2024 05:00) (60 - 97)  RR: 14 (31 Mar 2024 05:00) (13 - 27)  SpO2: 99% (31 Mar 2024 04:00) (93% - 100%)    O2 Parameters below as of 31 Mar 2024 04:00  Patient On (Oxygen Delivery Method): nasal cannula  O2 Flow (L/min): 2          I&O's Summary    29 Mar 2024 07:01  -  30 Mar 2024 07:00  --------------------------------------------------------  IN: 64.4 mL / OUT: 0 mL / NET: 64.4 mL    30 Mar 2024 07:01  -  31 Mar 2024 06:22  --------------------------------------------------------  IN: 3841 mL / OUT: 340 mL / NET: 3501 mL      Daily     Daily       PHYSICAL EXAMINATION:  General: Comfortable, no acute distress, cooperative with exam.  HEENT: Moist mucous membranes.  Respiratory: CTAB, normal respiratory effort, no coughing, wheezes, crackles, or rales.  CV: RRR, S1S2, no murmurs, rubs or gallops. No JVD. Distal pulses intact.  Abdominal: Soft, nontender, nondistended, no rebound or guarding, normal bowel sounds.  Neurology: AOx3, no focal neuro defects, MORROW x 4.  Extremities: No pitting edema, + Peripheral pulses.          LABS:  ABG - ( 30 Mar 2024 10:23 )  pH, Arterial: 7.34  pH, Blood: x     /  pCO2: 18    /  pO2: 178   / HCO3: 10    / Base Excess: -14.5 /  SaO2: 98.5                                    7.4    22.05 )-----------( 202      ( 30 Mar 2024 04:37 )             22.9     03-30    141  |  115<H>  |  57<H>  ----------------------------<  185<H>  4.2   |  12<L>  |  1.12    Ca    6.6<L>      30 Mar 2024 22:01  Phos  4.0     03-30  Mg     2.50     03-30    TPro  4.2<L>  /  Alb  1.2<L>  /  TBili  0.2  /  DBili  x   /  AST  13  /  ALT  12  /  AlkPhos  400<H>  03-30    LIVER FUNCTIONS - ( 30 Mar 2024 22:01 )  Alb: 1.2 g/dL / Pro: 4.2 g/dL / ALK PHOS: 400 U/L / ALT: 12 U/L / AST: 13 U/L / GGT: x           PT/INR - ( 29 Mar 2024 21:20 )   PT: 27.2 sec;   INR: 2.47 ratio         PTT - ( 30 Mar 2024 22:01 )  PTT:114.7 sec    CARDIAC MARKERS ( 29 Mar 2024 21:20 )  x     / x     / 35 U/L / x     / 3.8 ng/mL      Urinalysis Basic - ( 30 Mar 2024 22:01 )    Color: x / Appearance: x / SG: x / pH: x  Gluc: 185 mg/dL / Ketone: x  / Bili: x / Urobili: x   Blood: x / Protein: x / Nitrite: x   Leuk Esterase: x / RBC: x / WBC x   Sq Epi: x / Non Sq Epi: x / Bacteria: x      Assessment:  · Assessment	  83M from [facility] CAD s/p stents, HTN, T2DM, AAA s/p graft/stent, recent UGIB, Fermin's esophagitis, GERD, BPH, Bladder Cancer, 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 Patient  presents to the emergency department from [facility] due to worsening sacral wound with imaging of osteomyelitis. During transport, HR dropped to 30 with O2 of 76, was placed on 6L NC. While in the ED, patient started having dysarthria, contraction of left upper extremity. Code stroke was called CT head and brain negative for infarct. CT Angio of chest showed an acute left lower lobe PE with findings of right heart strain with left femoral DVT. In ED, pt. started on heparin gtt. for PE, levophed started  for hypotension.     Plan     Neuro   Alert and oriented x 1, unable to follow commands   -Code Stroke called in ED, CT HEAD negative   - A+O x 3 and conversational at baseline  #  Anxiety and depression.  - Hold mirtazapine, buspirone.    Pulmonary  #Pulmonary Embolism  Patient p/w SOB.  tachycardic    CT chest revealed Acute left lower lobe pulmonary emboli extending to the posterobasal   segmental and subsegmental pulmonary branches. Right heart strain is   present. Recommend echocardiography.    Small nonenhancing peripheral opacity in the left lung base, most   suspicious for pulmonary infarct.    -Started on heparin gtt for PE  -Pt. 100% O2 on 2L NC    -WELLS Score  -Trend cardiac enzymes and proBNP to exclude cardiac dysfunction.  -TTE in am, stat if hemodynamically unstable.      CARDIAC  # Right sided heart failure  Left ventricular cavity is normal in size. Left ventricular systolic function is probably nornal.   Severely enlarged right ventricular cavity size and reduced systolic function.   Right ventricular systolic function is reduced with apical sparing consistent with acute pulmonary embolism (Martinez's sign).    Remains on norepinephrine and Vaso for hypotension  RIJ placed: Initial HD readings:  CVP 1-2, CV02 56.4, CI 2.5.  lactate up trending up to 5. NS 500ml x 4 given and NS @ 100ML x 10 hours started.   Repeat hemodynamics after fluid resuscitation CVP 3, CV02 60%, CO 5, CI 3. Lactate down trending, now 3.    #CAD (coronary artery disease).  - c/w statin  -hold aspirin for now.        GI  # hx prior UGIB w/ ulcers  - PPI IVP bid  - Eliquis 1/2 dose    # BPH (benign prostatic hyperplasia).  ·  Plan: - c/w home finasteride, tamsulosin  - monitor urine output; consider bladder scan.  - timmons      Normal indices  - trend BUN/SCr, avoid nephrotoxic, renally dose all meds  - strict I/Os   - urine studies if not improving.    #  BPH (benign prostatic hyperplasia).  - Plan: - c/w home finasteride, tamsulosin  - monitor urine output; consider bladder scan.    ENDO  # diabetes mellitus.  - Home regimen metformin 1g qd, tradjenta 5mg qd  - f/u A1c  - c/w INOCENTE, FSG ACHS or q6h if NPO; goal glucose .      HEME  ·  Problem: Anemia.  ·  Plan: -anemia has hx prior UGIB w/ ulcers  -BUN/Cr ratio elevated      ID  #Systemic inflammatory response syndrome (SIRS).  -likely reactive in setting of anemia  -wound care consult for sacral ulcer, though afebrile and no neutrophil shift can hold off on further antibiotics for now.  - monitor for systemic infectious signs  - Meropenum initiated to cover ESBL organisms, detected on BCID.  - ID consult  - obtain records from [facility] regarding any bone cultures or workup for osteomyelitis      SKIN  Sacral decubitus ulcer, stage IV.   Chronic sacral and back decubiti ulcer c/b osteomyelitis. Unclear if organism identified, but was discharged from [facility] with 6 weeks of Augmentin/Flagyl due to end 3/4/24.  Wound consult      #DVT   Acute L prox great saphenous vein extending into common femoral DVT. Non occlusive on DVT study.     Access:   AMI KO 3/30  Code Status: FULL CODE; would engage in further GoC  Dispo: pending clinical improvement, likely return to [facility] if able.   CCU Service  Cardiology   Spect: [id number] ([facility])     PATIENT: MERLINE RODRIGUEZ, MRN: [id number]    83M from [facility] CAD s/p stents, HTN, T2DM, AAA s/p graft/stent, recent UGIB, Fermin's esophagitis, GERD, BPH, Bladder Cancer, 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 presents with worsening sacral wound with imaging of osteomyelitis. Code stroke was called CT head and brain negative for infarct.    Transferred to CCU after CT Angio of chest showed an acute left lower lobe PE with findings of right heart strain with left femoral DVT. In ED, pt. started on heparin gtt. for PE, levophed started  for hypotension.     INTERVAL HISTORY/OVERNIGHT EVENTS:  Remains on heparin gtt. for PE.   Levophed and vaso started  for hypotension.   Initial HD readings:  CVP 1-2, CV02 56.4, CI 2.5.  lactate up trending up to 5. NS 500ml x 4 given and NS @ 100ML x 10 hours started.   Repeat hemodynamics: CVP 3, CV02 60%, CO 5, CI 3. Lactate down trending, now 3.    Vaso weaned off and Norepi weaned to 0.05mcg/kg/mn  NS bolus 500    Meropenum initiated to cover ESBL organisms, detected on BCID.    Telemetry: SR, PVC, couplets, short burst svt  few seconds    ALLERGIES: Allergies  No Known Allergies      MEDICATIONS:  MEDICATIONS  (STANDING):  chlorhexidine 2% Cloths 1 Application(s) Topical <User Schedule>  chlorhexidine 4% Liquid 1 Application(s) Topical <User Schedule>  clindamycin IVPB 900 milliGRAM(s) IV Intermittent once  clindamycin IVPB      clindamycin IVPB 900 milliGRAM(s) IV Intermittent every 8 hours  dextrose 5%. 1000 milliLiter(s) (100 mL/Hr) IV Continuous <Continuous>  dextrose 5%. 1000 milliLiter(s) (50 mL/Hr) IV Continuous <Continuous>  dextrose 50% Injectable 25 Gram(s) IV Push once  dextrose 50% Injectable 25 Gram(s) IV Push once  dextrose 50% Injectable 12.5 Gram(s) IV Push once  glucagon  Injectable 1 milliGRAM(s) IntraMuscular once  heparin  Infusion 800 Unit(s)/Hr (8 mL/Hr) IV Continuous <Continuous>  insulin lispro (ADMELOG) corrective regimen sliding scale   SubCutaneous three times a day before meals  insulin lispro (ADMELOG) corrective regimen sliding scale   SubCutaneous at bedtime  meropenem  IVPB 1000 milliGRAM(s) IV Intermittent every 12 hours  norepinephrine Infusion 0.05 MICROgram(s)/kG/Min (5.59 mL/Hr) IV Continuous <Continuous>  pantoprazole  Injectable 40 milliGRAM(s) IV Push two times a day  sodium chloride 0.9%. 1000 milliLiter(s) (100 mL/Hr) IV Continuous <Continuous>  sodium chloride 0.9%. 500 milliLiter(s) (50 mL/Hr) IV Continuous <Continuous>  tamsulosin 0.4 milliGRAM(s) Oral at bedtime  vancomycin  IVPB 1000 milliGRAM(s) IV Intermittent every 12 hours    MEDICATIONS  (PRN):  dextrose Oral Gel 15 Gram(s) Oral once PRN Blood Glucose LESS THAN 70 milliGRAM(s)/deciliter  sodium chloride 0.9% lock flush 10 milliLiter(s) IV Push every 1 hour PRN Pre/post blood products, medications, blood draw, and to maintain line patency        OBJECTIVE:  ICU Vital Signs Last 24 Hrs  T(C): 37.1 (31 Mar 2024 00:00), Max: 37.1 (30 Mar 2024 20:30)  T(F): 98.8 (31 Mar 2024 00:00), Max: 98.8 (30 Mar 2024 20:30)  HR: 95 (31 Mar 2024 05:00) (86 - 127)  BP: --  BP(mean): --  ABP: 88/49 (31 Mar 2024 05:00) (79/51 - 122/78)  ABP(mean): 62 (31 Mar 2024 05:00) (60 - 97)  RR: 14 (31 Mar 2024 05:00) (13 - 27)  SpO2: 99% (31 Mar 2024 04:00) (93% - 100%)    O2 Parameters below as of 31 Mar 2024 04:00  Patient On (Oxygen Delivery Method): nasal cannula  O2 Flow (L/min): 2          I&O's Summary    29 Mar 2024 07:01  -  30 Mar 2024 07:00  --------------------------------------------------------  IN: 64.4 mL / OUT: 0 mL / NET: 64.4 mL    30 Mar 2024 07:01  -  31 Mar 2024 06:22  --------------------------------------------------------  IN: 3841 mL / OUT: 340 mL / NET: 3501 mL      Daily     Daily       PHYSICAL EXAMINATION:  General: non verbal responds to noxious stimuli  HEENT: Moist mucous membranes.  Respiratory: CTAB, normal respiratory effort, no coughing, wheezes, crackles, or rales.  CV: RRR, S1S2, no murmurs, rubs or gallops. No JVD. Distal pulses intact.  Abdominal: Soft, nontender, nondistended, no rebound or guarding, normal bowel sounds.  Neurology: AOx1, no focal neuro defects, b/L UE contracted.  Extremities: mild pitting edema, + Peripheral pulses.  SKIN: see wound care          LABS:  ABG - ( 30 Mar 2024 10:23 )  pH, Arterial: 7.34  pH, Blood: x     /  pCO2: 18    /  pO2: 178   / HCO3: 10    / Base Excess: -14.5 /  SaO2: 98.5                                    7.4    22.05 )-----------( 202      ( 30 Mar 2024 04:37 )             22.9     03-30    141  |  115<H>  |  57<H>  ----------------------------<  185<H>  4.2   |  12<L>  |  1.12    Ca    6.6<L>      30 Mar 2024 22:01  Phos  4.0     03-30  Mg     2.50     03-30    TPro  4.2<L>  /  Alb  1.2<L>  /  TBili  0.2  /  DBili  x   /  AST  13  /  ALT  12  /  AlkPhos  400<H>  03-30    LIVER FUNCTIONS - ( 30 Mar 2024 22:01 )  Alb: 1.2 g/dL / Pro: 4.2 g/dL / ALK PHOS: 400 U/L / ALT: 12 U/L / AST: 13 U/L / GGT: x           PT/INR - ( 29 Mar 2024 21:20 )   PT: 27.2 sec;   INR: 2.47 ratio         PTT - ( 30 Mar 2024 22:01 )  PTT:114.7 sec    CARDIAC MARKERS ( 29 Mar 2024 21:20 )  x     / x     / 35 U/L / x     / 3.8 ng/mL      Urinalysis Basic - ( 30 Mar 2024 22:01 )    Color: x / Appearance: x / SG: x / pH: x  Gluc: 185 mg/dL / Ketone: x  / Bili: x / Urobili: x   Blood: x / Protein: x / Nitrite: x   Leuk Esterase: x / RBC: x / WBC x   Sq Epi: x / Non Sq Epi: x / Bacteria: x      Assessment:  · Assessment	  83M from [facility] [place] CAD s/p stents, HTN, T2DM, AAA s/p graft/stent, recent UGIB, Fermin's esophagitis, GERD, BPH, Bladder Cancer, 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 Patient  presents to the emergency department from [facility] due to worsening sacral wound with imaging of osteomyelitis. During transport, HR dropped to 30 with O2 of 76, was placed on 6L NC. While in the ED, patient started having dysarthria, contraction of left upper extremity. Code stroke was called CT head and brain negative for infarct. CT Angio of chest showed an acute left lower lobe PE with findings of right heart strain with left femoral DVT. In ED, pt. started on heparin gtt. for PE, levophed started  for hypotension.     Plan     Neuro   Alert and oriented x 1, unable to follow commands   -Code Stroke called in ED, CT HEAD negative   - A+O x 3 and conversational at baseline  #  Anxiety and depression.  - Hold mirtazapine, buspirone.    Pulmonary  #Pulmonary Embolism  Patient p/w SOB.  tachycardic    CT chest revealed Acute left lower lobe pulmonary emboli extending to the posterobasal   segmental and subsegmental pulmonary branches. Right heart strain is   present. Recommend echocardiography.    Small nonenhancing peripheral opacity in the left lung base, most   suspicious for pulmonary infarct.    -Started on heparin gtt for PE  -Pt. 100% O2 on 2L NC    -WELLS Score  -Trend cardiac enzymes and proBNP to exclude cardiac dysfunction.  -TTE in am, stat if hemodynamically unstable.      CARDIAC  # Right sided heart failure  Left ventricular cavity is normal in size. Left ventricular systolic function is probably nornal.   Severely enlarged right ventricular cavity size and reduced systolic function.   Right ventricular systolic function is reduced with apical sparing consistent with acute pulmonary embolism (Martinez's sign).    Remains on norepinephrine and Vaso for hypotension  RIJ placed: Initial HD readings:  CVP 1-2, CV02 56.4, CI 2.5.  lactate up trending up to 5. NS 500ml x 4 given and NS @ 100ML x 10 hours started.   Repeat hemodynamics after fluid resuscitation CVP 3, CV02 60%, CO 5, CI 3. Lactate down trending, now 3.    #CAD (coronary artery disease).  - c/w statin  -hold aspirin for now.        GI  # hx prior UGIB w/ ulcers  - PPI IVP bid  - Eliquis 1/2 dose    # BPH (benign prostatic hyperplasia).  ·  Plan: - c/w home finasteride, tamsulosin  - monitor urine output; consider bladder scan.  - timmons      Normal indices  - trend BUN/SCr, avoid nephrotoxic, renally dose all meds  - strict I/Os   - urine studies if not improving.    #  BPH (benign prostatic hyperplasia).  - Plan: - c/w home finasteride, tamsulosin  - monitor urine output; consider bladder scan.    ENDO  # diabetes mellitus.  - Home regimen metformin 1g qd, tradjenta 5mg qd  - f/u A1c  - c/w INOCENTE, FSG ACHS or q6h if NPO; goal glucose .      HEME  ·  Problem: Anemia.  ·  Plan: -anemia has hx prior UGIB w/ ulcers  -BUN/Cr ratio elevated      ID  #Systemic inflammatory response syndrome (SIRS).  -likely reactive in setting of anemia  -wound care consult for sacral ulcer, though afebrile and no neutrophil shift can hold off on further antibiotics for now.  - monitor for systemic infectious signs  - Meropenum initiated to cover ESBL organisms, detected on BCID.  - ID consult  - obtain records from [facility] regarding any bone cultures or workup for osteomyelitis      SKIN  Sacral decubitus ulcer, stage IV.   Chronic sacral and back decubiti ulcer c/b osteomyelitis. Unclear if organism identified, but was discharged from [facility] with 6 weeks of Augmentin/Flagyl due to end 3/4/24.  Wound consult      #DVT   Acute L prox great saphenous vein extending into common femoral DVT. Non occlusive on DVT study.     Access:   [facility]   RIGEOVANNY 3/30  Code Status: FULL CODE; would engage in further GoC  Dispo: pending clinical improvement, likely return to [facility] if able.   CCU Service  Cardiology   Spect: [id number] ([facility])     PATIENT: MERLINE RODRIGUEZ, MRN: [id number]    83M from [facility] CAD s/p stents, HTN, T2DM, AAA s/p graft/stent, recent UGIB, Fermin's esophagitis, GERD, BPH, Bladder Cancer, 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 presents with worsening sacral wound with imaging of osteomyelitis. Code stroke was called CT head and brain negative for infarct.    Transferred to CCU after CT Angio of chest showed an acute left lower lobe PE with findings of right heart strain with left femoral DVT. In ED, pt. started on heparin gtt. for PE, levophed started  for hypotension.     INTERVAL HISTORY/OVERNIGHT EVENTS:  Remains on heparin gtt. for PE.   Levophed and vaso started  for hypotension.   Initial HD readings:  CVP 1-2, CV02 56.4, CI 2.5.  lactate up trending up to 5. NS 500ml x 4 given and NS @ 100ML x 10 hours started.   Repeat hemodynamics: CVP 3, CV02 60%, CO 5, CI 3. Lactate down trending, now 3.    Vaso weaned off and Norepi weaned to 0.05mcg/kg/mn  NS bolus 500    Meropenum initiated to cover ESBL organisms, detected on BCID.    Telemetry: SR, PVC, couplets, short burst svt  few seconds    ALLERGIES: Allergies  No Known Allergies      MEDICATIONS:  MEDICATIONS  (STANDING):  chlorhexidine 2% Cloths 1 Application(s) Topical <User Schedule>  chlorhexidine 4% Liquid 1 Application(s) Topical <User Schedule>  clindamycin IVPB 900 milliGRAM(s) IV Intermittent once  clindamycin IVPB      clindamycin IVPB 900 milliGRAM(s) IV Intermittent every 8 hours  dextrose 5%. 1000 milliLiter(s) (100 mL/Hr) IV Continuous <Continuous>  dextrose 5%. 1000 milliLiter(s) (50 mL/Hr) IV Continuous <Continuous>  dextrose 50% Injectable 25 Gram(s) IV Push once  dextrose 50% Injectable 25 Gram(s) IV Push once  dextrose 50% Injectable 12.5 Gram(s) IV Push once  glucagon  Injectable 1 milliGRAM(s) IntraMuscular once  heparin  Infusion 800 Unit(s)/Hr (8 mL/Hr) IV Continuous <Continuous>  insulin lispro (ADMELOG) corrective regimen sliding scale   SubCutaneous three times a day before meals  insulin lispro (ADMELOG) corrective regimen sliding scale   SubCutaneous at bedtime  meropenem  IVPB 1000 milliGRAM(s) IV Intermittent every 12 hours  norepinephrine Infusion 0.05 MICROgram(s)/kG/Min (5.59 mL/Hr) IV Continuous <Continuous>  pantoprazole  Injectable 40 milliGRAM(s) IV Push two times a day  sodium chloride 0.9%. 1000 milliLiter(s) (100 mL/Hr) IV Continuous <Continuous>  sodium chloride 0.9%. 500 milliLiter(s) (50 mL/Hr) IV Continuous <Continuous>  tamsulosin 0.4 milliGRAM(s) Oral at bedtime  vancomycin  IVPB 1000 milliGRAM(s) IV Intermittent every 12 hours    MEDICATIONS  (PRN):  dextrose Oral Gel 15 Gram(s) Oral once PRN Blood Glucose LESS THAN 70 milliGRAM(s)/deciliter  sodium chloride 0.9% lock flush 10 milliLiter(s) IV Push every 1 hour PRN Pre/post blood products, medications, blood draw, and to maintain line patency        OBJECTIVE:  ICU Vital Signs Last 24 Hrs  T(C): 37.1 (31 Mar 2024 00:00), Max: 37.1 (30 Mar 2024 20:30)  T(F): 98.8 (31 Mar 2024 00:00), Max: 98.8 (30 Mar 2024 20:30)  HR: 95 (31 Mar 2024 05:00) (86 - 127)  BP: --  BP(mean): --  ABP: 88/49 (31 Mar 2024 05:00) (79/51 - 122/78)  ABP(mean): 62 (31 Mar 2024 05:00) (60 - 97)  RR: 14 (31 Mar 2024 05:00) (13 - 27)  SpO2: 99% (31 Mar 2024 04:00) (93% - 100%)    O2 Parameters below as of 31 Mar 2024 04:00  Patient On (Oxygen Delivery Method): nasal cannula  O2 Flow (L/min): 2          I&O's Summary    29 Mar 2024 07:01  -  30 Mar 2024 07:00  --------------------------------------------------------  IN: 64.4 mL / OUT: 0 mL / NET: 64.4 mL    30 Mar 2024 07:01  -  31 Mar 2024 06:22  --------------------------------------------------------  IN: 3841 mL / OUT: 340 mL / NET: 3501 mL      Daily     Daily       PHYSICAL EXAMINATION:  General: non verbal responds to noxious stimuli  HEENT: Moist mucous membranes.  Respiratory: CTAB, normal respiratory effort, no coughing, wheezes, crackles, or rales.  CV: RRR, S1S2, no murmurs, rubs or gallops. No JVD. Distal pulses intact.  Abdominal: Soft, nontender, nondistended, no rebound or guarding, normal bowel sounds.  Neurology: AOx1, no focal neuro defects, b/L UE contracted.  Extremities: mild pitting edema, + Peripheral pulses.  SKIN: see wound care          LABS:  ABG - ( 30 Mar 2024 10:23 )  pH, Arterial: 7.34  pH, Blood: x     /  pCO2: 18    /  pO2: 178   / HCO3: 10    / Base Excess: -14.5 /  SaO2: 98.5                                    7.4    22.05 )-----------( 202      ( 30 Mar 2024 04:37 )             22.9     03-30    141  |  115<H>  |  57<H>  ----------------------------<  185<H>  4.2   |  12<L>  |  1.12    Ca    6.6<L>      30 Mar 2024 22:01  Phos  4.0     03-30  Mg     2.50     03-30    TPro  4.2<L>  /  Alb  1.2<L>  /  TBili  0.2  /  DBili  x   /  AST  13  /  ALT  12  /  AlkPhos  400<H>  03-30    LIVER FUNCTIONS - ( 30 Mar 2024 22:01 )  Alb: 1.2 g/dL / Pro: 4.2 g/dL / ALK PHOS: 400 U/L / ALT: 12 U/L / AST: 13 U/L / GGT: x           PT/INR - ( 29 Mar 2024 21:20 )   PT: 27.2 sec;   INR: 2.47 ratio         PTT - ( 30 Mar 2024 22:01 )  PTT:114.7 sec    CARDIAC MARKERS ( 29 Mar 2024 21:20 )  x     / x     / 35 U/L / x     / 3.8 ng/mL      Urinalysis Basic - ( 30 Mar 2024 22:01 )    Color: x / Appearance: x / SG: x / pH: x  Gluc: 185 mg/dL / Ketone: x  / Bili: x / Urobili: x   Blood: x / Protein: x / Nitrite: x   Leuk Esterase: x / RBC: x / WBC x   Sq Epi: x / Non Sq Epi: x / Bacteria: x      Assessment:  · Assessment	  83M from [facility] [place] CAD s/p stents, HTN, T2DM, AAA s/p graft/stent, recent UGIB, Fermin's esophagitis, GERD, BPH, Bladder Cancer, 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 Patient  presents to the emergency department from [facility] due to worsening sacral wound with imaging of osteomyelitis. During transport, HR dropped to 30 with O2 of 76, was placed on 6L NC. While in the ED, patient started having dysarthria, contraction of left upper extremity. Code stroke was called CT head and brain negative for infarct. CT Angio of chest showed an acute left lower lobe PE with findings of right heart strain with left femoral DVT. In ED, pt. started on heparin gtt. for PE, levophed started  for hypotension.     Plan     Neuro   Alert and oriented x 1, unable to follow commands   -Code Stroke called in ED, CT HEAD negative   - A+O x 3 and conversational at baseline  #  Anxiety and depression.  - Hold mirtazapine, buspirone.    Pulmonary  #Pulmonary Embolism  Patient p/w SOB.  tachycardic    CT chest revealed Acute left lower lobe pulmonary emboli extending to the posterobasal   segmental and subsegmental pulmonary branches. Right heart strain is   present. Recommend echocardiography.    Small nonenhancing peripheral opacity in the left lung base, most   suspicious for pulmonary infarct.    -Started on heparin gtt for PE  -Pt. 100% O2 on 2L NC    -WELLS Score  -Trend cardiac enzymes and proBNP to exclude cardiac dysfunction.  -TTE in am, stat if hemodynamically unstable.      CARDIAC  # Right sided heart failure  Left ventricular cavity is normal in size. Left ventricular systolic function is probably nornal.   Severely enlarged right ventricular cavity size and reduced systolic function.   Right ventricular systolic function is reduced with apical sparing consistent with acute pulmonary embolism (Martinez's sign).    Remains on norepinephrine and Vaso for hypotension  RIJ placed: Initial HD readings:  CVP 1-2, CV02 56.4, CI 2.5.  lactate up trending up to 5. NS 500ml x 4 given and NS @ 100ML x 10 hours started.   Repeat hemodynamics after fluid resuscitation CVP 3, CV02 60%, CO 5, CI 3. Lactate down trending, now 3.    #CAD (coronary artery disease).  - c/w statin  -hold aspirin for now.        GI  # hx prior UGIB w/ ulcers  - PPI IVP bid  - Eliquis 1/2 dose    # BPH (benign prostatic hyperplasia).  ·  Plan: - c/w home finasteride, tamsulosin  - monitor urine output; consider bladder scan.  - timmons      Normal indices  - trend BUN/SCr, avoid nephrotoxic, renally dose all meds  - strict I/Os   - urine studies if not improving.    #  BPH (benign prostatic hyperplasia).  - Plan: - c/w home finasteride, tamsulosin  - monitor urine output; consider bladder scan.    ENDO  # diabetes mellitus.  - Home regimen metformin 1g qd, tradjenta 5mg qd  - f/u A1c  - c/w INOCENTE, FSG ACHS or q6h if NPO; goal glucose .      HEME  ·  Problem: Anemia.  ·  Plan: -anemia has hx prior UGIB w/ ulcers  -BUN/Cr ratio elevated      ID  #Systemic inflammatory response syndrome (SIRS).  -wound care consult for sacral ulcer, though afebrile and no neutrophil shift can hold off on further antibiotics for now.  - Previously discharged from [facility] with 6 weeks of Augmentin/Flagyl which ended 3/4/24. Had wound vac placed 3/  - Blood cultures polymicrobial - ESBL gene detected with E. Coli, Klebsiella, Proteus, Enterococcus, and Bacteroides  - ID recs apprec - dc vanco, meropenem, and zosyn  - start imipenem/cilastin 500 mg q6h (covers all organisms on blood cultures - we ordered)  - surgical evaluation for debridement  - repeat BCx x2 in the AM        SKIN  Sacral decubitus ulcer, stage IV.   Chronic sacral and back decubiti ulcer c/b osteomyelitis. Unclear if organism identified, but was discharged from [facility] with 6 weeks of Augmentin/Flagyl due to end 3/4/24.  Wound consult      #DVT   Acute L prox great saphenous vein extending into common femoral DVT. Non occlusive on DVT study.     Access:   [facility] 3/30  Code Status: FULL CODE; would engage in further GoC  Dispo: pending clinical improvement, likely return to [facility] if able.   CCU Service  Cardiology   Spect: [id number] ([facility])     PATIENT: MERLINE RODRIGUEZ, MRN: [id number]    83M from [facility] CAD s/p stents, HTN, T2DM, AAA s/p graft/stent, recent UGIB, Fermin's esophagitis, GERD, BPH, Bladder Cancer, 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 presents with worsening sacral wound with imaging of osteomyelitis. Code stroke was called CT head and brain negative for infarct.    Transferred to CCU after CT Angio of chest showed an acute left lower lobe PE with findings of right heart strain with left femoral DVT. In ED, pt. started on heparin gtt. for PE, levophed started  for hypotension.     INTERVAL HISTORY/OVERNIGHT EVENTS:  Remains on heparin gtt. for PE.   Levophed and vaso started  for hypotension.   Initial HD readings:  CVP 1-2, CV02 56.4, CI 2.5.  lactate up trending up to 5. NS 500ml x 4 given and NS @ 100ML x 10 hours started.   Repeat hemodynamics: CVP 3, CV02 60%, CO 5, CI 3. Lactate down trending, now 3.    Vaso weaned off and Norepi weaned to 0.05mcg/kg/mn  NS bolus 500    Meropenum initiated to cover ESBL organisms, detected on BCID.    Telemetry: SR, PVC, couplets, short burst svt  few seconds    ALLERGIES: Allergies  No Known Allergies      MEDICATIONS:  MEDICATIONS  (STANDING):  chlorhexidine 2% Cloths 1 Application(s) Topical <User Schedule>  chlorhexidine 4% Liquid 1 Application(s) Topical <User Schedule>  clindamycin IVPB 900 milliGRAM(s) IV Intermittent once  clindamycin IVPB      clindamycin IVPB 900 milliGRAM(s) IV Intermittent every 8 hours  dextrose 5%. 1000 milliLiter(s) (100 mL/Hr) IV Continuous <Continuous>  dextrose 5%. 1000 milliLiter(s) (50 mL/Hr) IV Continuous <Continuous>  dextrose 50% Injectable 25 Gram(s) IV Push once  dextrose 50% Injectable 25 Gram(s) IV Push once  dextrose 50% Injectable 12.5 Gram(s) IV Push once  glucagon  Injectable 1 milliGRAM(s) IntraMuscular once  heparin  Infusion 800 Unit(s)/Hr (8 mL/Hr) IV Continuous <Continuous>  insulin lispro (ADMELOG) corrective regimen sliding scale   SubCutaneous three times a day before meals  insulin lispro (ADMELOG) corrective regimen sliding scale   SubCutaneous at bedtime  meropenem  IVPB 1000 milliGRAM(s) IV Intermittent every 12 hours  norepinephrine Infusion 0.05 MICROgram(s)/kG/Min (5.59 mL/Hr) IV Continuous <Continuous>  pantoprazole  Injectable 40 milliGRAM(s) IV Push two times a day  sodium chloride 0.9%. 1000 milliLiter(s) (100 mL/Hr) IV Continuous <Continuous>  sodium chloride 0.9%. 500 milliLiter(s) (50 mL/Hr) IV Continuous <Continuous>  tamsulosin 0.4 milliGRAM(s) Oral at bedtime  vancomycin  IVPB 1000 milliGRAM(s) IV Intermittent every 12 hours    MEDICATIONS  (PRN):  dextrose Oral Gel 15 Gram(s) Oral once PRN Blood Glucose LESS THAN 70 milliGRAM(s)/deciliter  sodium chloride 0.9% lock flush 10 milliLiter(s) IV Push every 1 hour PRN Pre/post blood products, medications, blood draw, and to maintain line patency        OBJECTIVE:  ICU Vital Signs Last 24 Hrs  T(C): 37.1 (31 Mar 2024 00:00), Max: 37.1 (30 Mar 2024 20:30)  T(F): 98.8 (31 Mar 2024 00:00), Max: 98.8 (30 Mar 2024 20:30)  HR: 95 (31 Mar 2024 05:00) (86 - 127)  BP: --  BP(mean): --  ABP: 88/49 (31 Mar 2024 05:00) (79/51 - 122/78)  ABP(mean): 62 (31 Mar 2024 05:00) (60 - 97)  RR: 14 (31 Mar 2024 05:00) (13 - 27)  SpO2: 99% (31 Mar 2024 04:00) (93% - 100%)    O2 Parameters below as of 31 Mar 2024 04:00  Patient On (Oxygen Delivery Method): nasal cannula  O2 Flow (L/min): 2          I&O's Summary    29 Mar 2024 07:01  -  30 Mar 2024 07:00  --------------------------------------------------------  IN: 64.4 mL / OUT: 0 mL / NET: 64.4 mL    30 Mar 2024 07:01  -  31 Mar 2024 06:22  --------------------------------------------------------  IN: 3841 mL / OUT: 340 mL / NET: 3501 mL      Daily     Daily       PHYSICAL EXAMINATION:  General: non verbal responds to noxious stimuli  HEENT: Moist mucous membranes.  Respiratory: CTAB, normal respiratory effort, no coughing, wheezes, crackles, or rales.  CV: RRR, S1S2, no murmurs, rubs or gallops. No JVD. Distal pulses intact.  Abdominal: Soft, nontender, nondistended, no rebound or guarding, normal bowel sounds.  Neurology: AOx1, no focal neuro defects, b/L UE contracted.  Extremities: mild pitting edema, + Peripheral pulses.  SKIN: see wound care          LABS:  ABG - ( 30 Mar 2024 10:23 )  pH, Arterial: 7.34  pH, Blood: x     /  pCO2: 18    /  pO2: 178   / HCO3: 10    / Base Excess: -14.5 /  SaO2: 98.5                                    7.4    22.05 )-----------( 202      ( 30 Mar 2024 04:37 )             22.9     03-30    141  |  115<H>  |  57<H>  ----------------------------<  185<H>  4.2   |  12<L>  |  1.12    Ca    6.6<L>      30 Mar 2024 22:01  Phos  4.0     03-30  Mg     2.50     03-30    TPro  4.2<L>  /  Alb  1.2<L>  /  TBili  0.2  /  DBili  x   /  AST  13  /  ALT  12  /  AlkPhos  400<H>  03-30    LIVER FUNCTIONS - ( 30 Mar 2024 22:01 )  Alb: 1.2 g/dL / Pro: 4.2 g/dL / ALK PHOS: 400 U/L / ALT: 12 U/L / AST: 13 U/L / GGT: x           PT/INR - ( 29 Mar 2024 21:20 )   PT: 27.2 sec;   INR: 2.47 ratio         PTT - ( 30 Mar 2024 22:01 )  PTT:114.7 sec    CARDIAC MARKERS ( 29 Mar 2024 21:20 )  x     / x     / 35 U/L / x     / 3.8 ng/mL      Urinalysis Basic - ( 30 Mar 2024 22:01 )    Color: x / Appearance: x / SG: x / pH: x  Gluc: 185 mg/dL / Ketone: x  / Bili: x / Urobili: x   Blood: x / Protein: x / Nitrite: x   Leuk Esterase: x / RBC: x / WBC x   Sq Epi: x / Non Sq Epi: x / Bacteria: x      Assessment:  · Assessment	  83M from [facility] [place] CAD s/p stents, HTN, T2DM, AAA s/p graft/stent, recent UGIB, Fermin's esophagitis, GERD, BPH, Bladder Cancer, 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 Patient  presents to the emergency department from [facility] due to worsening sacral wound with imaging of osteomyelitis. During transport, HR dropped to 30 with O2 of 76, was placed on 6L NC. While in the ED, patient started having dysarthria, contraction of left upper extremity. Code stroke was called CT head and brain negative for infarct. CT Angio of chest showed an acute left lower lobe PE with findings of right heart strain with left femoral DVT. In ED, pt. started on heparin gtt. for PE, levophed started  for hypotension.     Plan     Neuro   Alert and oriented x 1, unable to follow commands   -Code Stroke called in ED, CT HEAD negative   - A+O x 3 and conversational at baseline  #  Anxiety and depression.  - Hold mirtazapine, buspirone.    Pulmonary  #Pulmonary Embolism  Patient p/w SOB.  tachycardic    CT chest revealed Acute left lower lobe pulmonary emboli extending to the posterobasal   segmental and subsegmental pulmonary branches. Right heart strain is   present. Recommend echocardiography.    Small nonenhancing peripheral opacity in the left lung base, most   suspicious for pulmonary infarct.    -Started on heparin gtt for PE  -Pt. 100% O2 on 2L NC    -WELLS Score  -Trend cardiac enzymes and proBNP to exclude cardiac dysfunction.  -TTE in am, stat if hemodynamically unstable.      CARDIAC  # Right sided heart failure  Left ventricular cavity is normal in size. Left ventricular systolic function is probably nornal.   Severely enlarged right ventricular cavity size and reduced systolic function.   Right ventricular systolic function is reduced with apical sparing consistent with acute pulmonary embolism (Martinez's sign).    Remains on norepinephrine and Vaso for hypotension  RIJ placed: Initial HD readings:  CVP 1-2, CV02 56.4, CI 2.5.  lactate up trending up to 5. NS 500ml x 4 given and NS @ 100ML x 10 hours started.   Repeat hemodynamics after fluid resuscitation CVP 3, CV02 60%, CO 5, CI 3. Lactate down trending, now 3.    #CAD (coronary artery disease).  - c/w statin  -hold aspirin for now.        GI  # hx prior UGIB w/ ulcers  - PPI IVP bid  - Jevity TF w h20 flushes  - Albumen 1.2    # BPH (benign prostatic hyperplasia).  ·  Plan: - c/w home finasteride, tamsulosin  - monitor urine output; consider bladder scan.  - timmons      Normal indices  - trend BUN/SCr, avoid nephrotoxic, renally dose all meds  - strict I/Os   - urine studies if not improving.    #  BPH (benign prostatic hyperplasia).  - Plan: - c/w home finasteride, tamsulosin  - monitor urine output; consider bladder scan.    ENDO  # diabetes mellitus.  - Home regimen metformin 1g qd, tradjenta 5mg qd  - f/u A1c  - c/w INOCENTE, FSG ACHS or q6h if NPO; goal glucose .      HEME  ·  Problem: Anemia.  ·  Plan: -anemia has hx prior UGIB w/ ulcers  -BUN/Cr ratio elevated      ID  #Systemic inflammatory response syndrome (SIRS).  -wound care consult for sacral ulcer, though afebrile and no neutrophil shift can hold off on further antibiotics for now.  - Previously discharged from [facility] with 6 weeks of Augmentin/Flagyl which ended 3/4/24. Had wound vac placed 3/  - Blood cultures polymicrobial - ESBL gene detected with E. Coli, Klebsiella, Proteus, Enterococcus, and Bacteroides  - ID recs apprec - dc vanco, meropenem, and zosyn  - start imipenem/cilastin 500 mg q6h (covers all organisms on blood cultures - we ordered)  - surgical evaluation for debridement  - repeat BCx x2 in the AM        SKIN  Sacral decubitus ulcer, stage IV.   Chronic sacral and back decubiti ulcer c/b osteomyelitis. Unclear if organism identified, but was discharged from [facility] with 6 weeks of Augmentin/Flagyl due to end 3/4/24.  Wound consult      #DVT   Acute L prox great saphenous vein extending into common femoral DVT. Non occlusive on DVT study.     Access:   [facility] 3/30  Code Status: FULL CODE; would engage in further GoC  Dispo: pending clinical improvement, likely return to [facility] if able.

## 2024-03-31 NOTE — CONSULT NOTE ADULT - TIME BILLING
Date of service: 3/31/24    Agree with E&M above    Evaluation, chart review, care coordination, treatment planning, treatment.

## 2024-03-31 NOTE — PATIENT PROFILE ADULT - FALL HARM RISK - FACTORS
IV and/or equipment tethered to patient/Weakness IV and/or equipment tethered to patient/Other medical problems/Poor balance/Weakness

## 2024-03-31 NOTE — CONSULT NOTE ADULT - ASSESSMENT
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 ID consulted for sacral wound.    Previously discharged from [facility] with 6 weeks of Augmentin/Flagyl which ended 3/4/24. Had wound vac placed 3/4.    Sent in for bradycardia, c/b dysarthria, contraction of left upper extremity. Code stroke was called CT head and brain negative for infarct. CT Angio of chest showed an acute left lower lobe PE with findings of right heart strain with left femoral DVT. Started on heparin gtt and transferred to CICU.    CT a/p: Large sacral decubitus ulcer extending from L1 to the coccyx. Gas tracks   into the spinal canal and extends to the gluteal soft tissues. Left posterior thigh and right ischioanal fossa abscesses. Exposed lumbar spinous processes and erosions of the sacrococcygeal spinous processes most concerning for osteomyelitis.    CXR clear  UA grossly positive    Blood cultures polymicrobial - ESBL gene detected with E. Coli, Klebsiella, Proteus, Enterococcus, and Bacteroides 83M from [facility] bladder ca, CAD s/p stents, HTN, T2DM, LYUBOV, GERD, BPH, depression/anxiety, stage 4 decubitus sacral wound c/b osteomyelitis, AAA s/p graft/stent, recent UGIB (1/2024) iso corticosteroids, Fermin's esophagitis, s/p b/l hip replacements who was sent in for bradycardia. ID consulted for sacral wound.    Previously discharged from [facility] with 6 weeks of Augmentin/Flagyl which ended 3/4/24. Had wound vac placed 3/4.    Sent in for bradycardia, c/b dysarthria, contraction of left upper extremity. Code stroke was called CT head and brain negative for infarct. CT Angio of chest showed an acute left lower lobe PE with findings of right heart strain with left femoral DVT. Started on heparin gtt and transferred to CICU.    CT a/p: Large sacral decubitus ulcer extending from L1 to the coccyx. Gas tracks   into the spinal canal and extends to the gluteal soft tissues. Left posterior thigh and right ischioanal fossa abscesses. Exposed lumbar spinous processes and erosions of the sacrococcygeal spinous processes most concerning for osteomyelitis.    CXR clear  UA grossly positive  TTE - poor study    Blood cultures polymicrobial - ESBL gene detected with E. Coli, Klebsiella, Proteus, Enterococcus, and Bacteroides    Overall, polymicrobial bacteremia, stage IV sacral wound, left posterior thigh and right ischioanal fossa abscesses    Suggest:  - dc vanco, meropenem, and zosyn  - start imipenem/cilastin 500 mg q6h (covers all organisms on blood cultures - we ordered)  - surgical evaluation for debridement  - repeat BCx x2 in the AM  - will need GÓMEZ if line with GOC    d/w attending.    Ángel Carrington, PGY4  ID Fellow  [employer] Teams Preferred  After 5pm/weekends call [phone]

## 2024-03-31 NOTE — PATIENT PROFILE ADULT - NSTRANSFERBELONGINGSDISPO_GEN_A_NUR
pt doesnot answer questions no family at bedsideto provide answers. pt does not answer questions no family at bedside to provide answers.

## 2024-03-31 NOTE — PROGRESS NOTE ADULT - CRITICAL CARE ATTENDING COMMENT
Kevin Wade is an 83-year-old man with history of CAD s/p stents, HTN, DM-II, AAA s/p graft/stent, recent UGIB, Fermin's esophagitis, GERD, BPH, Bladder Cancer (Stage 4) decubitus sacral wound c/b osteomyelitis (ongoing abx treatment), LYUBOV depression / anxiety, and s/p b/l hip replacements. He presented to ED from [facility] due to worsening sacral wound osteomyelitis. During transport, HR dropped to 30 bpm and O2 Sat decreased to 76% on RA. He was placed on 6L NC. While in the ED, patient developed dysarthria, contraction of left upper extremity. Code stroke was called with CT head and brain negative for infarct. CT angio of chest showed acute left lower lobe PE with findings of right heart strain. There was left femoral DVT. In ED, pt. started on heparin gtt. for PE. Norepinephrine (Levophed) was started  for hypotension. RIJ placed: Initial HD readings:  CVP 1-2, CV02 56.4, CI 2.5.  lactate up trending up to 5. NS 500ml x 4 given and NS @ 100ML x 10 hours started. Repeat hemodynamics after fluid resuscitation CVP 3, CV02 60%, CO 5, CI 3. Lactate down trending, now 3 mmol/L. Meropenum initiated to cover ESBL organisms, detected on BCID. Kevin Wade is an 83-year-old man with history of CAD s/p stents, HTN, DM-II, AAA s/p graft/stent, recent UGIB, Fermin's esophagitis, GERD, BPH, Bladder Cancer (Stage 4) decubitus sacral wound c/b osteomyelitis (ongoing abx treatment), LYUBOV depression / anxiety, and s/p b/l hip replacements. He presented to ED from [facility] due to worsening sacral wound osteomyelitis. During transport, HR dropped to 30 bpm and O2 Sat decreased to 76% on RA. He was placed on 6L NC. While in the ED, patient developed dysarthria, contraction of left upper extremity. Code stroke was called with CT head and brain negative for infarct. CT angio of chest showed acute left lower lobe PE with findings of right heart strain. There was left femoral DVT. In ED, pt. started on heparin gtt. for PE. Norepinephrine (Levophed) was started for hypotension. RIJ placed: Initial HD readings:  CVP 1-2, CV02 56.4, CI 2.5.  lactate up trending up to 5. NS 500ml x 4 given and NS @ 100ML x 10 hours started. Repeat hemodynamics after fluid resuscitation CVP 3, CV02 60%, CO 5, CI 3. Lactate down trending, now 3 mmol/L. Vancomycin meropenem, and Zosynto be discontinued and replaced by imipenem/cilastin 500 mg q6h in order to cover all organisms on blood cultures, including ESBL Kevin Wade is an 83-year-old man with history of CAD s/p stents, HTN, DM-II, AAA s/p graft/stent, recent UGIB, Fermin's esophagitis, GERD, BPH, Bladder Cancer (Stage 4) decubitus sacral wound c/b osteomyelitis (ongoing abx treatment), LYUBOV depression / anxiety, and s/p b/l hip replacements. He presented to ED from [facility] due to worsening sacral wound osteomyelitis. During transport, HR dropped to 30 bpm and O2 Sat decreased to 76% on RA. He was placed on 6L NC. While in the ED, patient developed dysarthria, contraction of left upper extremity. Code stroke was called with CT head and brain negative for infarct. CT angio of chest showed acute left lower lobe PE with findings of right heart strain. There was left femoral DVT. In ED, pt. started on heparin gtt. for PE. Norepinephrine (Levophed) was started for hypotension. RIJ placed: Initial HD readings:  CVP 1-2, CV02 56.4, CI 2.5, suggesting septic shock.  lactate up trending up to 5. NS 500ml x 4 given and NS @ 100ML x 10 hours started. Repeat hemodynamics after fluid resuscitation CVP 3, CV02 60%, CO 5, CI 3. Lactate down trending, now 3 mmol/L. Vancomycin meropenem, and Zosynto be discontinued and replaced by imipenem/cilastin 500 mg q6h in order to cover all organisms on blood cultures, including E Coli (ESBL). Tube feed with Jevity started

## 2024-04-01 NOTE — CONSULT NOTE ADULT - ATTENDING COMMENTS
Patient was seen and evaluated with Dr. Mahmood, Palliative Medicine Fellow, during bedside rounds.   Agree with above findings and recommendations, edited documentation as appropriate to reflect the plan of care discussed with patient and myself with the following additions:    83-year-old man with CAD s/p PCI, emphysema, HTN, T2DM, Fermin's esophagus, GERD, BPH, bladder cancer, recent UGIB, AAA s/p stenting, depression, anxiety, bilateral hip replacements, extensive sacral wound who is admitted with septic shock and new acute high risk PE with right heart strain. Blood cultures showing ESBL and growing E. coli, Klebsiella, Proteus, Enterococcus, Bacteriodes. CXRAY 3/30- Imaging personally reviewed by me. No consolidation noted.   Palliative Care consulted for complex decision making  related to goals of care discussions in the setting of advanced illness/ evaluation and management of pain/ symptoms    Patient seen and examined at bedside. Patient awakes to voice. Patient AAOx1 (person). Patient moaning intermittently during encounter, but unable to articulate if it was pain that was bothering him, but did endorse wanting to have a bowel movement.  Per primary team, patient with pain due to extensive decub ulcer. Per bedside RN, patient receive IV Fentanyl about 15 minutes prior to encounter.    Goals of care discussion with patient's cousin/HCP Sara Butler. Sara to bring copy of HCP paperwork to hospital later.   Sara shared wish for patient to transition to comfort centric approach of care. She confirmed DNR/DNI code status. Per GOC discussion, she requested discontinuation of vasopressors, antibiotics, lab draws, and medications not directed to comfort, and no initiation of dialysis, IV fluids, or artificial nutrition.   MOLST updated to reflect comfort measures only.     - Start IV Dilaudid 0.2mg q1 PRN moderate pain  - Start IV Dilaudid 0.5mg q1 PRN severe pain/ dyspnea  - Bowel regimen while on opiates    Case reviewed with CCU team.  Thank you for allowing us to participate in your patient's care. Please page [phone] for any questions/concerns.

## 2024-04-01 NOTE — CONSULT NOTE ADULT - NS ATTEND AMEND GEN_ALL_CORE FT
Pt seen and examined with ACP.  Assessment and plan reviewed and discussed.  Agree with above.      I spent 75  minutes face to face w/ this pt of which more than 50% of the time was spent counseling & coordinating care of this pt.

## 2024-04-01 NOTE — PROGRESS NOTE ADULT - SUBJECTIVE AND OBJECTIVE BOX
83M from [facility] CAD s/p stents, HTN, T2DM, AAA s/p graft/stent, recent UGIB, Fermin's esophagitis, GERD, BPH, Bladder Cancer, 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 Patient  presents to the emergency department from [facility] due to worsening sacral wound with concern for osteomyelitis that patient has been ongoing treatment for as an outpatient. Recently saw wound care MD 3/4 who placed a wound vac prior to discahrge. During transport, HR dropped to 30 with O2 of 76, was placed on 6L NC. While in the ED, patient started having dysarthria, contraction of left upper extremity. Code stroke was called CT head and brain negative for infarct. CT Angio of chest showed an acute left lower lobe PE with findings of right heart strain with left femoral DVT. In ED, pt. started on heparin gtt. for PE, levophed started  for hypotension.     Patient admitted to CCU and undergoing treatment for PE with heparin gtt and requiring levophed .1. CTAP obtained which revealed similar large sacral decubitis ulcer with gas tracking into spinal canal and extending into gluteal tissue, left posterior right thigh and right posterior ischioanal fossa abscesses, and Exposed lumbar spinous processes and erosions of the sacrococcygeal spinous processes most concerning for osteomyelitis. WBC elevated to 21 however was discahrged previously with wbc elevated to 14 3/7. LRINEC score 3.       PAST MEDICAL HISTORY:  PAST MEDICAL & SURGICAL HISTORY:  Bladder cancer      CAD (coronary artery disease)      HTN (hypertension)      T2DM (type 2 diabetes mellitus)      LYUBOV (iron deficiency anemia)      GERD (gastroesophageal reflux disease)      BPH (benign prostatic hyperplasia)      Anxiety and depression      Sacral decubitus ulcer      Osteomyelitis      Abdominal aortic aneurysm      Upper GI bleed      Peptic ulcer disease      Goiter      Esophageal varix      Fermin esophagus      S/P hip replacement, bilateral      S/P repair of abdominal aortic aneurysm using straight graft      History of percutaneous coronary intervention      S/P bilateral hip replacements      S/P aortic aneurysm repair          ALLERGIES:  Allergies    No Known Allergies    Intolerances        SOCIAL HISTORY: Negative for tobacco, etoh, or drug use    FAMILY HISTORY:  FAMILY HISTORY:  Family history of colon cancer in mother (Mother)        PHYSICAL EXAM:  General: patient moaning, AxOx0  Pulmonary: on NC  Cardiovascular: NSR, no murmurs  Abdominal: soft, ND/NT, no organomegaly, no guarding or rebound tenderness  Wound: Large unstageable wound at lower back measuring [id number](superior portion) and 7cm in width at the inferior portion, upper back area with healthy muscle and exposed spinous process, one area of bridging skin at the center with tunneling connecting to the sacral wound. Sacral wound also with exposed bone and necrotic tissue overlaying the base and skin edges, not easily removal. Tunneling at the sacrum into left posterior thigh. No brigida pus expressed,       VITAL SIGNS:  Vital Signs Last 24 Hrs  T(C): 34.1 (31 Mar 2024 12:00), Max: 37.1 (30 Mar 2024 20:30)  T(F): 93.4 (31 Mar 2024 12:00), Max: 98.8 (30 Mar 2024 20:30)  HR: 98 (31 Mar 2024 14:00) (86 - 125)  BP: --  BP(mean): --  RR: 21 (31 Mar 2024 14:00) (13 - 28)  SpO2: 100% (31 Mar 2024 14:00) (95% - 100%)    Parameters below as of 31 Mar 2024 08:00  Patient On (Oxygen Delivery Method): nasal cannula  O2 Flow (L/min): 2      I&O's Summary    30 Mar 2024 07:01  -  31 Mar 2024 07:00  --------------------------------------------------------  IN: 3881 mL / OUT: 465 mL / NET: 3416 mL    31 Mar 2024 07:01  -  31 Mar 2024 14:16  --------------------------------------------------------  IN: 1111.5 mL / OUT: 25 mL / NET: 1086.5 mL        LABS:                        6.7    21.05 )-----------( 120      ( 31 Mar 2024 08:15 )             20.5     03-31    139  |  113<H>  |  58<H>  ----------------------------<  150<H>  3.9   |  10<LL>  |  1.11    Ca    6.7<L>      31 Mar 2024 08:15  Phos  3.9     03-31  Mg     2.20     03-31    TPro  4.4<L>  /  Alb  1.2<L>  /  TBili  <0.2  /  DBili  x   /  AST  14  /  ALT  15  /  AlkPhos  372<H>  03-31    PT/INR - ( 31 Mar 2024 08:15 )   PT: 18.2 sec;   INR: 1.63 ratio         PTT - ( 31 Mar 2024 08:15 )  PTT:95.4 sec  Urinalysis Basic - ( 31 Mar 2024 08:15 )    Color: x / Appearance: x / SG: x / pH: x  Gluc: 150 mg/dL / Ketone: x  / Bili: x / Urobili: x   Blood: x / Protein: x / Nitrite: x   Leuk Esterase: x / RBC: x / WBC x   Sq Epi: x / Non Sq Epi: x / Bacteria: x      CAPILLARY BLOOD GLUCOSE      POCT Blood Glucose.: 134 mg/dL (31 Mar 2024 11:17)  POCT Blood Glucose.: 175 mg/dL (31 Mar 2024 05:48)  POCT Blood Glucose.: 199 mg/dL (30 Mar 2024 23:24)  POCT Blood Glucose.: 164 mg/dL (30 Mar 2024 16:55)    LIVER FUNCTIONS - ( 31 Mar 2024 08:15 )  Alb: 1.2 g/dL / Pro: 4.4 g/dL / ALK PHOS: 372 U/L / ALT: 15 U/L / AST: 14 U/L / GGT: x             CULTURES:  Culture Results:   Growth in aerobic and anaerobic bottles: Klebsiella pneumoniae  Growth in aerobic and anaerobic bottles: Escherichia coli  See previous culture [id number]  Growth in aerobic and anaerobic bottles: Gram Negative Rods and Gram  Positive Rods (03-30 @ 03:37)  Culture Results:   Growth in aerobic and anaerobic bottles: Escherichia coli  Growth in aerobic and anaerobic bottles: Klebsiella pneumoniae  See previous culture [id number]  Growth in aerobic and anaerobic bottles: Gram Negative Rods and Gram  Positive Rods (03-30 @ 03:37)  Culture Results:   Growth in aerobic and anaerobic bottles: Enterococcus faecalis  Growth in aerobic bottle: Proteus mirabilis  Growth in anaerobic bottle: Klebsiella pneumoniae  Growth in aerobic and anaerobic bottles: Escherichia coli  Growth in anaerobic bottle: Clostridium perfringens "Susceptibilities not  performed"  Direct identification is available within approximately 3-5  hours either by Blood Panel Multiplexed PCR or Direct  MALDI-TOF. Details: [email or web address]  Aguilar/B ResistanceGene Detected  When multiple species' of Enterococcus are present,  association of a resistance gene to a specific organism  cannot be determined.  When multiple gram negative organisms are present,  association of a resistance gene to a specific organism  cannot be determined. (03-29 @ 21:28)  Culture Results:   Growth in aerobic and anaerobic bottles: Escherichia coli  Growth in aerobic and anaerobic bottles: Proteus mirabilis  Growth in anaerobic bottle: Clostridium perfringens  See previous culture [id number] (03-29 @ 21:20)      RADIOLOGY & ADDITIONAL STUDIES:  < from: CT Abdomen and Pelvis w/ IV Cont (03.29.24 @ 21:59) >    ACC: [id number] EXAM:  CT ABDOMEN AND PELVIS IC   ORDERED BY: ENRIQUETA DEL TORO     ACC: [id number] EXAM:  CT ANGIO CHEST PULM ART [facility]   ORDERED BY: ENRIQUETA DEL TORO     PROCEDURE DATE:  03/29/2024          INTERPRETATION:  CLINICAL INFORMATION: Wells score greater than 4    COMPARISON: CT abdomen and pelvis 2/29/2024, CT neck 8/2/2023    CONTRAST/COMPLICATIONS:  IV Contrast: Omnipaque 350 (accession [id number]), IV contrast documented   in unlinked concurrent exam (accession [id number])  90 cc administered   10   cc discarded  Oral Contrast: NONE  Complications: None reported at time of study completion    PROCEDURE:  CT Angiography of the Chest was performed followed by portal venous phase   imaging of the Abdomen and Pelvis.  Sagittal and coronal reformats were performed as well as 3D (MIP)   reconstructions.    FINDINGS:  CHEST:  LUNGS AND LARGE AIRWAYS: Patent central airways. There is rightward   deviation of the trachea due to substernal thyroid goiter. Emphysema.   Several scattered sub-6 mm pulmonary nodules bilaterally, nonspecific.   Bibasilar atelectasis. Small nonenhancing peripheral consolidation at the   left lung base in the region of the occluded pulmonary arteries, possibly   a small pulmonary infarct.  PLEURA: No pleural effusion.  VESSELS: Breathing motion significantly limits evaluation of subsegmental   pulmonary artery branches in the right middle, right lower, lingula, and   left lower lobes. Left lower lobar pulmonary embolism extending into the   and occluding theposterobasal segmental and subsegmental branches.   Enlargement of the main pulmonary artery measuring 4.1 cm.  HEART: Elevated RV to LV ratio and straightening of the interventricular   septum suggestive of right heart strain. Heart size is normal. No   pericardial effusion  MEDIASTINUM AND JENI: No lymphadenopathy.Redemonstrated large left   heterogeneous thyroid goiter extending into the mediastinum with mass   effect on/compression of the trachea and rightward rightward tracheal   deviation, as well as compression of the esophagus.  CHEST WALL AND LOWER NECK: Within normal limits.    ABDOMEN AND PELVIS:  LIVER: Few too small to characterize hepatic hypodensities.  BILE DUCTS: Normal caliber.  GALLBLADDER: Within normal limits.  SPLEEN: Within normal limits.  PANCREAS: Within normal limits.  ADRENALS: Nonspecific nodular bilateral adrenal thickening.  KIDNEYS/URETERS: No hydronephrosis. Bilateral renal hypoattenuating foci   too small to characterize.    BLADDER: Not visualized due to streak artifact from bilateral hip   prostheses.  REPRODUCTIVE ORGANS: Not visualized due to streak artifact from bilateral   hip prostheses.    BOWEL: Duodenal diverticulum. No bowel obstruction. Appendix is normal.   Copious stool in the rectum.  PERITONEUM: No ascites.  VESSELS: Filling defect in the left common femoral vein compatible with   deep venous thrombosis (303-114). Redemonstrated endovascular repair of   abdominal aortic aneurysm. The aneurysm sac measures 7.1 cm in transverse   dimension, not significantly changed. Similar-appearing mural thrombus   within the stent with moderate narrowing. The stent is patent to the   bifurcation. The left common iliac component of the stent is chronically   occluded. The distal left external iliac artery and superficial femoral   artery is patent. The proximal portion of the left external iliac artery   is obscured by streak artifact. Coils in the region of the left internal   iliac artery. The right common iliac limb of the stent is patent.   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  RETROPERITONEUM/LYMPH NODES: No lymphadenopathy.  ABDOMINAL WALL: Large decubitus ulcer extending from L1 to the coccyx   with multiple exposed vertebral body spinous processes. There is gasseen   tracking to the left second sacral foramen and into the spinal canal   (303-80 and 605-77). Gas and soft tissue tracks into the bilateral   gluteal musculature. Rim-enhancing collection of fluid and gas in the   left posterior upper thigh measuring 5.7 x 5.4 x 2.2 cm. A collection of   fluid and gas in the right ischioanal fossa measuring 3.8 x 3.0 x 2.1 cm.  BONES: Bilateral total hip arthroplasties. Osseous erosions of the   sacrococcygeal spinous processes also concerning for osteomyelitis.   Severe degenerative changes of the spine. Grade 1 anterolisthesis L4 on   L5. Mild retrolisthesis L3 on L4.    IMPRESSION:    Acute left lower lobe pulmonary emboli extending to the posterobasal   segmental and subsegmental pulmonary branches. Right heart strain is   present. Recommend echocardiography.    Small nonenhancing peripheral opacity in the left lung base, most   suspicious for pulmonary infarct.    These findings were discussed with Dr. Del Toro by Dr. Craig at 10:15 PM on   3/29/2024, with repeat back confirmation.    Left common femoral vein DVT.    Large sacral decubitus ulcer extending from L1 to the coccyx. Gas tracks   into the spinal canal and extends to the gluteal soft tissues.    Left posterior thigh and right ischioanalfossa abscesses as above.    Exposed lumbar spinous processes and erosions of the sacrococcygeal   spinous processes most concerning for osteomyelitis.    --- End of Report ---      < end of copied text >

## 2024-04-01 NOTE — CONSULT NOTE ADULT - CONSULT REQUESTED DATE/TIME
30-Mar-2024 00:02
31-Mar-2024 14:16
01-Apr-2024 14:16
29-Mar-2024 21:36
31-Mar-2024 08:25
01-Apr-2024 08:56

## 2024-04-01 NOTE — CONSULT NOTE ADULT - PROBLEM SELECTOR RECOMMENDATION 9
Patient admitted with septic shock from source extensive, infected sacral wound. Blood cultures showing ESBL and growing E. coli, Klebsiella, Proteus, Enterococcus, Bacteriodes. Transitioned to comfort-focused care. Code Status: DNR/DNI    The patient does not have decision-making capacity for any medical decisions on this encounter due to encephalopathy. His HCP is his cousin, Sara Butler [phone], who should be consulted for all medical decisions.    Palliative Care will continue to follow. Do not hesitate to reach out with questions or for uncontrolled symptoms. 24-hour pager: [phone].    Note is not complete until co-signed by an Attending.    Jt Mahmood MD  Fellow in Hospice & Palliative Medicine  [facility]. Code Status: DNR/DNI    Chaplaincy consulted for support for patient's family as patient is Hindu.    The patient does not have decision-making capacity for any medical decisions on this encounter due to encephalopathy. His HCP is his cousin, Sara Butlre [phone], who should be consulted for all medical decisions.    Palliative Care will continue to follow. Do not hesitate to reach out with questions or for uncontrolled symptoms. 24-hour pager: [phone].    Note is not complete until co-signed by an Attending.    Jt Mahmood MD  Fellow in Hospice & Palliative Medicine  [facility]. Patient admitted with septic shock from source extensive, infected sacral wound. Blood cultures showing ESBL and growing E. coli, Klebsiella, Proteus, Enterococcus, Bacteriodes.   Transitioned to comfort-focused care. Per [facility] discussion, de-escalation of care with discontinuation of vasopressors.

## 2024-04-01 NOTE — PROGRESS NOTE ADULT - ASSESSMENT
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    Upon arrival in the ED, stroke code called due to dysarthria, contraction of left upper extremity.  CT of the head was obtained which did not show evidence for infarction.  CT a of the thorax showed an acute left lower PE with right heart strain and a femoral DVT.  Patient was started on heparin gtt. following that.  Patient then required CCU admission for further management of hypotension and pressor support.    Other workup obtained on admission included CT abdomen/pelvis showing a large sacral decubitus ulcer extending from L1 to the coccyx, gas tracking was noted in the spinal canal and extending through the gluteal soft tissues.  Left posterior thigh and right thigh fossa abscesses were noted, exposed lumbar spinous processes and erosions were noted concerning for osteomyelitis.  Chest x-ray was obtained showing clear lungs, urinalysis with evidence for infection.  Blood cultures were obtained which are now polymicrobial with detection of E. coli, Klebsiella, Proteus, Enterococcus and Bacteroides.  ID consulted for further management.    #Polymicrobial bacteremia, sacral wound likely origin  #Stage IV sacral wound, infected  #Pelvic abscesses noted in the posterior thigh, right ischial anal fossa    Recommendations  Continue imipenem  Given concern for VRE, add vancomycin  Surgical evaluation for wound debridement, abscess drainage as noted on imaging  Follow repeat cultures  Would pursue GÓMEZ if within goals of care  Palliative consulted - tentative plan for comfort based approach if decision made, would discontinue antibiotics and management deferred to primary team  Follow fever curve and WBC count    Tin Vasquez MD  Division of Infectious Diseases

## 2024-04-01 NOTE — PROGRESS NOTE ADULT - ASSESSMENT
83M from [facility] CAD s/p stents, HTN, T2DM, AAA s/p graft/stent, recent UGIB, Fermin's esophagitis, GERD, BPH, Bladder Cancer, Stage 4 decubitus sacral wound c/b osteomyelitis with ongoing abx treatment,  Presented for worsening sacral wound but found to have LLL PE and left femoral DVT with right heart strain admitted to CCU on levophed and heparin gtt. General surgery consulted for CTAP with new abscess in right ischioanal space and left posterior thigh not previously seen and concern for sepsis 2/2 large sacral wound.     Plan:  - no acute surgical intervention, patient with severe comorbidities and not a candidate at this time for bedside or OR debridement with active PE w/ RHS/levophed gtt/hep gtt.  - Recommend palliative care consult for comprehensive GOC discussion with family  - recommend wound care   - c/w imipenem appreciate ID recs  - Call back if questions       Surgery Team A  [id number]

## 2024-04-01 NOTE — CONSULT NOTE ADULT - PROBLEM SELECTOR RECOMMENDATION 7
Ativan 0.5 mg IV q1h PRN for agitation/anxiety    This regimen can be increased to ativan 1 mg IV q1h PRN for agitation/anxiety if the above dose is insufficient for managing the patient's symptoms.

## 2024-04-01 NOTE — CONSULT NOTE ADULT - PROBLEM SELECTOR RECOMMENDATION 3
Patient with extensive, infected sacral wound from L1 to coccyx with exposed vertebrae, osteomyelitis, gas tracking into spinal canal, and bilateral gluteal abscesses. See CTAP imaging results copied above. Transitioned to comfort-focused care. Pain management as below. Patient with extensive, infected sacral wound from L1 to coccyx with exposed vertebrae, osteomyelitis, gas tracking into spinal canal, and bilateral gluteal abscesses. See CTAP imaging results copied above.   Transitioned to comfort-focused care.   Pain management as below.

## 2024-04-01 NOTE — CONSULT NOTE ADULT - ASSESSMENT
Assessment/Plan: 83M from [facility] CAD s/p stents, HTN, T2DM, AAA s/p graft/stent, recent UGIB, Fermin's esophagitis, GERD, BPH, Bladder Cancer, Stage 4 decubitus sacral wound c/b osteomyelitis with ongoing abx treatment,  Presented for worsening sacral wound but found to have LLL PE and left femoral DVT with right heart strain admitted to CCU on levophed and heparin gtt.     Wound Consult requested to assist w/ management of Lumbar spine and sacral stage 4 pressure injury.     Lumbar spine and sacral stage 4 pressure injury  -Seen by general surgery: no acute surgical intervention recommended "patient with severe comorbidities and not a candidate at this time for bedside or OR debridement with active PE w/ RHS/levophed gtt/hep gtt." Recommendations made for palliative care consult and wound care team.  -Wounds large and connect beneath epidermal-dermal bridge, bone with abundant fragmented bone exposed   -Sacral wound with nonviable base with deep tunnels towards bilateral buttocks, both with moderate malodorous purulent drainage.   -Clinical exam consistent with findings of CT abd/pelvis; "gas tracking into spinal canal and extending into gluteal tissue, left posterior right thigh and right posterior ischioanal fossa abscesses, and Exposed lumbar spinous processes and erosions of the sacrococcygeal spinous processes most concerning for osteomyelitis"  -Agree with general surgery, patient not a candidate for bedside or OR debridement due to comorbidities.  -Agree with Palliative Care consult for Goals of Care discussion.  -Topical recommendations: Cleanse with NS, pat dry. Apply Liquid barrier film to periwound skin. Pack dead space including deep tunnels with Dakins 1/4 strength moistened Joi (leave at least 2" out at end to ensure full removal of packing with subsequent dressing changes.) Change twice a day.   -Continue to offload pressure; low airloss support surface, continue to t&P per protocol with use of fluidized positioning devices.  -ABx management per primary team    Moisture and incontinence associated dermatitis to bilateral buttocks and medial groin  -consider stool containment with external fecal  pouch  -provide perineal care per protocol, apply TRIAD moisture barrier paste every shift and prn with episodes of incontinence.  -Continue use of single breathable incontinence pad.    Left dorsal foot, left heel, right lateral lower leg, right plantar foot deep tissue pressure injuries  -Topical recommendations: Paint with betadine daily, pat dry. Leave open to air.  -Continue to offload pressure with complete cair boots.    Findings and plan discussed in detail with primary RN and primary CCU team NP Nia.  Patient seen today with Dr. Oconnor. I was present for the entirety of this encounter.   Please reconsult as needed.    Thank you for this consult  NAHID Randall, CAMRYN (pager #[phone])    If after 4PM or before 7:30AM on Mon-Friday or weekend/holiday please contact general surgery for urgent matters.   Team A- [id number]   Team B- [id number]  For non-urgent matters e-mail [email or web address]   Assessment/Plan: 83M from [facility] CAD s/p stents, HTN, T2DM, AAA s/p graft/stent, recent UGIB, Fermin's esophagitis, GERD, BPH, Bladder Cancer, Stage 4 decubitus sacral wound c/b osteomyelitis with ongoing abx treatment,  Presented for worsening sacral wound but found to have LLL PE and left femoral DVT with right heart strain admitted to CCU on levophed and heparin gtt.     Wound Consult requested to assist w/ management of Lumbar spine and sacral stage 4 pressure injury.     Lumbar spine and sacral stage 4 pressure injury  -Seen by general surgery: no acute surgical intervention recommended "patient with severe comorbidities and not a candidate at this time for bedside or OR debridement with active PE w/ RHS/levophed gtt/hep gtt." Recommendations made for palliative care consult and wound care team.  -Wounds large and connect beneath epidermal-dermal bridge, bone with abundant fragmented bone exposed   -Sacral wound with nonviable base with deep tunnels towards bilateral buttocks, both with moderate malodorous purulent drainage.   -Clinical exam consistent with findings of CT abd/pelvis; "gas tracking into spinal canal and extending into gluteal tissue, left posterior right thigh and right posterior ischioanal fossa abscesses, and Exposed lumbar spinous processes and erosions of the sacrococcygeal spinous processes most concerning for osteomyelitis"  -Agree with general surgery, patient not a candidate for OR debridement due to comorbidities.  -Agree with Palliative Care consult for Goals of Care discussion.  -Topical recommendations: Cleanse with NS, pat dry. Apply Liquid barrier film to periwound skin. Pack dead space including deep tunnels with Dakins 1/4 strength moistened Joi (leave at least 2" out at end to ensure full removal of packing with subsequent dressing changes.) Change twice a day.   -Continue to offload pressure; low airloss support surface, continue to t&P per protocol with use of fluidized positioning devices.  -ABx management per primary team    Moisture and incontinence associated dermatitis to bilateral buttocks and medial groin  -consider stool containment with external fecal  pouch  -provide perineal care per protocol, apply TRIAD moisture barrier paste every shift and prn with episodes of incontinence.  -Continue use of single breathable incontinence pad.    Left dorsal foot, left heel, right lateral lower leg, right plantar foot deep tissue pressure injuries  -Topical recommendations: Paint with betadine daily, pat dry. Leave open to air.  -Continue to offload pressure with complete cair boots.  -consider NOEL/PVR if in line with GOC    Findings and plan discussed in detail with primary RN and primary CCU team NP Nia.  Patient seen today with Dr. Oconnor. I was present for the entirety of this encounter.   Please reconsult as needed.    Thank you for this consult  NAHID Randall, CAMRYN (pager #[phone])    If after 4PM or before 7:30AM on Mon-Friday or weekend/holiday please contact general surgery for urgent matters.   Team A- [id number]   Team B- [id number]  For non-urgent matters e-mail [email or web address]

## 2024-04-01 NOTE — CONSULT NOTE ADULT - NS ATTEST RISK PROBLEM GEN_ALL_CORE FT
1. Number and complexity of problems addressed for this patient:    1.1 Moderate (At least 1)  [ ] 1 or more chronic illnesses with exacerbation, progression, or side effects of treatment  [ ] 2 or more stable chronic illnesses  [ ] 1 undiagnosed new problem with uncertain prognosis  [ ] 1 acute illness with systemic symptoms  [ ] 1 acute complicated injury  1.2 High (At least 1)   [ ] 1 or more chronic illnesses with severe exacerbation, progression, or side effects of treatment  [ x] 1 acute or chronic illnesses or injuries that may pose a threat to life or bodily function    2. Amount and/or Complexity of Data that was Reviewed and Analyzed for this case:       Moderate (1 out of 3)       High (2 out of 3)  2.1. (Any combination of 3 of the following)   [x ] Prior External notes were reviewed  [x ] Each test result was reviewed (see "LABS" and "RADIOLOGY & ADDITIONAL STUDIES" above)  [ ] The following tests were ordered and/or reviewed (Only count 1 point for ordering or reviewing a unique test):  	[ ]CBC  	[ ] Chemistry   	[ ] Imaging   	[ ] Other:   [ ] Assessment requiring an independent historian   		Name of historian and relationship:   2.2  [x ] Personally review and interpretation of  image or testing   2.3  [ ] Discussion of management or test interpretation with external physician/other qualified health care professional\appropriate source (not separately reported)    3. Risk of Complications and/or Morbidity or Mortality of for this Patient’s Management:  3.1 Moderate risk of morbidity from additional diagnostic testing or treatment (At least 1):   [ ] Prescription drug management   [ ] Decision regarding minor surgery, treatment, or procedure with identified patient or procedure risk factors  [ ] Decision regarding elective major surgery, treatment, or procedure without identified patient or procedure risk factors   [ ] Diagnosis or treatment significantly limited by social determinants of health   [ ] Other:   3.2 High risk of morbidity from additional diagnostic testing or treatment (At least 1):   [ ] Drug therapy requiring intensive monitoring for toxicity   [ ] Decision regarding elective major surgery, treatment, or procedure with identified patient or procedure risk factors   [ ] Decision regarding emergency major surgery, treatment, or procedure   [ ] Decision regarding hospitalization or escalation of hospital-level of care  [ x] Decision not to resuscitate, not to intubate, and to de-escalate care because of poor prognosis   [ ] Decision to proceed or not with artificial nutrition   [x ] Parenteral controlled substance

## 2024-04-01 NOTE — CONSULT NOTE ADULT - PROBLEM SELECTOR PROBLEM 4
Impression: Presbyopia: H52.4. Plan: RX new specs and CL for FTW. Educated pt. on adaptation, use, and care of CLs. Instructed pt. to not sleep in CLs, follow 's replacement schedule, and use of proper solution. At high risk for pain

## 2024-04-01 NOTE — PROGRESS NOTE ADULT - SUBJECTIVE AND OBJECTIVE BOX
Follow Up:  bacteremia    Interval History/ROS:  Overnight: No acute events.  Patient remains afebrile.  Otherwise hemodynamically stable.  Latest labs show leukocytosis of 22.3, anemia 7.5/21, thrombocytopenia 69.  BMP with renal function within normal limits, .  Blood cultures from 3/30/2024 continue to be positive now growing Klebsiella pneumonia and E. coli.    Patient seen examined at bedside.  Appears in no distress.  Allergies  No Known Allergies        ANTIMICROBIALS:      OTHER MEDS:  MEDICATIONS  (STANDING):  acetaminophen  Suppository .. 650 every 6 hours PRN  bisacodyl Suppository 10 daily PRN  dextrose 50% Injectable 12.5 once  dextrose 50% Injectable 25 once  glycopyrrolate Injectable 0.4 every 6 hours PRN  HYDROmorphone  Injectable 0.2 every 1 hour PRN  HYDROmorphone  Injectable 0.5 every 1 hour PRN  HYDROmorphone  Injectable 0.5 every 1 hour PRN  LORazepam   Injectable 0.5 every 1 hour PRN  pantoprazole  Injectable 40 two times a day      Vital Signs Last 24 Hrs  T(C): 36.3 (01 Apr 2024 16:00), Max: 36.7 (31 Mar 2024 20:00)  T(F): 97.3 (01 Apr 2024 16:00), Max: 98 (31 Mar 2024 20:00)  HR: 93 (01 Apr 2024 18:00) (91 - 116)  BP: --  BP(mean): --  RR: 12 (01 Apr 2024 18:00) (12 - 29)  SpO2: 100% (01 Apr 2024 18:00) (95% - 100%)    Parameters below as of 01 Apr 2024 08:00  Patient On (Oxygen Delivery Method): nasal cannula        PHYSICAL EXAM:  General: Patient in no acute distress   HEENT: NCAT, EOMI, PERRL, no oral lesions  CV: S1+S2, no m/r/g appreciated   Lungs: No respiratory distress, CTAB  Abd: Soft, nontender, no guarding, no rebound tenderness, + bowel sounds   Ext: No cyanosis, no edema  Neuro: Alert and oriented, no focal deficits, CN II-XII grossly intact   Skin: No rash   IV: No phlebitis                                7.5    22.30 )-----------( 69       ( 01 Apr 2024 09:05 )             21.8       04-01    145  |  120<H>  |  55<H>  ----------------------------<  214<H>  3.8   |  11<L>  |  1.02    Ca    6.5<LL>      01 Apr 2024 09:05  Phos  2.9     04-01  Mg     2.20     04-01    TPro  4.4<L>  /  Alb  1.3<L>  /  TBili  0.2  /  DBili  x   /  AST  24  /  ALT  19  /  AlkPhos  540<H>  04-01      Urinalysis Basic - ( 01 Apr 2024 09:05 )    Color: x / Appearance: x / SG: x / pH: x  Gluc: 214 mg/dL / Ketone: x  / Bili: x / Urobili: x   Blood: x / Protein: x / Nitrite: x   Leuk Esterase: x / RBC: x / WBC x   Sq Epi: x / Non Sq Epi: x / Bacteria: x        MICROBIOLOGY:  v    Culture - Blood (collected 30 Mar 2024 03:37)  Source: .Blood Blood-Peripheral  Gram Stain (30 Mar 2024 19:41):    Growth in aerobic and anaerobic bottles: Gram Negative Rods and Gram    Positive Rods  Preliminary Report (31 Mar 2024 13:49):    Growth in aerobic and anaerobic bottles: Escherichia coli    Growth in aerobic and anaerobic bottles: Klebsiella pneumoniae    See previous culture [id number]    Growth in aerobic and anaerobic bottles: Gram Negative Rods and Gram    Positive Rods    Culture - Blood (collected 30 Mar 2024 03:37)  Source: .Blood Blood-Peripheral  Gram Stain (30 Mar 2024 19:42):    Growth in aerobic and anaerobic bottles: Gram Negative Rods and Gram    Positive Rods  Preliminary Report (31 Mar 2024 13:50):    Growth in aerobic and anaerobic bottles: Klebsiella pneumoniae    Growth in aerobic and anaerobic bottles: Escherichia coli    See previous culture [id number]    Growth in aerobic and anaerobic bottles: Gram Negative Rods and Gram    Positive Rods    Culture - Blood (collected 29 Mar 2024 21:28)  Source: .Blood Blood-Peripheral  Gram Stain (30 Mar 2024 15:30):    Growth in anaerobic bottle: Gram Negative Rods and Gram Positive Rods    Growth in aerobic bottle: Gram Negative Rods and Gram positive cocci in    pairs  Preliminary Report (01 Apr 2024 12:22):    Growth in aerobic and anaerobic bottles: Enterococcus faecalis    Susceptibility to follow.    Growth in aerobic and anaerobic bottles: Proteus mirabilis    Growth in anaerobic bottle: Klebsiella pneumoniae ESBL    Growth in aerobic and anaerobic bottles: Escherichia coli    Growth in aerobic bottle: Enterococcus faecium Susceptibility to follow.    Growth in anaerobic bottle: Clostridium perfringens "Susceptibilities not    performed"    Growth in anaerobic bottle: Bacteroides fragilis "Susceptibilities not    performed"    Direct identification is available within approximately 3-5    hours either by Blood Panel Multiplexed PCR or Direct    MALDI-TOF. Details: [email or web address]    Aguilar/B Resistance Gene Detected    When multiple species' of Enterococcus are present,    association of a resistance gene to a specific organism    cannot be determined.    When multiple gram negative organisms are present,    association of a resistance gene to a specific organism    cannot be determined.  Organism: Blood Culture PCR  Proteus mirabilis  Klebsiella pneumoniae ESBL  Escherichia coli (01 Apr 2024 09:15)  Organism: Klebsiella pneumoniae ESBL (01 Apr 2024 09:15)      Method Type: LORELEI      -  Ampicillin: R >16 These ampicillin results predict results for amoxicillin      -  Ampicillin/Sulbactam: R 8/4      -  Aztreonam: R 8      -  Cefazolin: R >16      -  Cefepime: R 16      -  Ceftriaxone: R >32      -  Ciprofloxacin: S <=0.25      -  Ertapenem: S <=0.5      -  Gentamicin: S <=2      -  Imipenem: S <=1      -  Levofloxacin: S <=0.5      -  Meropenem: S <=1      -  Piperacillin/Tazobactam: R <=8      -  Tobramycin: S <=2      -  Trimethoprim/Sulfamethoxazole: R >2/38  Organism: Proteus mirabilis (01 Apr 2024 09:12)      Method Type: LORELEI      -  Ampicillin: S <=8 These ampicillin results predict results for amoxicillin      -  Ampicillin/Sulbactam: S <=4/2      -  Aztreonam: S <=4      -  Cefazolin: S <=2      -  Cefepime: S <=2      -  Cefoxitin: S <=8      -  Ceftriaxone: S <=1      -  Ciprofloxacin: R >2      -  Ertapenem: S <=0.5      -  Gentamicin: I 4      -  Levofloxacin: R 2      -  Meropenem: S <=1      -  Piperacillin/Tazobactam: S <=8      -  Tobramycin: I 4      -  Trimethoprim/Sulfamethoxazole: S <=0.5/9.5  Organism: Escherichia coli (01 Apr 2024 09:11)      Method Type: LORELEI      -  Ampicillin: R >16 These ampicillin results predict results for amoxicillin      -  Ampicillin/Sulbactam: R >16/8      -  Aztreonam: S <=4      -  Cefazolin: S <=2      -  Cefepime: S <=2      -  Cefoxitin: S <=8      -  Ceftriaxone: S <=1      -  Ciprofloxacin: S <=0.25      -  Ertapenem: S <=0.5      -  Gentamicin: S <=2      -  Imipenem: S <=1      -  Levofloxacin: S <=0.5      -  Meropenem: S <=1      -  Piperacillin/Tazobactam: S <=8      -  Tobramycin: S <=2      -  Trimethoprim/Sulfamethoxazole: S <=0.5/9.5  Organism: Blood Culture PCR (30 Mar 2024 17:50)      Method Type: PCR      -  Bacteroides fragilis: Detec      -  Escherichia coli: Detec      -  K. pneumoniae group: Detec (K. pneumoniae, K. quasipneumoniae, K. variicola)      -  Proteus species: Detec      -  Enterococcus faecalis: Detec      -  Enterococcus faecium: Detec      -  ESBL: Detec      -  CTX-M Resistance Marker: Detec    Culture - Blood (collected 29 Mar 2024 21:20)  Source: .Blood Blood-Peripheral  Gram Stain (30 Mar 2024 15:09):    Growth in aerobic bottle: Gram Negative Rods    Growth in anaerobic bottle: Gram Negative Rods and Gram Positive Rods  Preliminary Report (01 Apr 2024 14:48):    Growth in aerobic and anaerobic bottles: Escherichia coli    Growth in aerobic and anaerobic bottles: Proteus mirabilis    Growth in anaerobic bottle: Enterococcus faecium    See previous culture [id number]    Growth in anaerobic bottle: Clostridium perfringens "Susceptibilities not    performed"    Growth in anaerobic bottle: Bacteroides fragilis "Susceptibilities not    performed"                    RADIOLOGY:  Imaging reviewed

## 2024-04-01 NOTE — CONSULT NOTE ADULT - PROBLEM SELECTOR RECOMMENDATION 6
Patient at high risk for dyspnea due to high risk PE with right heart strain.    Dilaudid 0.5 mg IV q1h PRN for dyspnea    This regimen can be increased to dilaudid 1 mg IV q1h PRN for dyspnea pain if the above dose is insufficient for managing the patient's symptoms.

## 2024-04-01 NOTE — CONSULT NOTE ADULT - ASSESSMENT
Kevin Wade is a 83-year-old man with CAD s/p PCI, emphysema, HTN, T2DM, Fermin's esophagus, GERD, BPH, bladder cancer, recent UGIB, AAA s/p stenting, depression, anxiety, bilateral hip replacements, extensive sacral wound who is admitted with septic shock and new acute high risk PE with right heart strain. Palliative Care is consulted for goals of care. Kevin Wade is a 83-year-old man with CAD s/p PCI, emphysema, HTN, T2DM, Fermin's esophagus, GERD, BPH, bladder cancer, recent UGIB, AAA s/p stenting, depression, anxiety, bilateral hip replacements, extensive sacral wound who is admitted with septic shock and new acute high risk PE with right heart strain.   Palliative Care is consulted for goals of care.

## 2024-04-01 NOTE — CONSULT NOTE ADULT - CONSULT REASON
Acute PE
Lumbar spine and sacrum Stage 4 pressure injuries. Concerning for gas tracking, ishioanal abscesses, and osteomyelitis on CT
Sacral wound with new abscess on CT
bacteremia
code stroke
Goals of care

## 2024-04-01 NOTE — CONSULT NOTE ADULT - PROBLEM SELECTOR RECOMMENDATION 2
Patient with high-risk PE with right heart strain. See CTA chest imaging results copied above. Transitioned to comfort-focused care. Dyspnea management as below. Patient with high-risk PE with right heart strain. See CTA chest imaging results copied above.   Transitioned to comfort-focused care.   Dyspnea management as below.

## 2024-04-01 NOTE — CONSULT NOTE ADULT - SUBJECTIVE AND OBJECTIVE BOX
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 Patient  presents to the emergency department from [facility] due to worsening sacral wound with imaging of osteomyelitis. During transport, HR dropped to 30 with O2 of 76, was placed on 6L NC. While in the ED, patient started having dysarthria, contraction of left upper extremity. Code stroke was called CT head and brain negative for infarct. CT Angio of chest showed an acute left lower lobe PE with findings of right heart strain with left femoral DVT. In ED, patient started on heparin gtt. for PE, levophed started  for hypotension.  (30 Mar 2024 03:43)    Wound consult requested to assist w/ management of Lumbar spine and sacrum Stage 4 pressure injuries. Patient known to Wound Service line from previous admission. Seen on 3/4/24. Per records chronic lumbar spine stage 4 pressure injury evolved post a fall at home after being found down for 2 days. Subsequently developed a sacral unstageable pressure injury, per records now stage 4 connecting to lumbar wound. Patient ill appearing, moaning, unable to provide subjective data, minimal verbal interaction. Per primary team plan to discuss goals of care today.      Current Diet: NPO        PAST MEDICAL & SURGICAL HISTORY:  Bladder cancer    CAD (coronary artery disease)    HTN (hypertension)    T2DM (type 2 diabetes mellitus)    LYUBOV (iron deficiency anemia)    GERD (gastroesophageal reflux disease)    BPH (benign prostatic hyperplasia)    Anxiety and depression    Sacral decubitus ulcer    Osteomyelitis    Abdominal aortic aneurysm    Upper GI bleed    Peptic ulcer disease    Goiter    Esophageal varix    Fermin esophagus    S/P hip replacement, bilateral    S/P repair of abdominal aortic aneurysm using straight graft    History of percutaneous coronary intervention    S/P bilateral hip replacements    S/P aortic aneurysm repair      REVIEW OF SYSTEMS: Pt unable to offer due to baseline mental status.    MEDICATIONS  (STANDING):  chlorhexidine 2% Cloths 1 Application(s) Topical <User Schedule>  Dakins Solution - 1/4 Strength 1 Application(s) Topical two times a day  dextrose 50% Injectable 12.5 Gram(s) IV Push once  dextrose 50% Injectable 25 Gram(s) IV Push once  pantoprazole  Injectable 40 milliGRAM(s) IV Push two times a day    MEDICATIONS  (PRN):  acetaminophen  Suppository .. 650 milliGRAM(s) Rectal every 6 hours PRN Temp greater or equal to 38C (100.4F)  bisacodyl Suppository 10 milliGRAM(s) Rectal daily PRN Constipation  glycopyrrolate Injectable 0.4 milliGRAM(s) IV Push every 6 hours PRN Excessive/Audible secretions  HYDROmorphone  Injectable 0.2 milliGRAM(s) IV Push every 1 hour PRN Moderate Pain (4 - 6)  HYDROmorphone  Injectable 0.5 milliGRAM(s) IV Push every 1 hour PRN Dyspnea  HYDROmorphone  Injectable 0.5 milliGRAM(s) IV Push every 1 hour PRN Severe Pain (7 - 10)  LORazepam   Injectable 0.5 milliGRAM(s) IV Push every 1 hour PRN Agitation      Allergies    No Known Allergies    Intolerances        SOCIAL HISTORY:      FAMILY HISTORY:  Family history of colon cancer in mother (Mother)        PHYSICAL EXAM:  Vital Signs Last 24 Hrs  T(C): 36.3 (01 Apr 2024 13:30), Max: 36.7 (31 Mar 2024 20:00)  T(F): 97.4 (01 Apr 2024 13:30), Max: 98 (31 Mar 2024 20:00)  HR: 92 (01 Apr 2024 14:00) (91 - 116)  BP: --  BP(mean): --  RR: 13 (01 Apr 2024 14:00) (13 - 29)  SpO2: 100% (01 Apr 2024 14:00) (95% - 100%)    Parameters below as of 01 Apr 2024 08:00  Patient On (Oxygen Delivery Method): nasal cannula    Wt: 59.6 kg (03-29-24)     Constitutional: Ill appearing, not verbally interactive/moaning, bedbound, dependent on ADLs  (+) low airloss support surface, (+) fluidized postioining devices, (+) complete cair boots  HEENT:  NC/AT, nonicteric, mucosa moist, right nare NGT, peritubular skin intact.  Cardiovascular: rate regular  Respiratory: supplemental oxygen via NC, nonlabored, equal chest expansion  Gastrointestinal: soft NT/ND, incontinent large mucoid-soft stool; perineal care provided.  : indwelling timmons catheter in place  Musculoskeletal: no gross deformities/ contractures  Vascular: BLE equally warm, trace edema, +2 dp pulses. Left medial knee linear stable scab- 0.4jil0ysg9.2cm; Right plantar base of first metatarsal head with callus.  Multiple deep tissue pressure injuries; 100% purple maroon discoloration, no palpable skin changes:  -Left dorsal foot- [id number]  -Left heel- 0pmb2ful9  -Right lateral leg- 8.4lyd0dor0  -Right plantar foot- 1.5cmx1.5cmx0  Skin:  frail  Lumbar spine stage 4 pressure injury  -20.3zyb00xoz4jw, connects to sacral wound beneath a 4cm intact epidermal-dermal bridge.  -Abundant, fragmented sharp bone exposed, pink moist granular base, scattered areas of adherent eschar.  -No purulent drainage, no odor.  -Periwound without crepitus, induration, fluctuance.  Sacrum stage 4 pressure injury  -7.5jxv3ymw8.5cm, circumferential undermining circumferential with deep tunnels at 5 and 7 o'clock, extending 11cm and 13 cm respectively; connected at 12 o'clock to lumbar spine stage 4  -Wound base necrotic including exposed fragmented bone   -moderate malodorous purulent drainage expressed from tunnels with deep palpation of bilateral buttock.  -Periwound skin without crepitus, induration, fluctuance.  Bilateral buttocks and groin with moisture and incontinence associated dermatitis  -scattered areas of denuded epidermis.        LABS/ CULTURES/ RADIOLOGY:                        7.5    22.30 )-----------( 69       ( 01 Apr 2024 09:05 )             21.8       145  |  120  |  55  ----------------------------<  214      [04-01-24 @ 09:05]  3.8   |  11  |  1.02        Ca     6.5     [04-01-24 @ 09:05]      Mg     2.20     [04-01-24 @ 09:05]      Phos  2.9     [04-01-24 @ 09:05]    TPro  4.4  /  Alb  1.3  /  TBili  0.2  /  DBili  x   /  AST  24  /  ALT  19  /  AlkPhos  540  [04-01-24 @ 09:05]    PT/INR: PT 15.7 , INR 1.42       [04-01-24 @ 03:00]  PTT: 64.5       [04-01-24 @ 03:00]          Culture - Blood (collected 03-30-24 @ 03:37)  Source: .Blood Blood-Peripheral  Gram Stain (03-30-24 @ 19:41):    Growth in aerobic and anaerobic bottles: Gram Negative Rods and Gram    Positive Rods  Preliminary Report (03-31-24 @ 13:49):    Growth in aerobic and anaerobic bottles: Escherichia coli    Growth in aerobic and anaerobic bottles: Klebsiella pneumoniae    See previous culture [id number]    Growth in aerobic and anaerobic bottles: Gram Negative Rods and Gram    Positive Rods    Culture - Blood (collected 03-30-24 @ 03:37)  Source: .Blood Blood-Peripheral  Gram Stain (03-30-24 @ 19:42):    Growth in aerobic and anaerobic bottles: Gram Negative Rods and Gram    Positive Rods  Preliminary Report (03-31-24 @ 13:50):    Growth in aerobic and anaerobic bottles: Klebsiella pneumoniae    Growth in aerobic and anaerobic bottles: Escherichia coli    See previous culture [id number]    Growth in aerobic and anaerobic bottles: Gram Negative Rods and Gram    Positive Rods    Culture - Blood (collected 03-29-24 @ 21:28)  Source: .Blood Blood-Peripheral  Gram Stain (03-30-24 @ 15:30):    Growth in anaerobic bottle: Gram Negative Rods and Gram Positive Rods    Growth in aerobic bottle: Gram Negative Rods and Gram positive cocci in    pairs  Preliminary Report (04-01-24 @ 12:22):    Growth in aerobic and anaerobic bottles: Enterococcus faecalis    Susceptibility to follow.    Growth in aerobic and anaerobic bottles: Proteus mirabilis    Growth in anaerobic bottle: Klebsiella pneumoniae ESBL    Growth in aerobic and anaerobic bottles: Escherichia coli    Growth in aerobic bottle: Enterococcus faecium Susceptibility to follow.    Growth in anaerobic bottle: Clostridium perfringens "Susceptibilities not    performed"    Growth in anaerobic bottle: Bacteroides fragilis "Susceptibilities not    performed"    Direct identification is available within approximately 3-5    hours either by Blood Panel Multiplexed PCR or Direct    MALDI-TOF. Details: [email or web address]    Aguilar/B Resistance Gene Detected    When multiple species' of Enterococcus are present,    association of a resistance gene to a specific organism    cannot be determined.    When multiple gram negative organisms are present,    association of a resistance gene to a specific organism    cannot be determined.  Organism: Blood Culture PCR  Proteus mirabilis  Klebsiella pneumoniae ESBL  Escherichia coli (04-01-24 @ 09:15)  Organism: Klebsiella pneumoniae ESBL (04-01-24 @ 09:15)      Method Type: LORELEI      -  Ampicillin: R >16 These ampicillin results predict results for amoxicillin      -  Ampicillin/Sulbactam: R 8/4      -  Aztreonam: R 8      -  Cefazolin: R >16      -  Cefepime: R 16      -  Ceftriaxone: R >32      -  Ciprofloxacin: S <=0.25      -  Ertapenem: S <=0.5      -  Gentamicin: S <=2      -  Imipenem: S <=1      -  Levofloxacin: S <=0.5      -  Meropenem: S <=1      -  Piperacillin/Tazobactam: R <=8      -  Tobramycin: S <=2      -  Trimethoprim/Sulfamethoxazole: R >2/38  Organism: Proteus mirabilis (04-01-24 @ 09:12)      Method Type: LORELEI      -  Ampicillin: S <=8 These ampicillin results predict results for amoxicillin      -  Ampicillin/Sulbactam: S <=4/2      -  Aztreonam: S <=4      -  Cefazolin: S <=2      -  Cefepime: S <=2      -  Cefoxitin: S <=8      -  Ceftriaxone: S <=1      -  Ciprofloxacin: R >2      -  Ertapenem: S <=0.5      -  Gentamicin: I 4      -  Levofloxacin: R 2      -  Meropenem: S <=1      -  Piperacillin/Tazobactam: S <=8      -  Tobramycin: I 4      -  Trimethoprim/Sulfamethoxazole: S <=0.5/9.5  Organism: Escherichia coli (04-01-24 @ 09:11)      Method Type: LORELEI      -  Ampicillin: R >16 These ampicillin results predict results for amoxicillin      -  Ampicillin/Sulbactam: R >16/8      -  Aztreonam: S <=4      -  Cefazolin: S <=2      -  Cefepime: S <=2      -  Cefoxitin: S <=8      -  Ceftriaxone: S <=1      -  Ciprofloxacin: S <=0.25      -  Ertapenem: S <=0.5      -  Gentamicin: S <=2      -  Imipenem: S <=1      -  Levofloxacin: S <=0.5      -  Meropenem: S <=1      -  Piperacillin/Tazobactam: S <=8      -  Tobramycin: S <=2      -  Trimethoprim/Sulfamethoxazole: S <=0.5/9.5  Organism: Blood Culture PCR (03-30-24 @ 17:50)      Method Type: PCR      -  Bacteroides fragilis: Detec      -  Escherichia coli: Detec      -  K. pneumoniae group: Detec (K. pneumoniae, K. quasipneumoniae, K. variicola)      -  Proteus species: Detec      -  Enterococcus faecalis: Detec      -  Enterococcus faecium: Detec      -  ESBL: Detec      -  CTX-M Resistance Marker: Detec    Culture - Blood (collected 03-29-24 @ 21:20)  Source: .Blood Blood-Peripheral  Gram Stain (03-30-24 @ 15:09):    Growth in aerobic bottle: Gram Negative Rods    Growth in anaerobic bottle: Gram Negative Rods and Gram Positive Rods  Preliminary Report (04-01-24 @ 12:17):    Growth in aerobic and anaerobic bottles: Escherichia coli    Growth in aerobic and anaerobic bottles: Proteus mirabilis    See previous culture [id number]    Growth in anaerobic bottle: Clostridium perfringens "Susceptibilities not    performed"    Growth in anaerobic bottle: Bacteroides fragilis "Susceptibilities not    performed"        ACC: [id number] EXAM:  CT ABDOMEN AND PELVIS IC   ORDERED BY: ENRIQUETA BERMUDEZ     ACC: [id number] EXAM:  CT ANGIO CHEST PULM [facility]   ORDERED BY: ENRIQUETA BERMUDEZ     PROCEDURE DATE:  03/29/2024          INTERPRETATION:  CLINICAL INFORMATION: Wells score greater than 4    COMPARISON: CT abdomen and pelvis 2/29/2024, CT neck 8/2/2023    CONTRAST/COMPLICATIONS:  IV Contrast: Omnipaque 350 (accession [id number]), IV contrast documented   in unlinked concurrent exam (accession [id number])  90 cc administered   10   cc discarded  Oral Contrast: NONE  Complications: None reported at time of study completion    PROCEDURE:  CT Angiography of the Chest was performed followed by portal venous phase   imaging of the Abdomen and Pelvis.  Sagittal and coronal reformats were performed as well as 3D (MIP)   reconstructions.    FINDINGS:  CHEST:  LUNGS AND LARGE AIRWAYS: Patent central airways. There is rightward   deviation of the trachea due to substernal thyroid goiter. Emphysema.   Several scattered sub-6 mm pulmonary nodules bilaterally, nonspecific.   Bibasilar atelectasis. Small nonenhancing peripheral consolidation at the   left lung base in the region of the occluded pulmonary arteries, possibly   a small pulmonary infarct.  PLEURA: No pleural effusion.  VESSELS: Breathing motion significantly limits evaluation of subsegmental   pulmonary artery branches in the right middle, right lower, lingula, and   left lower lobes. Left lower lobar pulmonary embolism extending into the   and occluding theposterobasal segmental and subsegmental branches.   Enlargement of the main pulmonary artery measuring 4.1 cm.  HEART: Elevated RV to LV ratio and straightening of the interventricular   septum suggestive of right heart strain. Heart size is normal. No   pericardial effusion  MEDIASTINUM AND JENI: No lymphadenopathy.Redemonstrated large left   heterogeneous thyroid goiter extending into the mediastinum with mass   effect on/compression of the trachea and rightward rightward tracheal   deviation, as well as compression of the esophagus.  CHEST WALL AND LOWER NECK: Within normal limits.    ABDOMEN AND PELVIS:  LIVER: Few too small to characterize hepatic hypodensities.  BILE DUCTS: Normal caliber.  GALLBLADDER: Within normal limits.  SPLEEN: Within normal limits.  PANCREAS: Within normal limits.  ADRENALS: Nonspecific nodular bilateral adrenal thickening.  KIDNEYS/URETERS: No hydronephrosis. Bilateral renal hypoattenuating foci   too small to characterize.    BLADDER: Not visualized due to streak artifact from bilateral hip   prostheses.  REPRODUCTIVE ORGANS: Not visualized due to streak artifact from bilateral   hip prostheses.    BOWEL: Duodenal diverticulum. No bowel obstruction. Appendix is normal.   Copious stool in the rectum.  PERITONEUM: No ascites.  VESSELS: Filling defect in the left common femoral vein compatible with   deep venous thrombosis (303-114). Redemonstrated endovascular repair of   abdominal aortic aneurysm. The aneurysm sac measures 7.1 cm in transverse   dimension, not significantly changed. Similar-appearing mural thrombus   within the stent with moderate narrowing. The stent is patent to the   bifurcation. The left common iliac component of the stent is chronically   occluded. The distal left external iliac artery and superficial femoral   artery is patent. The proximal portion of the left external iliac artery   is obscured by streak artifact. Coils in the region of the left internal   iliac artery. The right common iliac limb of the stent is patent.   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  RETROPERITONEUM/LYMPH NODES: No lymphadenopathy.  ABDOMINAL WALL: Large decubitus ulcer extending from L1 to the coccyx   with multiple exposed vertebral body spinous processes. There is gasseen   tracking to the left second sacral foramen and into the spinal canal   (303-80 and 605-77). Gas and soft tissue tracks into the bilateral   gluteal musculature. Rim-enhancing collection of fluid and gas in the   left posterior upper thigh measuring 5.7 x 5.4 x 2.2 cm. A collection of   fluid and gas in the right ischioanal fossa measuring 3.8 x 3.0 x 2.1 cm.  BONES: Bilateral total hip arthroplasties. Osseous erosions of the   sacrococcygeal spinous processes also concerning for osteomyelitis.   Severe degenerative changes of the spine. Grade 1 anterolisthesis L4 on   L5. Mild retrolisthesis L3 on L4.    IMPRESSION:    Acute left lower lobe pulmonary emboli extending to the posterobasal   segmental and subsegmental pulmonary branches. Right heart strain is   present. Recommend echocardiography.    Small nonenhancing peripheral opacity in the left lung base, most   suspicious for pulmonary infarct.    These findings were discussed with Dr. Bermudez by Dr. Toure at 10:15 PM on   3/29/2024, with repeat back confirmation.    Left common femoral vein DVT.    Large sacral decubitus ulcer extending from L1 to the coccyx. Gas tracks   into the spinal canal and extends to the gluteal soft tissues.    Left posterior thigh and right ischioanalfossa abscesses as above.    Exposed lumbar spinous processes and erosions of the sacrococcygeal   spinous processes most concerning for osteomyelitis.    --- End of Report ---          DENG TOURE MD; Resident Radiologist  This document has been electronically signed.  SAHIL LORENZO MD; Attending Radiologist  This document has been electronically signed. 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 Patient  presents to the emergency department from [facility] due to worsening sacral wound with imaging of osteomyelitis. During transport, HR dropped to 30 with O2 of 76, was placed on 6L NC. While in the ED, patient started having dysarthria, contraction of left upper extremity. Code stroke was called CT head and brain negative for infarct. CT Angio of chest showed an acute left lower lobe PE with findings of right heart strain with left femoral DVT. In ED, patient started on heparin gtt. for PE, levophed started  for hypotension.  (30 Mar 2024 03:43)    Wound consult requested to assist w/ management of Lumbar spine and sacrum Stage 4 pressure injuries. Patient known to Wound Service line from previous admission. Seen on 3/4/24. Per records chronic lumbar spine stage 4 pressure injury evolved post a fall at home after being found down for 2 days. Subsequently developed a sacral unstageable pressure injury, per records now stage 4 connecting to lumbar wound. Patient ill appearing, moaning, unable to provide subjective data, minimal verbal interaction. Per primary team plan to discuss goals of care today.      Current Diet: NPO        PAST MEDICAL & SURGICAL HISTORY:  Bladder cancer    CAD (coronary artery disease)    HTN (hypertension)    T2DM (type 2 diabetes mellitus)    LYUBOV (iron deficiency anemia)    GERD (gastroesophageal reflux disease)    BPH (benign prostatic hyperplasia)    Anxiety and depression    Sacral decubitus ulcer    Osteomyelitis    Abdominal aortic aneurysm    Upper GI bleed    Peptic ulcer disease    Goiter    Esophageal varix    Fermin esophagus    S/P hip replacement, bilateral    S/P repair of abdominal aortic aneurysm using straight graft    History of percutaneous coronary intervention    S/P bilateral hip replacements    S/P aortic aneurysm repair      REVIEW OF SYSTEMS: Pt unable to offer due to baseline mental status.    MEDICATIONS  (STANDING):  chlorhexidine 2% Cloths 1 Application(s) Topical <User Schedule>  Dakins Solution - 1/4 Strength 1 Application(s) Topical two times a day  dextrose 50% Injectable 12.5 Gram(s) IV Push once  dextrose 50% Injectable 25 Gram(s) IV Push once  pantoprazole  Injectable 40 milliGRAM(s) IV Push two times a day    MEDICATIONS  (PRN):  acetaminophen  Suppository .. 650 milliGRAM(s) Rectal every 6 hours PRN Temp greater or equal to 38C (100.4F)  bisacodyl Suppository 10 milliGRAM(s) Rectal daily PRN Constipation  glycopyrrolate Injectable 0.4 milliGRAM(s) IV Push every 6 hours PRN Excessive/Audible secretions  HYDROmorphone  Injectable 0.2 milliGRAM(s) IV Push every 1 hour PRN Moderate Pain (4 - 6)  HYDROmorphone  Injectable 0.5 milliGRAM(s) IV Push every 1 hour PRN Dyspnea  HYDROmorphone  Injectable 0.5 milliGRAM(s) IV Push every 1 hour PRN Severe Pain (7 - 10)  LORazepam   Injectable 0.5 milliGRAM(s) IV Push every 1 hour PRN Agitation      Allergies    No Known Allergies    Intolerances        SOCIAL HISTORY:  Admitted from [facility], bedbound. Dependent on ADLs. Per records no etoh, smoking, illicit drug use.  (+) DNR/DNI    FAMILY HISTORY:  Family history of colon cancer in mother (Mother)        PHYSICAL EXAM:  Vital Signs Last 24 Hrs  T(C): 36.3 (01 Apr 2024 13:30), Max: 36.7 (31 Mar 2024 20:00)  T(F): 97.4 (01 Apr 2024 13:30), Max: 98 (31 Mar 2024 20:00)  HR: 92 (01 Apr 2024 14:00) (91 - 116)  BP: --  BP(mean): --  RR: 13 (01 Apr 2024 14:00) (13 - 29)  SpO2: 100% (01 Apr 2024 14:00) (95% - 100%)    Parameters below as of 01 Apr 2024 08:00  Patient On (Oxygen Delivery Method): nasal cannula    Wt: 59.6 kg (03-29-24)     Constitutional: Ill appearing, not verbally interactive/moaning, bedbound, dependent on ADLs  (+) low airloss support surface, (+) fluidized postioining devices, (+) complete cair boots  HEENT:  NC/AT, nonicteric, mucosa moist, right nare NGT, peritubular skin intact.  Cardiovascular: rate regular  Respiratory: supplemental oxygen via NC, nonlabored, equal chest expansion  Gastrointestinal: soft NT/ND, incontinent large mucoid-soft stool; perineal care provided.  : indwelling timmons catheter in place  Musculoskeletal: no gross deformities/ contractures  Vascular: BLE equally warm, trace edema, +2 dp pulses. Left medial knee linear stable scab- 0.1vnn0cjt0.2cm; Right plantar base of first metatarsal head with callus.  Multiple deep tissue pressure injuries; 100% purple maroon discoloration, no palpable skin changes:  -Left dorsal foot- 8oep7uzf7  -Left heel- 9bql8ibn8  -Right lateral leg- 8.7ton3ujq7  -Right plantar foot- 1.5cmx1.5cmx0  Skin:  frail  Lumbar spine stage 4 pressure injury  -20.0vke70svn4oo, connects to sacral wound beneath a 4cm intact epidermal-dermal bridge.  -Abundant, fragmented sharp bone exposed, pink moist granular base, scattered areas of adherent eschar.  -No purulent drainage, no odor.  -Periwound without crepitus, induration, fluctuance.  Sacrum stage 4 pressure injury  -7.0ptv0qfp5.5cm, circumferential undermining circumferential with deep tunnels at 5 and 7 o'clock, extending 11cm and 13 cm respectively; connected at 12 o'clock to lumbar spine stage 4  -Wound base necrotic including exposed fragmented bone   -moderate malodorous purulent drainage expressed from tunnels with deep palpation of bilateral buttock.  -Periwound skin without crepitus, induration, fluctuance.  Bilateral buttocks and groin with moisture and incontinence associated dermatitis  -scattered areas of denuded epidermis.        LABS/ CULTURES/ RADIOLOGY:                        7.5    22.30 )-----------( 69       ( 01 Apr 2024 09:05 )             21.8       145  |  120  |  55  ----------------------------<  214      [04-01-24 @ 09:05]  3.8   |  11  |  1.02        Ca     6.5     [04-01-24 @ 09:05]      Mg     2.20     [04-01-24 @ 09:05]      Phos  2.9     [04-01-24 @ 09:05]    TPro  4.4  /  Alb  1.3  /  TBili  0.2  /  DBili  x   /  AST  24  /  ALT  19  /  AlkPhos  540  [04-01-24 @ 09:05]    PT/INR: PT 15.7 , INR 1.42       [04-01-24 @ 03:00]  PTT: 64.5       [04-01-24 @ 03:00]          Culture - Blood (collected 03-30-24 @ 03:37)  Source: .Blood Blood-Peripheral  Gram Stain (03-30-24 @ 19:41):    Growth in aerobic and anaerobic bottles: Gram Negative Rods and Gram    Positive Rods  Preliminary Report (03-31-24 @ 13:49):    Growth in aerobic and anaerobic bottles: Escherichia coli    Growth in aerobic and anaerobic bottles: Klebsiella pneumoniae    See previous culture [id number]    Growth in aerobic and anaerobic bottles: Gram Negative Rods and Gram    Positive Rods    Culture - Blood (collected 03-30-24 @ 03:37)  Source: .Blood Blood-Peripheral  Gram Stain (03-30-24 @ 19:42):    Growth in aerobic and anaerobic bottles: Gram Negative Rods and Gram    Positive Rods  Preliminary Report (03-31-24 @ 13:50):    Growth in aerobic and anaerobic bottles: Klebsiella pneumoniae    Growth in aerobic and anaerobic bottles: Escherichia coli    See previous culture [id number]    Growth in aerobic and anaerobic bottles: Gram Negative Rods and Gram    Positive Rods    Culture - Blood (collected 03-29-24 @ 21:28)  Source: .Blood Blood-Peripheral  Gram Stain (03-30-24 @ 15:30):    Growth in anaerobic bottle: Gram Negative Rods and Gram Positive Rods    Growth in aerobic bottle: Gram Negative Rods and Gram positive cocci in    pairs  Preliminary Report (04-01-24 @ 12:22):    Growth in aerobic and anaerobic bottles: Enterococcus faecalis    Susceptibility to follow.    Growth in aerobic and anaerobic bottles: Proteus mirabilis    Growth in anaerobic bottle: Klebsiella pneumoniae ESBL    Growth in aerobic and anaerobic bottles: Escherichia coli    Growth in aerobic bottle: Enterococcus faecium Susceptibility to follow.    Growth in anaerobic bottle: Clostridium perfringens "Susceptibilities not    performed"    Growth in anaerobic bottle: Bacteroides fragilis "Susceptibilities not    performed"    Direct identification is available within approximately 3-5    hours either by Blood Panel Multiplexed PCR or Direct    MALDI-TOF. Details: [email or web address]    Aguilar/B Resistance Gene Detected    When multiple species' of Enterococcus are present,    association of a resistance gene to a specific organism    cannot be determined.    When multiple gram negative organisms are present,    association of a resistance gene to a specific organism    cannot be determined.  Organism: Blood Culture PCR  Proteus mirabilis  Klebsiella pneumoniae ESBL  Escherichia coli (04-01-24 @ 09:15)  Organism: Klebsiella pneumoniae ESBL (04-01-24 @ 09:15)      Method Type: LORELEI      -  Ampicillin: R >16 These ampicillin results predict results for amoxicillin      -  Ampicillin/Sulbactam: R 8/4      -  Aztreonam: R 8      -  Cefazolin: R >16      -  Cefepime: R 16      -  Ceftriaxone: R >32      -  Ciprofloxacin: S <=0.25      -  Ertapenem: S <=0.5      -  Gentamicin: S <=2      -  Imipenem: S <=1      -  Levofloxacin: S <=0.5      -  Meropenem: S <=1      -  Piperacillin/Tazobactam: R <=8      -  Tobramycin: S <=2      -  Trimethoprim/Sulfamethoxazole: R >2/38  Organism: Proteus mirabilis (04-01-24 @ 09:12)      Method Type: LORELEI      -  Ampicillin: S <=8 These ampicillin results predict results for amoxicillin      -  Ampicillin/Sulbactam: S <=4/2      -  Aztreonam: S <=4      -  Cefazolin: S <=2      -  Cefepime: S <=2      -  Cefoxitin: S <=8      -  Ceftriaxone: S <=1      -  Ciprofloxacin: R >2      -  Ertapenem: S <=0.5      -  Gentamicin: I 4      -  Levofloxacin: R 2      -  Meropenem: S <=1      -  Piperacillin/Tazobactam: S <=8      -  Tobramycin: I 4      -  Trimethoprim/Sulfamethoxazole: S <=0.5/9.5  Organism: Escherichia coli (04-01-24 @ 09:11)      Method Type: LORELEI      -  Ampicillin: R >16 These ampicillin results predict results for amoxicillin      -  Ampicillin/Sulbactam: R >16/8      -  Aztreonam: S <=4      -  Cefazolin: S <=2      -  Cefepime: S <=2      -  Cefoxitin: S <=8      -  Ceftriaxone: S <=1      -  Ciprofloxacin: S <=0.25      -  Ertapenem: S <=0.5      -  Gentamicin: S <=2      -  Imipenem: S <=1      -  Levofloxacin: S <=0.5      -  Meropenem: S <=1      -  Piperacillin/Tazobactam: S <=8      -  Tobramycin: S <=2      -  Trimethoprim/Sulfamethoxazole: S <=0.5/9.5  Organism: Blood Culture PCR (03-30-24 @ 17:50)      Method Type: PCR      -  Bacteroides fragilis: Detec      -  Escherichia coli: Detec      -  K. pneumoniae group: Detec (K. pneumoniae, K. quasipneumoniae, K. variicola)      -  Proteus species: Detec      -  Enterococcus faecalis: Detec      -  Enterococcus faecium: Detec      -  ESBL: Detec      -  CTX-M Resistance Marker: Detec    Culture - Blood (collected 03-29-24 @ 21:20)  Source: .Blood Blood-Peripheral  Gram Stain (03-30-24 @ 15:09):    Growth in aerobic bottle: Gram Negative Rods    Growth in anaerobic bottle: Gram Negative Rods and Gram Positive Rods  Preliminary Report (04-01-24 @ 12:17):    Growth in aerobic and anaerobic bottles: Escherichia coli    Growth in aerobic and anaerobic bottles: Proteus mirabilis    See previous culture [id number]    Growth in anaerobic bottle: Clostridium perfringens "Susceptibilities not    performed"    Growth in anaerobic bottle: Bacteroides fragilis "Susceptibilities not    performed"        ACC: [id number] EXAM:  CT ABDOMEN AND PELVIS IC   ORDERED BY: ENRIQUETA BERMUDEZ     ACC: [id number] EXAM:  CT ANGIO CHEST PULM [facility]   ORDERED BY: ENRIQUETA BERMUDEZ     PROCEDURE DATE:  03/29/2024          INTERPRETATION:  CLINICAL INFORMATION: Wells score greater than 4    COMPARISON: CT abdomen and pelvis 2/29/2024, CT neck 8/2/2023    CONTRAST/COMPLICATIONS:  IV Contrast: Omnipaque 350 (accession [id number]), IV contrast documented   in unlinked concurrent exam (accession [id number])  90 cc administered   10   cc discarded  Oral Contrast: NONE  Complications: None reported at time of study completion    PROCEDURE:  CT Angiography of the Chest was performed followed by portal venous phase   imaging of the Abdomen and Pelvis.  Sagittal and coronal reformats were performed as well as 3D (MIP)   reconstructions.    FINDINGS:  CHEST:  LUNGS AND LARGE AIRWAYS: Patent central airways. There is rightward   deviation of the trachea due to substernal thyroid goiter. Emphysema.   Several scattered sub-6 mm pulmonary nodules bilaterally, nonspecific.   Bibasilar atelectasis. Small nonenhancing peripheral consolidation at the   left lung base in the region of the occluded pulmonary arteries, possibly   a small pulmonary infarct.  PLEURA: No pleural effusion.  VESSELS: Breathing motion significantly limits evaluation of subsegmental   pulmonary artery branches in the right middle, right lower, lingula, and   left lower lobes. Left lower lobar pulmonary embolism extending into the   and occluding theposterobasal segmental and subsegmental branches.   Enlargement of the main pulmonary artery measuring 4.1 cm.  HEART: Elevated RV to LV ratio and straightening of the interventricular   septum suggestive of right heart strain. Heart size is normal. No   pericardial effusion  MEDIASTINUM AND JENI: No lymphadenopathy.Redemonstrated large left   heterogeneous thyroid goiter extending into the mediastinum with mass   effect on/compression of the trachea and rightward rightward tracheal   deviation, as well as compression of the esophagus.  CHEST WALL AND LOWER NECK: Within normal limits.    ABDOMEN AND PELVIS:  LIVER: Few too small to characterize hepatic hypodensities.  BILE DUCTS: Normal caliber.  GALLBLADDER: Within normal limits.  SPLEEN: Within normal limits.  PANCREAS: Within normal limits.  ADRENALS: Nonspecific nodular bilateral adrenal thickening.  KIDNEYS/URETERS: No hydronephrosis. Bilateral renal hypoattenuating foci   too small to characterize.    BLADDER: Not visualized due to streak artifact from bilateral hip   prostheses.  REPRODUCTIVE ORGANS: Not visualized due to streak artifact from bilateral   hip prostheses.    BOWEL: Duodenal diverticulum. No bowel obstruction. Appendix is normal.   Copious stool in the rectum.  PERITONEUM: No ascites.  VESSELS: Filling defect in the left common femoral vein compatible with   deep venous thrombosis (303-114). Redemonstrated endovascular repair of   abdominal aortic aneurysm. The aneurysm sac measures 7.1 cm in transverse   dimension, not significantly changed. Similar-appearing mural thrombus   within the stent with moderate narrowing. The stent is patent to the   bifurcation. The left common iliac component of the stent is chronically   occluded. The distal left external iliac artery and superficial femoral   artery is patent. The proximal portion of the left external iliac artery   is obscured by streak artifact. Coils in the region of the left internal   iliac artery. The right common iliac limb of the stent is patent.   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  RETROPERITONEUM/LYMPH NODES: No lymphadenopathy.  ABDOMINAL WALL: Large decubitus ulcer extending from L1 to the coccyx   with multiple exposed vertebral body spinous processes. There is gasseen   tracking to the left second sacral foramen and into the spinal canal   (303-80 and 605-77). Gas and soft tissue tracks into the bilateral   gluteal musculature. Rim-enhancing collection of fluid and gas in the   left posterior upper thigh measuring 5.7 x 5.4 x 2.2 cm. A collection of   fluid and gas in the right ischioanal fossa measuring 3.8 x 3.0 x 2.1 cm.  BONES: Bilateral total hip arthroplasties. Osseous erosions of the   sacrococcygeal spinous processes also concerning for osteomyelitis.   Severe degenerative changes of the spine. Grade 1 anterolisthesis L4 on   L5. Mild retrolisthesis L3 on L4.    IMPRESSION:    Acute left lower lobe pulmonary emboli extending to the posterobasal   segmental and subsegmental pulmonary branches. Right heart strain is   present. Recommend echocardiography.    Small nonenhancing peripheral opacity in the left lung base, most   suspicious for pulmonary infarct.    These findings were discussed with Dr. Bermudez by Dr. Toure at 10:15 PM on   3/29/2024, with repeat back confirmation.    Left common femoral vein DVT.    Large sacral decubitus ulcer extending from L1 to the coccyx. Gas tracks   into the spinal canal and extends to the gluteal soft tissues.    Left posterior thigh and right ischioanalfossa abscesses as above.    Exposed lumbar spinous processes and erosions of the sacrococcygeal   spinous processes most concerning for osteomyelitis.    --- End of Report ---          DENG TOURE MD; Resident Radiologist  This document has been electronically signed.  SAHIL LORENZO MD; Attending Radiologist  This document has been electronically signed. 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 Patient  presents to the emergency department from [facility] due to worsening sacral wound with imaging of osteomyelitis. During transport, HR dropped to 30 with O2 of 76, was placed on 6L NC. While in the ED, patient started having dysarthria, contraction of left upper extremity. Code stroke was called CT head and brain negative for infarct. CT Angio of chest showed an acute left lower lobe PE with findings of right heart strain with left femoral DVT. In ED, patient started on heparin gtt. for PE, levophed started  for hypotension.  (30 Mar 2024 03:43)    Wound consult requested to assist w/ management of Lumbar spine and sacrum Stage 4 pressure injuries. Patient known to Wound Service line from previous admission. Seen on 3/4/24. Per records chronic lumbar spine stage 4 pressure injury evolved post a fall at home after being found down for 2 days. Subsequently developed a sacral unstageable pressure injury, per records now stage 4 connecting to lumbar wound. Patient ill appearing, moaning, unable to provide subjective data, minimal verbal interaction. Per primary team plan to discuss goals of care today.      Current Diet: NPO        PAST MEDICAL & SURGICAL HISTORY:  Bladder cancer    CAD (coronary artery disease)    HTN (hypertension)    T2DM (type 2 diabetes mellitus)    LYUBOV (iron deficiency anemia)    GERD (gastroesophageal reflux disease)    BPH (benign prostatic hyperplasia)    Anxiety and depression    Sacral decubitus ulcer    Osteomyelitis    Abdominal aortic aneurysm    Upper GI bleed    Peptic ulcer disease    Goiter    Esophageal varix    Fermin esophagus    S/P hip replacement, bilateral    S/P repair of abdominal aortic aneurysm using straight graft    History of percutaneous coronary intervention    S/P bilateral hip replacements    S/P aortic aneurysm repair      REVIEW OF SYSTEMS: Pt unable to offer due to baseline mental status.    MEDICATIONS  (STANDING):  chlorhexidine 2% Cloths 1 Application(s) Topical <User Schedule>  Dakins Solution - 1/4 Strength 1 Application(s) Topical two times a day  dextrose 50% Injectable 12.5 Gram(s) IV Push once  dextrose 50% Injectable 25 Gram(s) IV Push once  pantoprazole  Injectable 40 milliGRAM(s) IV Push two times a day    MEDICATIONS  (PRN):  acetaminophen  Suppository .. 650 milliGRAM(s) Rectal every 6 hours PRN Temp greater or equal to 38C (100.4F)  bisacodyl Suppository 10 milliGRAM(s) Rectal daily PRN Constipation  glycopyrrolate Injectable 0.4 milliGRAM(s) IV Push every 6 hours PRN Excessive/Audible secretions  HYDROmorphone  Injectable 0.2 milliGRAM(s) IV Push every 1 hour PRN Moderate Pain (4 - 6)  HYDROmorphone  Injectable 0.5 milliGRAM(s) IV Push every 1 hour PRN Dyspnea  HYDROmorphone  Injectable 0.5 milliGRAM(s) IV Push every 1 hour PRN Severe Pain (7 - 10)  LORazepam   Injectable 0.5 milliGRAM(s) IV Push every 1 hour PRN Agitation      Allergies    No Known Allergies    Intolerances        SOCIAL HISTORY:  Admitted from [place], bedbound. Dependent on ADLs. Per records no etoh, smoking, illicit drug use.  (+) DNR/DNI    FAMILY HISTORY:  Family history of colon cancer in mother (Mother)        PHYSICAL EXAM:  Vital Signs Last 24 Hrs  T(C): 36.3 (01 Apr 2024 13:30), Max: 36.7 (31 Mar 2024 20:00)  T(F): 97.4 (01 Apr 2024 13:30), Max: 98 (31 Mar 2024 20:00)  HR: 92 (01 Apr 2024 14:00) (91 - 116)  BP: --  BP(mean): --  RR: 13 (01 Apr 2024 14:00) (13 - 29)  SpO2: 100% (01 Apr 2024 14:00) (95% - 100%)    Parameters below as of 01 Apr 2024 08:00  Patient On (Oxygen Delivery Method): nasal cannula    Wt: 59.6 kg (03-29-24)     Constitutional: Ill appearing, not verbally interactive/moaning, bedbound, dependent on ADLs  (+) low airloss support surface, (+) fluidized postioining devices, (+) complete cair boots  HEENT:  NC/AT, nonicteric, mucosa moist, right nare NGT, peritubular skin intact.  Cardiovascular: rate regular  Respiratory: supplemental oxygen via NC, nonlabored, equal chest expansion  Gastrointestinal: soft NT/ND, incontinent large mucoid-soft stool; perineal care provided.  : indwelling timmons catheter in place  Musculoskeletal: no gross deformities/ contractures  Vascular: BLE equally warm, trace edema, non palpable dp pulses. Left medial knee linear stable scab- 0.9hqc5ksp5.2cm; Right plantar base of first metatarsal head with callus.  Multiple deep tissue pressure injuries; 100% purple maroon discoloration, no palpable skin changes:  -Left dorsal foot- 5szo8uae2  -Left heel- 9rhx2hip9  -Right lateral leg- 8.0knp3lqy6  -Right plantar foot- 1.5cmx1.5cmx0  Skin:  frail  Lumbar spine stage 4 pressure injury  -20.4jbr38oay3cm, connects to sacral wound beneath a 4cm intact epidermal-dermal bridge.  -Abundant, fragmented sharp bone exposed, pink moist granular base, scattered areas of adherent eschar.  -No purulent drainage, no odor.  -Periwound without crepitus, induration, fluctuance.  Sacrum stage 4 pressure injury  -7.2zgp6lvl8.5cm, circumferential undermining circumferential with deep tunnels at 5 and 7 o'clock, extending 11cm and 13 cm respectively; connected at 12 o'clock to lumbar spine stage 4  -Wound base necrotic including exposed fragmented bone   -moderate malodorous purulent drainage expressed from tunnels with deep palpation of bilateral buttock.  -Periwound skin without crepitus, induration, fluctuance.  Procedure Note - Selective Excisional Debridement:  Using aseptic technique the sacral wound underwent selective exscisional debridement using a scissor and forceps through necrotic/ nonviable subcutaneous tissue and gluteal muscle.  .  Pt tolerated procedure well.  Hemostasis was maintained throughout.    Debulking debridement of necrotic tissue.  measurements same.  Bilateral buttocks and groin with moisture and incontinence associated dermatitis  -scattered areas of denuded epidermis.        LABS/ CULTURES/ RADIOLOGY:                        7.5    22.30 )-----------( 69       ( 01 Apr 2024 09:05 )             21.8       145  |  120  |  55  ----------------------------<  214      [04-01-24 @ 09:05]  3.8   |  11  |  1.02        Ca     6.5     [04-01-24 @ 09:05]      Mg     2.20     [04-01-24 @ 09:05]      Phos  2.9     [04-01-24 @ 09:05]    TPro  4.4  /  Alb  1.3  /  TBili  0.2  /  DBili  x   /  AST  24  /  ALT  19  /  AlkPhos  540  [04-01-24 @ 09:05]    PT/INR: PT 15.7 , INR 1.42       [04-01-24 @ 03:00]  PTT: 64.5       [04-01-24 @ 03:00]          Culture - Blood (collected 03-30-24 @ 03:37)  Source: .Blood Blood-Peripheral  Gram Stain (03-30-24 @ 19:41):    Growth in aerobic and anaerobic bottles: Gram Negative Rods and Gram    Positive Rods  Preliminary Report (03-31-24 @ 13:49):    Growth in aerobic and anaerobic bottles: Escherichia coli    Growth in aerobic and anaerobic bottles: Klebsiella pneumoniae    See previous culture [id number]    Growth in aerobic and anaerobic bottles: Gram Negative Rods and Gram    Positive Rods    Culture - Blood (collected 03-30-24 @ 03:37)  Source: .Blood Blood-Peripheral  Gram Stain (03-30-24 @ 19:42):    Growth in aerobic and anaerobic bottles: Gram Negative Rods and Gram    Positive Rods  Preliminary Report (03-31-24 @ 13:50):    Growth in aerobic and anaerobic bottles: Klebsiella pneumoniae    Growth in aerobic and anaerobic bottles: Escherichia coli    See previous culture [id number]    Growth in aerobic and anaerobic bottles: Gram Negative Rods and Gram    Positive Rods    Culture - Blood (collected 03-29-24 @ 21:28)  Source: .Blood Blood-Peripheral  Gram Stain (03-30-24 @ 15:30):    Growth in anaerobic bottle: Gram Negative Rods and Gram Positive Rods    Growth in aerobic bottle: Gram Negative Rods and Gram positive cocci in    pairs  Preliminary Report (04-01-24 @ 12:22):    Growth in aerobic and anaerobic bottles: Enterococcus faecalis    Susceptibility to follow.    Growth in aerobic and anaerobic bottles: Proteus mirabilis    Growth in anaerobic bottle: Klebsiella pneumoniae ESBL    Growth in aerobic and anaerobic bottles: Escherichia coli    Growth in aerobic bottle: Enterococcus faecium Susceptibility to follow.    Growth in anaerobic bottle: Clostridium perfringens "Susceptibilities not    performed"    Growth in anaerobic bottle: Bacteroides fragilis "Susceptibilities not    performed"    Direct identification is available within approximately 3-5    hours either by Blood Panel Multiplexed PCR or Direct    MALDI-TOF. Details: [email or web address]    Aguilar/B Resistance Gene Detected    When multiple species' of Enterococcus are present,    association of a resistance gene to a specific organism    cannot be determined.    When multiple gram negative organisms are present,    association of a resistance gene to a specific organism    cannot be determined.  Organism: Blood Culture PCR  Proteus mirabilis  Klebsiella pneumoniae ESBL  Escherichia coli (04-01-24 @ 09:15)  Organism: Klebsiella pneumoniae ESBL (04-01-24 @ 09:15)      Method Type: LORELEI      -  Ampicillin: R >16 These ampicillin results predict results for amoxicillin      -  Ampicillin/Sulbactam: R 8/4      -  Aztreonam: R 8      -  Cefazolin: R >16      -  Cefepime: R 16      -  Ceftriaxone: R >32      -  Ciprofloxacin: S <=0.25      -  Ertapenem: S <=0.5      -  Gentamicin: S <=2      -  Imipenem: S <=1      -  Levofloxacin: S <=0.5      -  Meropenem: S <=1      -  Piperacillin/Tazobactam: R <=8      -  Tobramycin: S <=2      -  Trimethoprim/Sulfamethoxazole: R >2/38  Organism: Proteus mirabilis (04-01-24 @ 09:12)      Method Type: LORELEI      -  Ampicillin: S <=8 These ampicillin results predict results for amoxicillin      -  Ampicillin/Sulbactam: S <=4/2      -  Aztreonam: S <=4      -  Cefazolin: S <=2      -  Cefepime: S <=2      -  Cefoxitin: S <=8      -  Ceftriaxone: S <=1      -  Ciprofloxacin: R >2      -  Ertapenem: S <=0.5      -  Gentamicin: I 4      -  Levofloxacin: R 2      -  Meropenem: S <=1      -  Piperacillin/Tazobactam: S <=8      -  Tobramycin: I 4      -  Trimethoprim/Sulfamethoxazole: S <=0.5/9.5  Organism: Escherichia coli (04-01-24 @ 09:11)      Method Type: LORELEI      -  Ampicillin: R >16 These ampicillin results predict results for amoxicillin      -  Ampicillin/Sulbactam: R >16/8      -  Aztreonam: S <=4      -  Cefazolin: S <=2      -  Cefepime: S <=2      -  Cefoxitin: S <=8      -  Ceftriaxone: S <=1      -  Ciprofloxacin: S <=0.25      -  Ertapenem: S <=0.5      -  Gentamicin: S <=2      -  Imipenem: S <=1      -  Levofloxacin: S <=0.5      -  Meropenem: S <=1      -  Piperacillin/Tazobactam: S <=8      -  Tobramycin: S <=2      -  Trimethoprim/Sulfamethoxazole: S <=0.5/9.5  Organism: Blood Culture PCR (03-30-24 @ 17:50)      Method Type: PCR      -  Bacteroides fragilis: Detec      -  Escherichia coli: Detec      -  K. pneumoniae group: Detec (K. pneumoniae, K. quasipneumoniae, K. variicola)      -  Proteus species: Detec      -  Enterococcus faecalis: Detec      -  Enterococcus faecium: Detec      -  ESBL: Detec      -  CTX-M Resistance Marker: Detec    Culture - Blood (collected 03-29-24 @ 21:20)  Source: .Blood Blood-Peripheral  Gram Stain (03-30-24 @ 15:09):    Growth in aerobic bottle: Gram Negative Rods    Growth in anaerobic bottle: Gram Negative Rods and Gram Positive Rods  Preliminary Report (04-01-24 @ 12:17):    Growth in aerobic and anaerobic bottles: Escherichia coli    Growth in aerobic and anaerobic bottles: Proteus mirabilis    See previous culture [id number]    Growth in anaerobic bottle: Clostridium perfringens "Susceptibilities not    performed"    Growth in anaerobic bottle: Bacteroides fragilis "Susceptibilities not    performed"        ACC: [id number] EXAM:  CT ABDOMEN AND PELVIS IC   ORDERED BY: ENRIQUETA BERMUDEZ     ACC: [id number] EXAM:  CT ANGIO CHEST PULM ART WAWI   ORDERED BY: ENRIQUETA BERMUDEZ     PROCEDURE DATE:  03/29/2024          INTERPRETATION:  CLINICAL INFORMATION: Wells score greater than 4    COMPARISON: CT abdomen and pelvis 2/29/2024, CT neck 8/2/2023    CONTRAST/COMPLICATIONS:  IV Contrast: Omnipaque 350 (accession [id number]), IV contrast documented   in unlinked concurrent exam (accession [id number])  90 cc administered   10   cc discarded  Oral Contrast: NONE  Complications: None reported at time of study completion    PROCEDURE:  CT Angiography of the Chest was performed followed by portal venous phase   imaging of the Abdomen and Pelvis.  Sagittal and coronal reformats were performed as well as 3D (MIP)   reconstructions.    FINDINGS:  CHEST:  LUNGS AND LARGE AIRWAYS: Patent central airways. There is rightward   deviation of the trachea due to substernal thyroid goiter. Emphysema.   Several scattered sub-6 mm pulmonary nodules bilaterally, nonspecific.   Bibasilar atelectasis. Small nonenhancing peripheral consolidation at the   left lung base in the region of the occluded pulmonary arteries, possibly   a small pulmonary infarct.  PLEURA: No pleural effusion.  VESSELS: Breathing motion significantly limits evaluation of subsegmental   pulmonary artery branches in the right middle, right lower, lingula, and   left lower lobes. Left lower lobar pulmonary embolism extending into the   and occluding theposterobasal segmental and subsegmental branches.   Enlargement of the main pulmonary artery measuring 4.1 cm.  HEART: Elevated RV to LV ratio and straightening of the interventricular   septum suggestive of right heart strain. Heart size is normal. No   pericardial effusion  MEDIASTINUM AND JENI: No lymphadenopathy.Redemonstrated large left   heterogeneous thyroid goiter extending into the mediastinum with mass   effect on/compression of the trachea and rightward rightward tracheal   deviation, as well as compression of the esophagus.  CHEST WALL AND LOWER NECK: Within normal limits.    ABDOMEN AND PELVIS:  LIVER: Few too small to characterize hepatic hypodensities.  BILE DUCTS: Normal caliber.  GALLBLADDER: Within normal limits.  SPLEEN: Within normal limits.  PANCREAS: Within normal limits.  ADRENALS: Nonspecific nodular bilateral adrenal thickening.  KIDNEYS/URETERS: No hydronephrosis. Bilateral renal hypoattenuating foci   too small to characterize.    BLADDER: Not visualized due to streak artifact from bilateral hip   prostheses.  REPRODUCTIVE ORGANS: Not visualized due to streak artifact from bilateral   hip prostheses.    BOWEL: Duodenal diverticulum. No bowel obstruction. Appendix is normal.   Copious stool in the rectum.  PERITONEUM: No ascites.  VESSELS: Filling defect in the left common femoral vein compatible with   deep venous thrombosis (303-114). Redemonstrated endovascular repair of   abdominal aortic aneurysm. The aneurysm sac measures 7.1 cm in transverse   dimension, not significantly changed. Similar-appearing mural thrombus   within the stent with moderate narrowing. The stent is patent to the   bifurcation. The left common iliac component of the stent is chronically   occluded. The distal left external iliac artery and superficial femoral   artery is patent. The proximal portion of the left external iliac artery   is obscured by streak artifact. Coils in the region of the left internal   iliac artery. The right common iliac limb of the stent is patent.   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  RETROPERITONEUM/LYMPH NODES: No lymphadenopathy.  ABDOMINAL WALL: Large decubitus ulcer extending from L1 to the coccyx   with multiple exposed vertebral body spinous processes. There is gasseen   tracking to the left second sacral foramen and into the spinal canal   (303-80 and 605-77). Gas and soft tissue tracks into the bilateral   gluteal musculature. Rim-enhancing collection of fluid and gas in the   left posterior upper thigh measuring 5.7 x 5.4 x 2.2 cm. A collection of   fluid and gas in the right ischioanal fossa measuring 3.8 x 3.0 x 2.1 cm.  BONES: Bilateral total hip arthroplasties. Osseous erosions of the   sacrococcygeal spinous processes also concerning for osteomyelitis.   Severe degenerative changes of the spine. Grade 1 anterolisthesis L4 on   L5. Mild retrolisthesis L3 on L4.    IMPRESSION:    Acute left lower lobe pulmonary emboli extending to the posterobasal   segmental and subsegmental pulmonary branches. Right heart strain is   present. Recommend echocardiography.    Small nonenhancing peripheral opacity in the left lung base, most   suspicious for pulmonary infarct.    These findings were discussed with Dr. Bermudez by Dr. Toure at 10:15 PM on   3/29/2024, with repeat back confirmation.    Left common femoral vein DVT.    Large sacral decubitus ulcer extending from L1 to the coccyx. Gas tracks   into the spinal canal and extends to the gluteal soft tissues.    Left posterior thigh and right ischioanalfossa abscesses as above.    Exposed lumbar spinous processes and erosions of the sacrococcygeal   spinous processes most concerning for osteomyelitis.    --- End of Report ---          DENG TOURE MD; Resident Radiologist  This document has been electronically signed.  SAHIL LORENZO MD; Attending Radiologist  This document has been electronically signed. Mar 29 2024 11:20PM

## 2024-04-01 NOTE — CONSULT NOTE ADULT - PROBLEM SELECTOR RECOMMENDATION 8
See [facility] documentation 4/1. The patient is transitioned to comfort-focused care with DNR/DNI code status. See [facility] documentation 4/1.   The patient is transitioned to comfort-focused care with DNR/DNI code status.

## 2024-04-01 NOTE — PROGRESS NOTE ADULT - CRITICAL CARE ATTENDING COMMENT
83 year old man from [place] with CAD sp PCI, AAA sp repair, HTN, DM recent UGI Bleed with Barrets and GERD, stage 4 sacral decub with osteomyelitis. Transferred from [place] for assessment of worsening wound and then became bradycardic and hypoxic CTA with PE and right heart strain with DVTs  Now on pressors for hypotension in setting of sepsis.    Meds:  Vasopressin gtt  Levophed gtt  Heparin gtt  Imipenem  Protonix IV BID    #Neuro- At baseline  Continue to monitor    #Pulm- Now with PE- on heparin gtt    #CV- continue pressor support and fluids as needed    #ID- on IV ABX, followup appreciated  Continue imipenem  fu cultures and wound consult followup    #Palliative- consult called to assess goals of care

## 2024-04-01 NOTE — CONSULT NOTE ADULT - PROBLEM SELECTOR RECOMMENDATION 4
Patient at high risk for pain due to extensive sacral wound and critical illness.    Dilaudid 0.2 mg IV q1h PRN for moderate pain  Dilaudid 0.5 mg IV q1h PRN for severe pain    This regimen can be increased to dilaudid 0.5/1 mg IV q1h PRN for moderate/severe pain if the above doses are insufficient for managing the patient's symptoms. Patient at high risk for pain due to extensive sacral wound and critical illness.    Start Dilaudid 0.2 mg IV q1h PRN for moderate pain  Start Dilaudid 0.5 mg IV q1h PRN for severe pain    This regimen can be increased to dilaudid 0.5/1 mg IV q1h PRN for moderate/severe pain if the above doses are insufficient for managing the patient's symptoms.

## 2024-04-01 NOTE — CONSULT NOTE ADULT - SUBJECTIVE AND OBJECTIVE BOX
HPI:  83M from [facility] LAUR[place] CAD s/p stents, HTN, T2DM, AAA s/p graft/stent, recent UGIB, Fermin's esophagitis, GERD, BPH, Bladder Cancer, 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 Patient  presents to the emergency department from [facility] due to worsening sacral wound with imaging of osteomyelitis. During transport, HR dropped to 30 with O2 of 76, was placed on 6L NC. While in the ED, patient started having dysarthria, contraction of left upper extremity. Code stroke was called CT head and brain negative for infarct. CT Angio of chest showed an acute left lower lobe PE with findings of right heart strain with left femoral DVT. In ED, pt. started on heparin gtt. for PE, levophed started  for hypotension.  (30 Mar 2024 03:43)    PERTINENT PM/SXH:   Bladder cancer    CAD (coronary artery disease)    HTN (hypertension)    T2DM (type 2 diabetes mellitus)    LYUBOV (iron deficiency anemia)    GERD (gastroesophageal reflux disease)    BPH (benign prostatic hyperplasia)    Anxiety and depression    Sacral decubitus ulcer    Osteomyelitis    Abdominal aortic aneurysm    Upper GI bleed    Peptic ulcer disease    Goiter    Esophageal varix    Fermin esophagus    S/P hip replacement, bilateral    S/P repair of abdominal aortic aneurysm using straight graft      History of percutaneous coronary intervention    S/P bilateral hip replacements    S/P aortic aneurysm repair      FAMILY HISTORY:  Family history of colon cancer in mother (Mother)      Family Hx substance abuse [ ]yes [ ]no  ITEMS NOT CHECKED ARE NOT PRESENT    SOCIAL HISTORY:   Significant other/partner[ ]  Children[x ]  Latter-day/Spirituality:  Substance hx:  [ ]   Tobacco hx:  [ ]   Alcohol hx: [ ]   Home Opioid hx:  [ x] I-Stop Reference No: [id number]  Living Situation: [ ]Home  [ ]Long term care  [x ]Rehab [ ]Other  PDI	Current Rx	Drug Type	Rx Written	Rx Dispensed	Drug	Quantity	Days Supply	Prescriber Name	Prescriber FERNANDO #	Payment Method	Dispenser  A	Y	O	03/28/2024	03/28/2024	oxycodone hcl (ir) 5 mg tablet	60	10	Carlos Bah MD	[id number]	Cash	[facility]  A	N	O	03/14/2024	03/14/2024	oxycodone hcl (ir) 5 mg tablet	60	10	Carlos Bah MD	[id number]	Cash	[facility]  A	N	O	03/10/2024	03/10/2024	oxycodone hcl (ir) 5 mg tablet	8	8	Orin Vazquez	[id number]	[place]	[facility]  A	N	O	03/08/2024	03/08/2024	oxycodone hcl (ir) 5 mg tablet	30	5	Angel Casey	[id number]	Cash	[facility]  A	N	O	01/30/2024	01/30/2024	oxycodone hcl (ir) 5 mg tablet	30	30	Niurka Carlos OLIVA MD	[id number]	[place]	[facility]    ADVANCE DIRECTIVES:    DNR/MOLST  [ ]  Living Will  [ ]   DECISION MAKER(s):  [x ] Health Care Proxy(s)  [ ] Surrogate(s)  [ ] Guardian           Name(s): Phone Number(s): Sara Butler, cousin, [phone]    BASELINE (I)ADL(s) (prior to admission):  [place]: [ ]Total  [ ] Moderate [x ]Dependent    Allergies    No Known Allergies    Intolerances    MEDICATIONS  (STANDING):  chlorhexidine 2% Cloths 1 Application(s) Topical <User Schedule>  Dakins Solution - 1/4 Strength 1 Application(s) Topical two times a day  dextrose 50% Injectable 12.5 Gram(s) IV Push once  dextrose 50% Injectable 25 Gram(s) IV Push once  pantoprazole  Injectable 40 milliGRAM(s) IV Push two times a day    MEDICATIONS  (PRN):  acetaminophen  Suppository .. 650 milliGRAM(s) Rectal every 6 hours PRN Temp greater or equal to 38C (100.4F)  bisacodyl Suppository 10 milliGRAM(s) Rectal daily PRN Constipation  glycopyrrolate Injectable 0.4 milliGRAM(s) IV Push every 6 hours PRN Excessive/Audible secretions  HYDROmorphone  Injectable 0.2 milliGRAM(s) IV Push every 1 hour PRN Moderate Pain (4 - 6)  HYDROmorphone  Injectable 0.5 milliGRAM(s) IV Push every 1 hour PRN Dyspnea  HYDROmorphone  Injectable 0.5 milliGRAM(s) IV Push every 1 hour PRN Severe Pain (7 - 10)  LORazepam   Injectable 0.5 milliGRAM(s) IV Push every 1 hour PRN Agitation    PRESENT SYMPTOMS: [x ]Unable to self-report see CPOT, PAINADs, RDOS  Source if other than patient:  [ ]Family   [ ]Team     Pain: [ ]yes [ ]no  QOL impact -   Location -                    Aggravating factors -  Quality -  Radiation -  Timing-  Severity (0-10 scale):  Minimal acceptable level (0-10 scale):       Dyspnea:                           [ ]Mild [ ]Moderate [ ]Severe  Anxiety:                             [ ]Mild [ ]Moderate [ ]Severe  Fatigue:                             [ ]Mild [ ]Moderate [ ]Severe  Nausea:                             [ ]Mild [ ]Moderate [ ]Severe  Loss of appetite:              [ ]Mild [ ]Moderate [ ]Severe  Constipation:                    [ ]Mild [ ]Moderate [ ]Severe    PCSSQ [Palliative Care Spiritual Screening Question]   Severity (0-10):  Score of 4 or > indicate consideration of Chaplaincy referral.  Chaplaincy Referral: [x ] yes [ ] refused [ ] following    Caregiver [place]? : [ ] yes [ x] no [ ] deferred:  Social work referral [ ] Patient & Family Centered Care Referral [ ]     Anticipatory Grief Present?: [x ] yes [ ] no  [ ] deferred: Social work referral [ x]  Patient & Family Centered Care Referral [ ]       Other Symptoms:  [ ]All other review of systems negative - pt is unable to self-report    PHYSICAL EXAM:  Vital Signs Last 24 Hrs  T(C): 36.3 (01 Apr 2024 13:30), Max: 36.7 (31 Mar 2024 20:00)  T(F): 97.4 (01 Apr 2024 13:30), Max: 98 (31 Mar 2024 20:00)  HR: 98 (01 Apr 2024 15:00) (91 - 116)  BP: --  BP(mean): --  RR: 22 (01 Apr 2024 15:00) (13 - 29)  SpO2: 100% (01 Apr 2024 15:00) (95% - 100%)    Parameters below as of 01 Apr 2024 08:00  Patient On (Oxygen Delivery Method): nasal cannula     I&O's Summary    31 Mar 2024 07:01  -  01 Apr 2024 07:00  --------------------------------------------------------  IN: 5441 mL / OUT: 485 mL / NET: 4956 mL    01 Apr 2024 07:01  -  01 Apr 2024 15:31  --------------------------------------------------------  IN: 1169.8 mL / OUT: 130 mL / NET: 1039.8 mL      GENERAL: [ ]Cachexia    [ ]Alert  [x ]Oriented x1   [x ]Lethargic  [ ]Unarousable  [ ]Verbal  [ ]Non-Verbal  Behavioral:   [ ] Anxiety  [x ] Delirium [ ] Agitation [ ] Other  HEENT:  [ x]Normal   [ ]Dry mouth   [ ]ET Tube/Trach  [ ]Oral lesions  PULMONARY:   [ ]Clear [ ]Tachypnea  [ ]Audible excessive secretions   [ ]Rhonchi        [ ]Right [ ]Left [ ]Bilateral  [ ]Crackles        [ ]Right [ ]Left [ ]Bilateral  [ ]Wheezing     [ ]Right [ ]Left [ ]Bilateral  [x ]Diminished breath sounds [ ]right [ ]left [x ]bilateral  CARDIOVASCULAR:    [x ]Regular [ ]Irregular [ x]Tachy  [ ]Sam [ ]Murmur [ ]Other  GASTROINTESTINAL:  [x ]Soft  [ ]Distended   [x ]+BS  [ x]Non tender [ ]Tender  [ ]Other [ ]PEG [ ]OGT/ NGT  Last BM:  GENITOURINARY:  [ ]Normal [ ] Incontinent   [ ]Oliguria/Anuria   [ x]Hale  MUSCULOSKELETAL:   [ ]Normal   [ ]Weakness  [ x]Bed/Wheelchair bound [ ]Edema  NEUROLOGIC:   [ ]No focal deficits  [ x]Cognitive impairment  [ ]Dysphagia [ ]Dysarthria [ ]Paresis [ ]Other   SKIN: see nursing documentation for extensive sacral wound  [ ]Normal  [ ]Rash  [ ]Other  [ x]Pressure ulcer(s)       Present on admission [ x]y [ ]n    CRITICAL CARE:  [x ] Shock Present  [x ]Septic [ ]Cardiogenic [ ]Neurologic [ ]Hypovolemic  [x ]  Vasopressors [ ]  Inotropes   [ ]Respiratory failure present [ ]Mechanical ventilation [ ]Non-invasive ventilatory support [ ]High flow    [ ]Acute  [ ]Chronic [ ]Hypoxic  [ ]Hypercarbic [ ]Other  [x ]Other organ failure - CNS    LABS:                        7.5    22.30 )-----------( 69       ( 01 Apr 2024 09:05 )             21.8   04-01    145  |  120<H>  |  55<H>  ----------------------------<  214<H>  3.8   |  11<L>  |  1.02    Ca    6.5<LL>      01 Apr 2024 09:05  Phos  2.9     04-01  Mg     2.20     04-01    TPro  4.4<L>  /  Alb  1.3<L>  /  TBili  0.2  /  DBili  x   /  AST  24  /  ALT  19  /  AlkPhos  540<H>  04-01  PT/INR - ( 01 Apr 2024 03:00 )   PT: 15.7 sec;   INR: 1.42 ratio         PTT - ( 01 Apr 2024 03:00 )  PTT:64.5 sec    Urinalysis Basic - ( 01 Apr 2024 09:05 )    Color: x / Appearance: x / SG: x / pH: x  Gluc: 214 mg/dL / Ketone: x  / Bili: x / Urobili: x   Blood: x / Protein: x / Nitrite: x   Leuk Esterase: x / RBC: x / WBC x   Sq Epi: x / Non Sq Epi: x / Bacteria: x      RADIOLOGY & ADDITIONAL STUDIES:  3/29 CTA chest PE protocol, CTAP w/ venous phase contrast  IMPRESSION:    Acute left lower lobe pulmonary emboli extending to the posterobasal   segmental and subsegmental pulmonary branches. Right heart strain is   present. Recommend echocardiography.    Small nonenhancing peripheral opacity in the left lung base, most   suspicious for pulmonary infarct.    These findings were discussed with Dr. Bermudez by Dr. Craig at 10:15 PM on   3/29/2024, with repeat back confirmation.    Left common femoral vein DVT.    Large sacral decubitus ulcer extending from L1 to the coccyx. Gas tracks   into the spinal canal and extends to the gluteal soft tissues.    Left posterior thigh and right ischioanal fossa abscesses as above.    Exposed lumbar spinous processes and erosions of the sacrococcygeal   spinous processes most concerning for osteomyelitis.      PROTEIN CALORIE MALNUTRITION PRESENT: [ ]mild [ ]moderate [x ]severe [ ]underweight [ ]morbid obesity  https://www.andeal.org/vault/2440/web/files/ONC/Table_Clinical%20Characteristics%20to%20Document%20Malnutrition-White%20JV%20et%20al%202012.pdf    Height (cm): 188 (03-29-24 @ 21:08), 188 (03-04-24 @ 10:19)  Weight (kg): 59.6 (03-29-24 @ 21:08), 76 (03-04-24 @ 10:19)  BMI (kg/m2): 16.9 (03-29-24 @ 21:08), 21.5 (03-04-24 @ 10:19)    [x ]PPSV2 < or = to 30% [ ]significant weight loss  [ ]poor nutritional intake  [ ]anasarca[ ]Artificial Nutrition      Other REFERRALS:  [ ]Hospice  [ ]Child Life  [ ]Social Work  [ ]Case management [ ]Holistic Therapy     Care Coordination Assessment 201 [C. Provider] (03-01-24 @ 16:08)      Palliative Performance Scale:  http://npcrc.org/files/news/palliative_performance_scale_ppsv2.pdf  (Ctrl +  left click to view)  Respiratory Distress Observation Tool:  https://homecareinformation.net/handouts/hen/Respiratory_Distress_Observation_Scale.pdf (Ctrl +  left click to view)  PAINAD Score:  http://geriatrictoolkit.[email or web address]/cog/painad.pdf (Ctrl +  left click to view)   HPI:  83M from [facility] [place]UR[place] CAD s/p stents, HTN, T2DM, AAA s/p graft/stent, recent UGIB, Fermin's esophagitis, GERD, BPH, Bladder Cancer, 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 Patient  presents to the emergency department from [facility] due to worsening sacral wound with imaging of osteomyelitis. During transport, HR dropped to 30 with O2 of 76, was placed on 6L NC. While in the ED, patient started having dysarthria, contraction of left upper extremity. Code stroke was called CT head and brain negative for infarct. CT Angio of chest showed an acute left lower lobe PE with findings of right heart strain with left femoral DVT. In ED, pt. started on heparin gtt. for PE, levophed started  for hypotension.  (30 Mar 2024 03:43)    PERTINENT PM/SXH:   Bladder cancer  CAD (coronary artery disease)  HTN (hypertension)  T2DM (type 2 diabetes mellitus)  LYUBOV (iron deficiency anemia)  GERD (gastroesophageal reflux disease)  BPH (benign prostatic hyperplasia)  Anxiety and depression  Sacral decubitus ulcer  Osteomyelitis  Abdominal aortic aneurysm  Upper GI bleed  Peptic ulcer disease  Goiter  Esophageal varix  Fermin esophagus  S/P hip replacement, bilateral  S/P repair of abdominal aortic aneurysm using straight graft  History of percutaneous coronary intervention  S/P bilateral hip replacements  S/P aortic aneurysm repair    FAMILY HISTORY:  Family history of colon cancer in mother (Mother)    ITEMS NOT CHECKED ARE NOT PRESENT    SOCIAL HISTORY:   Significant other/partner[ ]  Children[x ]  Gnosticist/Spirituality:  Substance hx:  [ ]   Tobacco hx:  [ ]   Alcohol hx: [ ]   Home Opioid hx:  [ x] I-Stop Reference No: [id number]  Living Situation: [ ]Home  [ ]Long term care  [x ]Rehab [ ]Other  PDI	Current Rx	Drug Type	Rx Written	Rx Dispensed	Drug	Quantity	Days Supply	Prescriber Name	Prescriber FERNANDO #	Payment Method	Dispenser  A	Y	O	03/28/2024	03/28/2024	oxycodone hcl (ir) 5 mg tablet	60	10	Carlos Bah MD	[id number]	Cash	[facility]  A	N	O	03/14/2024	03/14/2024	oxycodone hcl (ir) 5 mg tablet	60	10	Carlos Bah MD	[id number]	Cash	[facility]  A	N	O	03/10/2024	03/10/2024	oxycodone hcl (ir) 5 mg tablet	8	8	Orin Vazquez	[id number]	[place]	[facility]  A	N	O	03/08/2024	03/08/2024	oxycodone hcl (ir) 5 mg tablet	30	5	Angel Casey	[id number]	Cash	[facility]  A	N	O	01/30/2024	01/30/2024	oxycodone hcl (ir) 5 mg tablet	30	30	Carlos Bah MD	[id number]	[place]	[facility]    ADVANCE DIRECTIVES:    DNR/MOLST  [ ]  Living Will  [ ]   DECISION MAKER(s):  [x ] Health Care Proxy(s)  [ ] Surrogate(s)  [ ] Guardian           Name(s): Phone Number(s): Sara Butler, cousin, [phone]    BASELINE (I)ADL(s) (prior to admission):  [place]: [ ]Total  [ ] Moderate [x ]Dependent    Allergies    No Known Allergies    Intolerances    MEDICATIONS  (STANDING):  chlorhexidine 2% Cloths 1 Application(s) Topical <User Schedule>  Dakins Solution - 1/4 Strength 1 Application(s) Topical two times a day  dextrose 50% Injectable 12.5 Gram(s) IV Push once  dextrose 50% Injectable 25 Gram(s) IV Push once  pantoprazole  Injectable 40 milliGRAM(s) IV Push two times a day    MEDICATIONS  (PRN):  acetaminophen  Suppository .. 650 milliGRAM(s) Rectal every 6 hours PRN Temp greater or equal to 38C (100.4F)  bisacodyl Suppository 10 milliGRAM(s) Rectal daily PRN Constipation  glycopyrrolate Injectable 0.4 milliGRAM(s) IV Push every 6 hours PRN Excessive/Audible secretions  HYDROmorphone  Injectable 0.2 milliGRAM(s) IV Push every 1 hour PRN Moderate Pain (4 - 6)  HYDROmorphone  Injectable 0.5 milliGRAM(s) IV Push every 1 hour PRN Dyspnea  HYDROmorphone  Injectable 0.5 milliGRAM(s) IV Push every 1 hour PRN Severe Pain (7 - 10)  LORazepam   Injectable 0.5 milliGRAM(s) IV Push every 1 hour PRN Agitation    PRESENT SYMPTOMS: [x ]Unable to self-report see CPOT, PAINADs, RDOS  Source if other than patient:  [ ]Family   [ ]Team     Pain: [ ]yes [ ]no  QOL impact -   Location -                    Aggravating factors -  Quality -  Radiation -  Timing-  Severity (0-10 scale):  Minimal acceptable level (0-10 scale):       Dyspnea:                           [ ]Mild [ ]Moderate [ ]Severe  Anxiety:                             [ ]Mild [ ]Moderate [ ]Severe  Fatigue:                             [ ]Mild [ ]Moderate [ ]Severe  Nausea:                             [ ]Mild [ ]Moderate [ ]Severe  Loss of appetite:              [ ]Mild [ ]Moderate [ ]Severe  Constipation:                    [ ]Mild [ ]Moderate [ ]Severe    PCSSQ [Palliative Care Spiritual Screening Question]   Severity (0-10):  Score of 4 or > indicate consideration of Chaplaincy referral.  Chaplaincy Referral: [x ] yes [ ] refused [ ] following    Caregiver [place]? : [ ] yes [ x] no [ ] deferred:  Social work referral [ ] Patient & Family Centered Care Referral [ ]     Anticipatory Grief Present?: [x ] yes [ ] no  [ ] deferred: Social work referral [ x]  Patient & Family Centered Care Referral [ ]       Other Symptoms:  [ ]All other review of systems negative - pt is unable to self-report    PHYSICAL EXAM:  Vital Signs Last 24 Hrs  T(C): 36.3 (01 Apr 2024 13:30), Max: 36.7 (31 Mar 2024 20:00)  T(F): 97.4 (01 Apr 2024 13:30), Max: 98 (31 Mar 2024 20:00)  HR: 98 (01 Apr 2024 15:00) (91 - 116)  BP: --  BP(mean): --  RR: 22 (01 Apr 2024 15:00) (13 - 29)  SpO2: 100% (01 Apr 2024 15:00) (95% - 100%)    Parameters below as of 01 Apr 2024 08:00  Patient On (Oxygen Delivery Method): nasal cannula     I&O's Summary    31 Mar 2024 07:01  -  01 Apr 2024 07:00  --------------------------------------------------------  IN: 5441 mL / OUT: 485 mL / NET: 4956 mL    01 Apr 2024 07:01  -  01 Apr 2024 15:31  --------------------------------------------------------  IN: 1169.8 mL / OUT: 130 mL / NET: 1039.8 mL      GENERAL: [ ]Cachexia    [ ]Alert  [x ]Oriented x1   [x ]Lethargic  [ ]Unarousable  [ ]Verbal  [ ]Non-Verbal  Behavioral:   [ ] Anxiety  [x ] Delirium [ ] Agitation [ ] Other  HEENT:  [ x]Normal   [ ]Dry mouth   [ ]ET Tube/Trach  [ ]Oral lesions  PULMONARY:   [ ]Clear [ ]Tachypnea  [ ]Audible excessive secretions   [ ]Rhonchi        [ ]Right [ ]Left [ ]Bilateral  [ ]Crackles        [ ]Right [ ]Left [ ]Bilateral  [ ]Wheezing     [ ]Right [ ]Left [ ]Bilateral  [x ]Diminished breath sounds [ ]right [ ]left [x ]bilateral  CARDIOVASCULAR:    [x ]Regular [ ]Irregular [ x]Tachy  [ ]Sam [ ]Murmur [ ]Other  GASTROINTESTINAL:  [x ]Soft  [ ]Distended   [x ]+BS  [ x]Non tender [ ]Tender  [ ]Other [ ]PEG [ ]OGT/ NGT  Last BM:  GENITOURINARY:  [ ]Normal [ ] Incontinent   [ ]Oliguria/Anuria   [ x]Hale  MUSCULOSKELETAL:   [ ]Normal   [ ]Weakness  [ x]Bed/Wheelchair bound [ ]Edema  NEUROLOGIC:   [ ]No focal deficits  [ x]Cognitive impairment  [ ]Dysphagia [ ]Dysarthria [ ]Paresis [ ]Other   SKIN: see nursing documentation for extensive sacral wound  [ ]Normal  [ ]Rash  [ ]Other  [ x]Pressure ulcer(s)       Present on admission [ x]y [ ]n    CRITICAL CARE:  [x ] Shock Present  [x ]Septic [ ]Cardiogenic [ ]Neurologic [ ]Hypovolemic  [x ]  Vasopressors [ ]  Inotropes   [ ]Respiratory failure present [ ]Mechanical ventilation [ ]Non-invasive ventilatory support [ ]High flow    [ ]Acute  [ ]Chronic [ ]Hypoxic  [ ]Hypercarbic [ ]Other  [x ]Other organ failure - CNS    LABS:                        7.5    22.30 )-----------( 69       ( 01 Apr 2024 09:05 )             21.8   04-01    145  |  120<H>  |  55<H>  ----------------------------<  214<H>  3.8   |  11<L>  |  1.02    Ca    6.5<LL>      01 Apr 2024 09:05  Phos  2.9     04-01  Mg     2.20     04-01    TPro  4.4<L>  /  Alb  1.3<L>  /  TBili  0.2  /  DBili  x   /  AST  24  /  ALT  19  /  AlkPhos  540<H>  04-01  PT/INR - ( 01 Apr 2024 03:00 )   PT: 15.7 sec;   INR: 1.42 ratio         PTT - ( 01 Apr 2024 03:00 )  PTT:64.5 sec    Urinalysis Basic - ( 01 Apr 2024 09:05 )    Color: x / Appearance: x / SG: x / pH: x  Gluc: 214 mg/dL / Ketone: x  / Bili: x / Urobili: x   Blood: x / Protein: x / Nitrite: x   Leuk Esterase: x / RBC: x / WBC x   Sq Epi: x / Non Sq Epi: x / Bacteria: x      RADIOLOGY & ADDITIONAL STUDIES:  3/29 CTA chest PE protocol, CTAP w/ venous phase contrast  IMPRESSION:    Acute left lower lobe pulmonary emboli extending to the posterobasal   segmental and subsegmental pulmonary branches. Right heart strain is   present. Recommend echocardiography.    Small nonenhancing peripheral opacity in the left lung base, most   suspicious for pulmonary infarct.    These findings were discussed with Dr. Bermudez by Dr. Craig at 10:15 PM on   3/29/2024, with repeat back confirmation.    Left common femoral vein DVT.    Large sacral decubitus ulcer extending from L1 to the coccyx. Gas tracks   into the spinal canal and extends to the gluteal soft tissues.    Left posterior thigh and right ischioanal fossa abscesses as above.    Exposed lumbar spinous processes and erosions of the sacrococcygeal   spinous processes most concerning for osteomyelitis.      PROTEIN CALORIE MALNUTRITION PRESENT: [ ]mild [ ]moderate [x ]severe [ ]underweight [ ]morbid obesity  https://www.andeal.org/vault/6156/web/files/ONC/Table_Clinical%20Characteristics%20to%20Document%20Malnutrition-White%20JV%20et%20al%202012.pdf    Height (cm): 188 (03-29-24 @ 21:08), 188 (03-04-24 @ 10:19)  Weight (kg): 59.6 (03-29-24 @ 21:08), 76 (03-04-24 @ 10:19)  BMI (kg/m2): 16.9 (03-29-24 @ 21:08), 21.5 (03-04-24 @ 10:19)    [x ]PPSV2 < or = to 30% [ ]significant weight loss  [ ]poor nutritional intake  [ ]anasarca[ ]Artificial Nutrition      Other REFERRALS:  [ ]Hospice  [ ]Child Life  [ ]Social Work  [ ]Case management [ ]Holistic Therapy     Care Coordination Assessment 201 [C. Provider] (03-01-24 @ 16:08)      Palliative Performance Scale:  http://npcrc.org/files/news/palliative_performance_scale_ppsv2.pdf  (Ctrl +  left click to view)  Respiratory Distress Observation Tool:  https://homecareinformation.net/handouts/hen/Respiratory_Distress_Observation_Scale.pdf (Ctrl +  left click to view)  PAINAD Score:  http://geriatrictoolkit.[place].[email or web address]/cog/painad.pdf (Ctrl +  left click to view)

## 2024-04-01 NOTE — CONSULT NOTE ADULT - CONVERSATION DETAILS
Conversation via telephone 4/1 AM with the patient's cousin and HCP, Sara Butler. Sara confirmed understanding that the patient is critically ill due to septic shock from his extensive, infected sacral wound, and in likely obstructive shock from his high-risk acute PE with right heart strain. She stated that the patient would not find his current quality of life acceptable, particularly due to the suffering he is facing with his extensive wound and critical illness. She requested that the patient's medical care be directed in order to "allow him to pass peacefully." She confirmed DNR/DNI code status. She requested discontinuation of vasopressors, antibiotics, lab draws, and medications not directed to comfort, and no initiation of dialysis, IV fluids, or artificial nutrition. We discussed that the patient would transition to comfort-focused care and receive medications that would keep him physically comfortable and give him peace and dignity with the time he has remaining. She verbalized her understanding and agreement with all of the above. Emotional support provided. All questions and concerns addressed. Updated MOLST completed and entered into the chart. Conversation via telephone 4/1 AM with the patient's cousin and HCP, Sara Butler.   Sara confirmed understanding that the patient is critically ill due to septic shock from his extensive, infected sacral wound, and in likely obstructive shock from his high-risk acute PE with right heart strain. She stated that the patient would not find his current quality of life acceptable, particularly due to the suffering he is facing with his extensive wound and critical illness.   She requested that the patient's medical care be directed in order to "allow him to pass peacefully." She confirmed DNR/DNI code status. She requested discontinuation of vasopressors, antibiotics, lab draws, and medications not directed to comfort, and no initiation of dialysis, IV fluids, or artificial nutrition.   We discussed that the patient would transition to comfort-focused care and receive medications that would keep him physically comfortable and give him peace and dignity with the time he has remaining. She verbalized her understanding and agreement with all of the above.   Emotional support provided. All questions and concerns addressed.   Updated MOLST completed for comfort measures only and entered into the chart.

## 2024-04-01 NOTE — PROGRESS NOTE ADULT - SUBJECTIVE AND OBJECTIVE BOX
FOLLOW UP:    SUBJECTIVE/OBSERVATIONS:    INTERVAL EVENTS:    TELE EVENTS:     Vital Signs Last 24 Hrs  T(C): 36.3 (01 Apr 2024 04:00), Max: 36.7 (31 Mar 2024 20:00)  T(F): 97.4 (01 Apr 2024 04:00), Max: 98 (31 Mar 2024 20:00)  HR: 107 (01 Apr 2024 07:00) (94 - 116)  BP: --  BP(mean): --  RR: 24 (01 Apr 2024 07:00) (14 - 29)  SpO2: 95% (01 Apr 2024 07:00) (95% - 100%)    Parameters below as of 01 Apr 2024 07:00  Patient On (Oxygen Delivery Method): nasal cannula  O2 Flow (L/min): 4    I&O's Summary    31 Mar 2024 07:01  -  01 Apr 2024 07:00  --------------------------------------------------------  IN: 5441 mL / OUT: 485 mL / NET: 4956 mL    MEDICATIONS:  heparin  Infusion 800 Unit(s)/Hr IV Continuous <Continuous>  norepinephrine Infusion 0.05 MICROgram(s)/kG/Min IV Continuous <Continuous>  imipenem/cilastatin  IVPB 500 milliGRAM(s) IV Intermittent every 6 hours  pantoprazole  Injectable 40 milliGRAM(s) IV Push two times a day  dextrose 50% Injectable 12.5 Gram(s) IV Push once  dextrose 50% Injectable 25 Gram(s) IV Push once  dextrose 50% Injectable 25 Gram(s) IV Push once  dextrose Oral Gel 15 Gram(s) Oral once PRN  glucagon  Injectable 1 milliGRAM(s) IntraMuscular once  insulin lispro (ADMELOG) corrective regimen sliding scale   SubCutaneous every 6 hours    chlorhexidine 2% Cloths 1 Application(s) Topical <User Schedule>  chlorhexidine 4% Liquid 1 Application(s) Topical <User Schedule>  dextrose 5%. 1000 milliLiter(s) IV Continuous <Continuous>  dextrose 5%. 1000 milliLiter(s) IV Continuous <Continuous>  potassium chloride   Powder 40 milliEquivalent(s) Oral every 4 hours  sodium chloride 0.9% lock flush 10 milliLiter(s) IV Push every 1 hour PRN  sodium chloride 0.9%. 1000 milliLiter(s) IV Continuous <Continuous>  tamsulosin 0.4 milliGRAM(s) Oral at bedtime      REVIEW OF SYSTEMS:      PHYSICAL EXAM:  General: NAD  Cardiovascular: Normal S1 S2, No JVD, No murmurs, No edema  Respiratory: Lungs clear to auscultation	  Gastrointestinal:  Soft, Non-tender, + BS	  Skin: warm and dry, No rashes, No ecchymoses, No cyanosis	  Extremities: Normal range of motion, No clubbing, cyanosis or edema  Vascular: Peripheral pulses palpable 2+ bilaterally    LABS:	 	    CBC Full  -  ( 01 Apr 2024 03:00 )  WBC Count : 21.55 K/uL  Hemoglobin : 7.4 g/dL  Hematocrit : 21.9 %  Platelet Count - Automated : 87 K/uL  Mean Cell Volume : 85.9 fL  Mean Cell Hemoglobin : 29.0 pg  Mean Cell Hemoglobin Concentration : 33.8 gm/dL  Auto Neutrophil # : x  Auto Lymphocyte # : x  Auto Monocyte # : x  Auto Eosinophil # : x  Auto Basophil # : x  Auto Neutrophil % : x  Auto Lymphocyte % : x  Auto Monocyte % : x  Auto Eosinophil % : x  Auto Basophil % : x    04-01    140  |  117<H>  |  55<H>  ----------------------------<  191<H>  3.4<L>   |  10<LL>  |  1.09  03-31    139  |  113<H>  |  58<H>  ----------------------------<  150<H>  3.9   |  10<LL>  |  1.11    Ca    6.4<LL>      01 Apr 2024 03:00  Ca    6.7<L>      31 Mar 2024 08:15  Phos  3.2     04-01  Phos  3.9     03-31  Mg     2.00     04-01  Mg     2.20     03-31    TPro  4.3<L>  /  Alb  1.2<L>  /  TBili  0.2  /  DBili  x   /  AST  16  /  ALT  16  /  AlkPhos  495<H>  04-01  TPro  4.4<L>  /  Alb  1.2<L>  /  TBili  <0.2  /  DBili  x   /  AST  14  /  ALT  15  /  AlkPhos  372<H>  03-31      proBNP:   Lipid Profile:   HgA1c:   TSH:       CARDIAC MARKERS:            	   FOLLOW UP:  bacteremic  SUBJECTIVE/OBSERVATIONS:  Patient seen and examined. There are no reports of chest pain. Patient is laying flat, he is not short of breath. He knows his name, poor historian.  INTERVAL EVENTS:  Acute PE  packed cells ordered for today  TELE EVENTS:   Sinus tachycardia  Vital Signs Last 24 Hrs  T(C): 36.3 (01 Apr 2024 04:00), Max: 36.7 (31 Mar 2024 20:00)  T(F): 97.4 (01 Apr 2024 04:00), Max: 98 (31 Mar 2024 20:00)  HR: 107 (01 Apr 2024 07:00) (94 - 116)  RR: 24 (01 Apr 2024 07:00) (14 - 29)  SpO2: 95% (01 Apr 2024 07:00) (95% - 100%)    Parameters below as of 01 Apr 2024 07:00  Patient On (Oxygen Delivery Method): nasal cannula  O2 Flow (L/min): 4    I&O's Summary    31 Mar 2024 07:01  -  01 Apr 2024 07:00  --------------------------------------------------------  IN: 5441 mL / OUT: 485 mL / NET: 4956 mL    MEDICATIONS:  heparin  Infusion 800 Unit(s)/Hr IV Continuous <Continuous>  norepinephrine Infusion 0.05 MICROgram(s)/kG/Min IV Continuous <Continuous>  imipenem/cilastatin  IVPB 500 milliGRAM(s) IV Intermittent every 6 hours  pantoprazole  Injectable 40 milliGRAM(s) IV Push two times a day  dextrose 50% Injectable 12.5 Gram(s) IV Push once  dextrose 50% Injectable 25 Gram(s) IV Push once  dextrose 50% Injectable 25 Gram(s) IV Push once  dextrose Oral Gel 15 Gram(s) Oral once PRN  glucagon  Injectable 1 milliGRAM(s) IntraMuscular once  insulin lispro (ADMELOG) corrective regimen sliding scale   SubCutaneous every 6 hours    chlorhexidine 2% Cloths 1 Application(s) Topical <User Schedule>  chlorhexidine 4% Liquid 1 Application(s) Topical <User Schedule>  dextrose 5%. 1000 milliLiter(s) IV Continuous <Continuous>  dextrose 5%. 1000 milliLiter(s) IV Continuous <Continuous>  potassium chloride   Powder 40 milliEquivalent(s) Oral every 4 hours  sodium chloride 0.9% lock flush 10 milliLiter(s) IV Push every 1 hour PRN  sodium chloride 0.9%. 1000 milliLiter(s) IV Continuous <Continuous>  tamsulosin 0.4 milliGRAM(s) Oral at bedtime    REVIEW OF SYSTEMS:  CONSTITUTIONAL: No weakness, fevers or chills  EYES/ENT: No visual changes;  No vertigo or throat pain   NECK: No pain or stiffness  RESPIRATORY: No cough, wheezing, hemoptysis; No shortness of breath  CARDIOVASCULAR: No chest pain or palpitations  GASTROINTESTINAL: No abdominal or epigastric pain. No nausea, vomiting, or hematemesis; No diarrhea or constipation. No melena or hematochezia.  GENITOURINARY: No dysuria, frequency or hematuria  NEUROLOGICAL: No numbness or weakness  SKIN: No itching, rashes    PHYSICAL EXAM:  General: sick, unstable  Cardiovascular: +S1 +S2, No JVD  Respiratory: decreased breath sounds at the bases  Gastrointestinal:  Soft, Non-tender, + BS	  Skin: decubitous ulcers on the back  Extremities: Normal range of motion, No clubbing, cyanosis or edema  Vascular: Peripheral pulses palpable 2+ bilaterally    LABS:	 	    CBC Full  -  ( 01 Apr 2024 03:00 )  WBC Count : 21.55 K/uL  Hemoglobin : 7.4 g/dL  Hematocrit : 21.9 %  Platelet Count - Automated : 87 K/uL  Mean Cell Volume : 85.9 fL  Mean Cell Hemoglobin : 29.0 pg  Mean Cell Hemoglobin Concentration : 33.8 gm/dL  Auto Neutrophil # : x  Auto Lymphocyte # : x  Auto Monocyte # : x  Auto Eosinophil # : x  Auto Basophil # : x  Auto Neutrophil % : x  Auto Lymphocyte % : x  Auto Monocyte % : x  Auto Eosinophil % : x  Auto Basophil % : x    04-01    140  |  117<H>  |  55<H>  ----------------------------<  191<H>  3.4<L>   |  10<LL>  |  1.09  03-31    139  |  113<H>  |  58<H>  ----------------------------<  150<H>  3.9   |  10<LL>  |  1.11    Ca    6.4<LL>      01 Apr 2024 03:00  Ca    6.7<L>      31 Mar 2024 08:15  Phos  3.2     04-01  Phos  3.9     03-31  Mg     2.00     04-01  Mg     2.20     03-31    TPro  4.3<L>  /  Alb  1.2<L>  /  TBili  0.2  /  DBili  x   /  AST  16  /  ALT  16  /  AlkPhos  495<H>  04-01  TPro  4.4<L>  /  Alb  1.2<L>  /  TBili  <0.2  /  DBili  x   /  AST  14  /  ALT  15  /  AlkPhos  372<H>  03-31      	< from: TTE W or WO Ultrasound Enhancing Agent (03.30.24 @ 08:54) >    TRANSTHORACIC ECHOCARDIOGRAM REPORT  ________________________________________________________________________________                                      _______       Pt. Name:       MERLINE RODRIGUEZ Study Date:    3/30/2024  MRN:            [id number]       YOB: 1940  Accession #:    [id number]       Age:           83 years  Account#:       [id number]        Gender:        M  Heart Rate:                     Height:        66.00 in (167.64 cm)  Rhythm:                         Weight:        152.12 lb (69.00 kg)  Blood Pressure: 97/67 mmHg      BSA/BMI:       1.78 m² / 24.55 kg/m²  ________________________________________________________________________________________  Referring Physician:    Luis Barnes MD  Interpreting Physician: Duane Avitia MD  Primary Sonographer:    Augustine ISLAS    CPT:               ECHO TTE WO CON COMP W DOPP - 01668.m  Indication(s):     Other pulmonary embolism without acute cor pulmonale - I26.99  Procedure:         Transthoracic echocardiogram with 2-D, M-mode and complete                     spectral and color flow Doppler.  Ordering Location: [facility]  Admission Status:  Inpatient  Study Information: Image quality for this study is technically difficult.    _______________________________________________________________________________________     CONCLUSIONS:      1. Technically difficult image quality.   2. Patient reportedly combative during the exam.   3. Left ventricular cavity is normal in size. Left ventricular systolic function is probably nornal.   4. Severely enlarged right ventricular cavity size and reduced systolic function.   5. Right ventricular systolic function is reduced with apical sparing consistent with acute pulmonary embolism (Martinez's sign).    ________________________________________________________________________________________  FINDINGS:     Left Ventricle:  The left ventricular cavity is normal in size. Left ventricular systolic function is probably nornal.     Right Ventricle:  The right ventricular cavity is severely enlarged in size and reduced systolic function. Right ventricular systolic function is reduced with apical sparing consistent with acute pulmonary embolism (Martinez's sign).     Aortic Valve:  There is calcification of theaortic valve leaflets.     Pericardium:  Pericardial fat pad is seen anterior to the right ventricle cannot rule out trivial effusion.  ____________________________________________________________________  QUANTITATIVE DATA:     LVOT / RVOT/ Qp/Qs Data: (Indexed to BSA)  LVOT Vmax: 0.70 m/s  LVOT VTI:  9.75 cm    ________________________________________________________________________________________  Electronically signed on 3/30/2024 at 11:15:14 AM by Duane Avitia MD         *** Final ***    < end of copied text >    < from: CT Abdomen and Pelvis w/ IV Cont (03.29.24 @ 21:59) >    FINDINGS:  CHEST:  LUNGS AND LARGE AIRWAYS: Patent central airways. There is rightward   deviation of the trachea due to substernal thyroid goiter. Emphysema.   Several scattered sub-6 mm pulmonary nodules bilaterally, nonspecific.   Bibasilar atelectasis. Small nonenhancing peripheral consolidation at the   left lung base in the region of the occluded pulmonary arteries, possibly   a small pulmonary infarct.  PLEURA: No pleural effusion.  VESSELS: Breathing motion significantly limits evaluation of subsegmental   pulmonary artery branches in the right middle, right lower, lingula, and   left lower lobes. Left lower lobar pulmonary embolism extending into the   and occluding theposterobasal segmental and subsegmental branches.   Enlargement of the main pulmonary artery measuring 4.1 cm.  HEART: Elevated RV to LV ratio and straightening of the interventricular   septum suggestive of right heart strain. Heart size is normal. No   pericardial effusion  MEDIASTINUM AND JENI: No lymphadenopathy.Redemonstrated large left   heterogeneous thyroid goiter extending into the mediastinum with mass   effect on/compression of the trachea and rightward rightward tracheal   deviation, as well as compression of the esophagus.  CHEST WALL AND LOWER NECK: Within normal limits.    ABDOMEN AND PELVIS:  LIVER: Few too small to characterize hepatic hypodensities.  BILE DUCTS: Normal caliber.  GALLBLADDER: Within normal limits.  SPLEEN: Within normal limits.  PANCREAS: Within normal limits.  ADRENALS: Nonspecific nodular bilateral adrenal thickening.  KIDNEYS/URETERS: No hydronephrosis. Bilateral renal hypoattenuating foci   too small to characterize.    BLADDER: Not visualized due to streak artifact from bilateral hip   prostheses.  REPRODUCTIVE ORGANS: Not visualized due to streak artifact from bilateral   hip prostheses.    BOWEL: Duodenal diverticulum. No bowel obstruction. Appendix is normal.   Copious stool in the rectum.  PERITONEUM: No ascites.  VESSELS: Filling defect in the left common femoral vein compatible with   deep venous thrombosis (303-114). Redemonstrated endovascular repair of   abdominal aortic aneurysm. The aneurysm sac measures 7.1 cm in transverse   dimension, not significantly changed. Similar-appearing mural thrombus   within the stent with moderate narrowing. The stent is patent to the   bifurcation. The left common iliac component of the stent is chronically   occluded. The distal left external iliac artery and superficial femoral   artery is patent. The proximal portion of the left external iliac artery   is obscured by streak artifact. Coils in the region of the left internal   iliac artery. The right common iliac limb of the stent is patent.   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  RETROPERITONEUM/LYMPH NODES: No lymphadenopathy.  ABDOMINAL WALL: Large decubitus ulcer extending from L1 to the coccyx   with multiple exposed vertebral body spinous processes. There is gasseen   tracking to the left second sacral foramen and into the spinal canal   (303-80 and 605-77). Gas and soft tissue tracks into the bilateral   gluteal musculature. Rim-enhancing collection of fluid and gas in the   left posterior upper thigh measuring 5.7 x 5.4 x 2.2 cm. A collection of   fluid and gas in the right ischioanal fossa measuring 3.8 x 3.0 x 2.1 cm.  BONES: Bilateral total hip arthroplasties. Osseous erosions of the   sacrococcygeal spinous processes also concerning for osteomyelitis.   Severe degenerative changes of the spine. Grade 1 anterolisthesis L4 on   L5. Mild retrolisthesis L3 on L4.    IMPRESSION:    Acute left lower lobe pulmonary emboli extending to the posterobasal   segmental and subsegmental pulmonary branches. Right heart strain is   present. Recommend echocardiography.    Small nonenhancing peripheral opacity in the left lung base, most   suspicious for pulmonary infarct.    These findings were discussed with Dr. Bermudez by Dr. Toure at 10:15 PM on   3/29/2024, with repeat back confirmation.    Left common femoral vein DVT.    Large sacral decubitus ulcer extending from L1 to the coccyx. Gas tracks   into the spinal canal and extends to the gluteal soft tissues.    Left posterior thigh and right ischioanalfossa abscesses as above.    Exposed lumbar spinous processes and erosions of the sacrococcygeal   spinous processes most concerning for osteomyelitis.    --- End of Report ---          DENG TOURE MD; Resident Radiologist  This document has been electronically signed.  SAHIL LORENZO MD; Attending Radiologist  This document has been electronically signed. Mar 29 2024 11:20PM    < end of copied text >

## 2024-04-01 NOTE — PROGRESS NOTE ADULT - ASSESSMENT
83M from [facility] [place] CAD s/p stents, HTN, T2DM, AAA s/p graft/stent, recent UGIB, Fermin's esophagitis, GERD, BPH, Bladder Cancer, 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 Patient  presents to the emergency department from [facility] due to worsening sacral wound with imaging of osteomyelitis. During transport, HR dropped to 30 with O2 of 76, was placed on 6L NC. While in the ED, patient started having dysarthria, contraction of left upper extremity. Code stroke was called CT head and brain negative for infarct. CT Angio of chest showed an acute left lower lobe PE with findings of right heart strain with left femoral DVT. In ED, pt. started on heparin gtt. for PE, levophed started  for hypotension.     Plan   Neuro:  Alert and oriented x 1, unable to follow commands   -Code Stroke called in ED, CT HEAD negative   - A+O x 3 and conversational at baseline  #  Anxiety and depression.  - Hold mirtazapine, buspirone.    Pulmonary  #Pulmonary Embolism  -CT Angio done revealing PE with right heart strain  -ECHO done revealing severely enlarged right ventricular cavity size and reduced systolic function.   -Right ventricular systolic function is reduced with apical sparing consistent with acute pulmonary embolism (Martinez's sign).  -continue heparin gtt, PTT is therapeutic  -will transition to lovenox eventually    CARDIAC  Hypotensive in setting of septic shock  -continue levophed gtt  -added vasopressin  -maintain MAP >65    # Septic Shock  -acidosis from septic shock  -bicarb given overnight  -CMP/CBC/PT/PTT/INR ABG every 6 hours  -CVP is 3  -dc IVF   -will give 1 more unit of packed cells  -continue tube feeds  -goal CVP 6-8    #CAD (coronary artery disease).  - c/w statin  -hold aspirin for now.    GI  # hx prior UGIB w/ ulcers  - PPI IVP bid  - Jevity TF w h20 flushes  - Albumen 1.2    # BPH (benign prostatic hyperplasia).  ·  Plan: - c/w home finasteride, tamsulosin  - monitor urine output; consider bladder scan.  - timmons      Normal indices  - trend BUN/SCr, avoid nephrotoxic, renally dose all meds  - urine output is improving  -creatinine is normal 1.09 BUn is 55  - urine studies if not improving.    #  BPH (benign prostatic hyperplasia).  - Plan: - c/w home finasteride, tamsulosin  - monitor urine output; consider bladder scan.    ENDO  # diabetes mellitus.  - Home regimen metformin 1g qd, tradjenta 5mg qd  -A1c is 5.9  - c/w INOCENTE, FSG ACHS or q6h if NPO; goal glucose .      HEME  #Anemia  1 unit of packed cells today      ID  #Systemic inflammatory response syndrome (SIRS).  -wound care consult for sacral ulcer, though afebrile and no neutrophil shift can hold off on further antibiotics for now.  - Previously discharged from [facility] with 6 weeks of Augmentin/Flagyl which ended 3/4/24. Had wound vac placed 3/  - Blood cultures polymicrobial - ESBL gene detected with E. Coli, Klebsiella, Proteus, Enterococcus, and Bacteroides  - ID recs apprec - dc vanco, meropenem, and zosyn  - continue imipenem/cilastin 500 mg q6h (covers all organisms on blood cultures - we ordered)  - repeat BCx x2 in the AM    SKIN  Sacral decubitus ulcer, stage IV.   Chronic sacral and back decubiti ulcer c/b osteomyelitis. Unclear if organism identified, but was discharged from [facility] with 6 weeks of Augmentin/Flagyl due to end 3/4/24.  Wound consult to be done today at noon  Surgical consultation recommends no acute surgical intervention, patient with severe comorbidities and not a candidate at this time for bedside or OR debridement with active PE w/ RHS/levophed gtt/hep gtt.    GOALS OF CARE  -palliative care consult for comprehensive GOC discussion with family as patient with multiple severe illnesses and no mental status  -wound care MD consult as patient has previously been evaluated by the team and may have further recommendations  - can cleanse wound with soap and water, apply barrier film to periwound skin, apply medihoney to sacral wound eschar and cover with gauze/adhesive foam, change daily and PRN if soiled  - c/w imipenem appreciate ID recs    #DVT   Acute L prox great saphenous vein extending into common femoral DVT. Non occlusive on DVT study.     Access:   [facility] 3/30  Code Status: FULL CODE; would engage in further GoC  Dispo: pending clinical improvement, likely return to [facility] if able.

## 2024-04-02 NOTE — DIETITIAN INITIAL EVALUATION ADULT - ADD RECOMMEND
--- Defer to medical team for nutrition intervention.  --- RD remains available to assess and assist with nutrition plan of care.

## 2024-04-02 NOTE — DIETITIAN INITIAL EVALUATION ADULT - NSFNSGIIOFT_GEN_A_CORE
04-01-24 @ 07:01  -  04-02-24 @ 07:00  --------------------------------------------------------  OUT:  Total OUT: 0 mL    Total NET: 200 mL      04-02-24 @ 07:01  -  04-02-24 @ 10:26  --------------------------------------------------------  OUT:    Rectal Tube (mL): 0 mL  Total OUT: 0 mL    Total NET: 0 mL

## 2024-04-02 NOTE — DIETITIAN INITIAL EVALUATION ADULT - OTHER INFO
Patient seen at bedside.  Patient is comfort measures only, DNR, DNI. Per chart review, pt with hx NPO with Jevity 1.2 (3/30-4/1/24). Per palliative care note 4/1/24 family made aware and understanding to keep patient physically comfortable and give him peace and dignity with the time he has remaining. [facility]: 59.6kg (3/29/24) dosing,  76kg (3/6/24) significant wt loss x3 weeks (21.5%) possible related to acute illness.  Patient seen at bedside. Patient currently NPO. Per chart review, pt with hx NPO with Jevity 1.2 (3/30-4/1/24). Per palliative care note 4/1/24. Patient is comfort measures only, DNR, DNI.  Per Angella GACRIA weight hx: 59.6kg (3/29/24) dosing,  76kg (3/6/24) significant wt loss x3 weeks (21.5%). No GI distress noted. Per RN flowsheet last bowel movement 4/1/24.

## 2024-04-02 NOTE — PROGRESS NOTE ADULT - SUBJECTIVE AND OBJECTIVE BOX
FOLLOW UP:  sepsis  SUBJECTIVE/OBSERVATIONS:  Patient is now on comfort measures only  INTERVAL EVENTS:  Comfort measures as per patient wishes  TELE EVENTS:   NSR  Vital Signs Last 24 Hrs  T(C): 36.4 (02 Apr 2024 08:00), Max: 36.5 (01 Apr 2024 12:00)  T(F): 97.5 (02 Apr 2024 08:00), Max: 97.7 (01 Apr 2024 12:00)  HR: 97 (02 Apr 2024 08:00) (88 - 108)  BP: --  BP(mean): --  RR: 10 (02 Apr 2024 08:00) (10 - 28)  SpO2: 100% (02 Apr 2024 08:00) (95% - 100%)    Parameters below as of 02 Apr 2024 08:00  Patient On (Oxygen Delivery Method): nasal cannula  O2 Flow (L/min): 2    I&O's Summary    01 Apr 2024 07:01  -  02 Apr 2024 07:00  --------------------------------------------------------  IN: 1169.8 mL / OUT: 530 mL / NET: 639.8 mL    02 Apr 2024 07:01  -  02 Apr 2024 08:54  --------------------------------------------------------  IN: 0 mL / OUT: 50 mL / NET: -50 mL        MEDICATIONS:        acetaminophen  Suppository .. 650 milliGRAM(s) Rectal every 6 hours PRN  HYDROmorphone  Injectable 0.5 milliGRAM(s) IV Push every 1 hour PRN  HYDROmorphone  Injectable 0.2 milliGRAM(s) IV Push every 1 hour PRN  HYDROmorphone  Injectable 0.5 milliGRAM(s) IV Push every 1 hour PRN  HYDROmorphone  Injectable 0.5 milliGRAM(s) IV Push every 4 hours  LORazepam   Injectable 0.5 milliGRAM(s) IV Push every 1 hour PRN    bisacodyl Suppository 10 milliGRAM(s) Rectal daily PRN  glycopyrrolate Injectable 0.4 milliGRAM(s) IV Push every 6 hours PRN  pantoprazole  Injectable 40 milliGRAM(s) IV Push two times a day    dextrose 50% Injectable 12.5 Gram(s) IV Push once  dextrose 50% Injectable 25 Gram(s) IV Push once    chlorhexidine 2% Cloths 1 Application(s) Topical <User Schedule>  Dakins Solution - 1/4 Strength 1 Application(s) Topical two times a day      REVIEW OF SYSTEMS:      PHYSICAL EXAM:  General: NAD  Cardiovascular: Normal S1 S2, No JVD, No murmurs, No edema  Respiratory: Lungs clear to auscultation	  Gastrointestinal:  Soft, Non-tender, + BS	  Skin: warm and dry, No rashes, No ecchymoses, No cyanosis	  Extremities: Normal range of motion, No clubbing, cyanosis or edema  Vascular: Peripheral pulses palpable 2+ bilaterally    LABS:	 	    CBC Full  -  ( 01 Apr 2024 09:05 )  WBC Count : 22.30 K/uL  Hemoglobin : 7.5 g/dL  Hematocrit : 21.8 %  Platelet Count - Automated : 69 K/uL  Mean Cell Volume : 86.9 fL  Mean Cell Hemoglobin : 29.9 pg  Mean Cell Hemoglobin Concentration : 34.4 gm/dL  Auto Neutrophil # : 19.52 K/uL  Auto Lymphocyte # : 0.64 K/uL  Auto Monocyte # : 1.45 K/uL  Auto Eosinophil # : 0.00 K/uL  Auto Basophil # : 0.05 K/uL  Auto Neutrophil % : 87.5 %  Auto Lymphocyte % : 2.9 %  Auto Monocyte % : 6.5 %  Auto Eosinophil % : 0.0 %  Auto Basophil % : 0.2 %    04-01    145  |  120<H>  |  55<H>  ----------------------------<  214<H>  3.8   |  11<L>  |  1.02  04-01    140  |  117<H>  |  55<H>  ----------------------------<  191<H>  3.4<L>   |  10<LL>  |  1.09    Ca    6.5<LL>      01 Apr 2024 09:05  Ca    6.4<LL>      01 Apr 2024 03:00  Phos  2.9     04-01  Phos  3.2     04-01  Mg     2.20     04-01  Mg     2.00     04-01    TPro  4.4<L>  /  Alb  1.3<L>  /  TBili  0.2  /  DBili  x   /  AST  24  /  ALT  19  /  AlkPhos  540<H>  04-01  TPro  4.3<L>  /  Alb  1.2<L>  /  TBili  0.2  /  DBili  x   /  AST  16  /  ALT  16  /  AlkPhos  495<H>  04-01      proBNP:   Lipid Profile:   HgA1c:   TSH:       CARDIAC MARKERS:

## 2024-04-02 NOTE — PROGRESS NOTE ADULT - ASSESSMENT
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 Patient  presents to the emergency department from [facility] due to worsening sacral wound with imaging of osteomyelitis. During transport, HR dropped to 30 with O2 of 76, was placed on 6L NC. While in the ED, patient started having dysarthria, contraction of left upper extremity. Code stroke was called CT head and brain negative for infarct. CT Angio of chest showed an acute left lower lobe PE with findings of right heart strain with left femoral DVT. In ED, pt. started on heparin gtt. for PE, levophed started  for hypotension.     Plan   Neuro:  Alert and comfortable    Pulmonary  #Pulmonary Embolism  -CT Angio done revealing PE with right heart strain  -ECHO done revealing severely enlarged right ventricular cavity size and reduced systolic function.   -would like comfort measures at this time, no further heparin gtt or anticoagulation      # Septic Shock  -family opting for comfort measures    #CAD (coronary artery disease).    SKIN  Sacral decubitus ulcer, stage IV.   Chronic sacral and back decubiti ulcer c/b osteomyelitis. Unclear if organism identified, but was discharged from [facility] with 6 weeks of Augmentin/Flagyl due to end 3/4/24.  Wound consult to be done today at noon  Surgical consultation recommends no acute surgical intervention, patient with severe comorbidities and not a candidate at this time for bedside or OR debridement with active PE w/ RHS/levophed gtt/hep gtt.    GOALS OF CARE  Palliative care team recommendations greatly appreciated, assistance and support provided to family and CCU.  Patient is DNR/DNI on comfort measures no lab draws, no IV pressors, no IVantibiotics, no feeding tube

## 2024-04-02 NOTE — DIETITIAN INITIAL EVALUATION ADULT - PERTINENT LABORATORY DATA
04-01    145  |  120<H>  |  55<H>  ----------------------------<  214<H>  3.8   |  11<L>  |  1.02    Ca    6.5<LL>      01 Apr 2024 09:05  Phos  2.9     04-01  Mg     2.20     04-01    TPro  4.4<L>  /  Alb  1.3<L>  /  TBili  0.2  /  DBili  x   /  AST  24  /  ALT  19  /  AlkPhos  540<H>  04-01  A1C with Estimated Average Glucose Result: 5.9 % (03-31-24 @ 08:15)

## 2024-04-02 NOTE — PROGRESS NOTE ADULT - PROBLEM SELECTOR PLAN 9
Code Status: DNR/DNI    Chaplaincy consulted for support for patient's family as patient is [employer].    The patient does not have decision-making capacity for any medical decisions on this encounter due to encephalopathy. His HCP is his cousin, Sara Butler [phone], who should be consulted for all medical decisions.    Palliative Care will continue to follow. Do not hesitate to reach out with questions or for uncontrolled symptoms. 24-hour pager: [phone].    Note is not complete until co-signed by an Attending.    Jt Mahmood MD  Fellow in Hospice & Palliative Medicine  [facility].

## 2024-04-02 NOTE — DIETITIAN INITIAL EVALUATION ADULT - ETIOLOGY
</= 75% of estimated energy requirement for >/=1 month Inability to meet estimated needs in setting increase demand of nutrients.

## 2024-04-02 NOTE — PROGRESS NOTE ADULT - PROBLEM SELECTOR PLAN 1
Patient admitted with septic shock from source extensive, infected sacral wound. Blood cultures showing ESBL and growing E. coli, Klebsiella, Proteus, Enterococcus, Bacteriodes.   Transitioned to comfort-focused care. Per [facility] discussion, de-escalation of care with discontinuation of vasopressors and antimicrobial therapy.

## 2024-04-02 NOTE — PROGRESS NOTE ADULT - ASSESSMENT
Kevin Wade is a 83-year-old man with CAD s/p PCI, emphysema, HTN, T2DM, Fermin's esophagus, GERD, BPH, bladder cancer, recent UGIB, AAA s/p stenting, depression, anxiety, bilateral hip replacements, extensive sacral wound who is admitted with septic shock and new acute high risk PE with right heart strain.   Palliative Care is consulted for goals of care and end-of-life symptom management.

## 2024-04-02 NOTE — DIETITIAN INITIAL EVALUATION ADULT - REASON FOR ADMISSION
Per chart review, Pt is 83 years old male with PMH: HTN, T2DM, AAA s/p graft/stent, recent UGIB, Fermin's esophagitis, GERD, BPH, Bladder Cancer, 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 Patient  presents to the emergency department from [facility] due to worsening sacral wound with imaging of osteomyelitis admitted with pulmonary embolism without acute cor pulmonale

## 2024-04-02 NOTE — PROGRESS NOTE ADULT - PROBLEM SELECTOR PLAN 6
Patient at high risk for dyspnea due to high risk PE with right heart strain.    4/2 start dilaudid 0.5 mg IV q6h ATC  Dilaudid 0.5 mg IV q1h PRN for dyspnea    This regimen can be increased to dilaudid 1 mg IV q1h PRN for dyspnea pain if the above dose is insufficient for managing the patient's symptoms.

## 2024-04-02 NOTE — PROGRESS NOTE ADULT - SUBJECTIVE AND OBJECTIVE BOX
SUBJECTIVE AND OBJECTIVE:  Indication for Geriatrics and Palliative Care Services/INTERVAL HPI: Goals of care in the setting of critical illness, end-of-life symptom management    OVERNIGHT EVENTS: Patient taken off vasopressors, antimicrobial therapy, anticoagulant therapy, and transitioned to comfort-focused care in the CCU. MAPs slowly dropping. Prognosis remains hours to 1-2 days. Patient used PRN IV dilaudid x 3 for severe pain over the last 24 hours from 7 am to 7 am.    DNR on chart:DNI  DNI      Allergies    No Known Allergies    Intolerances    MEDICATIONS  (STANDING):  chlorhexidine 2% Cloths 1 Application(s) Topical <User Schedule>  Dakins Solution - 1/4 Strength 1 Application(s) Topical two times a day  dextrose 50% Injectable 25 Gram(s) IV Push once  dextrose 50% Injectable 12.5 Gram(s) IV Push once  HYDROmorphone  Injectable 0.5 milliGRAM(s) IV Push every 6 hours  pantoprazole  Injectable 40 milliGRAM(s) IV Push two times a day    MEDICATIONS  (PRN):  acetaminophen  Suppository .. 650 milliGRAM(s) Rectal every 6 hours PRN Temp greater or equal to 38C (100.4F)  bisacodyl Suppository 10 milliGRAM(s) Rectal daily PRN Constipation  glycopyrrolate Injectable 0.4 milliGRAM(s) IV Push every 6 hours PRN Excessive/Audible secretions  HYDROmorphone  Injectable 0.2 milliGRAM(s) IV Push every 1 hour PRN Moderate Pain (4 - 6)  HYDROmorphone  Injectable 0.5 milliGRAM(s) IV Push every 1 hour PRN Dyspnea  HYDROmorphone  Injectable 0.5 milliGRAM(s) IV Push every 1 hour PRN Severe Pain (7 - 10)  LORazepam   Injectable 0.5 milliGRAM(s) IV Push every 1 hour PRN Agitation      ITEMS UNCHECKED ARE NOT PRESENT    PRESENT SYMPTOMS: [x ]Unable to self-report - see  CPOT, PAINADS, RDOS below  Source if other than patient:  [ ]Family   [ ]Team     Pain:  [ ]yes [ ]no  QOL impact -   Location -                    Aggravating factors -  Quality -  Radiation -  Timing-  Severity (0-10 scale):  Minimal acceptable level (0-10 scale):     Dyspnea:                           [ ]Mild [ ]Moderate [ ]Severe  Anxiety:                             [ ]Mild [ ]Moderate [ ]Severe  Fatigue:                             [ ]Mild [ ]Moderate [ ]Severe  Nausea:                             [ ]Mild [ ]Moderate [ ]Severe  Loss of appetite:              [ ]Mild [ ]Moderate [ ]Severe  Constipation:                    [ ]Mild [ ]Moderate [ ]Severe    PCSSQ[Palliative Care Spiritual Screening Question]   Severity (0-10):  Score of 4 or > indicate consideration of Chaplaincy referral.  Chaplaincy Referral: [x ] yes [ ] refused [ ] following    Caregiver [place]? : [ ] yes [ x] no  [ ] deferred:  Social work referral [ ] Patient & Family Centered Care Referral [ ]     Anticipatory Grief present?:  [ x] yes [ ] no  [ ] deferred: Social work referral [ x] Patient & Family Centered Care Referral [ ]      Other Symptoms:  [ ]All other review of systems negative - pt is unable to self-report    PHYSICAL EXAM:  Vital Signs Last 24 Hrs  T(C): 36.5 (02 Apr 2024 12:00), Max: 36.5 (02 Apr 2024 12:00)  T(F): 97.7 (02 Apr 2024 12:00), Max: 97.7 (02 Apr 2024 12:00)  HR: 94 (02 Apr 2024 11:07) (88 - 105)  BP: --  BP(mean): --  RR: 9 (02 Apr 2024 11:07) (9 - 25)  SpO2: 100% (02 Apr 2024 11:07) (95% - 100%)    Parameters below as of 02 Apr 2024 08:00  Patient On (Oxygen Delivery Method): nasal cannula  O2 Flow (L/min): 2   I&O's Summary    01 Apr 2024 07:01  -  02 Apr 2024 07:00  --------------------------------------------------------  IN: 1169.8 mL / OUT: 530 mL / NET: 639.8 mL    02 Apr 2024 07:01  -  02 Apr 2024 12:23  --------------------------------------------------------  IN: 0 mL / OUT: 50 mL / NET: -50 mL       GENERAL: [x ]Cachexia    [ ]Alert  [ ]Oriented x   [x ]Lethargic  [ ]Unarousable  [ ]Verbal  [ ]Non-Verbal  Behavioral:   [ ] Anxiety  [x ] Delirium [ ] Agitation [ ] Other  HEENT:  [ x]Normal   [ ]Dry mouth   [ ]ET Tube/Trach  [ ]Oral lesions  PULMONARY:   [ ]Clear [ ]Tachypnea  [ ]Audible excessive secretions   [ ]Rhonchi        [ ]Right [ ]Left [ ]Bilateral  [ ]Crackles        [ ]Right [ ]Left [ ]Bilateral  [ ]Wheezing     [ ]Right [ ]Left [ ]Bilateral  [x ]Diminished breath sounds [ ]right [ ]left [x ]bilateral  CARDIOVASCULAR:    [x ]Regular [ ]Irregular [ x]Tachy  [ ]Sam [ ]Murmur [ ]Other  GASTROINTESTINAL:  [x ]Soft  [ ]Distended   [x ]+BS  [ x]Non tender [ ]Tender  [ ]Other [ ]PEG [ ]OGT/ NGT  Last BM:  GENITOURINARY:  [ ]Normal [ ] Incontinent   [ ]Oliguria/Anuria   [ x]Hale  MUSCULOSKELETAL:   [ ]Normal   [ ]Weakness  [ x]Bed/Wheelchair bound [ ]Edema  NEUROLOGIC:   [ ]No focal deficits  [ x]Cognitive impairment  [ ]Dysphagia [ ]Dysarthria [ ]Paresis [ ]Other   SKIN: see nursing documentation for extensive sacral wound  [ ]Normal  [ ]Rash  [ ]Other  [ x]Pressure ulcer(s)       Present on admission [ x]y [ ]n    CRITICAL CARE:  [x ] Shock Present  [x ]Septic [ ]Cardiogenic [ ]Neurologic [ ]Hypovolemic  [x ]  Vasopressors [ ]  Inotropes   [ ]Respiratory failure present [ ]Mechanical ventilation [ ]Non-invasive ventilatory support [ ]High flow    [ ]Acute  [ ]Chronic [ ]Hypoxic  [ ]Hypercarbic [ ]Other  [x ]Other organ failure - CNS  LABS:                        7.5    22.30 )-----------( 69       ( 01 Apr 2024 09:05 )             21.8   04-01    145  |  120<H>  |  55<H>  ----------------------------<  214<H>  3.8   |  11<L>  |  1.02    Ca    6.5<LL>      01 Apr 2024 09:05  Phos  2.9     04-01  Mg     2.20     04-01    TPro  4.4<L>  /  Alb  1.3<L>  /  TBili  0.2  /  DBili  x   /  AST  24  /  ALT  19  /  AlkPhos  540<H>  04-01  PT/INR - ( 01 Apr 2024 03:00 )   PT: 15.7 sec;   INR: 1.42 ratio         PTT - ( 01 Apr 2024 03:00 )  PTT:64.5 sec    Urinalysis Basic - ( 01 Apr 2024 09:05 )    Color: x / Appearance: x / SG: x / pH: x  Gluc: 214 mg/dL / Ketone: x  / Bili: x / Urobili: x   Blood: x / Protein: x / Nitrite: x   Leuk Esterase: x / RBC: x / WBC x   Sq Epi: x / Non Sq Epi: x / Bacteria: x      RADIOLOGY & ADDITIONAL STUDIES:  3/29 CTA chest PE protocol, CTAP w/ venous phase contrast  IMPRESSION:    Acute left lower lobe pulmonary emboli extending to the posterobasal   segmental and subsegmental pulmonary branches. Right heart strain is   present. Recommend echocardiography.    Small nonenhancing peripheral opacity in the left lung base, most   suspicious for pulmonary infarct.    These findings were discussed with Dr. Bermudez by Dr. Craig at 10:15 PM on   3/29/2024, with repeat back confirmation.    Left common femoral vein DVT.    Large sacral decubitus ulcer extending from L1 to the coccyx. Gas tracks   into the spinal canal and extends to the gluteal soft tissues.    Left posterior thigh and right ischioanal fossa abscesses as above.    Exposed lumbar spinous processes and erosions of the sacrococcygeal   spinous processes most concerning for osteomyelitis.      Protein Calorie Malnutrition Present: [ ]mild [ ]moderate [ x]severe [ x]underweight [ ]morbid obesity  https://www.andeal.org/vault/4730/web/files/ONC/Table_Clinical%20Characteristics%20to%20Document%20Malnutrition-White%20JV%20et%20al%202012.pdf    Height (cm): 188 (03-29-24 @ 21:08), 188 (03-04-24 @ 10:19)  Weight (kg): 59.6 (03-29-24 @ 21:08), 76 (03-04-24 @ 10:19)  BMI (kg/m2): 16.9 (03-29-24 @ 21:08), 21.5 (03-04-24 @ 10:19)    [x ]PPSV2 < or = 30%  [ ]significant weight loss [ ]poor nutritional intake [ ]anasarca[ ]Artificial Nutrition    Other REFERRALS:  [ ]Hospice  [ ]Child Life  [ ]Social Work  [ ]Case management [ ]Holistic Therapy     Palliative Performance Scale:  http://npcrc.org/files/news/palliative_performance_scale_ppsv2.pdf  (Ctrl +  left click to view)  Respiratory Distress Observation Tool:  https://homecareinformation.net/handouts/hen/Respiratory_Distress_Observation_Scale.pdf (Ctrl +  left click to view)  PAINAD Score:  http://geriatrictoolkit.[place].[facility]/cog/painad.pdf (Ctrl +  left click to view)       SUBJECTIVE AND OBJECTIVE:  Indication for Geriatrics and Palliative Care Services/INTERVAL HPI: Goals of care in the setting of critical illness, end-of-life symptom management    OVERNIGHT EVENTS: Patient taken off vasopressors, antimicrobial therapy, anticoagulant therapy, and transitioned to comfort-focused care in the CCU. MAPs slowly dropping. Prognosis remains hours to 1-2 days. Patient used PRN IV dilaudid x 3 for severe pain over the last 24 hours from 7 am to 7 am.    DNR on chart:DNI  DNI      Allergies    No Known Allergies    Intolerances    MEDICATIONS  (STANDING):  chlorhexidine 2% Cloths 1 Application(s) Topical <User Schedule>  Dakins Solution - 1/4 Strength 1 Application(s) Topical two times a day  dextrose 50% Injectable 25 Gram(s) IV Push once  dextrose 50% Injectable 12.5 Gram(s) IV Push once  HYDROmorphone  Injectable 0.5 milliGRAM(s) IV Push every 6 hours  pantoprazole  Injectable 40 milliGRAM(s) IV Push two times a day    MEDICATIONS  (PRN):  acetaminophen  Suppository .. 650 milliGRAM(s) Rectal every 6 hours PRN Temp greater or equal to 38C (100.4F)  bisacodyl Suppository 10 milliGRAM(s) Rectal daily PRN Constipation  glycopyrrolate Injectable 0.4 milliGRAM(s) IV Push every 6 hours PRN Excessive/Audible secretions  HYDROmorphone  Injectable 0.2 milliGRAM(s) IV Push every 1 hour PRN Moderate Pain (4 - 6)  HYDROmorphone  Injectable 0.5 milliGRAM(s) IV Push every 1 hour PRN Dyspnea  HYDROmorphone  Injectable 0.5 milliGRAM(s) IV Push every 1 hour PRN Severe Pain (7 - 10)  LORazepam   Injectable 0.5 milliGRAM(s) IV Push every 1 hour PRN Agitation      ITEMS UNCHECKED ARE NOT PRESENT    PRESENT SYMPTOMS: [x ]Unable to self-report - see  CPOT, PAINADS, RDOS below  Source if other than patient:  [ ]Family   [ ]Team     Pain:  [ ]yes [ ]no  QOL impact -   Location -                    Aggravating factors -  Quality -  Radiation -  Timing-  Severity (0-10 scale):  Minimal acceptable level (0-10 scale):     Dyspnea:                           [ ]Mild [ ]Moderate [ ]Severe  Anxiety:                             [ ]Mild [ ]Moderate [ ]Severe  Fatigue:                             [ ]Mild [ ]Moderate [ ]Severe  Nausea:                             [ ]Mild [ ]Moderate [ ]Severe  Loss of appetite:              [ ]Mild [ ]Moderate [ ]Severe  Constipation:                    [ ]Mild [ ]Moderate [ ]Severe    PCSSQ[Palliative Care Spiritual Screening Question]   Severity (0-10):  Score of 4 or > indicate consideration of Chaplaincy referral.  Chaplaincy Referral: [x ] yes [ ] refused [ ] following    Caregiver [place]? : [ ] yes [ x] no  [ ] deferred:  Social work referral [ ] Patient & Family Centered Care Referral [ ]     Anticipatory Grief present?:  [ x] yes [ ] no  [ ] deferred: Social work referral [ x] Patient & Family Centered Care Referral [ ]      Other Symptoms:  [ ]All other review of systems negative - pt is unable to self-report    PHYSICAL EXAM:  Vital Signs Last 24 Hrs  T(C): 36.5 (02 Apr 2024 12:00), Max: 36.5 (02 Apr 2024 12:00)  T(F): 97.7 (02 Apr 2024 12:00), Max: 97.7 (02 Apr 2024 12:00)  HR: 94 (02 Apr 2024 11:07) (88 - 105)  BP: --  BP(mean): --  RR: 9 (02 Apr 2024 11:07) (9 - 25)  SpO2: 100% (02 Apr 2024 11:07) (95% - 100%)    Parameters below as of 02 Apr 2024 08:00  Patient On (Oxygen Delivery Method): nasal cannula  O2 Flow (L/min): 2   I&O's Summary    01 Apr 2024 07:01  -  02 Apr 2024 07:00  --------------------------------------------------------  IN: 1169.8 mL / OUT: 530 mL / NET: 639.8 mL    02 Apr 2024 07:01  -  02 Apr 2024 12:23  --------------------------------------------------------  IN: 0 mL / OUT: 50 mL / NET: -50 mL       GENERAL: [x ]Cachexia    [ ]Alert  [ ]Oriented x   [x ]Lethargic  [ ]Unarousable  [ ]Verbal  [ ]Non-Verbal  Behavioral:   [ ] Anxiety  [x ] Delirium [ ] Agitation [ ] Other  HEENT:  [ x]Normal   [ ]Dry mouth   [ ]ET Tube/Trach  [ ]Oral lesions  PULMONARY:   [ ]Clear [ ]Tachypnea  [ ]Audible excessive secretions   [ ]Rhonchi        [ ]Right [ ]Left [ ]Bilateral  [ ]Crackles        [ ]Right [ ]Left [ ]Bilateral  [ ]Wheezing     [ ]Right [ ]Left [ ]Bilateral  [x ]Diminished breath sounds [ ]right [ ]left [x ]bilateral  CARDIOVASCULAR:    [x ]Regular [ ]Irregular [ x]Tachy  [ ]Sam [ ]Murmur [ ]Other  GASTROINTESTINAL:  [x ]Soft  [ ]Distended   [x ]+BS  [ x]Non tender [ ]Tender  [ ]Other [ ]PEG [ ]OGT/ NGT  Last BM:  GENITOURINARY:  [ ]Normal [ ] Incontinent   [ ]Oliguria/Anuria   [ x]Hale  MUSCULOSKELETAL:   [ ]Normal   [ ]Weakness  [ x]Bed/Wheelchair bound [ ]Edema  NEUROLOGIC:   [ ]No focal deficits  [ x]Cognitive impairment  [ ]Dysphagia [ ]Dysarthria [ ]Paresis [ ]Other   SKIN: see nursing documentation for extensive sacral wound  [ ]Normal  [ ]Rash  [ ]Other  [ x]Pressure ulcer(s)       Present on admission [ x]y [ ]n    CRITICAL CARE:  [x ] Shock Present  [x ]Septic [ ]Cardiogenic [ ]Neurologic [ ]Hypovolemic  [x ]  Vasopressors [ ]  Inotropes   [ ]Respiratory failure present [ ]Mechanical ventilation [ ]Non-invasive ventilatory support [ ]High flow    [ ]Acute  [ ]Chronic [ ]Hypoxic  [ ]Hypercarbic [ ]Other  [x ]Other organ failure - CNS  LABS:                        7.5    22.30 )-----------( 69       ( 01 Apr 2024 09:05 )             21.8   04-01    145  |  120<H>  |  55<H>  ----------------------------<  214<H>  3.8   |  11<L>  |  1.02    Ca    6.5<LL>      01 Apr 2024 09:05  Phos  2.9     04-01  Mg     2.20     04-01    TPro  4.4<L>  /  Alb  1.3<L>  /  TBili  0.2  /  DBili  x   /  AST  24  /  ALT  19  /  AlkPhos  540<H>  04-01  PT/INR - ( 01 Apr 2024 03:00 )   PT: 15.7 sec;   INR: 1.42 ratio         PTT - ( 01 Apr 2024 03:00 )  PTT:64.5 sec    Urinalysis Basic - ( 01 Apr 2024 09:05 )    Color: x / Appearance: x / SG: x / pH: x  Gluc: 214 mg/dL / Ketone: x  / Bili: x / Urobili: x   Blood: x / Protein: x / Nitrite: x   Leuk Esterase: x / RBC: x / WBC x   Sq Epi: x / Non Sq Epi: x / Bacteria: x      RADIOLOGY & ADDITIONAL STUDIES:  3/29 CTA chest PE protocol, CTAP w/ venous phase contrast  IMPRESSION:    Acute left lower lobe pulmonary emboli extending to the posterobasal   segmental and subsegmental pulmonary branches. Right heart strain is   present. Recommend echocardiography.    Small nonenhancing peripheral opacity in the left lung base, most   suspicious for pulmonary infarct.    These findings were discussed with Dr. Bermudez by Dr. Craig at 10:15 PM on   3/29/2024, with repeat back confirmation.    Left common femoral vein DVT.    Large sacral decubitus ulcer extending from L1 to the coccyx. Gas tracks   into the spinal canal and extends to the gluteal soft tissues.    Left posterior thigh and right ischioanal fossa abscesses as above.    Exposed lumbar spinous processes and erosions of the sacrococcygeal   spinous processes most concerning for osteomyelitis.      Protein Calorie Malnutrition Present: [ ]mild [ ]moderate [ x]severe [ x]underweight [ ]morbid obesity  https://www.andeal.org/vault/4230/web/files/ONC/Table_Clinical%20Characteristics%20to%20Document%20Malnutrition-White%20JV%20et%20al%202012.pdf    Height (cm): 188 (03-29-24 @ 21:08), 188 (03-04-24 @ 10:19)  Weight (kg): 59.6 (03-29-24 @ 21:08), 76 (03-04-24 @ 10:19)  BMI (kg/m2): 16.9 (03-29-24 @ 21:08), 21.5 (03-04-24 @ 10:19)    [x ]PPSV2 < or = 30%  [ ]significant weight loss [ ]poor nutritional intake [ ]anasarca[ ]Artificial Nutrition    Other REFERRALS:  [ ]Hospice  [ ]Child Life  [ ]Social Work  [x ]Case management [ ]Holistic Therapy     Palliative Performance Scale:  http://npcrc.org/files/news/palliative_performance_scale_ppsv2.pdf  (Ctrl +  left click to view)  Respiratory Distress Observation Tool:  https://homecareinformation.net/handouts/hen/Respiratory_Distress_Observation_Scale.pdf (Ctrl +  left click to view)  PAINAD Score:  http://geriatrictoolkit.[place].[facility]/cog/painad.pdf (Ctrl +  left click to view)

## 2024-04-02 NOTE — PROGRESS NOTE ADULT - PROBLEM SELECTOR PLAN 8
See [facility] documentation 4/1.   The patient is transitioned to comfort-focused care with DNR/DNI code status. Code Status: DNR/DNI. The patient is transitioned to comfort-focused care.     Chaplaincy consulted for support for patient's family as patient is [employer].    The patient does not have decision-making capacity for any medical decisions on this encounter due to encephalopathy. His HCP is his cousin, Sara Butler [phone], who should be consulted for all medical decisions.    Palliative Care will continue to follow. Do not hesitate to reach out with questions or for uncontrolled symptoms. 24-hour pager: [phone].    Note is not complete until co-signed by an Attending.    Jt Mahmood MD  Fellow in Hospice & Palliative Medicine  [facility].

## 2024-04-02 NOTE — PROGRESS NOTE ADULT - CRITICAL CARE ATTENDING COMMENT
83 year old man from [place] with CAD sp PCI, AAA sp repair, HTN, DM recent UGI Bleed with Barrets and GERD, stage 4 sacral decub with osteomyelitis. Transferred from [place] for assessment of worsening wound and then became bradycardic and hypoxic CTA with PE and right heart strain with DVTs  Now on pressors for hypotension in setting of sepsis.  Comfort care- palliative input appreciated      #Palliative- consult appreciated  Patient DNR/DNI  Pain meds

## 2024-04-02 NOTE — PROGRESS NOTE ADULT - PROBLEM SELECTOR PLAN 4
Patient at high risk for pain due to extensive sacral wound and critical illness.    4/2 start dilaudid 0.5 mg IV q6h ATC  Continue dilaudid 0.2 mg IV q1h PRN for moderate pain  Continue dilaudid 0.5 mg IV q1h PRN for severe pain    This regimen can be increased to dilaudid 0.5/1 mg IV q1h PRN for moderate/severe pain if the above doses are insufficient for managing the patient's symptoms.

## 2024-04-02 NOTE — PROGRESS NOTE ADULT - ATTENDING COMMENTS
Patient was seen and evaluated with Dr. Mahmood, Palliative Medicine Fellow, during bedside rounds.   Agree with above findings and recommendations, edited documentation as appropriate to reflect the plan of care discussed with patient and myself with the following additions:    83-year-old man with CAD s/p PCI, emphysema, HTN, T2DM, Fermin's esophagus, GERD, BPH, bladder cancer, recent UGIB, AAA s/p stenting, depression, anxiety, bilateral hip replacements, extensive sacral wound who is admitted with septic shock and new acute high risk PE with right heart strain. Blood cultures showing ESBL and growing E. coli, Klebsiella, Proteus, Enterococcus, Bacteriodes. CXRAY 3/30- Imaging personally reviewed by me. No consolidation noted.   Palliative Care consulted for complex decision making  related to goals of care discussions in the setting of advanced illness/ evaluation and management of pain/ symptoms    Patient seen and examined at bedside. Patient awakes to voice after third attempt; opens eyes then closes them.   Per bedside RN, pain seems uncomfortable about 4 hours after prn doses, but does have adequate relief after prn dose is given.   BP : 82/49 during encounter     - Start IV Dilaudid 0.5mg q6 ATC as patient with extensive decub ulcer   - IV Dilaudid 0.2mg q1 PRN moderate pain  - IV Dilaudid 0.5mg q1 PRN severe pain/ dyspnea  - Bowel regimen while on opiates    Case reviewed with CCU team.  Patient with decreasing blood pressure; patient likely unstable for transition to inpatient hospice setting, but will continue to assess.   Thank you for allowing us to participate in your patient's care. Please page [phone] for any questions/concerns.

## 2024-04-02 NOTE — DIETITIAN INITIAL EVALUATION ADULT - PERTINENT MEDS FT
MEDICATIONS  (STANDING):  chlorhexidine 2% Cloths 1 Application(s) Topical <User Schedule>  Dakins Solution - 1/4 Strength 1 Application(s) Topical two times a day  dextrose 50% Injectable 12.5 Gram(s) IV Push once  dextrose 50% Injectable 25 Gram(s) IV Push once  HYDROmorphone  Injectable 0.5 milliGRAM(s) IV Push every 6 hours  pantoprazole  Injectable 40 milliGRAM(s) IV Push two times a day    MEDICATIONS  (PRN):  acetaminophen  Suppository .. 650 milliGRAM(s) Rectal every 6 hours PRN Temp greater or equal to 38C (100.4F)  bisacodyl Suppository 10 milliGRAM(s) Rectal daily PRN Constipation  glycopyrrolate Injectable 0.4 milliGRAM(s) IV Push every 6 hours PRN Excessive/Audible secretions  HYDROmorphone  Injectable 0.2 milliGRAM(s) IV Push every 1 hour PRN Moderate Pain (4 - 6)  HYDROmorphone  Injectable 0.5 milliGRAM(s) IV Push every 1 hour PRN Dyspnea  HYDROmorphone  Injectable 0.5 milliGRAM(s) IV Push every 1 hour PRN Severe Pain (7 - 10)  LORazepam   Injectable 0.5 milliGRAM(s) IV Push every 1 hour PRN Agitation

## 2024-04-02 NOTE — DIETITIAN INITIAL EVALUATION ADULT - ORAL INTAKE PTA/DIET HISTORY
Patient is unable to provide nutritional related hx. Reviewed chart form [facility] with no nutritional record.  Patient is unable to provide nutritional related hx related to current clinical status. Reviewed chart from [facility] with no nutritional record.

## 2024-04-03 NOTE — PROGRESS NOTE ADULT - PROBLEM SELECTOR PLAN 7
Ativan 0.5 mg IV q1h PRN for agitation/anxiety    This regimen can be increased to ativan 1 mg IV q1h PRN for agitation/anxiety if the above dose is insufficient for managing the patient's symptoms. Code Status: DNR/DNI. The patient is transitioned to comfort-focused care.     Chaplaincy consulted for support for patient's family as patient is [employer].    The patient does not have decision-making capacity for any medical decisions on this encounter due to encephalopathy. His HCP is his cousin, Sara Butler [phone], who should be consulted for all medical decisions.    Palliative Care will continue to follow. Do not hesitate to reach out with questions or for uncontrolled symptoms. 24-hour pager: [phone].    Note is not complete until co-signed by an Attending.    Jt Mahmood MD  Fellow in Hospice & Palliative Medicine  [facility].

## 2024-04-03 NOTE — PROGRESS NOTE ADULT - ASSESSMENT
83M from [facility] [place] CAD s/p stents, HTN, T2DM, AAA s/p graft/stent, recent UGIB, Fermin's esophagitis, GERD, BPH, Bladder Cancer, 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 Patient  presents to the emergency department from [facility] due to worsening sacral wound with imaging of osteomyelitis. During transport, HR dropped to 30 with O2 of 76, was placed on 6L NC. While in the ED, patient started having dysarthria, contraction of left upper extremity. Code stroke was called CT head and brain negative for infarct. CT Angio of chest showed an acute left lower lobe PE with findings of right heart strain with left femoral DVT. In ED, pt. started on heparin gtt. for PE, levophed started  for hypotension.       Neuro:  Alert x 0 and comfortable    Pulmonary:  #Pulmonary Embolism  -CT Angio done revealing PE with right heart strain  -ECHO done revealing severely enlarged right ventricular cavity size and reduced systolic function.   -would like comfort measures at this time, no further heparin gtt or anticoagulation      # Septic Shock  -family opting for comfort measures    #CAD (coronary artery disease).  -see above   SKIN  Sacral decubitus ulcer, stage IV.   Chronic sacral and back decubiti ulcer c/b osteomyelitis. Unclear if organism identified, but was discharged from [facility] with 6 weeks of Augmentin/Flagyl due to end 3/4/24.  Wound consult to be done today at noon  Surgical consultation recommends no acute surgical intervention, patient with severe comorbidities and not a candidate at this time for bedside or OR debridement with active PE w/ RHS/levophed gtt/hep gtt.    GOALS OF CARE  Palliative care team recommendations greatly appreciated, assistance and support provided to family and CCU.  Patient is DNR/DNI on comfort measures no lab draws, no IV pressors, no IVantibiotics, no feeding tube

## 2024-04-03 NOTE — PROGRESS NOTE ADULT - CRITICAL CARE ATTENDING COMMENT
83 year old man from [place] with CAD sp PCI, AAA sp repair, HTN, DM recent UGI Bleed with Barrets and GERD, stage 4 sacral decub with osteomyelitis. Transferred from [place] for assessment of worsening wound and then became bradycardic and hypoxic CTA with PE and right heart strain with DVTs  Now on pressors for hypotension in setting of sepsis.  Comfort care- palliative input appreciated      #Palliative- consult appreciated  Patient DNR/DNI  Pain meds

## 2024-04-03 NOTE — PROGRESS NOTE ADULT - ASSESSMENT
Kevin Wade is a 83-year-old man with CAD s/p PCI, emphysema, HTN, T2DM, Fermin's esophagus, GERD, BPH, bladder cancer, recent UGIB, AAA s/p stenting, depression, anxiety, bilateral hip replacements, extensive sacral wound who is admitted with septic shock and new acute high risk PE with right heart strain.   Palliative Care is consulted for goals of care and end-of-life symptom management. Prognosis remains limited to hours to 1-2 days.

## 2024-04-03 NOTE — PROGRESS NOTE ADULT - SUBJECTIVE AND OBJECTIVE BOX
SUBJECTIVE AND OBJECTIVE:  Indication for Geriatrics and Palliative Care Services/INTERVAL HPI: Goals of care in the setting of critical illness, end-of-life symptom management    OVERNIGHT EVENTS: Patient remains comfortable overall. Patient delirious this morning. Patient required PRN IV dilaudid x 3 for dyspnea/severe pain over the last 24 hours from 7 am to 7 am.    DNR on chart:DNI  DNI      Allergies    No Known Allergies    Intolerances    MEDICATIONS  (STANDING):  chlorhexidine 2% Cloths 1 Application(s) Topical <User Schedule>  Dakins Solution - 1/4 Strength 1 Application(s) Topical two times a day  dextrose 50% Injectable 12.5 Gram(s) IV Push once  dextrose 50% Injectable 25 Gram(s) IV Push once  HYDROmorphone  Injectable 0.5 milliGRAM(s) IV Push every 6 hours  pantoprazole  Injectable 40 milliGRAM(s) IV Push two times a day    MEDICATIONS  (PRN):  acetaminophen  Suppository .. 650 milliGRAM(s) Rectal every 6 hours PRN Temp greater or equal to 38C (100.4F)  bisacodyl Suppository 10 milliGRAM(s) Rectal daily PRN Constipation  glycopyrrolate Injectable 0.4 milliGRAM(s) IV Push every 6 hours PRN Excessive/Audible secretions  HYDROmorphone  Injectable 0.2 milliGRAM(s) IV Push every 1 hour PRN Moderate Pain (4 - 6)  HYDROmorphone  Injectable 0.5 milliGRAM(s) IV Push every 1 hour PRN Dyspnea  HYDROmorphone  Injectable 0.5 milliGRAM(s) IV Push every 1 hour PRN Severe Pain (7 - 10)  LORazepam   Injectable 0.5 milliGRAM(s) IV Push every 1 hour PRN Agitation      ITEMS UNCHECKED ARE NOT PRESENT    PRESENT SYMPTOMS: [x ]Unable to self-report - see  CPOT, PAINADS, RDOS below  Source if other than patient:  [ ]Family   [ ]Team     Pain:  [ ]yes [ ]no  QOL impact -   Location -                    Aggravating factors -  Quality -  Radiation -  Timing-  Severity (0-10 scale):  Minimal acceptable level (0-10 scale):     Dyspnea:                           [ ]Mild [ ]Moderate [ ]Severe  Anxiety:                             [ ]Mild [ ]Moderate [ ]Severe  Fatigue:                             [ ]Mild [ ]Moderate [ ]Severe  Nausea:                             [ ]Mild [ ]Moderate [ ]Severe  Loss of appetite:              [ ]Mild [ ]Moderate [ ]Severe  Constipation:                    [ ]Mild [ ]Moderate [ ]Severe    PCSSQ[Palliative Care Spiritual Screening Question]   Severity (0-10):  Score of 4 or > indicate consideration of Chaplaincy referral.  Chaplaincy Referral: [x ] yes [ ] refused [ ] following    Caregiver [place]? : [ ] yes [ x] no  [ ] deferred:  Social work referral [ ] Patient & Family Centered Care Referral [ ]     Anticipatory Grief present?:  [ x] yes [ ] no  [ ] deferred: Social work referral [ x] Patient & Family Centered Care Referral [ ]      Other Symptoms:  [ ]All other review of systems negative - pt is unable to self-report    PHYSICAL EXAM:  Vital Signs Last 24 Hrs  T(C): 36.8 (03 Apr 2024 12:00), Max: 37.1 (03 Apr 2024 00:00)  T(F): 98.2 (03 Apr 2024 12:00), Max: 98.8 (03 Apr 2024 00:00)  HR: 109 (03 Apr 2024 12:00) (89 - 128)  BP: --  BP(mean): --  RR: 16 (03 Apr 2024 12:00) (4 - 17)  SpO2: 98% (03 Apr 2024 06:00) (98% - 100%)    Parameters below as of 02 Apr 2024 20:00  Patient On (Oxygen Delivery Method): nasal cannula    I&O's Detail    02 Apr 2024 07:01  -  03 Apr 2024 07:00  --------------------------------------------------------  IN:  Total IN: 0 mL    OUT:    Indwelling Catheter - Urethral (mL): 520 mL    IV PiggyBack: 0 mL    Oral Fluid: 0 mL    PRBCs (Packed Red Blood Cells): 0 mL    Rectal Tube (mL): 0 mL  Total OUT: 520 mL    Total NET: -520 mL       GENERAL: [x ]Cachexia    [ ]Alert  [ ]Oriented x   [x ]Lethargic  [ ]Unarousable  [ x]Verbal - intermittently  [ ]Non-Verbal  Behavioral:   [ ] Anxiety  [x ] Delirium [ ] Agitation [ ] Other  HEENT:  [ x]Normal   [ ]Dry mouth   [ ]ET Tube/Trach  [ ]Oral lesions  PULMONARY:   [ ]Clear [ ]Tachypnea  [ ]Audible excessive secretions   [ ]Rhonchi        [ ]Right [ ]Left [ ]Bilateral  [ ]Crackles        [ ]Right [ ]Left [ ]Bilateral  [ ]Wheezing     [ ]Right [ ]Left [ ]Bilateral  [x ]Diminished breath sounds [ ]right [ ]left [x ]bilateral  CARDIOVASCULAR:    [x ]Regular [ ]Irregular [ x]Tachy  [ ]Sam [ ]Murmur [ ]Other  GASTROINTESTINAL:  [x ]Soft  [ ]Distended   [x ]+BS  [ x]Non tender [ ]Tender  [ ]Other [ ]PEG [ ]OGT/ NGT  Last BM:  GENITOURINARY:  [ ]Normal [ ] Incontinent   [ ]Oliguria/Anuria   [ x]Hale  MUSCULOSKELETAL:   [ ]Normal   [ ]Weakness  [ x]Bed/Wheelchair bound [ ]Edema  NEUROLOGIC:   [ ]No focal deficits  [ x]Cognitive impairment  [ ]Dysphagia [ ]Dysarthria [ ]Paresis [ ]Other   SKIN: see nursing documentation for extensive sacral wound  [ ]Normal  [ ]Rash  [ ]Other  [ x]Pressure ulcer(s)       Present on admission [ x]y [ ]n    CRITICAL CARE:  [x ] Shock Present  [x ]Septic [ ]Cardiogenic [ ]Neurologic [ ]Hypovolemic  [x ]  Vasopressors [ ]  Inotropes   [ ]Respiratory failure present [ ]Mechanical ventilation [ ]Non-invasive ventilatory support [ ]High flow    [ ]Acute  [ ]Chronic [ ]Hypoxic  [ ]Hypercarbic [ ]Other  [x ]Other organ failure - CNS  LABS:                        7.5    22.30 )-----------( 69       ( 01 Apr 2024 09:05 )             21.8   04-01    145  |  120<H>  |  55<H>  ----------------------------<  214<H>  3.8   |  11<L>  |  1.02    Ca    6.5<LL>      01 Apr 2024 09:05  Phos  2.9     04-01  Mg     2.20     04-01    TPro  4.4<L>  /  Alb  1.3<L>  /  TBili  0.2  /  DBili  x   /  AST  24  /  ALT  19  /  AlkPhos  540<H>  04-01  PT/INR - ( 01 Apr 2024 03:00 )   PT: 15.7 sec;   INR: 1.42 ratio         PTT - ( 01 Apr 2024 03:00 )  PTT:64.5 sec    Urinalysis Basic - ( 01 Apr 2024 09:05 )    Color: x / Appearance: x / SG: x / pH: x  Gluc: 214 mg/dL / Ketone: x  / Bili: x / Urobili: x   Blood: x / Protein: x / Nitrite: x   Leuk Esterase: x / RBC: x / WBC x   Sq Epi: x / Non Sq Epi: x / Bacteria: x      RADIOLOGY & ADDITIONAL STUDIES:  3/29 CTA chest PE protocol, CTAP w/ venous phase contrast  IMPRESSION:    Acute left lower lobe pulmonary emboli extending to the posterobasal   segmental and subsegmental pulmonary branches. Right heart strain is   present. Recommend echocardiography.    Small nonenhancing peripheral opacity in the left lung base, most   suspicious for pulmonary infarct.    These findings were discussed with Dr. Bermudez by Dr. Craig at 10:15 PM on   3/29/2024, with repeat back confirmation.    Left common femoral vein DVT.    Large sacral decubitus ulcer extending from L1 to the coccyx. Gas tracks   into the spinal canal and extends to the gluteal soft tissues.    Left posterior thigh and right ischioanal fossa abscesses as above.    Exposed lumbar spinous processes and erosions of the sacrococcygeal   spinous processes most concerning for osteomyelitis.      Protein Calorie Malnutrition Present: [ ]mild [ ]moderate [ x]severe [ x]underweight [ ]morbid obesity  https://www.andeal.org/vault/2440/web/files/ONC/Table_Clinical%20Characteristics%20to%20Document%20Malnutrition-White%20JV%20et%20al%202012.pdf    Height (cm): 188 (03-29-24 @ 21:08), 188 (03-04-24 @ 10:19)  Weight (kg): 59.6 (03-29-24 @ 21:08), 76 (03-04-24 @ 10:19)  BMI (kg/m2): 16.9 (03-29-24 @ 21:08), 21.5 (03-04-24 @ 10:19)    [x ]PPSV2 < or = 30%  [ ]significant weight loss [ ]poor nutritional intake [ ]anasarca[ ]Artificial Nutrition    Other REFERRALS:  [ ]Hospice  [ ]Child Life  [ ]Social Work  [x ]Case management [ ]Holistic Therapy     Palliative Performance Scale:  http://npcrc.org/files/news/palliative_performance_scale_ppsv2.pdf  (Ctrl +  left click to view)  Respiratory Distress Observation Tool:  https://homecareinformation.net/handouts/hen/Respiratory_Distress_Observation_Scale.pdf (Ctrl +  left click to view)  PAINAD Score:  http://geriatrictoolkit.[place].[facility] [place]/cog/painad.pdf (Ctrl +  left click to view)       SUBJECTIVE AND OBJECTIVE:  Indication for Geriatrics and Palliative Care Services/INTERVAL HPI: Goals of care in the setting of critical illness, end-of-life symptom management    OVERNIGHT EVENTS: Patient remains comfortable overall. Patient delirious this morning. Patient required PRN IV Dilaudid x 3 for dyspnea/severe pain over the last 24 hours from 7 am to 7 am.    DNR on chart:DNI  DNI      Allergies    No Known Allergies    Intolerances    MEDICATIONS  (STANDING):  chlorhexidine 2% Cloths 1 Application(s) Topical <User Schedule>  Dakins Solution - 1/4 Strength 1 Application(s) Topical two times a day  dextrose 50% Injectable 12.5 Gram(s) IV Push once  dextrose 50% Injectable 25 Gram(s) IV Push once  HYDROmorphone  Injectable 0.5 milliGRAM(s) IV Push every 6 hours  pantoprazole  Injectable 40 milliGRAM(s) IV Push two times a day    MEDICATIONS  (PRN):  acetaminophen  Suppository .. 650 milliGRAM(s) Rectal every 6 hours PRN Temp greater or equal to 38C (100.4F)  bisacodyl Suppository 10 milliGRAM(s) Rectal daily PRN Constipation  glycopyrrolate Injectable 0.4 milliGRAM(s) IV Push every 6 hours PRN Excessive/Audible secretions  HYDROmorphone  Injectable 0.2 milliGRAM(s) IV Push every 1 hour PRN Moderate Pain (4 - 6)  HYDROmorphone  Injectable 0.5 milliGRAM(s) IV Push every 1 hour PRN Dyspnea  HYDROmorphone  Injectable 0.5 milliGRAM(s) IV Push every 1 hour PRN Severe Pain (7 - 10)  LORazepam   Injectable 0.5 milliGRAM(s) IV Push every 1 hour PRN Agitation      ITEMS UNCHECKED ARE NOT PRESENT    PRESENT SYMPTOMS: [x ]Unable to self-report - see  CPOT, PAINADS, RDOS below  Source if other than patient:  [ ]Family   [ ]Team     Pain:  [ ]yes [ ]no  QOL impact -   Location -                    Aggravating factors -  Quality -  Radiation -  Timing-  Severity (0-10 scale):  Minimal acceptable level (0-10 scale):     Dyspnea:                           [ ]Mild [ ]Moderate [ ]Severe  Anxiety:                             [ ]Mild [ ]Moderate [ ]Severe  Fatigue:                             [ ]Mild [ ]Moderate [ ]Severe  Nausea:                             [ ]Mild [ ]Moderate [ ]Severe  Loss of appetite:              [ ]Mild [ ]Moderate [ ]Severe  Constipation:                    [ ]Mild [ ]Moderate [ ]Severe    PCSSQ[Palliative Care Spiritual Screening Question]   Severity (0-10):  Score of 4 or > indicate consideration of Chaplaincy referral.  Chaplaincy Referral: [x ] yes [ ] refused [ ] following    Caregiver [place]? : [ ] yes [ x] no  [ ] deferred:  Social work referral [ ] Patient & Family Centered Care Referral [ ]     Anticipatory Grief present?:  [ x] yes [ ] no  [ ] deferred: Social work referral [ x] Patient & Family Centered Care Referral [ ]      Other Symptoms:  [ ]All other review of systems negative - pt is unable to self-report    PHYSICAL EXAM:  Vital Signs Last 24 Hrs  T(C): 36.8 (03 Apr 2024 12:00), Max: 37.1 (03 Apr 2024 00:00)  T(F): 98.2 (03 Apr 2024 12:00), Max: 98.8 (03 Apr 2024 00:00)  HR: 109 (03 Apr 2024 12:00) (89 - 128)  BP: --  BP(mean): --  RR: 16 (03 Apr 2024 12:00) (4 - 17)  SpO2: 98% (03 Apr 2024 06:00) (98% - 100%)    Parameters below as of 02 Apr 2024 20:00  Patient On (Oxygen Delivery Method): nasal cannula    I&O's Detail    02 Apr 2024 07:01  -  03 Apr 2024 07:00  --------------------------------------------------------  IN:  Total IN: 0 mL    OUT:    Indwelling Catheter - Urethral (mL): 520 mL    IV PiggyBack: 0 mL    Oral Fluid: 0 mL    PRBCs (Packed Red Blood Cells): 0 mL    Rectal Tube (mL): 0 mL  Total OUT: 520 mL    Total NET: -520 mL       GENERAL: [x ]Cachexia    [ ]Alert  [ ]Oriented x   [x ]Lethargic  [ ]Unarousable  [ x]Verbal - intermittently  [ ]Non-Verbal  Behavioral:   [ ] Anxiety  [x ] Delirium [ ] Agitation [ ] Other  HEENT:  [ x]Normal   [ ]Dry mouth   [ ]ET Tube/Trach  [ ]Oral lesions  PULMONARY:   [ ]Clear [ ]Tachypnea  [ ]Audible excessive secretions   [ ]Rhonchi        [ ]Right [ ]Left [ ]Bilateral  [ ]Crackles        [ ]Right [ ]Left [ ]Bilateral  [ ]Wheezing     [ ]Right [ ]Left [ ]Bilateral  [x ]Diminished breath sounds [ ]right [ ]left [x ]bilateral  CARDIOVASCULAR:    [x ]Regular [ ]Irregular [ x]Tachy  [ ]Sam [ ]Murmur [ ]Other  GASTROINTESTINAL:  [x ]Soft  [ ]Distended   [x ]+BS  [ x]Non tender [ ]Tender  [ ]Other [ ]PEG [ ]OGT/ NGT  Last BM:  GENITOURINARY:  [ ]Normal [ ] Incontinent   [ ]Oliguria/Anuria   [ x]Hale  MUSCULOSKELETAL:   [ ]Normal   [ ]Weakness  [ x]Bed/Wheelchair bound [ ]Edema  NEUROLOGIC:   [ ]No focal deficits  [ x]Cognitive impairment  [ ]Dysphagia [ ]Dysarthria [ ]Paresis [ ]Other   SKIN: see nursing documentation for extensive sacral wound  [ ]Normal  [ ]Rash  [ ]Other  [ x]Pressure ulcer(s)       Present on admission [ x]y [ ]n    CRITICAL CARE:  [x ] Shock Present  [x ]Septic [ ]Cardiogenic [ ]Neurologic [ ]Hypovolemic  [x ]  Vasopressors [ ]  Inotropes   [ ]Respiratory failure present [ ]Mechanical ventilation [ ]Non-invasive ventilatory support [ ]High flow    [ ]Acute  [ ]Chronic [ ]Hypoxic  [ ]Hypercarbic [ ]Other  [x ]Other organ failure - CNS  LABS:                        7.5    22.30 )-----------( 69       ( 01 Apr 2024 09:05 )             21.8   04-01    145  |  120<H>  |  55<H>  ----------------------------<  214<H>  3.8   |  11<L>  |  1.02    Ca    6.5<LL>      01 Apr 2024 09:05  Phos  2.9     04-01  Mg     2.20     04-01    TPro  4.4<L>  /  Alb  1.3<L>  /  TBili  0.2  /  DBili  x   /  AST  24  /  ALT  19  /  AlkPhos  540<H>  04-01  PT/INR - ( 01 Apr 2024 03:00 )   PT: 15.7 sec;   INR: 1.42 ratio         PTT - ( 01 Apr 2024 03:00 )  PTT:64.5 sec    Urinalysis Basic - ( 01 Apr 2024 09:05 )    Color: x / Appearance: x / SG: x / pH: x  Gluc: 214 mg/dL / Ketone: x  / Bili: x / Urobili: x   Blood: x / Protein: x / Nitrite: x   Leuk Esterase: x / RBC: x / WBC x   Sq Epi: x / Non Sq Epi: x / Bacteria: x      RADIOLOGY & ADDITIONAL STUDIES:  3/29 CTA chest PE protocol, CTAP w/ venous phase contrast  IMPRESSION:    Acute left lower lobe pulmonary emboli extending to the posterobasal   segmental and subsegmental pulmonary branches. Right heart strain is   present. Recommend echocardiography.    Small nonenhancing peripheral opacity in the left lung base, most   suspicious for pulmonary infarct.    These findings were discussed with Dr. Bermudez by Dr. Craig at 10:15 PM on   3/29/2024, with repeat back confirmation.    Left common femoral vein DVT.    Large sacral decubitus ulcer extending from L1 to the coccyx. Gas tracks   into the spinal canal and extends to the gluteal soft tissues.    Left posterior thigh and right ischioanal fossa abscesses as above.    Exposed lumbar spinous processes and erosions of the sacrococcygeal   spinous processes most concerning for osteomyelitis.      Protein Calorie Malnutrition Present: [ ]mild [ ]moderate [ x]severe [ x]underweight [ ]morbid obesity  https://www.andeal.org/vault/2440/web/files/ONC/Table_Clinical%20Characteristics%20to%20Document%20Malnutrition-White%20JV%20et%20al%202012.pdf    Height (cm): 188 (03-29-24 @ 21:08), 188 (03-04-24 @ 10:19)  Weight (kg): 59.6 (03-29-24 @ 21:08), 76 (03-04-24 @ 10:19)  BMI (kg/m2): 16.9 (03-29-24 @ 21:08), 21.5 (03-04-24 @ 10:19)    [x ]PPSV2 < or = 30%  [ ]significant weight loss [ ]poor nutritional intake [ ]anasarca[ ]Artificial Nutrition    Other REFERRALS:  [ ]Hospice  [ ]Child Life  [ ]Social Work  [x ]Case management [ ]Holistic Therapy     Palliative Performance Scale:  http://npcrc.org/files/news/palliative_performance_scale_ppsv2.pdf  (Ctrl +  left click to view)  Respiratory Distress Observation Tool:  https://homecareinformation.net/handouts/hen/Respiratory_Distress_Observation_Scale.pdf (Ctrl +  left click to view)  PAINAD Score:  http://geriatrictoolkit.[place].[facility]/cog/painad.pdf (Ctrl +  left click to view)

## 2024-04-03 NOTE — PROGRESS NOTE ADULT - ATTENDING COMMENTS
Patient was seen and evaluated with Dr. Mahmood, Palliative Medicine Fellow, during bedside rounds.   Agree with above findings and recommendations, edited documentation as appropriate to reflect the plan of care discussed with patient and myself with the following additions:    83-year-old man with CAD s/p PCI, emphysema, HTN, T2DM, Fermin's esophagus, GERD, BPH, bladder cancer, recent UGIB, AAA s/p stenting, depression, anxiety, bilateral hip replacements, extensive sacral wound who is admitted with septic shock and new acute high risk PE with right heart strain. Blood cultures showing ESBL and growing E. coli, Klebsiella, Proteus, Enterococcus, Bacteriodes. CXRAY 3/30- Imaging personally reviewed by me. No consolidation noted.   Palliative Care consulted for complex decision making  related to goals of care discussions in the setting of advanced illness/ evaluation and management of pain/ symptoms    BP : 78/52 in afternoon  In past 24 hours, received total of 7 doses of IV Dilaudid 0.5mg, 2 doses of IV Robinul   Patient seen and examined at bedside. Patient awake with delirium, intermittently asking for help, but when asked to elaborate how we can help unable to articulate his needs. Denies pain when asked.   Per bedside RN, pain controlled with current regimen     - IV Dilaudid 0.5mg q6 ATC as patient with extensive decub ulcer ; will continue to monitor for assess for role of dilaudid gtt initiation   - IV Dilaudid 0.2mg q1 PRN moderate pain  - IV Dilaudid 0.5mg q1 PRN severe pain/ dyspnea  - IV Ativan 0.5mg q1 PRN for anxiety/ agitation   - IV Robinul 0.4mg q6 PRN secretions  - Bowel regimen while on opiates    Case reviewed with CCU team.  Patient with decreasing blood pressure; patient likely unstable for transfer to inpatient hospice setting at this time   Thank you for allowing us to participate in your patient's care. Please page [phone] for any questions/concerns.

## 2024-04-03 NOTE — PROGRESS NOTE ADULT - SUBJECTIVE AND OBJECTIVE BOX
Luis Camejo NP  CCU Service  Cardiology   Spect: [phone] ([facility])     PATIENT: MERLINE RODRIGUEZ, MRN: [id number]    CHIEF COMPLAINT: Patient is a 83y old  Male who presents with a chief complaint of Sepsis (2024 12:22)      INTERVAL HISTORY/OVERNIGHT EVENTS: On comfort care, does not appeared to be in pain.  Lethargic.     REVIEW OF SYSTEMS:    Constitutional:     [ ] negative [ ] fevers [ ] chills [ ] weight loss [ ] weight gain  HEENT:                  [ ] negative [ ] dry eyes [ ] eye irritation [ ] postnasal drip [ ] nasal congestion  CV:                         [ ] negative  [ ] chest pain [ ] orthopnea [ ] palpitations [ ] murmur  Resp:                     [ ] negative [ ] cough [ ] shortness of breath [ ] dyspnea [ ] wheezing [ ] sputum [ ] hemoptysis  GI:                          [ ] negative [ ] nausea [ ] vomiting [ ] diarrhea [ ] constipation [ ] abd pain [ ] dysphagia   :                        [ ] negative [ ] dysuria [ ] nocturia [ ] hematuria [ ] increased urinary frequency  Musculoskeletal: [ ] negative [ ] back pain [ ] myalgias [ ] arthralgias [ ] fracture  Skin:                       [ ] negative [ ] rash [ ] itch  Neurological:        [ ] negative [ ] headache [ ] dizziness [ ] syncope [ ] weakness [ ] numbness  Psychiatric:           [ ] negative [ ] anxiety [ ] depression  Endocrine:            [ ] negative [ ] diabetes [ ] thyroid problem  Heme/Lymph:      [ ] negative [ ] anemia [ ] bleeding problem  Allergic/Immune: [ ] negative [ ] itchy eyes [ ] nasal discharge [ ] hives [ ] angioedema    [ ] All other systems negative  [x ] Unable to assess ROS because ________.    MEDICATIONS:  MEDICATIONS  (STANDING):  chlorhexidine 2% Cloths 1 Application(s) Topical <User Schedule>  Dakins Solution - 1/4 Strength 1 Application(s) Topical two times a day  dextrose 50% Injectable 12.5 Gram(s) IV Push once  dextrose 50% Injectable 25 Gram(s) IV Push once  HYDROmorphone  Injectable 0.5 milliGRAM(s) IV Push every 6 hours  pantoprazole  Injectable 40 milliGRAM(s) IV Push two times a day    MEDICATIONS  (PRN):  acetaminophen  Suppository .. 650 milliGRAM(s) Rectal every 6 hours PRN Temp greater or equal to 38C (100.4F)  bisacodyl Suppository 10 milliGRAM(s) Rectal daily PRN Constipation  glycopyrrolate Injectable 0.4 milliGRAM(s) IV Push every 6 hours PRN Excessive/Audible secretions  HYDROmorphone  Injectable 0.5 milliGRAM(s) IV Push every 1 hour PRN Severe Pain (7 - 10)  HYDROmorphone  Injectable 0.2 milliGRAM(s) IV Push every 1 hour PRN Moderate Pain (4 - 6)  HYDROmorphone  Injectable 0.5 milliGRAM(s) IV Push every 1 hour PRN Dyspnea  LORazepam   Injectable 0.5 milliGRAM(s) IV Push every 1 hour PRN Agitation      ALLERGIES: Allergies    No Known Allergies    Intolerances        OBJECTIVE:  ICU Vital Signs Last 24 Hrs  T(C): 37.1 (2024 04:00), Max: 37.1 (2024 00:00)  T(F): 98.8 (2024 04:00), Max: 98.8 (2024 00:00)  HR: 128 (2024 06:00) (88 - 128)  ABP: 103/71 (2024 06:00) (75/45 - 103/71)  ABP(mean): 84 (2024 06:00) (57 - 84)  RR: 13 (2024 06:00) (4 - 17)  SpO2: 98% (2024 06:00) (98% - 100%)    O2 Parameters below as of 2024 20:00  Patient On (Oxygen Delivery Method): nasal cannula        CAPILLARY BLOOD GLUCOSE        I&O's Summary    2024 07:01  -  2024 07:00  --------------------------------------------------------  IN: 1169.8 mL / OUT: 530 mL / NET: 639.8 mL    2024 07:01  -  2024 06:54  --------------------------------------------------------  IN: 0 mL / OUT: 520 mL / NET: -520 mL       Daily Weight in k (2024 06:00)    PHYSICAL EXAMINATION:  General: Comfortable  HEENT: Moist mucous membranes.  Respiratory: Rhonchi b/l   CV: RRR, S1S2  Abdominal: Soft, nontender  Neurology: AOx0  Extremities: No pitting edema  Cath site:   Tubes:    LABS:  ABG - ( 2024 09:05 )  pH, Arterial: 7.40  pH, Blood: x     /  pCO2: 15    /  pO2: 127   / HCO3: 9     / Base Excess: -13.8 /  SaO2: 98.2                                    7.5    22.30 )-----------( 69       ( 2024 09:05 )             21.8     04-01    145  |  120<H>  |  55<H>  ----------------------------<  214<H>  3.8   |  11<L>  |  1.02    Ca    6.5<LL>      2024 09:05  Phos  2.9     04-  Mg     2.20     04-01    TPro  4.4<L>  /  Alb  1.3<L>  /  TBili  0.2  /  DBili  x   /  AST  24  /  ALT  19  /  AlkPhos  540<H>  04-    LIVER FUNCTIONS - ( 2024 09:05 )  Alb: 1.3 g/dL / Pro: 4.4 g/dL / ALK PHOS: 540 U/L / ALT: 19 U/L / AST: 24 U/L / GGT: x                   Urinalysis Basic - ( 2024 09:05 )    Color: x / Appearance: x / SG: x / pH: x  Gluc: 214 mg/dL / Ketone: x  / Bili: x / Urobili: x   Blood: x / Protein: x / Nitrite: x   Leuk Esterase: x / RBC: x / WBC x   Sq Epi: x / Non Sq Epi: x / Bacteria: x        TELEMETRY: NSR    EKG:     IMAGING:   TTE W or WO Ultrasound Enhancing Agent (24 @ 08:54)   1. Technically difficult image quality.   2. Patient reportedly combative during the exam.   3. Left ventricular cavity is normal in size. Left ventricular systolic function is probably nornal.   4. Severely enlarged right ventricular cavity size and reduced systolic function.   5. Right ventricular systolic function is reduced with apical sparing consistent with acute pulmonary embolism (Martinez's sign).

## 2024-04-03 NOTE — PROGRESS NOTE ADULT - PROBLEM SELECTOR PLAN 4
Patient at high risk for pain due to extensive sacral wound and critical illness.    Continue dilaudid 0.5 mg IV q6h ATC  Continue dilaudid 0.2 mg IV q1h PRN for moderate pain  Continue dilaudid 0.5 mg IV q1h PRN for severe pain    This regimen can be increased to dilaudid 0.5/1 mg IV q1h PRN for moderate/severe pain if the above doses are insufficient for managing the patient's symptoms. Patient at high risk for pain due to extensive sacral wound and critical illness.    Continue dilaudid 0.5 mg IV q6h ATC  Continue dilaudid 0.2 mg IV q1h PRN for moderate pain  Continue dilaudid 0.5 mg IV q1h PRN for severe pain  Bowel regimen while on opiates: Dulcolax Supp PRN     This regimen can be increased to dilaudid 0.5/1 mg IV q1h PRN for moderate/severe pain if the above doses are insufficient for managing the patient's symptoms.

## 2024-04-03 NOTE — PROGRESS NOTE ADULT - PROBLEM SELECTOR PLAN 1
Patient admitted with septic shock from source extensive, infected sacral wound. Blood cultures showing ESBL and growing E. coli, Klebsiella, Proteus, Enterococcus, Bacteriodes.   Transitioned to comfort-focused care. Per [facility] discussion, de-escalation of care with discontinuation of vasopressors and antimicrobial therapy.

## 2024-04-03 NOTE — PROGRESS NOTE ADULT - PROBLEM SELECTOR PLAN 8
Code Status: DNR/DNI. The patient is transitioned to comfort-focused care.     Chaplaincy consulted for support for patient's family as patient is Lutheran.    The patient does not have decision-making capacity for any medical decisions on this encounter due to encephalopathy. His HCP is his cousin, Sara Butler [phone], who should be consulted for all medical decisions.    Palliative Care will continue to follow. Do not hesitate to reach out with questions or for uncontrolled symptoms. 24-hour pager: [phone].    Note is not complete until co-signed by an Attending.    Jt Mahmood MD  Fellow in Hospice & Palliative Medicine  [facility].

## 2024-04-03 NOTE — PROGRESS NOTE ADULT - PROBLEM SELECTOR PLAN 5
Bowel regimen while on opioids:  Dulcolax suppository daily PRN for constipation. - Patient likely with delirium as intermittently asking for help, but consolable at bedside  -Frequent reassurance and verbal orientation   -Family members or other familiar persons by his bedside.   - Monitor for constipation, urinary retention, pain, hunger,   - IV Ativan 0.5mg q1 PRN anxiety/agitation

## 2024-04-04 NOTE — PROGRESS NOTE ADULT - SUBJECTIVE AND OBJECTIVE BOX
Adeline Hodge PA-C  CCU Service  Cardiology   Spect: [phone] ([facility])     PATIENT: MERLINE RODRIGUEZ, MRN: [id number]    CHIEF COMPLAINT: Patient is a 83y old  Male who presents with a chief complaint of Sepsis (03 Apr 2024 12:37)      INTERVAL HISTORY/OVERNIGHT EVENTS: No overnight events. Patient more lucid, asking RN during dressing change/cleaning overnight to "please stop." Patient appears comfortable on exam, on comfort care.    REVIEW OF SYSTEMS:  Constitutional:     [ ] negative [ ] fevers [ ] chills [ ] weight loss [ ] weight gain  HEENT:                  [ ] negative [ ] dry eyes [ ] eye irritation [ ] postnasal drip [ ] nasal congestion  CV:                         [ ] negative  [ ] chest pain [ ] orthopnea [ ] palpitations [ ] murmur  Resp:                     [ ] negative [ ] cough [ ] shortness of breath [ ] dyspnea [ ] wheezing [ ] sputum [ ] hemoptysis  GI:                          [ ] negative [ ] nausea [ ] vomiting [ ] diarrhea [ ] constipation [ ] abd pain [ ] dysphagia   :                        [ ] negative [ ] dysuria [ ] nocturia [ ] hematuria [ ] increased urinary frequency  Musculoskeletal: [ ] negative [ ] back pain [ ] myalgias [ ] arthralgias [ ] fracture  Skin:                       [ ] negative [ ] rash [ ] itch  Neurological:        [ ] negative [ ] headache [ ] dizziness [ ] syncope [ ] weakness [ ] numbness  Psychiatric:           [ ] negative [ ] anxiety [ ] depression  Endocrine:            [ ] negative [ ] diabetes [ ] thyroid problem  Heme/Lymph:      [ ] negative [ ] anemia [ ] bleeding problem  Allergic/Immune: [ ] negative [ ] itchy eyes [ ] nasal discharge [ ] hives [ ] angioedema    [ x ] All other systems negative  [ ] Unable to assess ROS because ________.    MEDICATIONS:  MEDICATIONS  (STANDING):  chlorhexidine 2% Cloths 1 Application(s) Topical <User Schedule>  Dakins Solution - 1/4 Strength 1 Application(s) Topical two times a day  dextrose 50% Injectable 12.5 Gram(s) IV Push once  dextrose 50% Injectable 25 Gram(s) IV Push once  HYDROmorphone  Injectable 0.5 milliGRAM(s) IV Push every 6 hours  pantoprazole  Injectable 40 milliGRAM(s) IV Push two times a day    MEDICATIONS  (PRN):  acetaminophen  Suppository .. 650 milliGRAM(s) Rectal every 6 hours PRN Temp greater or equal to 38C (100.4F)  bisacodyl Suppository 10 milliGRAM(s) Rectal daily PRN Constipation  glycopyrrolate Injectable 0.4 milliGRAM(s) IV Push every 6 hours PRN Excessive/Audible secretions  HYDROmorphone  Injectable 0.2 milliGRAM(s) IV Push every 1 hour PRN Moderate Pain (4 - 6)  HYDROmorphone  Injectable 0.5 milliGRAM(s) IV Push every 1 hour PRN Dyspnea  HYDROmorphone  Injectable 0.5 milliGRAM(s) IV Push every 1 hour PRN Severe Pain (7 - 10)  LORazepam   Injectable 0.5 milliGRAM(s) IV Push every 1 hour PRN Agitation      ALLERGIES: Allergies  No Known Allergies    Intolerances        OBJECTIVE:  ICU Vital Signs Last 24 Hrs  T(C): 35.3 (04 Apr 2024 06:00), Max: 36.8 (03 Apr 2024 12:00)  T(F): 95.6 (04 Apr 2024 06:00), Max: 98.2 (03 Apr 2024 12:00)  HR: 91 (04 Apr 2024 06:00) (91 - 120)  BP: --  BP(mean): --  ABP: 89/55 (04 Apr 2024 06:00) (77/50 - 104/62)  ABP(mean): 69 (04 Apr 2024 06:00) (61 - 81)  RR: 12 (04 Apr 2024 06:00) (7 - 16)  SpO2: 100% (04 Apr 2024 06:00) (99% - 100%)        Adult Advanced Hemodynamics Last 24 Hrs  CVP(mm Hg): --  CVP(cm H2O): --  CO: --  CI: --  PA: --  PA(mean): --  PCWP: --  SVR: --  SVRI: --  PVR: --  PVRI: --  CAPILLARY BLOOD GLUCOSE        CAPILLARY BLOOD GLUCOSE        I&O's Summary    03 Apr 2024 07:01  -  04 Apr 2024 07:00  --------------------------------------------------------  IN: 0 mL / OUT: 425 mL / NET: -425 mL      Daily         PHYSICAL EXAMINATION:  General: Comfortable  HEENT: Moist mucous membranes.  Respiratory: Rhonchi b/l   CV: RRR, S1S2  Abdominal: Soft, nontender  Neurology: AOx0  Extremities: No pitting edema  Cath site:   Tubes:      LABS:  TELEMETRY:   EKG:   IMAGING:

## 2024-04-04 NOTE — PROGRESS NOTE ADULT - PROBLEM SELECTOR PLAN 1
Patient admitted with septic shock from source extensive, infected sacral wound. Blood cultures showing ESBL and growing E. coli, Klebsiella, Proteus, Enterococcus, Bacteriodes.   Transitioned to comfort-focused care. Per [facility] discussion, de-escalation of care with discontinuation of vasopressors and antimicrobial therapy. Patient admitted with septic shock from source extensive, infected sacral wound. Blood cultures showing ESBL and growing E. coli, Klebsiella, Proteus, Enterococcus, Bacteriodes.   Transitioned to comfort-focused care. Per [facility] discussion, de-escalation of care with discontinuation of vasopressors and antimicrobial therapy.    Pending referral to inpatient hospice.

## 2024-04-04 NOTE — PROGRESS NOTE ADULT - ASSESSMENT
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 Patient  presents to the emergency department from [facility] due to worsening sacral wound with imaging of osteomyelitis. During transport, HR dropped to 30 with O2 of 76, was placed on 6L NC. While in the ED, patient started having dysarthria, contraction of left upper extremity. Code stroke was called CT head and brain negative for infarct. CT Angio of chest showed an acute left lower lobe PE with findings of right heart strain with left femoral DVT. In ED, pt. started on heparin gtt. for PE, levophed started  for hypotension.     PLAN:  Neuro:  Alert x 0 and comfortable    Pulmonary:  #Pulmonary Embolism  -CT Angio done revealing PE with right heart strain  -ECHO done revealing severely enlarged right ventricular cavity size and reduced systolic function.   -would like comfort measures at this time, no further heparin gtt or anticoagulation      # Septic Shock  -family opting for comfort measures    #CAD (coronary artery disease).  -see above   SKIN  Sacral decubitus ulcer, stage IV.   Chronic sacral and back decubiti ulcer c/b osteomyelitis. Unclear if organism identified, but was discharged from [facility] with 6 weeks of Augmentin/Flagyl due to end 3/4/24.  Surgical consultation recommends no acute surgical intervention, patient with severe comorbidities and not a candidate at this time for bedside or OR debridement with active PE w/ RHS/levophed gtt/hep gtt.  Changing dressing changes to QD as patient in pain overnight 4/3/24 per RN    GOALS OF CARE  Palliative care team recommendations greatly appreciated, assistance and support provided to family and CCU.  Patient is DNR/DNI on comfort measures no lab draws, no IV pressors, no IVantibiotics, no feeding tube  Patient mental status improved, consideration of pleasure feeds

## 2024-04-04 NOTE — PROGRESS NOTE ADULT - PROBLEM SELECTOR PLAN 4
Patient at high risk for pain due to extensive sacral wound and critical illness.    Continue dilaudid 0.5 mg IV q6h ATC  Continue dilaudid 0.2 mg IV q1h PRN for moderate pain  Continue dilaudid 0.5 mg IV q1h PRN for severe pain  Bowel regimen while on opiates: Dulcolax Supp PRN     This regimen can be increased to dilaudid 0.5/1 mg IV q1h PRN for moderate/severe pain if the above doses are insufficient for managing the patient's symptoms. Patient at high risk for pain due to extensive sacral wound and critical illness.    Continue dilaudid 0.5 mg IV q6h ATC  Increase PRN dilaudid for moderate pain to 0.5 mg IV q1h  Increase PRN dilaudid for severe pain to 1 mg IV q1h   Bowel regimen while on opiates: Dulcolax Supp PRN

## 2024-04-04 NOTE — PROGRESS NOTE ADULT - CRITICAL CARE ATTENDING COMMENT
83 year old man from [place] with CAD sp PCI, AAA sp repair, HTN, DM recent UGI Bleed with Barrets and GERD, stage 4 sacral decub with osteomyelitis. Transferred from [place] for assessment of worsening wound and then became bradycardic and hypoxic CTA with PE and right heart strain with DVTs  Now on pressors for hypotension in setting of sepsis.  Comfort care- palliative input appreciated      #Palliative- consult appreciated  Patient DNR/DNI  Pain meds

## 2024-04-04 NOTE — PROGRESS NOTE ADULT - SUBJECTIVE AND OBJECTIVE BOX
SUBJECTIVE AND OBJECTIVE:  Indication for Geriatrics and Palliative Care Services/INTERVAL HPI: Goals of care in the setting of critical illness, end-of-life symptom management    OVERNIGHT EVENTS: Patient remains delirious although overall comfortable. Patient used PRN IV dilaudid x 2 over the last 24 hours from 7 am to 7 am. Patient having severe pain during wound care not controlled with 0.5 mg IV dilaudid per bedside RN.     DNR on chart:DNI  DNI      Allergies    No Known Allergies    Intolerances    MEDICATIONS  (STANDING):  chlorhexidine 2% Cloths 1 Application(s) Topical <User Schedule>  Dakins Solution - 1/4 Strength 1 Application(s) Topical daily  dextrose 50% Injectable 12.5 Gram(s) IV Push once  dextrose 50% Injectable 25 Gram(s) IV Push once  HYDROmorphone  Injectable 0.5 milliGRAM(s) IV Push every 6 hours  pantoprazole  Injectable 40 milliGRAM(s) IV Push two times a day    MEDICATIONS  (PRN):  acetaminophen  Suppository .. 650 milliGRAM(s) Rectal every 6 hours PRN Temp greater or equal to 38C (100.4F)  bisacodyl Suppository 10 milliGRAM(s) Rectal daily PRN Constipation  glycopyrrolate Injectable 0.4 milliGRAM(s) IV Push every 6 hours PRN Excessive/Audible secretions  HYDROmorphone  Injectable 1 milliGRAM(s) IV Push every 1 hour PRN Severe Pain (7 - 10)  HYDROmorphone  Injectable 1 milliGRAM(s) IV Push every 1 hour PRN Dyspnea  HYDROmorphone  Injectable 0.5 milliGRAM(s) IV Push every 1 hour PRN Moderate Pain (4 - 6)  LORazepam   Injectable 0.5 milliGRAM(s) IV Push every 1 hour PRN Agitation    ITEMS UNCHECKED ARE NOT PRESENT    PRESENT SYMPTOMS: [x ]Unable to self-report - see  CPOT, PAINADS, RDOS below  Source if other than patient:  [ ]Family   [ ]Team     Pain:  [ ]yes [ ]no  QOL impact -   Location -                    Aggravating factors -  Quality -  Radiation -  Timing-  Severity (0-10 scale):  Minimal acceptable level (0-10 scale):     Dyspnea:                           [ ]Mild [ ]Moderate [ ]Severe  Anxiety:                             [ ]Mild [ ]Moderate [ ]Severe  Fatigue:                             [ ]Mild [ ]Moderate [ ]Severe  Nausea:                             [ ]Mild [ ]Moderate [ ]Severe  Loss of appetite:              [ ]Mild [ ]Moderate [ ]Severe  Constipation:                    [ ]Mild [ ]Moderate [ ]Severe    PCSSQ[Palliative Care Spiritual Screening Question]   Severity (0-10):  Score of 4 or > indicate consideration of Chaplaincy referral.  Chaplaincy Referral: [x ] yes [ ] refused [ ] following    Caregiver [place]? : [ ] yes [ x] no  [ ] deferred:  Social work referral [ ] Patient & Family Centered Care Referral [ ]     Anticipatory Grief present?:  [ x] yes [ ] no  [ ] deferred: Social work referral [ x] Patient & Family Centered Care Referral [ ]      Other Symptoms:  [ ]All other review of systems negative - pt is unable to self-report    PHYSICAL EXAM:  Vital Signs Last 24 Hrs  T(C): 37 (04 Apr 2024 12:00), Max: 37 (04 Apr 2024 12:00)  T(F): 98.6 (04 Apr 2024 12:00), Max: 98.6 (04 Apr 2024 12:00)  HR: 89 (04 Apr 2024 13:00) (86 - 111)  BP: --  BP(mean): --  RR: 12 (04 Apr 2024 13:00) (7 - 28)  SpO2: 100% (04 Apr 2024 13:00) (91% - 100%)    Parameters below as of 04 Apr 2024 13:00  Patient On (Oxygen Delivery Method): nasal cannula  O2 Flow (L/min): 2    I&O's Detail    03 Apr 2024 07:01  -  04 Apr 2024 07:00  --------------------------------------------------------  IN:  Total IN: 0 mL    OUT:    Indwelling Catheter - Urethral (mL): 425 mL  Total OUT: 425 mL    Total NET: -425 mL     GENERAL: [x ]Cachexia    [ ]Alert  [ ]Oriented x   [x ]Lethargic  [ ]Unarousable  [ x]Verbal - intermittently  [ ]Non-Verbal  Behavioral:   [ ] Anxiety  [x ] Delirium [ ] Agitation [ ] Other  HEENT:  [ x]Normal   [ ]Dry mouth   [ ]ET Tube/Trach  [ ]Oral lesions  PULMONARY:   [ ]Clear [ ]Tachypnea  [ ]Audible excessive secretions   [ ]Rhonchi        [ ]Right [ ]Left [ ]Bilateral  [ ]Crackles        [ ]Right [ ]Left [ ]Bilateral  [ ]Wheezing     [ ]Right [ ]Left [ ]Bilateral  [x ]Diminished breath sounds [ ]right [ ]left [x ]bilateral  CARDIOVASCULAR:    [x ]Regular [ ]Irregular [ x]Tachy  [ ]Sam [ ]Murmur [ ]Other  GASTROINTESTINAL:  [x ]Soft  [ ]Distended   [x ]+BS  [ x]Non tender [ ]Tender  [ ]Other [ ]PEG [ ]OGT/ NGT  Last BM:  GENITOURINARY:  [ ]Normal [ ] Incontinent   [ ]Oliguria/Anuria   [ x]Hale  MUSCULOSKELETAL:   [ ]Normal   [ ]Weakness  [ x]Bed/Wheelchair bound [ ]Edema  NEUROLOGIC:   [ ]No focal deficits  [ x]Cognitive impairment  [ ]Dysphagia [ ]Dysarthria [ ]Paresis [ ]Other   SKIN: see nursing documentation for extensive sacral wound  [ ]Normal  [ ]Rash  [ ]Other  [ x]Pressure ulcer(s)       Present on admission [ x]y [ ]n    CRITICAL CARE:  [x ] Shock Present  [x ]Septic [ ]Cardiogenic [ ]Neurologic [ ]Hypovolemic  [x ]  Vasopressors [ ]  Inotropes   [ ]Respiratory failure present [ ]Mechanical ventilation [ ]Non-invasive ventilatory support [ ]High flow    [ ]Acute  [ ]Chronic [ ]Hypoxic  [ ]Hypercarbic [ ]Other  [x ]Other organ failure - CNS  LABS:                        7.5    22.30 )-----------( 69       ( 01 Apr 2024 09:05 )             21.8   04-01    145  |  120<H>  |  55<H>  ----------------------------<  214<H>  3.8   |  11<L>  |  1.02    Ca    6.5<LL>      01 Apr 2024 09:05  Phos  2.9     04-01  Mg     2.20     04-01    TPro  4.4<L>  /  Alb  1.3<L>  /  TBili  0.2  /  DBili  x   /  AST  24  /  ALT  19  /  AlkPhos  540<H>  04-01  PT/INR - ( 01 Apr 2024 03:00 )   PT: 15.7 sec;   INR: 1.42 ratio         PTT - ( 01 Apr 2024 03:00 )  PTT:64.5 sec    Urinalysis Basic - ( 01 Apr 2024 09:05 )    Color: x / Appearance: x / SG: x / pH: x  Gluc: 214 mg/dL / Ketone: x  / Bili: x / Urobili: x   Blood: x / Protein: x / Nitrite: x   Leuk Esterase: x / RBC: x / WBC x   Sq Epi: x / Non Sq Epi: x / Bacteria: x      RADIOLOGY & ADDITIONAL STUDIES:  3/29 CTA chest PE protocol, CTAP w/ venous phase contrast  IMPRESSION:    Acute left lower lobe pulmonary emboli extending to the posterobasal   segmental and subsegmental pulmonary branches. Right heart strain is   present. Recommend echocardiography.    Small nonenhancing peripheral opacity in the left lung base, most   suspicious for pulmonary infarct.    These findings were discussed with Dr. Bermudez by Dr. Craig at 10:15 PM on   3/29/2024, with repeat back confirmation.    Left common femoral vein DVT.    Large sacral decubitus ulcer extending from L1 to the coccyx. Gas tracks   into the spinal canal and extends to the gluteal soft tissues.    Left posterior thigh and right ischioanal fossa abscesses as above.    Exposed lumbar spinous processes and erosions of the sacrococcygeal   spinous processes most concerning for osteomyelitis.      Protein Calorie Malnutrition Present: [ ]mild [ ]moderate [ x]severe [ x]underweight [ ]morbid obesity  https://www.andeal.org/vault/2440/web/files/ONC/Table_Clinical%20Characteristics%20to%20Document%20Malnutrition-White%20JV%20et%20al%202012.pdf    Height (cm): 188 (03-29-24 @ 21:08), 188 (03-04-24 @ 10:19)  Weight (kg): 59.6 (03-29-24 @ 21:08), 76 (03-04-24 @ 10:19)  BMI (kg/m2): 16.9 (03-29-24 @ 21:08), 21.5 (03-04-24 @ 10:19)    [x ]PPSV2 < or = 30%  [ ]significant weight loss [ ]poor nutritional intake [ ]anasarca[ ]Artificial Nutrition    Other REFERRALS:  [ x]Hospice  [ ]Child Life  [ ]Social Work  [x ]Case management [ ]Holistic Therapy     Palliative Performance Scale:  http://npcrc.org/files/news/palliative_performance_scale_ppsv2.pdf  (Ctrl +  left click to view)  Respiratory Distress Observation Tool:  https://homecareinformation.net/handouts/hen/Respiratory_Distress_Observation_Scale.pdf (Ctrl +  left click to view)  PAINAD Score:  http://geriatrictoolkit.[facility]/cog/painad.pdf (Ctrl +  left click to view)

## 2024-04-04 NOTE — PROGRESS NOTE ADULT - ASSESSMENT
Kevin Wade is a 83-year-old man with CAD s/p PCI, emphysema, HTN, T2DM, Fermin's esophagus, GERD, BPH, bladder cancer, recent UGIB, AAA s/p stenting, depression, anxiety, bilateral hip replacements, extensive sacral wound who is admitted with septic shock and new acute high risk PE with right heart strain.   Palliative Care is consulted for goals of care and end-of-life symptom management. Prognosis remains limited to hours to 1-2 days. Kevin Wade is a 83-year-old man with CAD s/p PCI, emphysema, HTN, T2DM, Fermin's esophagus, GERD, BPH, bladder cancer, recent UGIB, AAA s/p stenting, depression, anxiety, bilateral hip replacements, extensive sacral wound who is admitted with septic shock and new acute high risk PE with right heart strain.   Palliative Care is consulted for goals of care and end-of-life symptom management. Prognosis remains limited to hours to 1-2 days. Pending referral to inpatient hospice.

## 2024-04-04 NOTE — PROGRESS NOTE ADULT - CONVERSATION DETAILS
Inpatient hospice introduced with HCP Sara Butler as a good option for this patient who remains with severely limited life expectancy although MAPs pseudo-stable over the last couple of days. Sara in agreement with inpatient hospice referral, requests a facility that will be convenient for family to visit as multiple people are flying in to see the patient.

## 2024-04-04 NOTE — PROGRESS NOTE ADULT - PROBLEM SELECTOR PLAN 5
- Patient likely with delirium as intermittently asking for help, but consolable at bedside  -Frequent reassurance and verbal orientation   -Family members or other familiar persons by his bedside.   - Monitor for constipation, urinary retention, pain, hunger,   - IV Ativan 0.5mg q1 PRN anxiety/agitation - Patient likely with delirium as intermittently asking for help, but consolable at bedside  -Frequent reassurance and verbal orientation   - Monitor for constipation, urinary retention, pain, hunger  - Continue ativan 0.5 mg IV q1h PRN for agitation/anxiety

## 2024-04-04 NOTE — PROGRESS NOTE ADULT - PROBLEM SELECTOR PLAN 6
Patient at high risk for dyspnea due to high risk PE with right heart strain.    Continue dilaudid 0.5 mg IV q6h ATC  Dilaudid 0.5 mg IV q1h PRN for dyspnea    This regimen can be increased to dilaudid 1 mg IV q1h PRN for dyspnea pain if the above dose is insufficient for managing the patient's symptoms. Patient at high risk for dyspnea due to high risk PE with right heart strain.    Continue dilaudid 0.5 mg IV q6h ATC  Increase PRN dilaudid for dyspnea to 1 mg IV q1h

## 2024-04-04 NOTE — PROGRESS NOTE ADULT - ATTENDING COMMENTS
Patient was seen and evaluated with Dr. Mahmood, Palliative Medicine Fellow, during bedside rounds.   Agree with above findings and recommendations, edited documentation as appropriate to reflect the plan of care discussed with patient and myself with the following additions:    83-year-old man with CAD s/p PCI, emphysema, HTN, T2DM, Fermin's esophagus, GERD, BPH, bladder cancer, recent UGIB, AAA s/p stenting, depression, anxiety, bilateral hip replacements, extensive sacral wound who is admitted with septic shock and new acute high risk PE with right heart strain. Blood cultures showing ESBL and growing E. coli, Klebsiella, Proteus, Enterococcus, Bacteriodes. CXRAY 3/30- Imaging personally reviewed by me. No consolidation noted.   Palliative Care consulted for complex decision making  related to goals of care discussions in the setting of advanced illness/ evaluation and management of pain/ symptoms    Patient seen and examined at bedside. Patient awakes to voice, nods head intermittently to questions.   Per bedside RN, pain uncontrolled with prn dilaudid dose, especially with wound changes.   SBP has been stable in high 80s/low 90s.    Reached out to HCP to discuss transfer to inpatient hospice if remains stable for transfer. HCP amenable to inpatient hospice referral.    - IV Dilaudid 0.5mg q6 ATC as patient with extensive decub ulcer   - Increase to IV Dilaudid 0.5mg q1 PRN moderate pain  - Increase to IV Dilaudid 1mg q1 PRN severe pain/ dyspnea  - Bowel regimen while on opiates    Case reviewed with CCU team.  Inpatient hospice referral placed; will continue to assess if patient remains stable for transfer.   Thank you for allowing us to participate in your patient's care. Please page [phone] for any questions/concerns. Patient was seen and evaluated with Dr. Mahmood, Palliative Medicine Fellow, during bedside rounds.   Agree with above findings and recommendations, edited documentation as appropriate to reflect the plan of care discussed with patient and myself with the following additions:    83-year-old man with CAD s/p PCI, emphysema, HTN, T2DM, Fermin's esophagus, GERD, BPH, bladder cancer, recent UGIB, AAA s/p stenting, depression, anxiety, bilateral hip replacements, extensive sacral wound who is admitted with septic shock and new acute high risk PE with right heart strain. Blood cultures showing ESBL and growing E. coli, Klebsiella, Proteus, Enterococcus, Bacteriodes. CXRAY 3/30- Imaging personally reviewed by me. No consolidation noted.   Palliative Care consulted for complex decision making  related to goals of care discussions in the setting of advanced illness/ evaluation and management of pain/ symptoms    Patient seen and examined at bedside. Patient awakes to voice, nods head intermittently to questions.   Per bedside RN, pain uncontrolled with prn dilaudid dose, especially with wound changes.   SBP has been stable in high 80s/low 90s.  In past 24 hours, received IV Dilaudid 0.5mg - 3x, IV Robinul - 1x     Reached out to HCP to discuss transfer to inpatient hospice if remains stable for transfer as SBP stable at high 80s/low 90s yesterday evening and this morning. HCP amenable to inpatient hospice referral.    - IV Dilaudid 0.5mg q6 ATC as patient with extensive decub ulcer   - Increase to IV Dilaudid 0.5mg q1 PRN moderate pain  - Increase to IV Dilaudid 1mg q1 PRN severe pain/ dyspnea  - Bowel regimen while on opiates    Case reviewed with CCU team.  Inpatient hospice referral placed; will continue to assess if patient remains stable for transfer.   Thank you for allowing us to participate in your patient's care. Please page [phone] for any questions/concerns.

## 2024-04-05 NOTE — PROGRESS NOTE ADULT - PROBLEM SELECTOR PLAN 1
Patient admitted with septic shock from source extensive, infected sacral wound. Blood cultures showing ESBL and growing E. coli, Klebsiella, Proteus, Enterococcus, Bacteriodes.   Transitioned to comfort-focused care. Per [facility] discussion, de-escalation of care with discontinuation of vasopressors and antimicrobial therapy.

## 2024-04-05 NOTE — PROGRESS NOTE ADULT - ATTENDING COMMENTS
Patient was seen and evaluated with Dr. Mahmood, Palliative Medicine Fellow, during bedside rounds.   Agree with above findings and recommendations, edited documentation as appropriate to reflect the plan of care discussed with patient and myself with the following additions:    83-year-old man with CAD s/p PCI, emphysema, HTN, T2DM, Fermin's esophagus, GERD, BPH, bladder cancer, recent UGIB, AAA s/p stenting, depression, anxiety, bilateral hip replacements, extensive sacral wound who is admitted with septic shock and new acute high risk PE with right heart strain. Blood cultures showing ESBL and growing E. coli, Klebsiella, Proteus, Enterococcus, Bacteriodes. CXRAY 3/30- Imaging personally reviewed by me. No consolidation noted.   Palliative Care consulted for complex decision making  related to goals of care discussions in the setting of advanced illness/ evaluation and management of pain/ symptoms    Patient seen and examined at bedside. Patient awake with eyes open; nods head intermittently to questions.   In past 24 hours, received IV Dilaudid 1mg- 3x   BP 57/30 this AM. Per bedside RN, pain control improved with adjustment in pain regimen yesterday    Reached out to HCP this AM to update her that patient is actively dying and unstable for inpatient hospice transfer at this time.  Emotional support provided    - IV Dilaudid 0.5mg q6 ATC as patient with extensive decub ulcer   - IV Dilaudid 0.5mg q1 PRN moderate pain  - IV Dilaudid 1mg q1 PRN severe pain/ dyspnea  - Bowel regimen while on opiates    Case reviewed with CCU team , care coordination team, and updated hospice team  Inpatient hospice referral placed; will continue to assess if patient remains stable for transfer.   Thank you for allowing us to participate in your patient's care. Please page [phone] for any questions/concerns.

## 2024-04-05 NOTE — PROGRESS NOTE ADULT - CRITICAL CARE ATTENDING COMMENT
83 year old man from [place] with CAD sp PCI, AAA sp repair, HTN, DM recent UGI Bleed with Barrets and GERD, stage 4 sacral decub with osteomyelitis. Transferred from [place] for assessment of worsening wound and then became bradycardic and hypoxic CTA with PE and right heart strain with DVTs  Now on pressors for hypotension in setting of sepsis.  Comfort care- palliative input appreciated      #Palliative- consult appreciated  Patient DNR/DNI  Pain meds

## 2024-04-05 NOTE — PROGRESS NOTE ADULT - PROBLEM SELECTOR PLAN 6
Patient at high risk for dyspnea due to high risk PE with right heart strain.    Continue dilaudid 0.5 mg IV q6h ATC  Continue dilaudid 1 mg IV q1h PRN for dyspnea

## 2024-04-05 NOTE — PROGRESS NOTE ADULT - PROBLEM SELECTOR PLAN 4
Patient at high risk for pain due to extensive sacral wound and critical illness.    Continue dilaudid 0.5 mg IV q6h ATC  Continue dilaudid 0.5 mg IV q1h PRN for moderate pain  Continue dilaudid 1 mg IV q1h PRN for severe pain  Bowel regimen while on opiates: Dulcolax Supp PRN

## 2024-04-05 NOTE — PROGRESS NOTE ADULT - ASSESSMENT
Kevin Wade is a 83-year-old man with CAD s/p PCI, emphysema, HTN, T2DM, Fermin's esophagus, GERD, BPH, bladder cancer, recent UGIB, AAA s/p stenting, depression, anxiety, bilateral hip replacements, extensive sacral wound who is admitted with septic shock and new acute high risk PE with right heart strain.   Palliative Care is consulted for goals of care and end-of-life symptom management. MAPs progressively decreasing, patient no longer stable for transfer to inpatient hospice. Prognosis is hours, family are aware.

## 2024-04-05 NOTE — PROGRESS NOTE ADULT - PAIN ASSESSMENT ADVANCED DEMENTIA: NEGATIVE VOCALIZATION
None
Occasional moan or groan. Low-level speech with a negative or disapproving quality
Occasional moan or groan. Low-level speech with a negative or disapproving quality
None

## 2024-04-05 NOTE — PROGRESS NOTE ADULT - SUBJECTIVE AND OBJECTIVE BOX
SUBJECTIVE AND OBJECTIVE:  Indication for Geriatrics and Palliative Care Services/INTERVAL HPI: Goals of care in the setting of critical illness, end-of-life symptom management    OVERNIGHT EVENTS: MAPs now in the 40s, patient now very advanced in the dying process with life expectancy of hours, although patient still rouses during encounters and speaks briefly. HCP Sara made aware. Patient remains delirious although overall comfortable. Patient used PRN IV dilaudid x 4 over the last 24 hours from 7 am to 7 am.     DNR on chart:DNI  DNI      Allergies    No Known Allergies    Intolerances    MEDICATIONS  (STANDING):  chlorhexidine 2% Cloths 1 Application(s) Topical <User Schedule>  Dakins Solution - 1/4 Strength 1 Application(s) Topical daily  dextrose 50% Injectable 12.5 Gram(s) IV Push once  dextrose 50% Injectable 25 Gram(s) IV Push once  HYDROmorphone  Injectable 0.5 milliGRAM(s) IV Push every 6 hours  pantoprazole  Injectable 40 milliGRAM(s) IV Push two times a day    MEDICATIONS  (PRN):  acetaminophen  Suppository .. 650 milliGRAM(s) Rectal every 6 hours PRN Temp greater or equal to 38C (100.4F)  bisacodyl Suppository 10 milliGRAM(s) Rectal daily PRN Constipation  glycopyrrolate Injectable 0.4 milliGRAM(s) IV Push every 6 hours PRN Excessive/Audible secretions  HYDROmorphone  Injectable 0.5 milliGRAM(s) IV Push every 1 hour PRN Moderate Pain (4 - 6)  HYDROmorphone  Injectable 1 milliGRAM(s) IV Push every 1 hour PRN Severe Pain (7 - 10)  HYDROmorphone  Injectable 1 milliGRAM(s) IV Push every 1 hour PRN Dyspnea  LORazepam   Injectable 0.5 milliGRAM(s) IV Push every 1 hour PRN Agitation    ITEMS UNCHECKED ARE NOT PRESENT    PRESENT SYMPTOMS: [x ]Unable to self-report - see  CPOT, PAINADS, RDOS below  Source if other than patient:  [ ]Family   [ ]Team     Pain:  [ ]yes [ ]no  QOL impact -   Location -                    Aggravating factors -  Quality -  Radiation -  Timing-  Severity (0-10 scale):  Minimal acceptable level (0-10 scale):     Dyspnea:                           [ ]Mild [ ]Moderate [ ]Severe  Anxiety:                             [ ]Mild [ ]Moderate [ ]Severe  Fatigue:                             [ ]Mild [ ]Moderate [ ]Severe  Nausea:                             [ ]Mild [ ]Moderate [ ]Severe  Loss of appetite:              [ ]Mild [ ]Moderate [ ]Severe  Constipation:                    [ ]Mild [ ]Moderate [ ]Severe    PCSSQ[Palliative Care Spiritual Screening Question]   Severity (0-10):  Score of 4 or > indicate consideration of Chaplaincy referral.  Chaplaincy Referral: [x ] yes [ ] refused [ ] following    Caregiver [place]? : [ ] yes [ x] no  [ ] deferred:  Social work referral [ ] Patient & Family Centered Care Referral [ ]     Anticipatory Grief present?:  [ x] yes [ ] no  [ ] deferred: Social work referral [ x] Patient & Family Centered Care Referral [ ]      Other Symptoms:  [ ]All other review of systems negative - pt is unable to self-report    PHYSICAL EXAM:  Vital Signs Last 24 Hrs  T(C): 36.5 (05 Apr 2024 08:00), Max: 36.8 (05 Apr 2024 04:00)  T(F): 97.7 (05 Apr 2024 08:00), Max: 98.3 (05 Apr 2024 04:00)  HR: 88 (05 Apr 2024 10:00) (80 - 94)  BP: --  BP(mean): --  RR: 11 (05 Apr 2024 10:00) (8 - 17)  SpO2: 99% (05 Apr 2024 10:00) (95% - 100%)    Parameters below as of 05 Apr 2024 09:00  Patient On (Oxygen Delivery Method): nasal cannula  O2 Flow (L/min): 2    I&O's Detail    04 Apr 2024 07:01  -  05 Apr 2024 07:00  --------------------------------------------------------  IN:  Total IN: 0 mL    OUT:    Indwelling Catheter - Urethral (mL): 90 mL    Voided (mL): 100 mL  Total OUT: 190 mL    Total NET: -190 mL       GENERAL: [x ]Cachexia    [ ]Alert  [ ]Oriented x   [x ]Lethargic  [ ]Unarousable  [ x]Verbal - intermittently  [ ]Non-Verbal  Behavioral:   [ ] Anxiety  [x ] Delirium [ ] Agitation [ ] Other  HEENT:  [ x]Normal   [ ]Dry mouth   [ ]ET Tube/Trach  [ ]Oral lesions  PULMONARY:   [ ]Clear [ ]Tachypnea  [ ]Audible excessive secretions   [ ]Rhonchi        [ ]Right [ ]Left [ ]Bilateral  [ ]Crackles        [ ]Right [ ]Left [ ]Bilateral  [ ]Wheezing     [ ]Right [ ]Left [ ]Bilateral  [x ]Diminished breath sounds [ ]right [ ]left [x ]bilateral  CARDIOVASCULAR:    [x ]Regular [ ]Irregular [ x]Tachy  [ ]Sam [ ]Murmur [ ]Other  GASTROINTESTINAL:  [x ]Soft  [ ]Distended   [x ]+BS  [ x]Non tender [ ]Tender  [ ]Other [ ]PEG [ ]OGT/ NGT  Last BM:  GENITOURINARY:  [ ]Normal [ ] Incontinent   [ ]Oliguria/Anuria   [ x]Hale  MUSCULOSKELETAL:   [ ]Normal   [ ]Weakness  [ x]Bed/Wheelchair bound [ ]Edema  NEUROLOGIC:   [ ]No focal deficits  [ x]Cognitive impairment  [ ]Dysphagia [ ]Dysarthria [ ]Paresis [ ]Other   SKIN: see nursing documentation for extensive sacral wound  [ ]Normal  [ ]Rash  [ ]Other  [ x]Pressure ulcer(s)       Present on admission [ x]y [ ]n    CRITICAL CARE:  [x ] Shock Present  [x ]Septic [ ]Cardiogenic [ ]Neurologic [ ]Hypovolemic  [x ]  Vasopressors [ ]  Inotropes   [ ]Respiratory failure present [ ]Mechanical ventilation [ ]Non-invasive ventilatory support [ ]High flow    [ ]Acute  [ ]Chronic [ ]Hypoxic  [ ]Hypercarbic [ ]Other  [x ]Other organ failure - CNS  LABS:                        7.5    22.30 )-----------( 69       ( 01 Apr 2024 09:05 )             21.8   04-01    145  |  120<H>  |  55<H>  ----------------------------<  214<H>  3.8   |  11<L>  |  1.02    Ca    6.5<LL>      01 Apr 2024 09:05  Phos  2.9     04-01  Mg     2.20     04-01    TPro  4.4<L>  /  Alb  1.3<L>  /  TBili  0.2  /  DBili  x   /  AST  24  /  ALT  19  /  AlkPhos  540<H>  04-01  PT/INR - ( 01 Apr 2024 03:00 )   PT: 15.7 sec;   INR: 1.42 ratio         PTT - ( 01 Apr 2024 03:00 )  PTT:64.5 sec    Urinalysis Basic - ( 01 Apr 2024 09:05 )    Color: x / Appearance: x / SG: x / pH: x  Gluc: 214 mg/dL / Ketone: x  / Bili: x / Urobili: x   Blood: x / Protein: x / Nitrite: x   Leuk Esterase: x / RBC: x / WBC x   Sq Epi: x / Non Sq Epi: x / Bacteria: x      RADIOLOGY & ADDITIONAL STUDIES:  3/29 CTA chest PE protocol, CTAP w/ venous phase contrast  IMPRESSION:    Acute left lower lobe pulmonary emboli extending to the posterobasal   segmental and subsegmental pulmonary branches. Right heart strain is   present. Recommend echocardiography.    Small nonenhancing peripheral opacity in the left lung base, most   suspicious for pulmonary infarct.    These findings were discussed with Dr. Bermudez by Dr. Craig at 10:15 PM on   3/29/2024, with repeat back confirmation.    Left common femoral vein DVT.    Large sacral decubitus ulcer extending from L1 to the coccyx. Gas tracks   into the spinal canal and extends to the gluteal soft tissues.    Left posterior thigh and right ischioanal fossa abscesses as above.    Exposed lumbar spinous processes and erosions of the sacrococcygeal   spinous processes most concerning for osteomyelitis.      Protein Calorie Malnutrition Present: [ ]mild [ ]moderate [ x]severe [ x]underweight [ ]morbid obesity  https://www.andeal.org/vault/2440/web/files/ONC/Table_Clinical%20Characteristics%20to%20Document%20Malnutrition-White%20JV%20et%20al%202012.pdf    Height (cm): 188 (03-29-24 @ 21:08), 188 (03-04-24 @ 10:19)  Weight (kg): 59.6 (03-29-24 @ 21:08), 76 (03-04-24 @ 10:19)  BMI (kg/m2): 16.9 (03-29-24 @ 21:08), 21.5 (03-04-24 @ 10:19)    [x ]PPSV2 < or = 30%  [ ]significant weight loss [ ]poor nutritional intake [ ]anasarca[ ]Artificial Nutrition    Other REFERRALS:  [ x]Hospice  [ ]Child Life  [ ]Social Work  [x ]Case management [ ]Holistic Therapy     Palliative Performance Scale:  http://npcrc.org/files/news/palliative_performance_scale_ppsv2.pdf  (Ctrl +  left click to view)  Respiratory Distress Observation Tool:  https://homecareinformation.net/handouts/hen/Respiratory_Distress_Observation_Scale.pdf (Ctrl +  left click to view)  PAINAD Score:  http://geriatrictoolkit.[place].[facility]/cog/painad.pdf (Ctrl +  left click to view)

## 2024-04-05 NOTE — PROGRESS NOTE ADULT - SUBJECTIVE AND OBJECTIVE BOX
CCU Service  Cardiology   Spect: [id number] ([facility])     PATIENT: MERLINE RODRIGUEZ, MRN: [id number]    CHIEF COMPLAINT: presents to the emergency department from [facility] due to worsening sacral wound with imaging of osteomyelitis.      transferred to CCU for    INTERVAL HISTORY/OVERNIGHT EVENTS:     TELEMETRY:     EKG:       ALLERGIES: Allergies    No Known Allergies    Intolerances        MEDICATIONS:  MEDICATIONS  (STANDING):  chlorhexidine 2% Cloths 1 Application(s) Topical <User Schedule>  Dakins Solution - 1/4 Strength 1 Application(s) Topical daily  dextrose 50% Injectable 12.5 Gram(s) IV Push once  dextrose 50% Injectable 25 Gram(s) IV Push once  HYDROmorphone  Injectable 0.5 milliGRAM(s) IV Push every 6 hours  pantoprazole  Injectable 40 milliGRAM(s) IV Push two times a day    MEDICATIONS  (PRN):  acetaminophen  Suppository .. 650 milliGRAM(s) Rectal every 6 hours PRN Temp greater or equal to 38C (100.4F)  bisacodyl Suppository 10 milliGRAM(s) Rectal daily PRN Constipation  glycopyrrolate Injectable 0.4 milliGRAM(s) IV Push every 6 hours PRN Excessive/Audible secretions  HYDROmorphone  Injectable 1 milliGRAM(s) IV Push every 1 hour PRN Severe Pain (7 - 10)  HYDROmorphone  Injectable 1 milliGRAM(s) IV Push every 1 hour PRN Dyspnea  HYDROmorphone  Injectable 0.5 milliGRAM(s) IV Push every 1 hour PRN Moderate Pain (4 - 6)  LORazepam   Injectable 0.5 milliGRAM(s) IV Push every 1 hour PRN Agitation        OBJECTIVE:  ICU Vital Signs Last 24 Hrs  T(C): 36.8 (05 Apr 2024 04:00), Max: 37 (04 Apr 2024 12:00)  T(F): 98.3 (05 Apr 2024 04:00), Max: 98.6 (04 Apr 2024 12:00)  HR: 87 (05 Apr 2024 06:00) (80 - 94)  BP: --  BP(mean): --  ABP: 54/33 (05 Apr 2024 06:00) (51/32 - 120/72)  ABP(mean): 41 (05 Apr 2024 06:00) (40 - 90)  RR: 12 (05 Apr 2024 06:00) (8 - 17)  SpO2: 97% (05 Apr 2024 06:00) (95% - 100%)    O2 Parameters below as of 05 Apr 2024 06:00  Patient On (Oxygen Delivery Method): nasal cannula  O2 Flow (L/min): 2          I&O's Summary    04 Apr 2024 07:01  -  05 Apr 2024 07:00  --------------------------------------------------------  IN: 0 mL / OUT: 190 mL / NET: -190 mL      Daily     Daily       PHYSICAL EXAMINATION:  General: Comfortable, no acute distress, cooperative with exam.  HEENT: Moist mucous membranes.  Respiratory: CTAB, normal respiratory effort, no coughing, wheezes, crackles, or rales.  CV: RRR, S1S2, no murmurs, rubs or gallops. No JVD. Distal pulses intact.  Abdominal: Soft, nontender, nondistended, no rebound or guarding, normal bowel sounds.  Neurology: AOx3, no focal neuro defects, MORROW x 4.  Extremities: No pitting edema, + Peripheral pulses.          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 Patient  presents to the emergency department from [facility] due to worsening sacral wound with imaging of osteomyelitis. During transport, HR dropped to 30 with O2 of 76, was placed on 6L NC. While in the ED, patient started having dysarthria, contraction of left upper extremity. Code stroke was called CT head and brain negative for infarct. CT Angio of chest showed an acute left lower lobe PE with findings of right heart strain with left femoral DVT. In ED, pt. started on heparin gtt. for PE, levophed started  for hypotension.     PLAN:  Neuro:  Alert x 0 and comfortable    Pulmonary:  #Pulmonary Embolism  -CT Angio done revealing PE with right heart strain  -ECHO done revealing severely enlarged right ventricular cavity size and reduced systolic function.   -would like comfort measures at this time, no further heparin gtt or anticoagulation      # Septic Shock  -family opting for comfort measures    #CAD (coronary artery disease).  -see above   SKIN  Sacral decubitus ulcer, stage IV.   Chronic sacral and back decubiti ulcer c/b osteomyelitis. Unclear if organism identified, but was discharged from [facility] with 6 weeks of Augmentin/Flagyl due to end 3/4/24.  Surgical consultation recommends no acute surgical intervention, patient with severe comorbidities and not a candidate at this time for bedside or OR debridement with active PE w/ RHS/levophed gtt/hep gtt.  Changing dressing changes to QD as patient in pain overnight 4/3/24 per RN    GOALS OF CARE  Palliative care team recommendations greatly appreciated, assistance and support provided to family and CCU.  Patient is DNR/DNI on comfort measures no lab draws, no IV pressors, no IVantibiotics, no feeding tube  Patient mental status improved, consideration of pleasure feeds  prophylaxis (GI and DVT)    LINES:   CCU Service  Cardiology   Spect: [id number] ([facility])     PATIENT: MERLINE RODRIGUEZ, MRN: [id number]    CHIEF COMPLAINT: presents to the emergency department from [facility] due to worsening sacral wound with imaging of osteomyelitis.      transferred to CCU for PE, Now comfort care    INTERVAL HISTORY/OVERNIGHT EVENTS: none, remains on 2 liters NC arousable , in no distress        ALLERGIES: Allergies    No Known Allergies    Intolerances        MEDICATIONS:  MEDICATIONS  (STANDING):  chlorhexidine 2% Cloths 1 Application(s) Topical <User Schedule>  Dakins Solution - 1/4 Strength 1 Application(s) Topical daily  dextrose 50% Injectable 12.5 Gram(s) IV Push once  dextrose 50% Injectable 25 Gram(s) IV Push once  HYDROmorphone  Injectable 0.5 milliGRAM(s) IV Push every 6 hours  pantoprazole  Injectable 40 milliGRAM(s) IV Push two times a day    MEDICATIONS  (PRN):  acetaminophen  Suppository .. 650 milliGRAM(s) Rectal every 6 hours PRN Temp greater or equal to 38C (100.4F)  bisacodyl Suppository 10 milliGRAM(s) Rectal daily PRN Constipation  glycopyrrolate Injectable 0.4 milliGRAM(s) IV Push every 6 hours PRN Excessive/Audible secretions  HYDROmorphone  Injectable 1 milliGRAM(s) IV Push every 1 hour PRN Severe Pain (7 - 10)  HYDROmorphone  Injectable 1 milliGRAM(s) IV Push every 1 hour PRN Dyspnea  HYDROmorphone  Injectable 0.5 milliGRAM(s) IV Push every 1 hour PRN Moderate Pain (4 - 6)  LORazepam   Injectable 0.5 milliGRAM(s) IV Push every 1 hour PRN Agitation        OBJECTIVE:  ICU Vital Signs Last 24 Hrs  T(C): 36.8 (05 Apr 2024 04:00), Max: 37 (04 Apr 2024 12:00)  T(F): 98.3 (05 Apr 2024 04:00), Max: 98.6 (04 Apr 2024 12:00)  HR: 87 (05 Apr 2024 06:00) (80 - 94)  BP: --  BP(mean): --  ABP: 54/33 (05 Apr 2024 06:00) (51/32 - 120/72)  ABP(mean): 41 (05 Apr 2024 06:00) (40 - 90)  RR: 12 (05 Apr 2024 06:00) (8 - 17)  SpO2: 97% (05 Apr 2024 06:00) (95% - 100%)    O2 Parameters below as of 05 Apr 2024 06:00  Patient On (Oxygen Delivery Method): nasal cannula  O2 Flow (L/min): 2          I&O's Summary    04 Apr 2024 07:01  -  05 Apr 2024 07:00  --------------------------------------------------------  IN: 0 mL / OUT: 190 mL / NET: -190 mL      Daily     Daily       PHYSICAL EXAMINATION:  General: Comfortable, no acute distress, cooperative with exam.  HEENT: Moist mucous membranes.  Respiratory: CTAB, normal respiratory effort, no coughing, wheezes, crackles, or rales.  CV: RRR, S1S2, no murmurs, rubs or gallops. No JVD. Distal pulses intact.  Abdominal: Soft, nontender, nondistended, no rebound or guarding, normal bowel sounds.  Neurology: AOx3, no focal neuro defects, MORROW x 4.  Extremities: No pitting edema, + Peripheral pulses.          LABS:      Assessment and Plan:   · Assessment	  83M from [facility] LAURITA CAD s/p stents, HTN, T2DM, AAA s/p graft/stent, recent UGIB, Fermin's esophagitis, GERD, BPH, Bladder Cancer, 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 Patient  presents to the emergency department from [facility] due to worsening sacral wound with imaging of osteomyelitis. During transport, HR dropped to 30 with O2 of 76, was placed on 6L NC. While in the ED, patient started having dysarthria, contraction of left upper extremity. Code stroke was called CT head and brain negative for infarct. CT Angio of chest showed an acute left lower lobe PE with findings of right heart strain with left femoral DVT. In ED, pt. started on heparin gtt. for PE, levophed started  for hypotension.     PLAN:  Neuro:  Alert x 0 and comfortable    Pulmonary:  #Pulmonary Embolism  -CT Angio done revealing PE with right heart strain  -ECHO done revealing severely enlarged right ventricular cavity size and reduced systolic function.   -would like comfort measures at this time, no further heparin gtt or anticoagulation      # Septic Shock  -family opting for comfort measures    #CAD (coronary artery disease).  -see above     SKIN  Sacral decubitus ulcer, stage IV.   Chronic sacral and back decubiti ulcer c/b osteomyelitis. Unclear if organism identified, but was discharged from [facility] with 6 weeks of Augmentin/Flagyl due to end 3/4/24.  Surgical consultation recommends no acute surgical intervention, patient with severe comorbidities and not a candidate at this time for bedside or OR debridement with active PE w/ RHS/levophed gtt/hep gtt.  Changing dressing changes to QD as patient in pain overnight 4/3/24 per RN    GOALS OF CARE  Palliative care team recommendations greatly appreciated, assistance and support provided to family and CCU.  Patient is DNR/DNI on comfort measures no lab draws, no IV pressors, no IVantibiotics, no feeding tube  Patient mental status improved, consideration of pleasure feeds  prophylaxis (GI and DVT)    LINES:   CCU Service  Cardiology   Spect: [id number] ([facility])     PATIENT: MERLINE RODRIGUEZ, MRN: [id number]    CHIEF COMPLAINT: presents to the emergency department from [facility] due to worsening sacral wound with imaging of osteomyelitis.      transferred to CCU for PE, Now comfort care    INTERVAL HISTORY/OVERNIGHT EVENTS: none, remains on 2 liters NC arousable , in no distress        ALLERGIES: Allergies    No Known Allergies    Intolerances        MEDICATIONS:  MEDICATIONS  (STANDING):  chlorhexidine 2% Cloths 1 Application(s) Topical <User Schedule>  Dakins Solution - 1/4 Strength 1 Application(s) Topical daily  dextrose 50% Injectable 12.5 Gram(s) IV Push once  dextrose 50% Injectable 25 Gram(s) IV Push once  HYDROmorphone  Injectable 0.5 milliGRAM(s) IV Push every 6 hours  pantoprazole  Injectable 40 milliGRAM(s) IV Push two times a day    MEDICATIONS  (PRN):  acetaminophen  Suppository .. 650 milliGRAM(s) Rectal every 6 hours PRN Temp greater or equal to 38C (100.4F)  bisacodyl Suppository 10 milliGRAM(s) Rectal daily PRN Constipation  glycopyrrolate Injectable 0.4 milliGRAM(s) IV Push every 6 hours PRN Excessive/Audible secretions  HYDROmorphone  Injectable 1 milliGRAM(s) IV Push every 1 hour PRN Severe Pain (7 - 10)  HYDROmorphone  Injectable 1 milliGRAM(s) IV Push every 1 hour PRN Dyspnea  HYDROmorphone  Injectable 0.5 milliGRAM(s) IV Push every 1 hour PRN Moderate Pain (4 - 6)  LORazepam   Injectable 0.5 milliGRAM(s) IV Push every 1 hour PRN Agitation        OBJECTIVE:  ICU Vital Signs Last 24 Hrs  T(C): 36.8 (05 Apr 2024 04:00), Max: 37 (04 Apr 2024 12:00)  T(F): 98.3 (05 Apr 2024 04:00), Max: 98.6 (04 Apr 2024 12:00)  HR: 87 (05 Apr 2024 06:00) (80 - 94)  BP: --  BP(mean): --  ABP: 54/33 (05 Apr 2024 06:00) (51/32 - 120/72)  ABP(mean): 41 (05 Apr 2024 06:00) (40 - 90)  RR: 12 (05 Apr 2024 06:00) (8 - 17)  SpO2: 97% (05 Apr 2024 06:00) (95% - 100%)    O2 Parameters below as of 05 Apr 2024 06:00  Patient On (Oxygen Delivery Method): nasal cannula  O2 Flow (L/min): 2          I&O's Summary    04 Apr 2024 07:01  -  05 Apr 2024 07:00  --------------------------------------------------------  IN: 0 mL / OUT: 190 mL / NET: -190 mL      Daily     Daily       PHYSICAL EXAMINATION:  General: Comfortable, no acute distress, cooperative with exam.  HEENT: Moist mucous membranes.  Respiratory: CTAB, normal respiratory effort, no coughing, wheezes, crackles, or rales.  CV: RRR, S1S2, no murmurs, rubs or gallops. No JVD. Distal pulses intact.  Abdominal: Soft, nontender, nondistended, no rebound or guarding, normal bowel sounds.  Neurology: AOx3, no focal neuro defects, MORROW x 4.  Extremities: No pitting edema, + Peripheral pulses.          LABS:      Assessment and Plan:   · Assessment	  83M from [facility] LAURITA CAD s/p stents, HTN, T2DM, AAA s/p graft/stent, recent UGIB, Fermin's esophagitis, GERD, BPH, Bladder Cancer, 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 Patient  presents to the emergency department from [facility] due to worsening sacral wound with imaging of osteomyelitis. During transport, HR dropped to 30 with O2 of 76, was placed on 6L NC. While in the ED, patient started having dysarthria, contraction of left upper extremity. Code stroke was called CT head and brain negative for infarct. CT Angio of chest showed an acute left lower lobe PE with findings of right heart strain with left femoral DVT. In ED, pt. started on heparin gtt. for PE, levophed started  for hypotension.     PLAN:  Neuro:  Alert x 0 and comfortable    Pulmonary:  #Pulmonary Embolism  -CT Angio done revealing PE with right heart strain  -ECHO done revealing severely enlarged right ventricular cavity size and reduced systolic function.   -would like comfort measures at this time, no further heparin gtt or anticoagulation      # Septic Shock  -family opting for comfort measures    #CAD (coronary artery disease).  -see above     SKIN  Sacral decubitus ulcer, stage IV.   Chronic sacral and back decubiti ulcer c/b osteomyelitis. Unclear if organism identified, but was discharged from [facility] with 6 weeks of Augmentin/Flagyl due to end 3/4/24.  Surgical consultation recommends no acute surgical intervention, patient with severe comorbidities and not a candidate at this time for bedside or OR debridement with active PE w/ RHS/levophed gtt/hep gtt.  Changing dressing changes to QD as patient in pain overnight 4/3/24 per RN    GOALS OF CARE  Palliative care team recommendations greatly appreciated, assistance and support provided to family and CCU.  Patient is DNR/DNI on comfort measures no lab draws, no IV pressors, no IVantibiotics, no feeding tube  Patient mental status improved, consideration of pleasure feeds  - IV Dilaudid 0.5mg q6 ATC as patient with extensive decub ulcer   - Increase to IV Dilaudid 0.5mg q1 PRN moderate pain  - Increase to IV Dilaudid 1mg q1 PRN severe pain/ dyspnea  - Bowel regimen while on opiates    Case reviewed with CCU team.  prophylaxis (GI and DVT)    LINES:   CCU Service  Cardiology   Spect: [id number] ([facility])     PATIENT: MERLINE RODRIGUEZ, MRN: [id number]    CHIEF COMPLAINT: presents to the emergency department from [facility] due to worsening sacral wound with imaging of osteomyelitis.      transferred to CCU for PE, Now comfort care    INTERVAL HISTORY/OVERNIGHT EVENTS: none, remains on 2 liters NC arousable , in no distress. Receiving Dilaudid OTC (0.5mg q 6)        ALLERGIES: Allergies    No Known Allergies    Intolerances        MEDICATIONS:  MEDICATIONS  (STANDING):  chlorhexidine 2% Cloths 1 Application(s) Topical <User Schedule>  Dakins Solution - 1/4 Strength 1 Application(s) Topical daily  dextrose 50% Injectable 12.5 Gram(s) IV Push once  dextrose 50% Injectable 25 Gram(s) IV Push once  HYDROmorphone  Injectable 0.5 milliGRAM(s) IV Push every 6 hours  pantoprazole  Injectable 40 milliGRAM(s) IV Push two times a day    MEDICATIONS  (PRN):  acetaminophen  Suppository .. 650 milliGRAM(s) Rectal every 6 hours PRN Temp greater or equal to 38C (100.4F)  bisacodyl Suppository 10 milliGRAM(s) Rectal daily PRN Constipation  glycopyrrolate Injectable 0.4 milliGRAM(s) IV Push every 6 hours PRN Excessive/Audible secretions  HYDROmorphone  Injectable 1 milliGRAM(s) IV Push every 1 hour PRN Severe Pain (7 - 10)  HYDROmorphone  Injectable 1 milliGRAM(s) IV Push every 1 hour PRN Dyspnea  HYDROmorphone  Injectable 0.5 milliGRAM(s) IV Push every 1 hour PRN Moderate Pain (4 - 6)  LORazepam   Injectable 0.5 milliGRAM(s) IV Push every 1 hour PRN Agitation        OBJECTIVE:  ICU Vital Signs Last 24 Hrs  T(C): 36.8 (05 Apr 2024 04:00), Max: 37 (04 Apr 2024 12:00)  T(F): 98.3 (05 Apr 2024 04:00), Max: 98.6 (04 Apr 2024 12:00)  HR: 87 (05 Apr 2024 06:00) (80 - 94)  BP: --  BP(mean): --  ABP: 54/33 (05 Apr 2024 06:00) (51/32 - 120/72)  ABP(mean): 41 (05 Apr 2024 06:00) (40 - 90)  RR: 12 (05 Apr 2024 06:00) (8 - 17)  SpO2: 97% (05 Apr 2024 06:00) (95% - 100%)    O2 Parameters below as of 05 Apr 2024 06:00  Patient On (Oxygen Delivery Method): nasal cannula  O2 Flow (L/min): 2          I&O's Summary    04 Apr 2024 07:01  -  05 Apr 2024 07:00  --------------------------------------------------------  IN: 0 mL / OUT: 190 mL / NET: -190 mL      Daily     Daily       PHYSICAL EXAMINATION:  General: Comfortable, no acute distress, cooperative with exam.  HEENT: Moist mucous membranes.  Respiratory: CTAB, normal respiratory effort, no coughing, wheezes, crackles, or rales.  CV: RRR, S1S2, no murmurs, rubs or gallops. No JVD. Distal pulses intact.  Abdominal: Soft, nontender, nondistended, no rebound or guarding, normal bowel sounds.  Neurology: AOx3, no focal neuro defects, MORROW x 4.  Extremities: No pitting edema, + Peripheral pulses.          LABS:      Assessment and Plan:   · Assessment	  83M from [facility] LAURITA CAD s/p stents, HTN, T2DM, AAA s/p graft/stent, recent UGIB, Fermin's esophagitis, GERD, BPH, Bladder Cancer, 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 Patient  presents to the emergency department from [facility] due to worsening sacral wound with imaging of osteomyelitis. During transport, HR dropped to 30 with O2 of 76, was placed on 6L NC. While in the ED, patient started having dysarthria, contraction of left upper extremity. Code stroke was called CT head and brain negative for infarct. CT Angio of chest showed an acute left lower lobe PE with findings of right heart strain with left femoral DVT. In ED, pt. started on heparin gtt. for PE, levophed started  for hypotension.     PLAN:  Neuro:  Alert x 0 and comfortable    Pulmonary:  #Pulmonary Embolism  -CT Angio done revealing PE with right heart strain  -ECHO done revealing severely enlarged right ventricular cavity size and reduced systolic function.   -would like comfort measures at this time, no further heparin gtt or anticoagulation      # Septic Shock  -family opting for comfort measures    #CAD (coronary artery disease).  -see above     SKIN  Sacral decubitus ulcer, stage IV.   Chronic sacral and back decubiti ulcer c/b osteomyelitis. Unclear if organism identified, but was discharged from [facility] with 6 weeks of Augmentin/Flagyl due to end 3/4/24.  Surgical consultation recommends no acute surgical intervention, patient with severe comorbidities and not a candidate at this time for bedside or OR debridement with active PE w/ RHS/levophed gtt/hep gtt.  Changing dressing changes to QD as patient in pain overnight 4/3/24 per RN    GOALS OF CARE  Palliative care team recommendations greatly appreciated, assistance and support provided to family and CCU.  Patient is DNR/DNI on comfort measures no lab draws, no IV pressors, no IVantibiotics, no feeding tube  Patient mental status improved, consideration of pleasure feeds  - IV Dilaudid 0.5mg q6 ATC as patient with extensive decub ulcer   - Increase to IV Dilaudid 0.5mg q1 PRN moderate pain  - Increase to IV Dilaudid 1mg q1 PRN severe pain/ dyspnea  - Bowel regimen while on opiates    Case reviewed with CCU team.  prophylaxis (GI and DVT)    LINES:

## 2024-04-05 NOTE — PROGRESS NOTE ADULT - PROBLEM SELECTOR PLAN 5
- Patient likely with delirium as intermittently asking for help, but consolable at bedside  -Frequent reassurance and verbal orientation   - Monitor for constipation, urinary retention, pain, hunger  - Continue ativan 0.5 mg IV q1h PRN for agitation/anxiety

## 2024-04-05 NOTE — PROGRESS NOTE ADULT - NS ATTEST RISK PROBLEM GEN_ALL_CORE FT
safety/toxicity monitoring of intravenous opiates and/or benzodiazepines in end stage disease process.
safety/toxicity monitoring of intravenous opiates and/or benzodiazepines in end of life care
Valtrex Counseling: I discussed with the patient the risks of valacyclovir including but not limited to kidney damage, nausea, vomiting and severe allergy.  The patient understands that if the infection seems to be worsening or is not improving, they are to call.
safety/toxicity monitoring of intravenous opiates and/or benzodiazepines in end stage disease process.

## 2024-04-05 NOTE — PROGRESS NOTE ADULT - PROBLEM SELECTOR PLAN 7
Code Status: DNR/DNI. The patient is transitioned to comfort-focused care.     Chaplaincy consulted for support for patient's family as patient is Shinto.    The patient does not have decision-making capacity for any medical decisions on this encounter due to encephalopathy. His HCP is his cousin, Sara Butler [phone], who should be consulted for all medical decisions.    Palliative Care will continue to follow. Do not hesitate to reach out with questions or for uncontrolled symptoms. 24-hour pager: [phone].    Note is not complete until co-signed by an Attending.    Jt Mahmood MD  Fellow in Hospice & Palliative Medicine  [facility].

## 2024-04-06 NOTE — CHART NOTE - NSCHARTNOTEFT_GEN_A_CORE
Pt ready for transfer to  rm 542A  Hand off communication provided to   MAR: Dr Kath Hall  Hosp: Dr Jose Sanchez  ACP: Pt ready for transfer to  rm 542A  Hand off communication provided to   MAR: Dr Kath Hall  Hosp: Dr Jose Sanchez  ACP: Keke [phone]

## 2024-04-06 NOTE — PROGRESS NOTE ADULT - SUBJECTIVE AND OBJECTIVE BOX
CCU Service  Cardiology   Spect: [phone] ([facility])     PATIENT: MERLINE RODRIGUEZ, MRN: [id number]    CHIEF COMPLAINT: presents to the emergency department from [facility] due to worsening sacral wound with imaging of osteomyelitis.      transferred to CCU for PE, Now comfort care    INTERVAL HISTORY/OVERNIGHT EVENTS: none, remains on 2 liters NC arousable , in no distress. Receiving Dilaudid OTC (0.5mg q 6)        ALLERGIES: Allergies    No Known Allergies    Intolerances        MEDICATIONS:  MEDICATIONS  (STANDING):  chlorhexidine 2% Cloths 1 Application(s) Topical <User Schedule>  Dakins Solution - 1/4 Strength 1 Application(s) Topical daily  dextrose 50% Injectable 12.5 Gram(s) IV Push once  dextrose 50% Injectable 25 Gram(s) IV Push once  HYDROmorphone  Injectable 0.5 milliGRAM(s) IV Push every 6 hours  pantoprazole  Injectable 40 milliGRAM(s) IV Push two times a day    MEDICATIONS  (PRN):  acetaminophen  Suppository .. 650 milliGRAM(s) Rectal every 6 hours PRN Temp greater or equal to 38C (100.4F)  bisacodyl Suppository 10 milliGRAM(s) Rectal daily PRN Constipation  glycopyrrolate Injectable 0.4 milliGRAM(s) IV Push every 6 hours PRN Excessive/Audible secretions  HYDROmorphone  Injectable 0.5 milliGRAM(s) IV Push every 1 hour PRN Moderate Pain (4 - 6)  HYDROmorphone  Injectable 1 milliGRAM(s) IV Push every 1 hour PRN Severe Pain (7 - 10)  HYDROmorphone  Injectable 1 milliGRAM(s) IV Push every 1 hour PRN Dyspnea  LORazepam   Injectable 0.5 milliGRAM(s) IV Push every 1 hour PRN Agitation        OBJECTIVE:  ICU Vital Signs Last 24 Hrs  T(C): 37.1 (05 Apr 2024 20:00), Max: 37.1 (05 Apr 2024 20:00)  T(F): 98.7 (05 Apr 2024 20:00), Max: 98.7 (05 Apr 2024 20:00)  HR: 83 (06 Apr 2024 05:00) (80 - 91)  BP: --  BP(mean): --  ABP: 75/41 (06 Apr 2024 05:00) (52/25 - 78/45)  ABP(mean): 53 (06 Apr 2024 05:00) (34 - 58)  RR: 13 (06 Apr 2024 05:00) (8 - 18)  SpO2: 98% (06 Apr 2024 05:00) (97% - 100%)    O2 Parameters below as of 06 Apr 2024 05:00  Patient On (Oxygen Delivery Method): room air            I&O's Summary    04 Apr 2024 07:01  -  05 Apr 2024 07:00  --------------------------------------------------------  IN: 0 mL / OUT: 190 mL / NET: -190 mL    05 Apr 2024 07:01  -  06 Apr 2024 06:01  --------------------------------------------------------  IN: 0 mL / OUT: 310 mL / NET: -310 mL      Daily     Daily       PHYSICAL EXAMINATION:  General: Comfortable, no acute distress, cooperative with exam.  HEENT: Moist mucous membranes.  Respiratory: CTAB, normal respiratory effort, no coughing, wheezes, crackles, or rales.  CV: RRR, S1S2, no murmurs, rubs or gallops. No JVD. Distal pulses intact.  Abdominal: Soft, nontender, nondistended, no rebound or guarding, normal bowel sounds.  Neurology: AOx3, no focal neuro defects, MORROW x 4.  Extremities: No pitting edema, + Peripheral pulses.        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 Patient  presents to the emergency department from [facility] due to worsening sacral wound with imaging of osteomyelitis. During transport, HR dropped to 30 with O2 of 76, was placed on 6L NC. While in the ED, patient started having dysarthria, contraction of left upper extremity. Code stroke was called CT head and brain negative for infarct. CT Angio of chest showed an acute left lower lobe PE with findings of right heart strain with left femoral DVT. In ED, pt. started on heparin gtt. for PE, levophed started  for hypotension.     PLAN:  Neuro:  Alert x 0 and comfortable    Pulmonary:  #Pulmonary Embolism  -CT Angio done revealing PE with right heart strain  -ECHO done revealing severely enlarged right ventricular cavity size and reduced systolic function.   -would like comfort measures at this time, no further heparin gtt or anticoagulation      # Septic Shock  -family opting for comfort measures    #CAD (coronary artery disease).  -see above     SKIN  Sacral decubitus ulcer, stage IV.   Chronic sacral and back decubiti ulcer c/b osteomyelitis. Unclear if organism identified, but was discharged from [facility] with 6 weeks of Augmentin/Flagyl due to end 3/4/24.  Surgical consultation recommends no acute surgical intervention, patient with severe comorbidities and not a candidate at this time for bedside or OR debridement with active PE w/ RHS/levophed gtt/hep gtt.  Changing dressing changes to QD as patient in pain overnight 4/3/24 per RN    GOALS OF CARE  Palliative care team recommendations greatly appreciated, assistance and support provided to family and CCU.  Patient is DNR/DNI on comfort measures no lab draws, no IV pressors, no IVantibiotics, no feeding tube  Patient mental status improved, consideration of pleasure feeds  - IV Dilaudid 0.5mg q6 ATC as patient with extensive decub ulcer   - Increase to IV Dilaudid 0.5mg q1 PRN moderate pain  - Increase to IV Dilaudid 1mg q1 PRN severe pain/ dyspnea  - Bowel regimen while on opiates    Case reviewed with CCU team.  prophylaxis (GI and DVT)    LINES: PIV x 2

## 2024-04-06 NOTE — PROGRESS NOTE ADULT - CRITICAL CARE ATTENDING COMMENT
Patient seen and examined during CCU morning rounds.  Assessment and plan were reviewed with the CCU team, and as outlined above. Patient seen and examined during CCU morning rounds.  Assessment and plan were reviewed with the CCU team, and as outlined above.  Receiving comfort care measures.

## 2024-04-06 NOTE — PROGRESS NOTE ADULT - NS ATTEND AMEND GEN_ALL_CORE FT
Patient seen and examined during CCU morning rounds.  Assessment and plan were reviewed with the CCU team, and as outlined above.

## 2024-04-06 NOTE — CHART NOTE - NSCHARTNOTEFT_GEN_A_CORE
MAR Accept Note  Transfer to:  Med  Accepting Attending Physician:  Dr. Michele  Assigned Room:  542A    To be signed out to ADS provider.    Labs and data reviewed.   No findings precluding transfer of service.       HPI/CCU COURSE:   Please refer to CCU d/c note for full details. Briefly, this is a  83M PMH CAD s/p stents, HTN, T2DM, AAA s/p graft/stent, recent UGIB, Fermin's esophagitis, GERD, BPH, Bladder Cancer, Stage 4 decubitus sacral wound c/b osteomyelitis with ongoing abx treatment, depression/anxiety, s/p b/l hip replacements admitted with PE, now transitioned to comfort-focused care.     FOR FOLLOW-UP:  [ ] f/u palliative recs  [ ] comfort care    Kath Hall  Internal Medicine, PGY3

## 2024-04-07 NOTE — DISCHARGE NOTE FOR THE EXPIRED PATIENT - HOSPITAL COURSE
83M from [facility] CAD s/p stents, HTN, T2DM, AAA s/p graft/stent, recent UGIB, Fermin's esophagitis, GERD, BPH, Bladder Cancer, 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 presents with worsening sacral wound with imaging of osteomyelitis. Code stroke was called CT head and brain negative for infarct.    Transferred to CCU after CT Angio of chest showed an acute left lower lobe PE with findings of right heart strain with left femoral DVT. In ED, pt. started on heparin gtt. for PE, levophed started  for hypotension      3/30 Admit to CCU acute left lower lobe PE with right heart strain and left femoral DVT. Started on heparin gtt. for PE. Levophed and vaso started  for hypotension.   RIJ placed,   CVP 1-2, CV02 56.4, CI 2.5., , lactate uptrending 5.-- NS 500ml x 4 and fluid @ 100cc x 10 hrs. 1700: CVP 2-3 CV02 60, CO 5.3, CI 3, lact now down trending 3. Lico tube feed placed. Levo being weaned. Blood Cultures show Sulaiman - Rods, Gram + Rods  ovn zosyn -> vinod for gram neg pos bcx, vaso dec to 0.02, levo req decreasing, vaso off  3/21 Abx switched to imipenum,   10AM CVP 0-1, CV02 70%, CO 9.4, CI 5,  Pt given 1 liter bolus over 2 hours and started on NS @ 150, PRBC x 1 for Hg 6.5. Imipenum started per ID.  OVN- HCO3 10, IVP Sodium Bicarb x 1 given   4/1: comfort care measures only with IV antibiotics and pressors are capped.   4/2: Comfort measures  4/3; d/c line and NGT  4/4 BP holding map >65, inpatient hospice referral per palliative    CT: sacral decub w/ Gas tracks into the spinal canal and gluteal soft tissues.   Bld cultures: after 24 hors (+) for GNR and GPR and gram (+) cocci in pairs

## 2024-04-07 NOTE — PROGRESS NOTE ADULT - SUBJECTIVE AND OBJECTIVE BOX
[facility] Division of Hospital Medicine  Minal Perze MD  Pager [phone]    Patient is a 83y old  Male who presents with a chief complaint of Sepsis      SUBJECTIVE / OVERNIGHT EVENTS: appears comfortable in bed; unresponsive      MEDICATIONS  (STANDING):  chlorhexidine 2% Cloths 1 Application(s) Topical <User Schedule>  Dakins Solution - 1/4 Strength 1 Application(s) Topical daily  dextrose 50% Injectable 12.5 Gram(s) IV Push once  dextrose 50% Injectable 25 Gram(s) IV Push once  HYDROmorphone  Injectable 0.5 milliGRAM(s) IV Push every 6 hours  pantoprazole  Injectable 40 milliGRAM(s) IV Push two times a day    MEDICATIONS  (PRN):  acetaminophen  Suppository .. 650 milliGRAM(s) Rectal every 6 hours PRN Temp greater or equal to 38C (100.4F)  bisacodyl Suppository 10 milliGRAM(s) Rectal daily PRN Constipation  glycopyrrolate Injectable 0.4 milliGRAM(s) IV Push every 6 hours PRN Excessive/Audible secretions  HYDROmorphone  Injectable 1 milliGRAM(s) IV Push every 1 hour PRN Severe Pain (7 - 10)  HYDROmorphone  Injectable 1 milliGRAM(s) IV Push every 1 hour PRN Dyspnea  HYDROmorphone  Injectable 0.5 milliGRAM(s) IV Push every 1 hour PRN Moderate Pain (4 - 6)  LORazepam   Injectable 0.5 milliGRAM(s) IV Push every 1 hour PRN Agitation    PHYSICAL EXAM:  Vital Signs Last 24 Hrs  T(F): 97.8 (06 Apr 2024 22:00), Max: 97.8 (06 Apr 2024 22:00)  HR: 93 (06 Apr 2024 22:00) (83 - 93)  BP: 83/61 (06 Apr 2024 22:00) (83/61 - 83/61)  RR: 13 (06 Apr 2024 22:00) (10 - 13)  SpO2: 97% (06 Apr 2024 22:00) (96% - 97%)    Parameters below as of 06 Apr 2024 22:00  Patient On (Oxygen Delivery Method): nasal cannula  O2 Flow (L/min): 2    limited exam as deferred for comfort  CONSTITUTIONAL: NAD, thin  EYES: eyes closedr  ENMT: dry oral mucosa; poor dentition  RESPIRATORY: Normal respiratory effort  ABDOMEN: soft  MUSCULOSKELETAL: no clubbing or cyanosis of digits; no joint swelling or tenderness to palpation  PSYCH: unable to assess  NEUROLOGY: unable to assess   : timmons in place draining minimal urine    LABS:

## 2024-04-07 NOTE — PROGRESS NOTE ADULT - PROBLEM SELECTOR PLAN 1
Sacral decubitus ulcer, stage IV.   Chronic sacral and back decubiti ulcer c/b osteomyelitis  Surgical consultation recommends no acute surgical intervention, patient with severe comorbidities and not a candidate at this time for bedside or OR debridement with active PE w/ RHS/levophed gtt/hep gtt.  pt comfort care

## 2024-04-07 NOTE — PROGRESS NOTE ADULT - PROVIDER SPECIALTY LIST ADULT
CCU
Surgery
CCU
CCU
Infectious Disease
CCU
Hospitalist
Palliative Care

## 2024-04-07 NOTE — PROGRESS NOTE ADULT - PROBLEM SELECTOR PLAN 2
-CT Angio done revealing PE with right heart strain  -ECHO done revealing severely enlarged right ventricular cavity size and reduced systolic function.   -would like comfort measures at this time, no further heparin gtt or anticoagulation
Patient with high-risk PE with right heart strain. See CTA chest imaging results copied above.   Transitioned to comfort-focused care.   Dyspnea management as below.

## 2024-04-07 NOTE — PROGRESS NOTE ADULT - PROBLEM/PLAN-1
DISPLAY PLAN FREE TEXT
no paresthesia/fingers/toes warm to touch/capillary refill time < 2 seconds/no cyanosis of extremity

## 2024-04-07 NOTE — PROGRESS NOTE ADULT - NUTRITIONAL ASSESSMENT
This patient has been assessed with a concern for Malnutrition and has been determined to have a diagnosis/diagnoses of Severe protein-calorie malnutrition and Underweight (BMI < 19).  

## 2024-04-07 NOTE — PROGRESS NOTE ADULT - ASSESSMENT
83M from [facility] [place] CAD s/p stents, HTN, T2DM, AAA s/p graft/stent, recent UGIB, Fermin's esophagitis, GERD, BPH, Bladder Cancer, 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 Patient  presents to the emergency department from [facility] due to worsening sacral wound with imaging of osteomyelitis. During transport, HR dropped to 30 with O2 of 76, was placed on 6L NC. While in the ED, patient started having dysarthria, contraction of left upper extremity. Code stroke was called CT head and brain negative for infarct. CT Angio of chest showed an acute left lower lobe PE with findings of right heart strain with left femoral DVT. In ED, pt. started on heparin gtt. for PE, levophed started  for hypotension. family has now decided for comfort focused care

## 2024-04-07 NOTE — PROGRESS NOTE ADULT - PROBLEM SELECTOR PLAN 3
Patient with extensive, infected sacral wound from L1 to coccyx with exposed vertebrae, osteomyelitis, gas tracking into spinal canal, and bilateral gluteal abscesses. See CTAP imaging results copied above.   Transitioned to comfort-focused care.   Pain management as below.
Changing dressing changes to QD as patient in pain overnight 4/3/24 per RN
Patient with extensive, infected sacral wound from L1 to coccyx with exposed vertebrae, osteomyelitis, gas tracking into spinal canal, and bilateral gluteal abscesses. See CTAP imaging results copied above.   Transitioned to comfort-focused care.   Pain management as below.

## 2024-04-17 ENCOUNTER — APPOINTMENT (OUTPATIENT)
Dept: GASTROENTEROLOGY | Facility: CLINIC | Age: 84
End: 2024-04-17

## 2025-06-01 NOTE — PROCEDURE NOTE - NSPROCDETAILS_GEN_ALL_CORE
guidewire recovered/lumen(s) aspirated and flushed/sterile dressing applied/sterile technique, catheter placed/ultrasound guidance with use of sterile gel and probe cove
location identified, draped/prepped, sterile technique used, needle inserted/introduced/positive blood return obtained via catheter/connected to a pressurized flush line/sutured in place/hemostasis with direct pressure, dressing applied/Seldinger technique/all materials/supplies accounted for at end of procedure
Never

## (undated) DEVICE — CONTAINER FORMALIN 80ML YELLOW

## (undated) DEVICE — GOWN LG

## (undated) DEVICE — CATH IV SAFE BC 22G X 1" (BLUE)

## (undated) DEVICE — DRSG BANDAID 0.75X3"

## (undated) DEVICE — PACK IV START WITH CHG

## (undated) DEVICE — BITE BLOCK ADULT 20 X 27MM (GREEN)

## (undated) DEVICE — BASIN EMESIS 10IN GRADUATED MAUVE

## (undated) DEVICE — BIOPSY FORCEP RADIAL JAW 4 STANDARD WITH NEEDLE

## (undated) DEVICE — SALIVA EJECTOR (BLUE)

## (undated) DEVICE — BIOPSY FORCEP COLD DISP

## (undated) DEVICE — DENTURE CUP PINK

## (undated) DEVICE — DRSG 2X2

## (undated) DEVICE — CLAMP BX HOT RAD JAW 3

## (undated) DEVICE — TUBING IV SET GRAVITY 3Y 100" MACRO

## (undated) DEVICE — UNDERPAD LINEN SAVER 17 X 24"

## (undated) DEVICE — TUBING MEDI-VAC W MAXIGRIP CONNECTORS 1/4"X6'

## (undated) DEVICE — ELCTR ECG CONDUCTIVE ADHESIVE

## (undated) DEVICE — DRSG CURITY GAUZE SPONGE 4 X 4" 12-PLY NON-STERILE

## (undated) DEVICE — LUBRICATING JELLY HR ONE SHOT 3G